# Patient Record
Sex: FEMALE | Race: WHITE | NOT HISPANIC OR LATINO | Employment: OTHER | URBAN - METROPOLITAN AREA
[De-identification: names, ages, dates, MRNs, and addresses within clinical notes are randomized per-mention and may not be internally consistent; named-entity substitution may affect disease eponyms.]

---

## 2017-01-06 ENCOUNTER — HOSPITAL ENCOUNTER (OUTPATIENT)
Dept: GENERAL RADIOLOGY | Facility: HOSPITAL | Age: 67
Discharge: HOME | End: 2017-01-06
Attending: INTERNAL MEDICINE | Admitting: INTERNAL MEDICINE

## 2017-01-11 ENCOUNTER — HOSPITAL ENCOUNTER (OUTPATIENT)
Dept: MRI IMAGING | Facility: HOSPITAL | Age: 67
Discharge: HOME | End: 2017-01-11
Attending: INTERNAL MEDICINE | Admitting: INTERNAL MEDICINE

## 2017-01-16 ENCOUNTER — HOSPITAL ENCOUNTER (OUTPATIENT)
Dept: PAIN MEDICINE | Facility: HOSPITAL | Age: 67
Discharge: HOME | End: 2017-01-16
Attending: PAIN MEDICINE | Admitting: PAIN MEDICINE

## 2017-03-07 ENCOUNTER — HOSPITAL ENCOUNTER (OUTPATIENT)
Dept: CARDIOLOGY | Facility: HOSPITAL | Age: 67
Discharge: HOME | End: 2017-03-07
Attending: INTERNAL MEDICINE | Admitting: INTERNAL MEDICINE

## 2018-01-18 ENCOUNTER — HOSPITAL ENCOUNTER (OUTPATIENT)
Dept: PAIN MEDICINE | Facility: HOSPITAL | Age: 68
Discharge: HOME | End: 2018-01-18
Attending: PHYSICAL MEDICINE & REHABILITATION | Admitting: PHYSICAL MEDICINE & REHABILITATION

## 2018-02-01 ENCOUNTER — HOSPITAL ENCOUNTER (OUTPATIENT)
Dept: PAIN MEDICINE | Facility: HOSPITAL | Age: 68
Discharge: HOME | End: 2018-02-01
Attending: PHYSICAL MEDICINE & REHABILITATION | Admitting: PHYSICAL MEDICINE & REHABILITATION

## 2018-02-22 ENCOUNTER — HOSPITAL ENCOUNTER (OUTPATIENT)
Dept: PAIN MEDICINE | Facility: HOSPITAL | Age: 68
Discharge: HOME | End: 2018-02-22
Attending: PHYSICAL MEDICINE & REHABILITATION | Admitting: PHYSICAL MEDICINE & REHABILITATION

## 2018-03-08 ENCOUNTER — HOSPITAL ENCOUNTER (OUTPATIENT)
Dept: PAIN MEDICINE | Facility: HOSPITAL | Age: 68
Discharge: HOME | End: 2018-03-08
Attending: PHYSICAL MEDICINE & REHABILITATION | Admitting: PHYSICAL MEDICINE & REHABILITATION

## 2018-05-08 ENCOUNTER — HOSPITAL ENCOUNTER (OUTPATIENT)
Dept: PAIN MEDICINE | Facility: HOSPITAL | Age: 68
Discharge: HOME | End: 2018-05-08
Attending: PHYSICAL MEDICINE & REHABILITATION | Admitting: PHYSICAL MEDICINE & REHABILITATION

## 2018-05-15 ENCOUNTER — HOSPITAL ENCOUNTER (OUTPATIENT)
Dept: ORTHOPEDIC SURGERY | Facility: CLINIC | Age: 68
Discharge: HOME | End: 2018-05-15
Attending: ORTHOPAEDIC SURGERY | Admitting: ORTHOPAEDIC SURGERY

## 2018-06-28 ENCOUNTER — HOSPITAL ENCOUNTER (OUTPATIENT)
Dept: ORTHOPEDIC SURGERY | Facility: CLINIC | Age: 68
Discharge: HOME | End: 2018-06-28
Attending: PHYSICIAN ASSISTANT | Admitting: PHYSICIAN ASSISTANT

## 2018-07-16 ENCOUNTER — HOSPITAL ENCOUNTER (OUTPATIENT)
Dept: PHYSICAL THERAPY | Facility: HOSPITAL | Age: 68
Setting detail: RECURRING SERIES
Discharge: HOME | End: 2018-09-26
Attending: PHYSICIAN ASSISTANT | Admitting: PHYSICIAN ASSISTANT

## 2018-08-13 ENCOUNTER — HOSPITAL ENCOUNTER (OUTPATIENT)
Dept: PAIN MEDICINE | Facility: HOSPITAL | Age: 68
Discharge: HOME | End: 2018-08-13
Attending: PHYSICAL MEDICINE & REHABILITATION | Admitting: PHYSICAL MEDICINE & REHABILITATION

## 2018-12-06 ENCOUNTER — HOSPITAL ENCOUNTER (OUTPATIENT)
Dept: PAIN MEDICINE | Facility: HOSPITAL | Age: 68
Discharge: HOME | End: 2018-12-06
Attending: PHYSICAL MEDICINE & REHABILITATION | Admitting: PHYSICAL MEDICINE & REHABILITATION

## 2018-12-21 ENCOUNTER — HOSPITAL ENCOUNTER (OUTPATIENT)
Dept: MRI IMAGING | Facility: HOSPITAL | Age: 68
Discharge: HOME | End: 2018-12-21
Attending: PHYSICAL MEDICINE & REHABILITATION | Admitting: PHYSICAL MEDICINE & REHABILITATION

## 2019-01-10 ENCOUNTER — HOSPITAL ENCOUNTER (OUTPATIENT)
Dept: PAIN MEDICINE | Facility: HOSPITAL | Age: 69
Discharge: HOME | End: 2019-01-10
Attending: PHYSICAL MEDICINE & REHABILITATION | Admitting: PHYSICAL MEDICINE & REHABILITATION

## 2019-02-01 ENCOUNTER — HOSPITAL ENCOUNTER (OUTPATIENT)
Dept: FAMILY MEDICINE CLINIC | Facility: CLINIC | Age: 69
Setting detail: SPECIMEN
Discharge: HOME | End: 2019-02-01
Attending: INTERNAL MEDICINE | Admitting: INTERNAL MEDICINE

## 2019-02-01 LAB
ALBUMIN SERPL-MCNC: 3.6 G/DL (ref 3.5–4.8)
ALBUMIN/GLOB SERPL: 1.2 {RATIO} (ref 1–1.7)
ALP SERPL-CCNC: 81 IU/L (ref 32–91)
ALT SERPL-CCNC: 17 IU/L (ref 14–54)
ANION GAP SERPL CALC-SCNC: 14.2 MMOL/L (ref 10–20)
AST SERPL-CCNC: 25 IU/L (ref 15–41)
BILIRUB SERPL-MCNC: 0.3 MG/DL (ref 0.3–1.2)
BUN SERPL-MCNC: 12 MG/DL (ref 8–20)
BUN/CREAT SERPL: 17.1 (ref 5.4–26.2)
CALCIUM SERPL-MCNC: 9.6 MG/DL (ref 8.9–10.3)
CHLORIDE SERPL-SCNC: 98 MMOL/L (ref 101–111)
CONV CO2: 24 MMOL/L (ref 22–32)
CONV TOTAL PROTEIN: 6.7 G/DL (ref 6.1–7.9)
CREAT UR-MCNC: 0.7 MG/DL (ref 0.4–1)
ERYTHROCYTE [SEDIMENTATION RATE] IN BLOOD BY WESTERGREN METHOD: 82 MM/HR (ref 0–30)
GLOBULIN UR ELPH-MCNC: 3.1 G/DL (ref 2.5–3.8)
GLUCOSE SERPL-MCNC: 85 MG/DL (ref 65–99)
POTASSIUM SERPL-SCNC: 4.2 MMOL/L (ref 3.6–5.1)
SODIUM SERPL-SCNC: 132 MMOL/L (ref 136–144)

## 2019-02-04 LAB
ANA SER QL IA: NORMAL

## 2019-02-13 ENCOUNTER — HOSPITAL ENCOUNTER (OUTPATIENT)
Dept: LAB | Facility: HOSPITAL | Age: 69
Discharge: HOME | End: 2019-02-13
Attending: NURSE PRACTITIONER | Admitting: NURSE PRACTITIONER

## 2019-02-13 LAB
ALBUMIN SERPL-MCNC: 3.5 G/DL (ref 3.5–4.8)
ALPHA1 GLOB FLD ELPH-MCNC: 0.3 GM/DL (ref 0.1–0.4)
ALPHA2 GLOB SERPL ELPH-MCNC: 1 GM/DL (ref 0.5–1)
B-GLOBULIN SERPL ELPH-MCNC: 1 GM/DL (ref 0.7–1.4)
BILIRUB UR QL STRIP: NEGATIVE MG/DL
CASTS URNS QL MICRO: ABNORMAL /[LPF]
CK SERPL-CCNC: 65 IU/L (ref 38–234)
COLOR UR: YELLOW
CONV BACTERIA IN URINE MICRO: NEGATIVE
CONV CLARITY OF URINE: CLEAR
CONV HYALINE CASTS IN URINE MICRO: 0 /[LPF] (ref 0–5)
CONV PROTEIN IN URINE BY AUTOMATED TEST STRIP: NEGATIVE MG/DL
CONV SMALL ROUND CELLS: ABNORMAL /[HPF]
CONV TOTAL PROTEIN: 7.1 G/DL (ref 6.1–7.9)
CONV UROBILINOGEN IN URINE BY AUTOMATED TEST STRIP: 0.2 MG/DL
CRP SERPL-MCNC: 0.41 MG/DL (ref 0–0.7)
CULTURE INDICATED?: ABNORMAL
ERYTHROCYTE [DISTWIDTH] IN BLOOD BY AUTOMATED COUNT: 14.3 % (ref 11.5–14.5)
GAMMA GLOB SERPL ELPH-MCNC: 1.2 GM/DL (ref 0.6–1.6)
GLUCOSE UR QL: NEGATIVE MG/DL
HCT VFR BLD AUTO: 36.7 % (ref 35–49)
HGB BLD-MCNC: 12 G/DL (ref 12–15)
HGB UR QL STRIP: NEGATIVE
INSULIN SERPL-ACNC: NORMAL U[IU]/ML
KETONES UR QL STRIP: NEGATIVE MG/DL
LEUKOCYTE ESTERASE UR QL STRIP: ABNORMAL
MAGNESIUM SERPL-MCNC: 1.9 MG/DL (ref 1.8–2.5)
MCH RBC QN AUTO: 34.5 PG (ref 26–32)
MCHC RBC AUTO-ENTMCNC: 32.7 G/DL (ref 32–36)
MCV RBC AUTO: 105.2 FL (ref 80–94)
NITRITE UR QL STRIP: NEGATIVE
PH UR STRIP.AUTO: 7 [PH] (ref 4.5–8)
PLATELET # BLD AUTO: 499 10*3/UL (ref 150–450)
PMV BLD AUTO: 7.4 FL (ref 7.4–10.4)
RBC # BLD AUTO: 3.49 10*6/UL (ref 4–5.4)
RBC #/AREA URNS HPF: 0 /[HPF] (ref 0–3)
SP GR UR: 1.01 (ref 1–1.03)
SPERM URNS QL MICRO: ABNORMAL /[HPF]
SQUAMOUS SPT QL MICRO: 3 /[HPF] (ref 0–5)
UNIDENT CRYS URNS QL MICRO: ABNORMAL /[HPF]
WBC # BLD AUTO: 11.5 10*3/UL (ref 4.5–11.5)
WBC #/AREA URNS HPF: 4 /[HPF] (ref 0–5)
YEAST SPEC QL WET PREP: ABNORMAL /[HPF]

## 2019-02-15 LAB
ANA SER QL IA: NORMAL
C3 SERPL-MCNC: 110 MG/DL (ref 79–152)
C4 SERPL-MCNC: 26.4 MG/DL (ref 18–55)

## 2019-04-15 ENCOUNTER — HOSPITAL ENCOUNTER (OUTPATIENT)
Dept: ORTHOPEDIC SURGERY | Facility: CLINIC | Age: 69
Discharge: HOME | End: 2019-04-15
Attending: NEUROLOGICAL SURGERY | Admitting: NEUROLOGICAL SURGERY

## 2019-06-03 ENCOUNTER — CONVERSION ENCOUNTER (OUTPATIENT)
Dept: ORTHOPEDIC SURGERY | Facility: CLINIC | Age: 69
End: 2019-06-03

## 2019-06-03 ENCOUNTER — HOSPITAL ENCOUNTER (OUTPATIENT)
Dept: ORTHOPEDIC SURGERY | Facility: CLINIC | Age: 69
Discharge: HOME | End: 2019-06-03
Attending: NEUROLOGICAL SURGERY | Admitting: NEUROLOGICAL SURGERY

## 2019-06-04 VITALS
HEIGHT: 65 IN | DIASTOLIC BLOOD PRESSURE: 85 MMHG | SYSTOLIC BLOOD PRESSURE: 125 MMHG | HEART RATE: 79 BPM | BODY MASS INDEX: 31.95 KG/M2

## 2019-06-06 NOTE — PROGRESS NOTES
Referring Provider:  Uyen Cleveland MD  Primary Provider:  Uyen Cleveland MD    CC:  back pain.    History of Present Illness:  Mrs. Schoen He is here today for her 2 week postoperative follow-up after undergoing a T10 through S2 posterior thoracic decompression and arthrodesis with correction of sagittal coronal deformity.  She states that when she left the rehab the MS Contin was   DC age.  The pain is now somewhat returns but mainly all in the right upper gluteal flank region.  Her preoperative back and leg pain have all resolved.  She is mildly tender palpation in the right superior gluteal region over the iliac crest.  her   incision is healing very well.  Her standing x-rays today demonstrate good hardware placement and spinal alignment.  At this time I feel that some of her pain is more mild fashion related.  Will start course of physical therapy would deep tissue and core   strengthening as well as prescribed her her MS Contin 50 mg p.o. twice a day.  I will see her back in 2 months with repeat standing x-rays.  She is to continue wearing her brace at this time.      Past Medical History:     Reviewed history from 08/09/2018 and no changes required:        stress test normal (2000)        HTN        hospitalizations: childbirth, surgery        thyroid problems        Inflammatory Arthritis         Rheumatology - Dr. Stein        Anxiety Disorder        Depression        Polymyalgia Rheumatica         left knee pain        No Drug Allergies?     Past Surgical History:     Reviewed history from 06/19/2018 and no changes required:        ruptured appendix        Left TKR 6/13/18 Dr. Vega        5/19/19 T10-S1 posterior arthrodesis correction of sagittal  coronal deformity with L1-S1 decompression - Dr Emmanuel Ley        Social History:     Passive Smoke: Y     Alcohol Use: N     Drug Use: N     HIV/High Risk: N     Regular Exercise: N     Hx Domestic Abuse: N     Yazdanism Affecting Care: N      Marital Status:      Children:      Occupation:      Smoking History:     Patient currently smokes every day.     Patient has been counseled to quit.        Risk Factors:     Smoked Tobacco Use:  Current every day smoker     Cigarettes:  Yes -- 1 pack(s) per day,      Years smoked:  30  Smokeless Tobacco Use:  Never     Counseled to quit/cut down:  yes  Passive smoke exposure:  yes  Drug use:  no  HIV high-risk behavior:  no  Alcohol use:  no  Exercise:  no  Seatbelt use:  100 %  Sun Exposure:  occasionally      Vital Signs:    Patient Profile:    69 Years Old Female  Height:     65 inches (165.10 cm)  Pulse rate: 79 / minute  BP Sittin / 85        Problems: Active problems were reviewed with the patient during this visit.  Medications: Medications were reviewed with the patient during this visit.  Allergies: Allergies were reviewed with the patient during this visit.  No Known Allergy.        Vitals Entered By: Erica MORA (Makayla  3, 2019 12:55 PM)    Active Medications (reviewed today):  NORVASC 2.5 MG ORAL TABLET (AMLODIPINE BESYLATE) take one tablet daily  MEDROL 4 MG ORAL TABLET THERAPY PACK (METHYLPREDNISOLONE) by mouth as directed on package  TIZANIDINE HCL 2 MG ORAL TABLET (TIZANIDINE HCL) 1 tab po Q8hrs as needed for muscle spasms.  PREDNISONE 20 MG ORAL TABLET (PREDNISONE) 2 po qd x 4 days then 1 po qd x 10 days, then 1/2 daily  LOSARTAN POTASSIUM 100 MG ORAL TABLET (LOSARTAN POTASSIUM) one tablet by mouth daily  GABAPENTIN 300 MG CAPSULE (GABAPENTIN) TAKE 3 in the morning, 1 in the afternoon nan 3 at night  LEVOTHYROXINE SODIUM 125 MCG ORAL TABLET (LEVOTHYROXINE SODIUM) one tablet by mouth daily    Current Allergies (reviewed today):  No known allergies    Current Medications (including medications started today):   HYDROCODONE-ACETAMINOPHEN 5-325 MG ORAL TABLET (HYDROCODONE-ACETAMINOPHEN) 1 po qid  NORVASC 2.5 MG ORAL TABLET (AMLODIPINE BESYLATE) take one tablet daily  MEDROL 4 MG ORAL  TABLET THERAPY PACK (METHYLPREDNISOLONE) by mouth as directed on package  TIZANIDINE HCL 2 MG ORAL TABLET (TIZANIDINE HCL) 1 tab po Q8hrs as needed for muscle spasms.  PREDNISONE 20 MG ORAL TABLET (PREDNISONE) 2 po qd x 4 days then 1 po qd x 10 days, then 1/2 daily  LOSARTAN POTASSIUM 100 MG ORAL TABLET (LOSARTAN POTASSIUM) one tablet by mouth daily  GABAPENTIN 300 MG CAPSULE (GABAPENTIN) TAKE 3 in the morning, 1 in the afternoon nan 3 at night  LEVOTHYROXINE SODIUM 125 MCG ORAL TABLET (LEVOTHYROXINE SODIUM) one tablet by mouth daily          Blood Pressure:  Today's BP: 125/85 mm Hg    Labwork:   Most Recent Lab Results:   LDL: na mg/dL 11/29/2012  HbA1c: : 5.5 % 06/04/2018          Medications Added to Medication List This Visit:  1)  Hydrocodone-acetaminophen 5-325 Mg Oral Tablet (Hydrocodone-acetaminophen) .... 1 po qid      ]      Electronically signed by Emmanuel Ley MD on 06/03/2019 at 4:43 PM  ________________________________________________________________________  Clinical Lists Changes    Orders:  Added new Referral order of Home Physical Therapy (HPT) - Signed                          Electronically signed by Kimberly Jones CMA on 06/05/2019 at 2:03 PM  ________________________________________________________________________       Disclaimer: Converted Note message may not contain all data elements that existed in the legacy source system. Please see [facility] [facility] for the original note details.

## 2019-06-27 ENCOUNTER — TELEPHONE (OUTPATIENT)
Dept: NEUROSURGERY | Facility: CLINIC | Age: 69
End: 2019-06-27

## 2019-06-28 DIAGNOSIS — M43.20 FUSION OF SPINE, UNSPECIFIED SPINAL REGION: Primary | ICD-10-CM

## 2019-07-22 RX ORDER — GABAPENTIN 300 MG/1
CAPSULE ORAL
Qty: 210 CAPSULE | Refills: 2 | Status: SHIPPED | OUTPATIENT
Start: 2019-07-22 | End: 2019-10-21 | Stop reason: SDUPTHER

## 2019-07-30 ENCOUNTER — OFFICE VISIT (OUTPATIENT)
Dept: PHYSICAL THERAPY | Facility: CLINIC | Age: 69
End: 2019-07-30

## 2019-07-30 DIAGNOSIS — M43.20 FUSION OF SPINE, UNSPECIFIED SPINAL REGION: Primary | ICD-10-CM

## 2019-07-30 PROCEDURE — 97162 PT EVAL MOD COMPLEX 30 MIN: CPT | Performed by: PHYSICAL THERAPIST

## 2019-07-30 PROCEDURE — 97110 THERAPEUTIC EXERCISES: CPT | Performed by: PHYSICAL THERAPIST

## 2019-07-30 NOTE — PROGRESS NOTES
Physical Therapy Initial Evaluation and Plan of Care    Patient: Connie A Schoen   : 1950  Diagnosis/ICD-10 Code:  Fusion of spine, unspecified spinal region [M43.20]  Referring practitioner: Iram Arambula PA-C  Date of Initial Visit: 2019  Today's Date: 2019  Patient seen for 1 sessions           Subjective Questionnaire: Oswestry: 32%      Subjective Evaluation    History of Present Illness  Mechanism of injury: Pt reports she had spinal fusion May 17, 2019 then she went to [facility] for a week  after being in the hospital for 1 week.  She had OT for 1 week at home and PT for 3 weeks then DC'd.  She ended up with a stomach bug in early to mid July and was not able to come in to PT til now.  She walked as much as she could at home and is still walking but feels like she is behind on her progress.  She is unsure how long she is to wear her brace or how long she cannot bend; she is wearing her bone stimulator 2 hours per day.  She sees MD again on .  She is not employed; she enjoys playing with [employer], doing yardwork, attending sporting events including sitting on bleachers.  Symptoms prior to surgery included pain in right leg to foot, pain in right side of back.  All of that is gone now.    Her current restrictions are no bending, twisting or lifting.      Quality of life: good    Pain  Current pain ratin  At best pain ratin  At worst pain ratin  Quality: dull ache  Relieving factors: rest and medications  Aggravating factors: standing    Social Support  Lives in: multiple-level home (one step entry)  Lives with: spouse    Treatments  Previous treatment: home therapy  Discharged from (in last 30 days): home health care  Patient Goals  Patient goals for therapy: decreased pain, increased motion, independence with ADLs/IADLs and return to sport/leisure activities             Objective       Active Range of Motion     Additional Active Range of Motion Details  Deferred due to  surgical precautions    SLR: right 55 deg            Left 65 deg    Strength/Myotome Testing     Left Hip   Planes of Motion   Flexion: 4  Extension: 4  Abduction: 4    Right Hip   Planes of Motion   Flexion: 4  Extension: 4  Abduction: 4    Left Knee   Flexion: 4+  Extension: 4+    Right Knee   Flexion: 4+  Extension: 4+    Left Ankle/Foot   Dorsiflexion: 4+  Plantar flexion: 4+  Inversion: 4+  Eversion: 4+    Right Ankle/Foot   Dorsiflexion: 4+  Plantar flexion: 4+  Inversion: 4+  Eversion: 4+         Assessment & Plan     Assessment  Impairments: abnormal or restricted ROM, activity intolerance, impaired physical strength, lacks appropriate home exercise program, pain with function and weight-bearing intolerance  Assessment details: Pt is a 70 y/o wf with a dx of s/p spinal fusion May 17, 2019.  She went to rehab then had home health PT.  She has little pain with the most being while standing.  She has been walking as much as possible but had been ill with at stomach bug for 11 days.  She has a goal of getting back to yardwork and going to sporting events and activities with her grandkids.  She exhibits the above impairments and would benefit from skilled PT to address those, dec pain and maximize function.  Prognosis: good  Functional Limitations: carrying objects, walking and standing  Goals  Plan Goals: STGs:  3 weeks  1.  Pt to tolerate initial HEP without inc pain.  2.  Pt to tolerate advancement of HEP without inc pain.  3.  Pt to report pain has decreased by at least 25%.  4.  Assess spine ROM when appropriate and allowed per protocol.  5.  Pt to sit with proper posture without cues required.    6.  Obtain information from MD office re: restrictions and protocol.      LTGs:  By DC  1.  Pt to be (I) with HEP.  2.  Pt to report pain has decreased by at least 75% to allow better tolerance to and  performance of daily tasks.  3.  Pt to improve AROM of lumbar spine  to WFL considering her surgical status to  allow normal performance of daily tasks.  4.  Pt to improve strength of right LE to 4+/5 or better to be adequate for           completion/performance of daily tasks.  5.  Pt to exhibit improved function as indicated by improved score on oswestry vs score at eval.    6.  Pt to demonstrate (I) and appropriate use of body mechanics for daily home and/or work tasks.    Plan  Therapy options: will be seen for skilled physical therapy services  Planned modality interventions: thermotherapy (hydrocollator packs) and TENS  Planned therapy interventions: abdominal trunk stabilization, body mechanics training, home exercise program, therapeutic activities, strengthening, stretching, postural training, neuromuscular re-education and manual therapy  Frequency: 2x week  Duration in visits: 20  Treatment plan discussed with: patient        Timed:         Manual Therapy:         mins  31801;     Therapeutic Exercise:   15      mins  13848;     Neuromuscular Brooklynn:        mins  09647;    Therapeutic Activity:          mins  43078;     Gait Training:           mins  38086;     Ultrasound:          mins  79151;    Ionto:                                   mins   27391  Self Care:                            mins   66853  Canalith Repos:                  mins   36711      Un-Timed:  Electrical Stimulation:         mins  14599 ( );  Traction          mins 11530  Low Eval          Mins  75990  Mod Eval   40       Mins  26598  High Eval                            Mins  46490  Re-Eval                               mins  70218        Timed Treatment:  15    mins   Total Treatment:    55    mins    PT SIGNATURE: Vicki Deshpande PT   DATE TREATMENT INITIATED: 7/30/2019    Initial Certification  Certification Period: 10/28/2019  I certify that the therapy services are furnished while this patient is under my care.  The services outlined above are required by this patient, and will be reviewed every 90 days.     PHYSICIAN: Iram Arambula,  PA-C      DATE:     Please sign and return via fax to [phone].. Thank you, [facility].

## 2019-08-08 ENCOUNTER — OFFICE VISIT (OUTPATIENT)
Dept: PHYSICAL THERAPY | Facility: CLINIC | Age: 69
End: 2019-08-08

## 2019-08-08 DIAGNOSIS — M43.20 FUSION OF SPINE, UNSPECIFIED SPINAL REGION: Primary | ICD-10-CM

## 2019-08-08 PROCEDURE — 97110 THERAPEUTIC EXERCISES: CPT | Performed by: PHYSICAL THERAPIST

## 2019-08-08 NOTE — PROGRESS NOTES
Physical Therapy Daily Progress Note    VISIT#: 2    Subjective Evaluation    History of Present Illness  Mechanism of injury: Pt reports she has been having issues with bowels where she has to get to the BR quickly; at times she has a normal BM at home then she ends up with diarrhea.  She was scheduled for a colonoscopy before surgery and it had to be cancelled due to the surgery.  She has a family history of colon CA.    Back pain seems to be doing OK; she feels it in her ribs at times.    Pain  Current pain ratin             Objective     See Exercise, Manual, and Modality Logs for complete treatment.   Reviewed HEP from last session;  Added to program  Waiting to hear from surgeon's office re:  Restrictions at this point in recovery    Patient Education:  Follow restrictions as she has been told per MD office until further notice    Assessment & Plan     Assessment  Assessment details: Pt tolerated there ex well today with some difficulty due to incoordination          Progress per Plan of Care            Timed:         Manual Therapy:         mins  11432;     Therapeutic Exercise:   30      mins  42435;     Neuromuscular Brooklynn:        mins  96466;    Therapeutic Activity:          mins  10656;     Gait Training:           mins  13687;     Ultrasound:          mins  77148;    Ionto                                   mins   95254  Self Care                            mins   49758  Canalith Repos                   mins  4209    Un-Timed:  Electrical Stimulation:         mins  48869 ( );  Dry Needling          mins self-pay  Traction          mins 49500  Low Eval          Mins  87431  Mod Eval          Mins  19042  High Eval                            Mins  48721  Re-Eval                               mins  52275    Timed Treatment: 30     mins   Total Treatment:        mins    Vicki Deshpande, PT  Physical Therapist

## 2019-08-13 ENCOUNTER — OFFICE VISIT (OUTPATIENT)
Dept: PHYSICAL THERAPY | Facility: CLINIC | Age: 69
End: 2019-08-13

## 2019-08-13 DIAGNOSIS — M43.20 FUSION OF SPINE, UNSPECIFIED SPINAL REGION: Primary | ICD-10-CM

## 2019-08-13 PROCEDURE — 97110 THERAPEUTIC EXERCISES: CPT | Performed by: PHYSICAL THERAPIST

## 2019-08-13 NOTE — PROGRESS NOTES
Physical Therapy Daily Progress Note    VISIT#: 3    Subjective Evaluation    History of Present Illness  Mechanism of injury: Reports she has been doing well with exercises.  Cannot do the arm thing on the left at all due to pain in the arm             Objective     See Exercise, Manual, and Modality Logs for complete treatment.   Cont with there ex and added to HEP today    Patient Education:    Assessment & Plan     Assessment  Assessment details: Tolerated well with additions of ex today for home          Progress per Plan of Care            Timed:         Manual Therapy:         mins  92416;     Therapeutic Exercise:   30      mins  72309;     Neuromuscular Brooklynn:        mins  30257;    Therapeutic Activity:          mins  68997;     Gait Training:           mins  26900;     Ultrasound:          mins  42213;    Ionto                                   mins   99142  Self Care                            mins   48087  Canalith Repos                   mins  4209    Un-Timed:  Electrical Stimulation:         mins  93253 ( );  Dry Needling          mins self-pay  Traction          mins 88146  Low Eval          Mins  71114  Mod Eval          Mins  51294  High Eval                            Mins  63057  Re-Eval                               mins  62427    Timed Treatment:  30    mins   Total Treatment:   30     mins    Vicki Deshpande PT  Physical Therapist

## 2019-08-15 ENCOUNTER — OFFICE VISIT (OUTPATIENT)
Dept: PHYSICAL THERAPY | Facility: CLINIC | Age: 69
End: 2019-08-15

## 2019-08-15 DIAGNOSIS — M43.20 FUSION OF SPINE, UNSPECIFIED SPINAL REGION: Primary | ICD-10-CM

## 2019-08-15 PROCEDURE — 97110 THERAPEUTIC EXERCISES: CPT | Performed by: PHYSICAL THERAPIST

## 2019-08-15 NOTE — PROGRESS NOTES
Physical Therapy Daily Progress Note    VISIT#: 4    Subjective Evaluation    History of Present Illness  Mechanism of injury: She is sore today in her thighs and she is feeling like her back was having issues during washing the dishes;  Fatigue in a way but definitely discomfort.      Pain  Current pain ratin             Objective     See Exercise, Manual, and Modality Logs for complete treatment.   Cont with there ex as before reviewing HEP and adding new ex to program    Pt had left knee pain with right hip ext and ad in standing; unlocking left knee relieved that pain    Patient Education/Ther Act:  instr in standing with foot on shelf of lower cabinet during kitchen or bathroom tasks to dec force on back; she returned demo and is (I) with this and voices understanding    Assessment & Plan     Assessment  Assessment details: Good tolerance to there ex today with some soreness reported in thighs from HEP          Progress per Plan of Care            Timed:         Manual Therapy:         mins  54317;     Therapeutic Exercise:  35       mins  64148;     Neuromuscular Brooklynn:        mins  10734;    Therapeutic Activity:          mins  60823;     Gait Training:           mins  97771;     Ultrasound:          mins  95037;    Ionto                                   mins   17595  Self Care                            mins   53450  Canalith Repos                   mins  4209    Un-Timed:  Electrical Stimulation:         mins  56939 ( );  Dry Needling          mins self-pay  Traction          mins 37835  Low Eval          Mins  82722  Mod Eval          Mins  85188  High Eval                            Mins  57067  Re-Eval                               mins  96293    Timed Treatment:  35    mins   Total Treatment:    35    mins    Vicki Deshpande PT  Physical Therapist

## 2019-08-22 ENCOUNTER — OFFICE VISIT (OUTPATIENT)
Dept: PHYSICAL THERAPY | Facility: CLINIC | Age: 69
End: 2019-08-22

## 2019-08-22 DIAGNOSIS — M43.20 FUSION OF SPINE, UNSPECIFIED SPINAL REGION: Primary | ICD-10-CM

## 2019-08-22 PROCEDURE — 97110 THERAPEUTIC EXERCISES: CPT | Performed by: PHYSICAL THERAPIST

## 2019-08-22 NOTE — PROGRESS NOTES
Physical Therapy Daily Progress Note    VISIT#: 5    Subjective Evaluation    History of Present Illness  Mechanism of injury: Pt reports she went out of town and did a lot of walking and her right leg started to hurt/burn and get numb to the knee while in the car sitting for the ride on the way home.  The next day the upper thigh swelled.  Cancelled Monday PT appt due to still burning and swollen.  Tues and Wed it started to get better but even walking around the house caused burning.  She has pretty much stopped the ex given to her from PT due to fear of swelling and pain.  She feels like she is pounding on her right leg when she walks.  She also spent the day in the hospital with her son.      Pain  Current pain ratin             Objective     See Exercise, Manual, and Modality Logs for complete treatment.   Cont with supine and SL there ex to assess effect;  No inc pain with these    Patient Education: try to resume lying and or sitting ex at home then add in the standing ex as able/tolerated    Assessment & Plan     Assessment  Assessment details: iva well today without inc pain with ex here in clinic          Progress per Plan of Care            Timed:         Manual Therapy:         mins  40327;     Therapeutic Exercise:   40      mins  68037;     Neuromuscular Brooklynn:        mins  76486;    Therapeutic Activity:          mins  38126;     Gait Training:           mins  83985;     Ultrasound:          mins  54029;    Ionto                                   mins   93874  Self Care                            mins   36320  Canalith Repos                   mins  4209    Un-Timed:  Electrical Stimulation:         mins  08360 ( );  Dry Needling          mins self-pay  Traction          mins 73720  Low Eval          Mins  83851  Mod Eval          Mins  21278  High Eval                            Mins  24054  Re-Eval                               mins  22690    Timed Treatment:  40    mins   Total Treatment:         francisco javier Deshpande, PT  Physical Therapist

## 2019-08-26 ENCOUNTER — OFFICE VISIT (OUTPATIENT)
Dept: PHYSICAL THERAPY | Facility: CLINIC | Age: 69
End: 2019-08-26

## 2019-08-26 DIAGNOSIS — M43.20 FUSION OF SPINE, UNSPECIFIED SPINAL REGION: Primary | ICD-10-CM

## 2019-08-26 PROCEDURE — 97530 THERAPEUTIC ACTIVITIES: CPT | Performed by: PHYSICAL THERAPIST

## 2019-08-26 PROCEDURE — 97110 THERAPEUTIC EXERCISES: CPT | Performed by: PHYSICAL THERAPIST

## 2019-08-26 NOTE — PROGRESS NOTES
Physical Therapy Daily Progress Note    VISIT#: 6    Subjective Evaluation    History of Present Illness  Mechanism of injury: Pt reports she had inc pain from standing to mick krishnamurthyon; walking/standing combo more than 15 mins it starts to hurt.  Last week she went shopping for a bit after PT (at least 45 mins shopping).  She has not added back in the lying exercises.  She forgets a lot of the time due to being busy.  During the other act that have caused pain she hurts in the rib area rated at 3-4/10.      Pain  Current pain ratin             Objective     See Exercise, Manual, and Modality Logs for complete treatment.   Cont with there ex as noted  THERE ACT:  instr pt in squatting and/or golfer's bend to get something from the floor    Patient Education:  Follow your body:  Listen to pain signals and what you are doing at the time the pain starts; modify positions as needed to dec pressure on the spine    Assessment & Plan     Assessment  Assessment details: Pt cont to over-work herself and she experiences pain in spine that at times lingers for hours and she does not get relief unless she lies down          Progress per Plan of Care            Timed:         Manual Therapy:         mins  85287;     Therapeutic Exercise:  30       mins  52130;     Neuromuscular Brooklynn:        mins  84442;    Therapeutic Activity:   10       mins  36906;     Gait Training:           mins  69224;     Ultrasound:          mins  75189;    Ionto                                   mins   20213  Self Care                            mins   29741  Canalith Repos                   mins  4209    Un-Timed:  Electrical Stimulation:         mins  92683 ( );  Dry Needling          mins self-pay  Traction          mins 76156  Low Eval          Mins  50555  Mod Eval          Mins  80406  High Eval                            Mins  49937  Re-Eval                               mins  26185    Timed Treatment:  40    mins   Total Treatment:    40     francisco javier Deshpande, PT  Physical Therapist

## 2019-09-03 ENCOUNTER — OFFICE VISIT (OUTPATIENT)
Dept: PHYSICAL THERAPY | Facility: CLINIC | Age: 69
End: 2019-09-03

## 2019-09-03 DIAGNOSIS — M43.20 FUSION OF SPINE, UNSPECIFIED SPINAL REGION: Primary | ICD-10-CM

## 2019-09-03 PROCEDURE — 97110 THERAPEUTIC EXERCISES: CPT | Performed by: PHYSICAL THERAPIST

## 2019-09-03 NOTE — PROGRESS NOTES
Physical Therapy Daily Progress Note    VISIT#: 7    Subjective Evaluation    History of Present Illness  Mechanism of injury: Pt came in today without the brace; she didn't wear it over the weekend and she felt better without pain in the ribs.  She had been doing the ex but this weekend was bad for doing them twice a day.  She does them in the am about 10:00 then again in the evening around 5 pm but the weekends are bad due to being busy, having company,etc.      Pain  Current pain ratin             Objective     See Exercise, Manual, and Modality Logs for complete treatment.   Cont with there ex        Assessment & Plan     Assessment  Assessment details: Performed well with new addition today for strengthening and balance          Progress per Plan of Care            Timed:         Manual Therapy:         mins  63111;     Therapeutic Exercise:   30      mins  49306;     Neuromuscular Brooklynn:        mins  20491;    Therapeutic Activity:          mins  59392;     Gait Training:           mins  70642;     Ultrasound:          mins  36286;    Ionto                                   mins   10156  Self Care                            mins   85022  Canalith Repos                   mins  4209    Un-Timed:  Electrical Stimulation:         mins  59721 ( );  Dry Needling          mins self-pay  Traction          mins 60465  Low Eval          Mins  46344  Mod Eval          Mins  26248  High Eval                            Mins  04959  Re-Eval                               mins  26758    Timed Treatment: 30     mins   Total Treatment:    30    mins    Vicki Deshpande PT  Physical Therapist

## 2019-09-05 ENCOUNTER — OFFICE VISIT (OUTPATIENT)
Dept: NEUROSURGERY | Facility: CLINIC | Age: 69
End: 2019-09-05

## 2019-09-05 VITALS
SYSTOLIC BLOOD PRESSURE: 121 MMHG | RESPIRATION RATE: 18 BRPM | HEIGHT: 65 IN | HEART RATE: 74 BPM | DIASTOLIC BLOOD PRESSURE: 83 MMHG | WEIGHT: 183 LBS | OXYGEN SATURATION: 95 % | BODY MASS INDEX: 30.49 KG/M2

## 2019-09-05 DIAGNOSIS — M25.561 CHRONIC PAIN OF RIGHT KNEE: ICD-10-CM

## 2019-09-05 DIAGNOSIS — M41.57 SCOLIOSIS OF LUMBOSACRAL REGION DUE TO DEGENERATIVE DISEASE OF SPINE IN ADULT: ICD-10-CM

## 2019-09-05 DIAGNOSIS — M43.20 FUSION OF SPINE, UNSPECIFIED SPINAL REGION: Primary | ICD-10-CM

## 2019-09-05 DIAGNOSIS — G89.29 CHRONIC PAIN OF RIGHT KNEE: ICD-10-CM

## 2019-09-05 PROCEDURE — 99213 OFFICE O/P EST LOW 20 MIN: CPT | Performed by: NEUROLOGICAL SURGERY

## 2019-09-05 RX ORDER — LOSARTAN POTASSIUM 100 MG/1
TABLET ORAL
COMMUNITY
Start: 2019-08-12 | End: 2019-10-04 | Stop reason: SDUPTHER

## 2019-09-05 RX ORDER — LEVOTHYROXINE SODIUM 0.12 MG/1
TABLET ORAL
COMMUNITY
Start: 2019-08-08 | End: 2019-10-08 | Stop reason: SDUPTHER

## 2019-09-05 RX ORDER — SERTRALINE HYDROCHLORIDE 100 MG/1
TABLET, FILM COATED ORAL
COMMUNITY
Start: 2019-07-11 | End: 2020-01-30

## 2019-09-05 NOTE — PROGRESS NOTES
"Subjective   Connie A Schoen is a 69 y.o. female.     Chief Complaint   Patient presents with   • Follow-up     T10-S1 poster arthrodesis correction     Visit Vitals  /83 (BP Location: Right arm, Patient Position: Sitting, Cuff Size: Large Adult)   Pulse 74   Resp 18   Ht 165.1 cm (65\")   Wt 83 kg (183 lb)   SpO2 95%   BMI 30.45 kg/m²       History of Present Illness: Mrs Schoen is here today for follow-up.  She is a month status post a T10-S2 posterior decompression and arthrodesis with correction of sagittal deformity.  She states that she only has minimal back pain in all of her leg pain is resolved.  She is actually describing worsening right knee pain as she is been ambulating more.  She did have her left knee replaced several years ago.  She is seen orthopedics in the past for her knee.    The following portions of the patient's history were reviewed and updated as appropriate: allergies, current medications, past family history, past medical history, past social history, past surgical history and problem list.    Review of Systems      Past Surgical History:   Procedure Laterality Date   • APPENDECTOMY     • BACK SURGERY      T10-S1 poster lumbar fusion correction   • TOTAL KNEE ARTHROPLASTY Left        Past Medical History:   Diagnosis Date   • Anxiety    • Arthritis    • Depression    • HTN (hypertension)    • RA (rheumatoid arthritis) (CMS/[facility])    • Thyroid disease        Objective   Physical Exam  Neurologic Exam  Ortho Exam    Tender to palpation of the right knee at the patellar fat pad  No obvious edema  No focal motor deficits    Standing thoracolumbar films demonstrate all the hardware to be in good position with good correction of the sagittal coronal balance    Assessment and Plan: At this time Jazmine is doing quite well postoperatively.  I do feel that her knee is problematic probably more from redistribution of her balance and mobilizing more.  I will refer her to orthopedics for evaluation " of her knee.  I will see her back in 3 months with repeat standing films.  She is to come out of the brace at this time.  She can continue her physical therapy and continue working on her hip flexibility and hamstring flexibility.      Problems Addressed this Visit     None

## 2019-09-23 ENCOUNTER — TREATMENT (OUTPATIENT)
Dept: PHYSICAL THERAPY | Facility: CLINIC | Age: 69
End: 2019-09-23

## 2019-09-23 DIAGNOSIS — M43.20 FUSION OF SPINE, UNSPECIFIED SPINAL REGION: Primary | ICD-10-CM

## 2019-09-23 PROCEDURE — 97110 THERAPEUTIC EXERCISES: CPT | Performed by: PHYSICAL THERAPIST

## 2019-09-23 NOTE — PROGRESS NOTES
Physical Therapy Daily Progress Note    VISIT#: 8    Subjective Evaluation    History of Present Illness  Mechanism of injury: Pt has missed a lot of visits of PT due to various reasons but she is having a lot of pain and into her ribs.  Her knee is bothering her so she is not able to do the one exercises for sit to stands.  She saw assistant at surgeon's office and she said to stop PT for 2 weeks to give herself a rest.  She asked the MD and he said to cont PT.  When she has an inc in pain it often finally goes away but it might take awhile.  She works around the house a lot and she tries to keep her back straight.  He told her she is looking at 6 months minimum and maybe up to a year before she will feel really back to normal.  She has not done her HEP as much as she should.      Pain  Current pain rating: 3  Location: LB and ribcage             Objective     See Exercise, Manual, and Modality Logs for complete treatment.   Cont with there ex with review of HEP to help ensure compliance    Patient Education:    Assessment & Plan     Assessment  Assessment details: Pt performed most of current HEP without inc pain in back          Progress per Plan of Care            Timed:         Manual Therapy:         mins  65753;     Therapeutic Exercise:   30      mins  08944;     Neuromuscular Brooklynn:        mins  76557;    Therapeutic Activity:          mins  38860;     Gait Training:           mins  46385;     Ultrasound:          mins  13979;    Ionto                                   mins   48441  Self Care                            mins   74465  Canalith Repos                   mins  4209    Un-Timed:  Electrical Stimulation:         mins  17140 ( );  Dry Needling          mins self-pay  Traction          mins 62387  Low Eval          Mins  59759  Mod Eval          Mins  68001  High Eval                            Mins  74434  Re-Eval                               mins  11728    Timed Treatment:  30    mins    Total Treatment:    30    mins    Vicki Deshpande, PT  Physical Therapist

## 2019-10-04 RX ORDER — LOSARTAN POTASSIUM 100 MG/1
100 TABLET ORAL DAILY
Qty: 90 TABLET | Refills: 0 | Status: SHIPPED | OUTPATIENT
Start: 2019-10-04 | End: 2019-10-10 | Stop reason: SDUPTHER

## 2019-10-08 RX ORDER — LEVOTHYROXINE SODIUM 0.12 MG/1
TABLET ORAL
Qty: 30 TABLET | Refills: 10 | Status: SHIPPED | OUTPATIENT
Start: 2019-10-08 | End: 2020-10-19

## 2019-10-10 RX ORDER — LOSARTAN POTASSIUM 100 MG/1
TABLET ORAL
Qty: 30 TABLET | Refills: 10 | Status: SHIPPED | OUTPATIENT
Start: 2019-10-10 | End: 2019-10-17 | Stop reason: SDUPTHER

## 2019-10-17 RX ORDER — LOSARTAN POTASSIUM 100 MG/1
TABLET ORAL
Qty: 30 TABLET | Refills: 10 | Status: SHIPPED | OUTPATIENT
Start: 2019-10-17 | End: 2020-08-25 | Stop reason: SDUPTHER

## 2019-10-21 RX ORDER — GABAPENTIN 300 MG/1
CAPSULE ORAL
Qty: 210 CAPSULE | Refills: 1 | Status: SHIPPED | OUTPATIENT
Start: 2019-10-21 | End: 2019-12-27

## 2019-11-21 ENCOUNTER — OFFICE VISIT (OUTPATIENT)
Dept: NEUROSURGERY | Facility: CLINIC | Age: 69
End: 2019-11-21

## 2019-11-21 VITALS
DIASTOLIC BLOOD PRESSURE: 80 MMHG | WEIGHT: 186.6 LBS | BODY MASS INDEX: 31.09 KG/M2 | HEART RATE: 74 BPM | HEIGHT: 65 IN | SYSTOLIC BLOOD PRESSURE: 119 MMHG

## 2019-11-21 DIAGNOSIS — M43.20 FUSION OF SPINE, UNSPECIFIED SPINAL REGION: Primary | ICD-10-CM

## 2019-11-21 PROCEDURE — 99213 OFFICE O/P EST LOW 20 MIN: CPT | Performed by: NEUROLOGICAL SURGERY

## 2019-11-21 RX ORDER — GINKGO BILOBA LEAF EXTRACT 60 MG
CAPSULE ORAL
COMMUNITY

## 2019-12-02 NOTE — PROGRESS NOTES
"Subjective Connie A Schoen is a 69 y.o. female.     Chief Complaint   Patient presents with   • Back Pain     increased pain in ribs bilat front     Visit Vitals  /80 (BP Location: Left arm, Patient Position: Sitting, Cuff Size: Large Adult)   Pulse 74   Ht 165.1 cm (65\")   Wt 84.6 kg (186 lb 9.6 oz)   BMI 31.05 kg/m²       History of Present Illness:  Mrs Connie Schoen is a 68 y/o female that presents today a 6 month post op follow up. In May 2019 she underwent a T10-S2 posterior decompression and arthrodesis with correction of sagittal and coronal deformity.  She continues to have resolution on her leg pain and her back continues to improve. However, she fell about three weeks ago and is concerned about the hardware in her back with an increase in her back pain after the fall.     The following portions of the patient's history were reviewed and updated as appropriate: allergies, current medications, past family history, past medical history, past social history, past surgical history and problem list.    Review of Systems   Constitutional: Positive for activity change. Negative for appetite change, chills, diaphoresis, fatigue and fever.   Gastrointestinal: Negative for abdominal pain, constipation, diarrhea, nausea and vomiting.   Genitourinary: Negative for urinary incontinence.   Musculoskeletal: Positive for back pain. Negative for gait problem, joint swelling, myalgias and neck pain.   Neurological: Negative for dizziness, weakness, numbness and headache.         Past Surgical History:   Procedure Laterality Date   • APPENDECTOMY     • BACK SURGERY      T10-S1 poster lumbar fusion correction   • TOTAL KNEE ARTHROPLASTY Left        Past Medical History:   Diagnosis Date   • Anxiety    • Arthritis    • Depression    • HTN (hypertension)    • RA (rheumatoid arthritis) (CMS/[facility])    • Thyroid disease        Objective   Physical Exam   Constitutional: She is oriented to person, place, and time. She appears " well-developed and well-nourished.   HENT:   Head: Normocephalic and atraumatic.   Eyes: Pupils are equal, round, and reactive to light.   Musculoskeletal: Normal range of motion.   Neurological: She is alert and oriented to person, place, and time.   No focal neurological deficits    No sensory deficits    No gait ataxia    No midline tenderness   Skin: Skin is warm.   Psychiatric: She has a normal mood and affect.   Vitals reviewed.    Neurologic Exam     Mental Status   Oriented to person, place, and time.     Cranial Nerves     CN III, IV, VI   Pupils are equal, round, and reactive to light.    Ortho Exam        Assessment and Plan:   I have reviewed the ap/lat XRs of her lumbar spine in the office today. The hardware is intact with no signs of lucency, it is stable in comparison to 9/5/2019. At this time, I would like to obtain a CT scan of her lumbar spine to assess for bony ingrowth, she is now 6 months out and recently fell. Further determinations will be based on the findings of her CT scan.       Problems Addressed this Visit        Musculoskeletal and Integument    Fusion of spine - Primary    Relevant Orders    XR Spine Lumbar AP & Lateral (Completed)    CT Lumbar Spine Without Contrast

## 2019-12-09 ENCOUNTER — OFFICE VISIT (OUTPATIENT)
Dept: NEUROSURGERY | Facility: CLINIC | Age: 69
End: 2019-12-09

## 2019-12-09 VITALS
DIASTOLIC BLOOD PRESSURE: 95 MMHG | SYSTOLIC BLOOD PRESSURE: 138 MMHG | RESPIRATION RATE: 18 BRPM | BODY MASS INDEX: 30.99 KG/M2 | WEIGHT: 186 LBS | HEART RATE: 82 BPM | HEIGHT: 65 IN

## 2019-12-09 DIAGNOSIS — M43.20 FUSION OF SPINE, UNSPECIFIED SPINAL REGION: ICD-10-CM

## 2019-12-09 DIAGNOSIS — M41.57 SCOLIOSIS OF LUMBOSACRAL REGION DUE TO DEGENERATIVE DISEASE OF SPINE IN ADULT: Primary | ICD-10-CM

## 2019-12-09 PROCEDURE — 99212 OFFICE O/P EST SF 10 MIN: CPT | Performed by: NEUROLOGICAL SURGERY

## 2019-12-09 NOTE — PROGRESS NOTES
"Subjective   Connie A Schoen is a 69 y.o. female.     Chief Complaint   Patient presents with   • Follow-up     s/p post. fusion T10-S2     Visit Vitals  /95 (BP Location: Right arm, Patient Position: Sitting, Cuff Size: Large Adult)   Pulse 82   Resp 18   Ht 165.1 cm (65\")   Wt 84.4 kg (186 lb)   BMI 30.95 kg/m²       History of Present Illness: Jazmine is here today for follow-up.  She is not obtaining the CT scan of her thoracolumbar spine yet.  She states she is having the pain in the upper thoracic area which is only aggravated with excessive motion and standing.  It is not that bad.  I discussed her at length that I need to see the CT scan but I feel like most likely she needs to work on flexibility in her hips.  I feel like she strained in the upper part of her spine but I do not feel that there is any evidence of hardware failure.  Her standing films demonstrate that there appears to be a solid arthrodesis.  At this time we will get the CT scan and bring her back after that and started a course of physical therapy for dedicated hip range of motion and flexibility therapy.    The following portions of the patient's history were reviewed and updated as appropriate: allergies, current medications, past family history, past medical history, past social history, past surgical history and problem list.    Review of Systems      Past Surgical History:   Procedure Laterality Date   • APPENDECTOMY     • BACK SURGERY      T10-S1 poster lumbar fusion correction   • TOTAL KNEE ARTHROPLASTY Left        Past Medical History:   Diagnosis Date   • Anxiety    • Arthritis    • Depression    • HTN (hypertension)    • RA (rheumatoid arthritis) (CMS/[facility])    • Thyroid disease        Objective   Physical Exam  Neurologic Exam  Ortho Exam        Assessment and Plan: Please see above      Problems Addressed this Visit     None                 "

## 2019-12-17 ENCOUNTER — HOSPITAL ENCOUNTER (OUTPATIENT)
Dept: CT IMAGING | Facility: HOSPITAL | Age: 69
Discharge: HOME | End: 2019-12-17
Admitting: NEUROLOGICAL SURGERY

## 2019-12-17 DIAGNOSIS — M43.20 FUSION OF SPINE, UNSPECIFIED SPINAL REGION: ICD-10-CM

## 2019-12-17 PROCEDURE — 72131 CT LUMBAR SPINE W/O DYE: CPT

## 2019-12-19 ENCOUNTER — DOCUMENTATION (OUTPATIENT)
Dept: PHYSICAL THERAPY | Facility: CLINIC | Age: 69
End: 2019-12-19

## 2019-12-19 NOTE — PROGRESS NOTES
Discharge Summary  Discharge Summary from Physical Therapy Report      Number of Visits: 8     Goals: Partially Met    Discharge Plan: Patient to return to referring/providing physician    Comments :  Pt has not been actively attending PT due to pain in her back and pain in her knee; she was going to see ortho about her knee but has not called back to schedule any appts.      Date of Discharge 12-19-19        Vicki Deshpande, PT  Physical Therapist

## 2019-12-27 RX ORDER — GABAPENTIN 300 MG/1
CAPSULE ORAL
Qty: 210 CAPSULE | Refills: 0 | Status: SHIPPED | OUTPATIENT
Start: 2019-12-27 | End: 2020-02-03

## 2020-01-06 ENCOUNTER — OFFICE VISIT (OUTPATIENT)
Dept: NEUROSURGERY | Facility: CLINIC | Age: 70
End: 2020-01-06

## 2020-01-06 VITALS
BODY MASS INDEX: 30.22 KG/M2 | SYSTOLIC BLOOD PRESSURE: 146 MMHG | HEART RATE: 77 BPM | DIASTOLIC BLOOD PRESSURE: 86 MMHG | HEIGHT: 66 IN | WEIGHT: 188 LBS

## 2020-01-06 DIAGNOSIS — M43.20 FUSION OF SPINE, UNSPECIFIED SPINAL REGION: ICD-10-CM

## 2020-01-06 DIAGNOSIS — M41.57 SCOLIOSIS OF LUMBOSACRAL REGION DUE TO DEGENERATIVE DISEASE OF SPINE IN ADULT: ICD-10-CM

## 2020-01-06 DIAGNOSIS — M47.27 OSTEOARTHRITIS OF LUMBOSACRAL SPINE WITH RADICULOPATHY: Primary | ICD-10-CM

## 2020-01-06 PROBLEM — Z96.652 PRESENCE OF LEFT ARTIFICIAL KNEE JOINT: Status: ACTIVE | Noted: 2018-06-19

## 2020-01-06 PROBLEM — N39.0 URINARY TRACT INFECTION: Status: ACTIVE | Noted: 2017-01-06

## 2020-01-06 PROBLEM — M51.36 DEGENERATION OF INTERVERTEBRAL DISC OF LUMBAR REGION: Status: ACTIVE | Noted: 2018-01-18

## 2020-01-06 PROBLEM — M17.9 OSTEOARTHRITIS OF KNEE: Status: ACTIVE | Noted: 2018-04-09

## 2020-01-06 PROBLEM — M21.70 LEG LENGTH DISCREPANCY: Status: ACTIVE | Noted: 2019-02-01

## 2020-01-06 PROBLEM — F41.9 ANXIETY DISORDER: Status: ACTIVE | Noted: 2018-01-17

## 2020-01-06 PROBLEM — M47.817 OSTEOARTHRITIS OF LUMBOSACRAL SPINE WITHOUT MYELOPATHY: Status: ACTIVE | Noted: 2018-05-08

## 2020-01-06 PROBLEM — M51.369 DEGENERATION OF INTERVERTEBRAL DISC OF LUMBAR REGION: Status: ACTIVE | Noted: 2018-01-18

## 2020-01-06 PROBLEM — M79.604 LEG PAIN, BILATERAL: Status: ACTIVE | Noted: 2017-03-07

## 2020-01-06 PROBLEM — M17.12 DEGENERATIVE JOINT DISEASE OF LEFT KNEE: Status: ACTIVE | Noted: 2018-05-15

## 2020-01-06 PROBLEM — E55.9 VITAMIN D DEFICIENCY: Status: ACTIVE | Noted: 2018-04-09

## 2020-01-06 PROBLEM — M54.16 LUMBAR RADICULOPATHY: Status: ACTIVE | Noted: 2018-01-18

## 2020-01-06 PROBLEM — M48.061 SPINAL STENOSIS, LUMBAR: Status: ACTIVE | Noted: 2017-03-30

## 2020-01-06 PROBLEM — Z01.818 PREOPERATIVE CLEARANCE: Status: ACTIVE | Noted: 2018-06-05

## 2020-01-06 PROBLEM — M25.562 KNEE PAIN, LEFT: Status: ACTIVE | Noted: 2018-05-15

## 2020-01-06 PROBLEM — L73.9 FOLLICULITIS: Status: ACTIVE | Noted: 2017-02-21

## 2020-01-06 PROBLEM — M79.605 LEG PAIN, BILATERAL: Status: ACTIVE | Noted: 2017-03-07

## 2020-01-06 PROBLEM — M79.7 FIBROMYALGIA: Status: ACTIVE | Noted: 2018-02-14

## 2020-01-06 PROBLEM — R15.9 FULL INCONTINENCE OF FECES: Status: ACTIVE | Noted: 2018-11-28

## 2020-01-06 PROCEDURE — 99212 OFFICE O/P EST SF 10 MIN: CPT | Performed by: NEUROLOGICAL SURGERY

## 2020-01-06 RX ORDER — MELOXICAM 15 MG/1
15 TABLET ORAL DAILY
Qty: 60 TABLET | Refills: 2 | Status: SHIPPED | OUTPATIENT
Start: 2020-01-06 | End: 2020-09-28 | Stop reason: ALTCHOICE

## 2020-01-06 RX ORDER — LORATADINE 10 MG/1
10 TABLET ORAL DAILY
COMMUNITY
End: 2020-08-31

## 2020-01-06 RX ORDER — OMEGA-3S/DHA/EPA/FISH OIL/D3 300MG-1000
400 CAPSULE ORAL DAILY
COMMUNITY
End: 2021-09-01 | Stop reason: SDUPTHER

## 2020-01-06 NOTE — PROGRESS NOTES
"Subjective   Connie A Schoen is a 69 y.o. female.     Chief Complaint   Patient presents with   • Back Pain     CT Follow up     Visit Vitals  /86   Pulse 77   Ht 167.6 cm (66\")   Wt 85.3 kg (188 lb)   BMI 30.34 kg/m²       History of Present Illness: Mrs. Schoen is here today for follow-up review of her CT scan of the thoracolumbar spine.  She states that all the back and leg pain she had prior to surgery is completely gone.  This is only occurring in the upper lumbar thoracic region but she states is only hurts sometimes.  She works really to bend over that is the biggest pain.  She denies any weakness or gait dysfunction.  She has taken ibuprofen intermittently which she states does help.  Is not set up her appointment with physical therapy yet.    The following portions of the patient's history were reviewed and updated as appropriate: allergies, current medications, past family history, past medical history, past social history, past surgical history and problem list.    Review of Systems      Past Surgical History:   Procedure Laterality Date   • APPENDECTOMY     • BACK SURGERY      T10-S1 poster lumbar fusion correction   • TOTAL KNEE ARTHROPLASTY Left        Past Medical History:   Diagnosis Date   • Anxiety    • Arthritis    • Depression    • HTN (hypertension)    • RA (rheumatoid arthritis) (CMS/[facility])    • Thyroid disease        Objective   Physical Exam  Neurologic Exam  Ortho Exam    The CT scan of the lumbar and thoracic spine demonstrate stable hardware placement and spinal alignment.  Her overall spinal curvature matches her pelvic incident.  There appears to be a solid arthrodesis from the T12-S1 region and interbody arthrodesis I can see from the T9-T12 level but no real facet arthrodesis yet.  There is no evidence of hardware failure or lucency.    Assessment and Plan: At this time I feel Jazmine has a solid arthrodesis.  I do feel a lot of her pain could be coming from the adjacent segment " thoracic spine but most likely is arthritic and inflammatory nature.  There is no evidence of adjacent segment degenerative disease.  At this time we will start her on meloxicam and continue to work with physical therapy for increasing her pelvic range of motion which I think will significantly help her.  I will see her back in 3 months on a as needed basis.      Problems Addressed this Visit     None

## 2020-01-16 ENCOUNTER — TREATMENT (OUTPATIENT)
Dept: PHYSICAL THERAPY | Facility: CLINIC | Age: 70
End: 2020-01-16

## 2020-01-16 DIAGNOSIS — M41.57 SCOLIOSIS OF LUMBOSACRAL REGION DUE TO DEGENERATIVE DISEASE OF SPINE IN ADULT: Primary | ICD-10-CM

## 2020-01-16 DIAGNOSIS — M43.20 FUSION OF SPINE, UNSPECIFIED SPINAL REGION: ICD-10-CM

## 2020-01-16 PROCEDURE — 97110 THERAPEUTIC EXERCISES: CPT | Performed by: PHYSICAL THERAPIST

## 2020-01-16 PROCEDURE — 97161 PT EVAL LOW COMPLEX 20 MIN: CPT | Performed by: PHYSICAL THERAPIST

## 2020-01-16 NOTE — PROGRESS NOTES
Physical Therapy Initial Evaluation and Plan of Care    Patient: Connie A Schoen   : 1950  Diagnosis/ICD-10 Code:  Scoliosis of lumbosacral region due to degenerative disease of spine in adult [M41.57]  Referring practitioner: Emmanuel Ley MD  Date of Initial Visit: 2020  Today's Date: 2020  Patient seen for 1 sessions           Subjective Questionnaire: Oswestry: 38%      Subjective Evaluation    History of Present Illness  Mechanism of injury: Pt has been in PT prior for the same back problem s/p surgery.  She just saw that MD last week.  She feels like she has a lot of restrictions vs pain at this point.  He wanted her to go back to PT again.  She is to go back to see him in 3 months. She had a CT and it was fine.  She no longer has any restrictions from the surgeon.   She has had injections in her right knee x 3 and they did help some (it is about 75% better now).  If she sits too long it gets achy but otherwise she feels pretty good.      Pain  Current pain ratin  At best pain ratin  At worst pain ratin  Quality: dull ache, sharp, throbbing and tight  Relieving factors: rest, support and change in position  Aggravating factors: lifting, movement and squatting  Progression: improved    Social Support  Lives in: one-story house  Lives with: spouse    Treatments  Previous treatment: physical therapy  Patient Goals  Patient goals for therapy: decreased pain, improved balance, increased motion, increased strength and independence with ADLs/IADLs  Patient goal: pick things up from the floor       Objective       Postural Observations  Seated posture: fair  Standing posture: fair  Correction of posture: has no consistent effect        Palpation     Additional Palpation Details  TTP:  bilat thor paraspinals and quadratus lumborum    Active Range of Motion     Lumbar   Flexion: Active lumbar flexion: mod limit.   Extension: Active lumbar extension: severe limit.   Left lateral  flexion: Active left lumbar lateral flexion: mod limit. with pain  Right lateral flexion: Active right lumbar lateral flexion: mod limit. with pain  Left rotation: WFL  Right rotation: WFL    Strength/Myotome Testing     Additional Strength Details  Sitting all are 4 to 4+ bilat    SL hip abd:  4 to 4+ bilat  Prone hip ext:  3+/4- bilat    Tests     Additional Tests Details  SLR:  bilat 65 deg with no back pain  Hip scour:  Neg bilat  Hip AROM:  All WFL except ext is limited to 5 deg bilat         Assessment & Plan     Assessment  Impairments: abnormal or restricted ROM, activity intolerance, impaired balance, impaired physical strength and lacks appropriate home exercise program  Assessment details: Pt is a 68 y/o female with a dx of lumbar spinal fusion T10-S2 in May of 2019.  She was in PT previously and is now returning for work on mobility and using her hips vs her back.  Pt exhibits the above impairments and functional limitations and would benefit from skilled PT to address those areas and maximize function.  Functional Limitations: carrying objects, lifting, pulling, pushing, stooping, reaching behind back and reaching overhead  Goals  Plan Goals: STGs:  6 weeks  1.  Pt to tolerate initial HEP without inc pain.  2.  Pt to tolerate advancement of HEP without inc pain.  3.  Pt to report pain has decreased by at least 25%.  4.  Pt to improve  AROM of bilat hip flexion (vs spine) to at least minimal limitation.  5.  Pt to sit with proper posture without cues required.    6.  Pt to demo proper body mechanics with daily household chores.      LTGs:  By DC  1.  Pt to be (I) with HEP.  2.  Pt to report pain has decreased by at least 80% to allow better tolerance to and performance of daily tasks.  3.  Pt to improve AROM of bilat hip flexion  to WFL/WNL to allow (I) and normal function with daily tasks.  4.  Pt to improve strength of bilat hips  to 4+/5 or better to be adequate for completion/performance of normal  daily tasks.  5.  Pt to exhibit improved function as indicated by improved score on Oswestry vs score at eval.    6.  Pt to demonstrate (I) and appropriate use of body mechanics for daily home and/or work tasks including picking up objects from the floor.    Plan  Therapy options: will be seen for skilled physical therapy services  Planned therapy interventions: abdominal trunk stabilization, body mechanics training, home exercise program, joint mobilization, therapeutic activities, stretching, strengthening, manual therapy, neuromuscular re-education and postural training  Frequency: 2x week  Plan details: 30 visits        See flowsheet for treatment details.    History # of Personal Factors and/or Comorbidities: MODERATE (1-2)  Examination of Body System(s): # of elements: MODERATE (3)  Clinical Presentation: STABLE   Clinical Decision Making: LOW       Timed:         Manual Therapy:         mins  01189;     Therapeutic Exercise:  15       mins  03275;     Neuromuscular Brooklynn:        mins  13631;    Therapeutic Activity:          mins  88202;     Gait Training:           mins  60142;     Ultrasound:          mins  59789;    Ionto:                                   mins   01279  Self Care:                            mins   79432  Canalith Repos:                  mins   88040      Un-Timed:  Electrical Stimulation:         mins  66843 ( );  Traction          mins 86623  Low Eval   45       Mins  95403  Mod Eval          Mins  90852  High Eval                            Mins  64271  Re-Eval                               mins  46093        Timed Treatment: 15     mins   Total Treatment:    45    mins    PT SIGNATURE: Vicki Deshpande PT   DATE TREATMENT INITIATED: 1/16/2020    Initial Certification  Certification Period: 4/15/2020  I certify that the therapy services are furnished while this patient is under my care.  The services outlined above are required by this patient, and will be reviewed every 90  days.     PHYSICIAN: Emmanuel Ley MD      DATE:     Please sign and return via fax to [phone].. Thank you, [facility].

## 2020-01-30 ENCOUNTER — TELEPHONE (OUTPATIENT)
Dept: FAMILY MEDICINE CLINIC | Facility: CLINIC | Age: 70
End: 2020-01-30

## 2020-01-30 RX ORDER — SERTRALINE HYDROCHLORIDE 100 MG/1
TABLET, FILM COATED ORAL
Qty: 90 TABLET | Refills: 4 | Status: SHIPPED | OUTPATIENT
Start: 2020-01-30 | End: 2020-01-30 | Stop reason: SDUPTHER

## 2020-01-30 RX ORDER — SERTRALINE HYDROCHLORIDE 100 MG/1
100 TABLET, FILM COATED ORAL DAILY
Qty: 15 TABLET | Refills: 0 | Status: SHIPPED | OUTPATIENT
Start: 2020-01-30 | End: 2021-04-05

## 2020-02-03 RX ORDER — GABAPENTIN 300 MG/1
CAPSULE ORAL
Qty: 210 CAPSULE | Refills: 0 | Status: SHIPPED | OUTPATIENT
Start: 2020-02-03 | End: 2020-03-04

## 2020-02-03 NOTE — TELEPHONE ENCOUNTER
Found op report in centricity, spoke with patient. It was Dr. Richy Rendon in 2011. Info given to front office to have info scanned into her chart.

## 2020-02-24 ENCOUNTER — TREATMENT (OUTPATIENT)
Dept: PHYSICAL THERAPY | Facility: CLINIC | Age: 70
End: 2020-02-24

## 2020-02-24 DIAGNOSIS — M41.57 SCOLIOSIS OF LUMBOSACRAL REGION DUE TO DEGENERATIVE DISEASE OF SPINE IN ADULT: Primary | ICD-10-CM

## 2020-02-24 DIAGNOSIS — M43.20 FUSION OF SPINE, UNSPECIFIED SPINAL REGION: ICD-10-CM

## 2020-02-24 PROCEDURE — 97530 THERAPEUTIC ACTIVITIES: CPT | Performed by: PHYSICAL THERAPIST

## 2020-02-24 PROCEDURE — 97110 THERAPEUTIC EXERCISES: CPT | Performed by: PHYSICAL THERAPIST

## 2020-02-24 NOTE — PROGRESS NOTES
Physical Therapy Daily Progress Note    VISIT#: 2    Subjective Evaluation    History of Present Illness  Mechanism of injury: Pt has been taking Meloxicam every day and it has been helping.  She has right knee pain if she lies on her right side.  She is doing better bending over (while sitting) to put on her shoes.  She still is not sure how to move the right way to do things at home.    Pain  Current pain ratin  Location: sore on the right side         Objective     See Exercise, Manual, and Modality Logs for complete treatment.   Cont with there ex as noted; initiated training on body mechanics as noted    Pt missed 1 visit due to PT being out and 1 visit due to pt hurting her ankle and could not walk very well      Assessment & Plan     Assessment  Assessment details: Pt performed fair today with apparent deconditioning overall; would benefit from cont strengthening and body mechanics training          Progress per Plan of Care            Timed:         Manual Therapy:         mins  72360;     Therapeutic Exercise:  10       mins  85489;     Neuromuscular Brooklynn:        mins  83118;    Therapeutic Activity:  20        mins  32439;     Gait Training:           mins  13668;     Ultrasound:          mins  85534;    Ionto                                   mins   97084  Self Care                            mins   99120  Canalith Repos                   mins  4209    Un-Timed:  Electrical Stimulation:         mins  16995 ( );  Dry Needling          mins self-pay  Traction          mins 66033  Low Eval          Mins  65054  Mod Eval          Mins  24288  High Eval                            Mins  69420  Re-Eval                               mins  74070    Timed Treatment: 30     mins   Total Treatment:   30     mins    Vicki Deshpande PT  Physical Therapist

## 2020-02-26 ENCOUNTER — TREATMENT (OUTPATIENT)
Dept: PHYSICAL THERAPY | Facility: CLINIC | Age: 70
End: 2020-02-26

## 2020-02-26 DIAGNOSIS — M41.57 SCOLIOSIS OF LUMBOSACRAL REGION DUE TO DEGENERATIVE DISEASE OF SPINE IN ADULT: Primary | ICD-10-CM

## 2020-02-26 DIAGNOSIS — M43.20 FUSION OF SPINE, UNSPECIFIED SPINAL REGION: ICD-10-CM

## 2020-02-26 PROCEDURE — 97110 THERAPEUTIC EXERCISES: CPT | Performed by: PHYSICAL THERAPIST

## 2020-02-26 PROCEDURE — 97530 THERAPEUTIC ACTIVITIES: CPT | Performed by: PHYSICAL THERAPIST

## 2020-02-26 NOTE — PROGRESS NOTES
Physical Therapy Daily Progress Note    VISIT#: 3    Subjective Evaluation    History of Present Illness  Mechanism of injury: Pt reports she went to [facility] after PT yesterday and she did a lot of walking.  She     Pain  Current pain ratin         Objective     See Exercise, Manual, and Modality Logs for complete treatment.   Cont with there ex and there act as noted; added ham stretch to HEP     Patient Education:  Cont with ex at home and be mindful of back position with daily taskd    Assessment & Plan     Assessment  Assessment details: iva well today with some concern about getting sore from exercises          Progress per Plan of Care            Timed:         Manual Therapy:         mins  57019;     Therapeutic Exercise: 20        mins  48664;     Neuromuscular Brooklynn:        mins  92728;    Therapeutic Activity:  10        mins  76533;     Gait Training:           mins  19778;     Ultrasound:          mins  69386;    Ionto                                   mins   23572  Self Care                            mins   65071  Canalith Repos                   mins  4209    Un-Timed:  Electrical Stimulation:         mins  46343 ( );  Dry Needling          mins self-pay  Traction          mins 27264  Low Eval          Mins  96511  Mod Eval          Mins  52679  High Eval                            Mins  03393  Re-Eval                               mins  47002    Timed Treatment:30      mins   Total Treatment:  30      mins    Vicki Deshpande PT  Physical Therapist

## 2020-03-04 RX ORDER — GABAPENTIN 300 MG/1
CAPSULE ORAL
Qty: 210 CAPSULE | Refills: 0 | Status: SHIPPED | OUTPATIENT
Start: 2020-03-04 | End: 2020-04-06

## 2020-03-31 ENCOUNTER — DOCUMENTATION (OUTPATIENT)
Dept: PHYSICAL THERAPY | Facility: CLINIC | Age: 70
End: 2020-03-31

## 2020-03-31 NOTE — PROGRESS NOTES
Discharge Summary  Discharge Summary from Physical/Occupational Therapy Report    Patient: Connie A Schoen   : 1950  Today's Date: 3/31/2020    Patient seen for 11 visits.    Goals: Partially Met    Discharge Plan: Continue with current home exercise program as instructed    Comments :  Pt has been out of PT due to knee pain;  She plans to call us when she can return  For treatment      SIGNATURE: Vicki Deshpande, PT

## 2020-04-06 RX ORDER — GABAPENTIN 300 MG/1
CAPSULE ORAL
Qty: 210 CAPSULE | Refills: 0 | Status: SHIPPED | OUTPATIENT
Start: 2020-04-06 | End: 2020-05-11

## 2020-05-11 RX ORDER — GABAPENTIN 300 MG/1
CAPSULE ORAL
Qty: 210 CAPSULE | Refills: 0 | Status: SHIPPED | OUTPATIENT
Start: 2020-05-11 | End: 2020-06-15

## 2020-06-09 ENCOUNTER — TELEPHONE (OUTPATIENT)
Dept: FAMILY MEDICINE CLINIC | Facility: CLINIC | Age: 70
End: 2020-06-09

## 2020-06-09 DIAGNOSIS — R13.19 ESOPHAGEAL DYSPHAGIA: Primary | ICD-10-CM

## 2020-06-09 DIAGNOSIS — Z12.11 SCREENING FOR COLON CANCER: ICD-10-CM

## 2020-06-09 NOTE — TELEPHONE ENCOUNTER
Pt called and stated she is needing a gastro referral. Pt would like the referral to go to:   Please advise: [phone]        [facility]  [address] [place] [phone]

## 2020-06-09 NOTE — TELEPHONE ENCOUNTER
Scheduled Medicare wellness exam 6/19/20 2:30 pm CMM (30 minutes).    Pt is asking for referral to GI for trouble swallowing and she also needs to get a colonoscopy. Thank you.

## 2020-06-15 RX ORDER — GABAPENTIN 300 MG/1
CAPSULE ORAL
Qty: 210 CAPSULE | Refills: 0 | Status: SHIPPED | OUTPATIENT
Start: 2020-06-15 | End: 2020-07-13

## 2020-07-13 RX ORDER — GABAPENTIN 300 MG/1
CAPSULE ORAL
Qty: 210 CAPSULE | Refills: 0 | Status: SHIPPED | OUTPATIENT
Start: 2020-07-13 | End: 2020-09-09 | Stop reason: SDUPTHER

## 2020-08-03 ENCOUNTER — TELEPHONE (OUTPATIENT)
Dept: NEUROSURGERY | Facility: CLINIC | Age: 70
End: 2020-08-03

## 2020-08-03 DIAGNOSIS — M41.57 SCOLIOSIS OF LUMBOSACRAL REGION DUE TO DEGENERATIVE DISEASE OF SPINE IN ADULT: ICD-10-CM

## 2020-08-03 DIAGNOSIS — M43.20 FUSION OF SPINE, UNSPECIFIED SPINAL REGION: ICD-10-CM

## 2020-08-03 DIAGNOSIS — M47.27 OSTEOARTHRITIS OF LUMBOSACRAL SPINE WITH RADICULOPATHY: Primary | ICD-10-CM

## 2020-08-03 NOTE — TELEPHONE ENCOUNTER
Spoke with patient.  xrays ordered per Nathalie DAVILA.  Patient will obtain xrays and they will be reviewed to see if patient needs follow up

## 2020-08-03 NOTE — TELEPHONE ENCOUNTER
PT CALLED AND STATES SHE IS HAVING  BACK PAIN AND NEEDS TO BE SEEN OR HAVE AN XRAY ORDER/PT STATES THAT SHE FELL IN EARLY MAY BUT SHE FEELS LIKE THE FALL AFFECTED HER HIP MORE THAN HER BACK AT THE TIME/PT STATES BOTH SIDES OF HER BACK ARE TIGHT WITH A DULL PAIN/PT STATES SHE IS UNABLE TO SIT IN A CHAIR WITHOUT DISCOMFORT/STATES HER BALANCE AND GAIT ARE OFF/PT STATES SHE IS TAKING TYLENOL AND MELOXICAM BUT DOESN'T SEEM TO GETTING ANY RELIEF.     PLEASE CONTACT PT AT: [phone] or [phone].    THANK YOU!

## 2020-08-07 NOTE — TELEPHONE ENCOUNTER
PATIENT CALLED TO CHECK THE STATUS OF XR ORDERS TO [facility]. CONFIRMED ORDER WERE CREATED BUT UNABLE TO CONFIRM IF ORDERS WERE SENT DUE TO NO ADDITIONAL NOTES. PATIENT WILL CONTACT [facility] TO CHECK STATUS.    [phone]

## 2020-08-10 ENCOUNTER — TELEPHONE (OUTPATIENT)
Dept: FAMILY MEDICINE CLINIC | Facility: CLINIC | Age: 70
End: 2020-08-10

## 2020-08-10 NOTE — TELEPHONE ENCOUNTER
PATIENT CALLED STATING THAT SHE HAS BEEN HAVING PAIN IN THE MIDDLE OF HER BACK, ON BOTH SIDES, FOR AT LEAST 3 WEEKS NOW, AND IT IS GETTING PROGRESSIVELY WORSE. THE PATIENT STATED THAT SHE HAS ALSO BEEN EXPERIENCING A PERSISTENT URGE TO URINATE, BUT WITH LITTLE URINE OUTPUT.    THE PATIENT STATED THAT SHE IS GOING TO HAVE AN X-RAY PERFORMED ON HER BACK TODAY (8/10/20) TO CHECK FOR ANYTHING THAT MAY BE WRONG WITH IT, BUT SHE IS CONCERNED THAT SHE MAY BE HAVING A PROBLEM WITH HER KIDNEYS.    THE PATIENT STATED THAT SHE IS SCHEDULED TO HAVE A MEDICARE WELLNESS EXAM WITH DR. HOLLEY ON 8/25/20, BUT SHE DOES NOT WANT TO WAIT THAT LONG TO HAVE THIS CONCERN ADDRESSED, ESPECIALLY IF THERE IS SOMETHING WRONG WITH HER KIDNEYS.    THE PATIENT STATED THAT SHE WOULD LIKE TO BRING A URINE SAMPLE TO DR. HOLLEY'S OFFICE AS SOON AS POSSIBLE TO HAVE LABS PERFORMED ON IT TO CHECK HER KIDNEY FUNCTION.    PLEASE CALL THE PATIENT ON HER MOBILE PHONE [phone] WHEN THIS MESSAGE HAS BEEN RECEIVED AND ADVISE HER REGARDING THE STATUS OF THIS REQUEST. THE PATIENT STATED THAT A DETAILED MESSAGE CAN BE LEFT ON HER VOICEMAIL IF SHE IS UNABLE TO ANSWER WHEN CALLED.

## 2020-08-10 NOTE — TELEPHONE ENCOUNTER
Gave message to patient at 2:16pm.  I offered an appt with [facility] for tomorrow, but patient said she is going to wait until she gets her back xray results back and see what they say first.

## 2020-08-13 ENCOUNTER — TELEPHONE (OUTPATIENT)
Dept: NEUROSURGERY | Facility: CLINIC | Age: 70
End: 2020-08-13

## 2020-08-13 NOTE — TELEPHONE ENCOUNTER
PATIENT CALLED TO GET THE RESULTS FROM HER MRI TAKEN AT [facility]    [phone] [place]  [phone] -CELL

## 2020-08-17 ENCOUNTER — TELEPHONE (OUTPATIENT)
Dept: NEUROSURGERY | Facility: CLINIC | Age: 70
End: 2020-08-17

## 2020-08-17 NOTE — TELEPHONE ENCOUNTER
PT CALLED REQUESTING XRAY RESULTS FROM [facility] THAT WAS COMPLETED ON 8/10/20.    CALL PT [phone] WITH THESE RESULTS.

## 2020-08-20 ENCOUNTER — TELEPHONE (OUTPATIENT)
Dept: NEUROSURGERY | Facility: CLINIC | Age: 70
End: 2020-08-20

## 2020-08-25 ENCOUNTER — LAB (OUTPATIENT)
Dept: FAMILY MEDICINE CLINIC | Facility: CLINIC | Age: 70
End: 2020-08-25

## 2020-08-25 ENCOUNTER — OFFICE VISIT (OUTPATIENT)
Dept: FAMILY MEDICINE CLINIC | Facility: CLINIC | Age: 70
End: 2020-08-25

## 2020-08-25 VITALS
BODY MASS INDEX: 28.93 KG/M2 | HEART RATE: 98 BPM | DIASTOLIC BLOOD PRESSURE: 74 MMHG | WEIGHT: 180 LBS | RESPIRATION RATE: 16 BRPM | OXYGEN SATURATION: 93 % | TEMPERATURE: 97.3 F | SYSTOLIC BLOOD PRESSURE: 110 MMHG | HEIGHT: 66 IN

## 2020-08-25 DIAGNOSIS — M15.9 OSTEOARTHRITIS OF MULTIPLE JOINTS, UNSPECIFIED OSTEOARTHRITIS TYPE: ICD-10-CM

## 2020-08-25 DIAGNOSIS — I10 ESSENTIAL HYPERTENSION: ICD-10-CM

## 2020-08-25 DIAGNOSIS — M35.3 POLYMYALGIA RHEUMATICA (HCC): ICD-10-CM

## 2020-08-25 DIAGNOSIS — F10.20 ALCOHOLISM (HCC): ICD-10-CM

## 2020-08-25 DIAGNOSIS — E03.9 ACQUIRED HYPOTHYROIDISM: ICD-10-CM

## 2020-08-25 DIAGNOSIS — Z00.00 MEDICARE ANNUAL WELLNESS VISIT, SUBSEQUENT: Primary | ICD-10-CM

## 2020-08-25 DIAGNOSIS — E55.9 VITAMIN D DEFICIENCY: ICD-10-CM

## 2020-08-25 DIAGNOSIS — M51.36 DEGENERATION OF INTERVERTEBRAL DISC OF LUMBAR REGION: ICD-10-CM

## 2020-08-25 DIAGNOSIS — Z12.11 SCREENING FOR COLON CANCER: ICD-10-CM

## 2020-08-25 DIAGNOSIS — Z12.31 ENCOUNTER FOR SCREENING MAMMOGRAM FOR MALIGNANT NEOPLASM OF BREAST: ICD-10-CM

## 2020-08-25 DIAGNOSIS — Z23 ENCOUNTER FOR IMMUNIZATION: ICD-10-CM

## 2020-08-25 DIAGNOSIS — Z12.39 SCREENING FOR BREAST CANCER: ICD-10-CM

## 2020-08-25 PROCEDURE — 85025 COMPLETE CBC W/AUTO DIFF WBC: CPT | Performed by: INTERNAL MEDICINE

## 2020-08-25 PROCEDURE — 99213 OFFICE O/P EST LOW 20 MIN: CPT | Performed by: INTERNAL MEDICINE

## 2020-08-25 PROCEDURE — 96160 PT-FOCUSED HLTH RISK ASSMT: CPT | Performed by: INTERNAL MEDICINE

## 2020-08-25 PROCEDURE — 82306 VITAMIN D 25 HYDROXY: CPT | Performed by: INTERNAL MEDICINE

## 2020-08-25 PROCEDURE — G0009 ADMIN PNEUMOCOCCAL VACCINE: HCPCS | Performed by: INTERNAL MEDICINE

## 2020-08-25 PROCEDURE — 84443 ASSAY THYROID STIM HORMONE: CPT | Performed by: INTERNAL MEDICINE

## 2020-08-25 PROCEDURE — 80061 LIPID PANEL: CPT | Performed by: INTERNAL MEDICINE

## 2020-08-25 PROCEDURE — G0439 PPPS, SUBSEQ VISIT: HCPCS | Performed by: INTERNAL MEDICINE

## 2020-08-25 PROCEDURE — 80053 COMPREHEN METABOLIC PANEL: CPT | Performed by: INTERNAL MEDICINE

## 2020-08-25 PROCEDURE — 84439 ASSAY OF FREE THYROXINE: CPT | Performed by: INTERNAL MEDICINE

## 2020-08-25 PROCEDURE — 90732 PPSV23 VACC 2 YRS+ SUBQ/IM: CPT | Performed by: INTERNAL MEDICINE

## 2020-08-25 RX ORDER — LOSARTAN POTASSIUM 100 MG/1
100 TABLET ORAL DAILY
Qty: 90 TABLET | Refills: 3 | Status: SHIPPED | OUTPATIENT
Start: 2020-08-25 | End: 2021-11-10

## 2020-08-25 RX ORDER — DICLOFENAC SODIUM 75 MG/1
75 TABLET, DELAYED RELEASE ORAL 2 TIMES DAILY PRN
Qty: 180 TABLET | Refills: 0 | Status: SHIPPED | OUTPATIENT
Start: 2020-08-25 | End: 2020-11-23

## 2020-08-25 RX ORDER — TIZANIDINE 4 MG/1
4 TABLET ORAL NIGHTLY PRN
Qty: 30 TABLET | Refills: 1 | Status: SHIPPED | OUTPATIENT
Start: 2020-08-25 | End: 2020-10-28

## 2020-08-25 NOTE — PROGRESS NOTES
The ABCs of the Annual Wellness Visit  Subsequent Medicare Wellness Visit    Chief Complaint   Patient presents with   • Medicare Wellness-subsequent       Subjective   History of Present Illness:  Connie A Schoen is a 70 y.o. female who presents for a Subsequent Medicare Wellness Visit and other concerns. Had increased back pains-  Never heard from the neurosurgery- was on mobic then off increased pain in thoracic tight muscle pain.  No muscle relaxer.  Not doing stretches anymore no radicular pain gait off- needs sharping cart- right knee hurts all the time- [facility]- and had injections  x4 weeks (synvisc)  And helped but now bad again-   Sleeping poor because of pain- takes 2 tylenol pm to sleep   was seeing perla and diagnosed with polyarthralgia but then later told fibromyalgia from NP and unsure what.   Had left knee replaced        HEALTH RISK ASSESSMENT    Recent Hospitalizations:  No hospitalization(s) within the last year.    Current Medical Providers:  Patient Care Team:  Uyen Cleveland MD as PCP - General (Internal Medicine)  Uyen Cleveland MD as PCP - Family Medicine    Smoking Status:  Social History     Tobacco Use   Smoking Status Current Every Day Smoker   • Packs/day: 1.00   • Types: Cigarettes   Smokeless Tobacco Never Used       Alcohol Consumption:  Social History     Substance and Sexual Activity   Alcohol Use No   • Frequency: Never       Depression Screen:   PHQ-2/PHQ-9 Depression Screening 8/25/2020   Little interest or pleasure in doing things 0   Feeling down, depressed, or hopeless 0   Total Score 0       Fall Risk Screen:  STEADI Fall Risk Assessment was completed, and patient is at MODERATE risk for falls. Assessment completed on:8/25/2020    Health Habits and Functional and Cognitive Screening:  Functional & Cognitive Status 8/25/2020   Do you have difficulty preparing food and eating? No   Do you have difficulty bathing yourself, getting  dressed or grooming yourself? Yes   Do you have difficulty using the toilet? Yes   Do you have difficulty moving around from place to place? Yes   Do you have trouble with steps or getting out of a bed or a chair? No   Current Diet Well Balanced Diet   Dental Exam Not up to date   Eye Exam Not up to date   Current Exercise Activities Include None   Do you need help using the phone?  No   Do you need help with transportation? No   Do you need help shopping? No   Do you need help preparing meals?  No   Do you need help with housework?  Yes   Do you need help with laundry? No   Do you need help taking your medications? No   Do you need help managing money? No   Do you ever drive or ride in a car without wearing a seat belt? No   Have you felt unusual stress, anger or loneliness in the last month? No   Who do you live with? Spouse   If you need help, do you have trouble finding someone available to you? No   Do you have difficulty concentrating, remembering or making decisions? No         Does the patient have evidence of cognitive impairment? No    Asprin use counseling:Taking ASA appropriately as indicated    Age-appropriate Screening Schedule:  Refer to the list below for future screening recommendations based on patient's age, sex and/or medical conditions. Orders for these recommended tests are listed in the plan section. The patient has been provided with a written plan.    Health Maintenance   Topic Date Due   • MAMMOGRAM  1950   • ZOSTER VACCINE (1 of 2) 03/31/2002   • TDAP/TD VACCINES (2 - Td) 01/08/2019   • INFLUENZA VACCINE  08/01/2020   • COLONOSCOPY  02/04/2025          The following portions of the patient's history were reviewed and updated as appropriate: allergies, current medications, past family history, past medical history, past social history, past surgical history and problem list.    Outpatient Medications Prior to Visit   Medication Sig Dispense Refill   • cholecalciferol (VITAMIN D3) 10  MCG (400 UNIT) tablet Take 400 Units by mouth Daily.     • gabapentin (NEURONTIN) 300 MG capsule TAKE THREE CAPSULES BY MOUTH EVERY MORNING AND TAKE 1 CAPSULE IN THE AFTERNOON AND TAKE THREE CAPSULES BY MOUTH EVERY EVENING 210 capsule 0   • Green Tea 150 MG capsule Take  by mouth.     • levothyroxine (SYNTHROID, LEVOTHROID) 125 MCG tablet TAKE ONE TABLET BY MOUTH DAILY 30 tablet 10   • sertraline (ZOLOFT) 100 MG tablet Take 1 tablet by mouth Daily. 15 tablet 0   • losartan (COZAAR) 100 MG tablet TAKE ONE TABLET BY MOUTH DAILY 30 tablet 10   • loratadine (CLARITIN) 10 MG tablet Take 10 mg by mouth Daily.     • meloxicam (MOBIC) 15 MG tablet Take 1 tablet by mouth Daily. 60 tablet 2     No facility-administered medications prior to visit.        Patient Active Problem List   Diagnosis   • Fusion of spine   • Scoliosis of lumbosacral region due to degenerative disease of spine in adult   • Abdominal pain   • Low back pain   • Alcoholism (CMS/HCC)   • Anemia   • Ankle sprain   • Anxiety disorder   • Bursitis of hip   • Chest discomfort   • Cough   • Degeneration of intervertebral disc of lumbar region   • Depression   • Ecchymosis   • Elevated LFTs   • Folliculitis   • Full incontinence of feces   • Hypertension   • Hypothyroidism   • Knee pain, left   • Leg pain, bilateral   • Leg edema   • Swelling of lower extremity   • Leg length discrepancy   • Muscle pain   • Degenerative joint disease of left knee   • Osteoarthritis of shoulder   • Osteoarthritis of knee   • Osteoarthritis of glenohumeral joint   • Osteoarthritis   • Polyarthritis   • Other ovarian dysfunction   • Other specified dorsopathies, site unspecified   • Pain in joint of left shoulder   • Arm pain   • Fibromyalgia   • Pain in shoulder   • Polymyalgia rheumatica (CMS/HCC)   • Personal history of other drug therapy   • Preoperative clearance   • Presence of left artificial knee joint   • Encounter for general adult medical examination without abnormal  "findings   • Screening for breast cancer   • Tear of rotator cuff   • Urinary tract infection   • Vitamin D deficiency   • Weight loss   • Lumbar radiculopathy   • Radiculopathy   • Spinal stenosis, lumbar   • Osteoarthritis of lumbosacral spine with radiculopathy   • Osteoarthritis of lumbosacral spine without myelopathy   • Screening for colon cancer   • Encounter for immunization    • Medicare annual wellness visit, subsequent       Advanced Care Planning:  ACP discussion was held with the patient during this visit. Patient has an advance directive in EMR which is still valid.     Review of Systems   Constitutional: Positive for fatigue. Negative for chills.   HENT: Negative for congestion.    Respiratory: Negative for apnea, cough and wheezing.    Cardiovascular: Negative for chest pain.   Gastrointestinal: Negative for abdominal distention.   Genitourinary: Positive for decreased urine volume.   Musculoskeletal: Positive for arthralgias, back pain and gait problem.   Neurological: Negative for seizures.   Psychiatric/Behavioral: Positive for sleep disturbance.       Compared to one year ago, the patient feels her physical health is better.  Compared to one year ago, the patient feels her mental health is the same.    Reviewed chart for potential of high risk medication in the elderly: yes  Reviewed chart for potential of harmful drug interactions in the elderly:yes    Objective         Vitals:    08/25/20 1507   BP: 110/74   BP Location: Left arm   Cuff Size: Large Adult   Pulse: 98   Resp: 16   Temp: 97.3 °F (36.3 °C)   SpO2: 93%   Weight: 81.6 kg (180 lb)   Height: 167.6 cm (66\")       Body mass index is 29.05 kg/m².  Discussed the patient's BMI with her. The BMI is in the acceptable range.    Physical Exam   Constitutional: She is oriented to person, place, and time. She appears well-developed and well-nourished.   HENT:   Head: Normocephalic and atraumatic.   Right Ear: Tympanic membrane normal.   Left Ear: " Tympanic membrane normal.   Eyes: Pupils are equal, round, and reactive to light.   Neck: Normal range of motion. Neck supple.   Cardiovascular: Normal rate and regular rhythm.   No murmur heard.  Pulmonary/Chest: Effort normal and breath sounds normal. Right breast exhibits no inverted nipple and no mass. Left breast exhibits no inverted nipple and no mass.   Abdominal: Soft. Bowel sounds are normal. She exhibits no distension.   Musculoskeletal: She exhibits tenderness.   Neurological: She is oriented to person, place, and time.   Skin: Capillary refill takes less than 2 seconds.   Nursing note and vitals reviewed.      Lab Results   Component Value Date    TRIG 156 (H) 08/25/2020    HDL 57 08/25/2020    LDL 86 08/25/2020    VLDL 31.2 08/25/2020        Assessment/Plan   Medicare Risks and Personalized Health Plan  CMS Preventative Services Quick Reference  Dementia/Memory     The above risks/problems have been discussed with the patient.  Pertinent information has been shared with the patient in the After Visit Summary.  Follow up plans and orders are seen below in the Assessment/Plan Section.    Diagnoses and all orders for this visit:    1. Medicare annual wellness visit, subsequent (Primary)  Comments:  dsicussed all recommendations   labs order, mammogram ordered, refilled meds  Orders:  -     Lipid Panel  -     Pneumococcal Polysaccharide Vaccine 23-Valent Greater Than or Equal To 1yo Subcutaneous / IM    2. Acquired hypothyroidism  -     TSH  -     T4, Free    3. Essential hypertension  -     Comprehensive Metabolic Panel  -     CBC Auto Differential    4. Osteoarthritis of multiple joints, unspecified osteoarthritis type    5. Degeneration of intervertebral disc of lumbar region  Comments:  pain management and possibly surgery in near future    6. Screening for breast cancer  -     Mammo Screening Digital Tomosynthesis Bilateral With CAD; Future    7. Vitamin D deficiency  -     Vitamin D 25 Hydroxy    8.  Encounter for screening mammogram for malignant neoplasm of breast   -     Mammo Screening Digital Tomosynthesis Bilateral With CAD; Future    9. Encounter for immunization   -     Pneumococcal Polysaccharide Vaccine 23-Valent Greater Than or Equal To 3yo Subcutaneous / IM    10. Screening for colon cancer  -     Ambulatory Referral For Screening Colonoscopy    11. Polymyalgia rheumatica (CMS/[facility])  Comments:  seen rheum- pains continue    12. Alcoholism (CMS/[facility])  Comments:  resolved- doign great alcohol free    Other orders  -     tiZANidine (Zanaflex) 4 MG tablet; Take 1 tablet by mouth At Night As Needed for Muscle Spasms.  Dispense: 30 tablet; Refill: 1  -     diclofenac (VOLTAREN) 75 MG EC tablet; Take 1 tablet by mouth 2 (Two) Times a Day As Needed (knee pain) for up to 90 days. Take with food  Dispense: 180 tablet; Refill: 0  -     losartan (COZAAR) 100 MG tablet; Take 1 tablet by mouth Daily.  Dispense: 90 tablet; Refill: 3      Follow Up:  Return in about 6 months (around 2/25/2021), or if symptoms worsen or fail to improve.     An After Visit Summary and PPPS were given to the patient.         .During this visit for their annual exam, we reviewed their personal history, social history and family history.  We went over their medications and all the recommended health maintenence items for their age group. They were given the opportunity to ask questions and discuss other concerns.

## 2020-08-26 LAB
25(OH)D3 SERPL-MCNC: 64.1 NG/ML (ref 30–100)
ALBUMIN SERPL-MCNC: 4.2 G/DL (ref 3.5–5.2)
ALBUMIN/GLOB SERPL: 1.3 G/DL
ALP SERPL-CCNC: 88 U/L (ref 39–117)
ALT SERPL W P-5'-P-CCNC: 17 U/L (ref 1–33)
ANION GAP SERPL CALCULATED.3IONS-SCNC: 9.7 MMOL/L (ref 5–15)
AST SERPL-CCNC: 18 U/L (ref 1–32)
BASOPHILS # BLD AUTO: 0.02 10*3/MM3 (ref 0–0.2)
BASOPHILS NFR BLD AUTO: 0.2 % (ref 0–1.5)
BILIRUB SERPL-MCNC: 0.2 MG/DL (ref 0–1.2)
BUN SERPL-MCNC: 26 MG/DL (ref 8–23)
BUN/CREAT SERPL: 27.1 (ref 7–25)
CALCIUM SPEC-SCNC: 10.8 MG/DL (ref 8.6–10.5)
CHLORIDE SERPL-SCNC: 102 MMOL/L (ref 98–107)
CHOLEST SERPL-MCNC: 174 MG/DL (ref 0–200)
CO2 SERPL-SCNC: 22.3 MMOL/L (ref 22–29)
CREAT SERPL-MCNC: 0.96 MG/DL (ref 0.57–1)
DEPRECATED RDW RBC AUTO: 47 FL (ref 37–54)
EOSINOPHIL # BLD AUTO: 0.33 10*3/MM3 (ref 0–0.4)
EOSINOPHIL NFR BLD AUTO: 2.7 % (ref 0.3–6.2)
ERYTHROCYTE [DISTWIDTH] IN BLOOD BY AUTOMATED COUNT: 12.3 % (ref 12.3–15.4)
GFR SERPL CREATININE-BSD FRML MDRD: 58 ML/MIN/1.73
GLOBULIN UR ELPH-MCNC: 3.3 GM/DL
GLUCOSE SERPL-MCNC: 95 MG/DL (ref 65–99)
HCT VFR BLD AUTO: 36.9 % (ref 34–46.6)
HDLC SERPL-MCNC: 57 MG/DL (ref 40–60)
HGB BLD-MCNC: 12.3 G/DL (ref 12–15.9)
IMM GRANULOCYTES # BLD AUTO: 0.05 10*3/MM3 (ref 0–0.05)
IMM GRANULOCYTES NFR BLD AUTO: 0.4 % (ref 0–0.5)
LDLC SERPL CALC-MCNC: 86 MG/DL (ref 0–100)
LDLC/HDLC SERPL: 1.51 {RATIO}
LYMPHOCYTES # BLD AUTO: 1.74 10*3/MM3 (ref 0.7–3.1)
LYMPHOCYTES NFR BLD AUTO: 14.5 % (ref 19.6–45.3)
MCH RBC QN AUTO: 34.4 PG (ref 26.6–33)
MCHC RBC AUTO-ENTMCNC: 33.3 G/DL (ref 31.5–35.7)
MCV RBC AUTO: 103.1 FL (ref 79–97)
MONOCYTES # BLD AUTO: 0.93 10*3/MM3 (ref 0.1–0.9)
MONOCYTES NFR BLD AUTO: 7.7 % (ref 5–12)
NEUTROPHILS NFR BLD AUTO: 74.5 % (ref 42.7–76)
NEUTROPHILS NFR BLD AUTO: 8.96 10*3/MM3 (ref 1.7–7)
NRBC BLD AUTO-RTO: 0 /100 WBC (ref 0–0.2)
PLATELET # BLD AUTO: 515 10*3/MM3 (ref 140–450)
PMV BLD AUTO: 8.9 FL (ref 6–12)
POTASSIUM SERPL-SCNC: 5.7 MMOL/L (ref 3.5–5.2)
PROT SERPL-MCNC: 7.5 G/DL (ref 6–8.5)
RBC # BLD AUTO: 3.58 10*6/MM3 (ref 3.77–5.28)
SODIUM SERPL-SCNC: 134 MMOL/L (ref 136–145)
T4 FREE SERPL-MCNC: 1.61 NG/DL (ref 0.93–1.7)
TRIGL SERPL-MCNC: 156 MG/DL (ref 0–150)
TSH SERPL DL<=0.05 MIU/L-ACNC: 0.8 UIU/ML (ref 0.27–4.2)
VLDLC SERPL-MCNC: 31.2 MG/DL (ref 5–40)
WBC # BLD AUTO: 12.03 10*3/MM3 (ref 3.4–10.8)

## 2020-08-31 ENCOUNTER — TELEPHONE (OUTPATIENT)
Dept: FAMILY MEDICINE CLINIC | Facility: CLINIC | Age: 70
End: 2020-08-31

## 2020-08-31 DIAGNOSIS — E87.5 HIGH SERUM POTASSIUM LEVEL: ICD-10-CM

## 2020-08-31 DIAGNOSIS — E83.52 HIGH CALCIUM LEVELS: Primary | ICD-10-CM

## 2020-08-31 PROBLEM — Z00.00 MEDICARE ANNUAL WELLNESS VISIT, SUBSEQUENT: Status: ACTIVE | Noted: 2020-08-31

## 2020-08-31 PROBLEM — Z12.11 SCREENING FOR COLON CANCER: Status: ACTIVE | Noted: 2020-08-31

## 2020-08-31 PROBLEM — Z23 ENCOUNTER FOR IMMUNIZATION: Status: ACTIVE | Noted: 2020-08-31

## 2020-08-31 NOTE — TELEPHONE ENCOUNTER
Patient is coming in on 9/8 under [facility] for this lab work.  Please put orders in.

## 2020-08-31 NOTE — PROGRESS NOTES
Please call the patient regarding her abnormal result. Please tell pt that potassium and calcium are high- stop multi vitamin and any potassium supplements may be taking- recheck in 1 week

## 2020-08-31 NOTE — TELEPHONE ENCOUNTER
----- Message from Uyen Cleveland MD sent at 8/30/2020 10:28 PM EDT -----  Please call the patient regarding her abnormal result. Please tell pt that potassium and calcium are high- stop multi vitamin and any potassium supplements may be taking- recheck in 1 week

## 2020-08-31 NOTE — TELEPHONE ENCOUNTER
Spoke with patient & she is coming back in a week to get her labs rechecked but she isn't taking any multivitamin or potassium supplement.

## 2020-09-08 ENCOUNTER — LAB (OUTPATIENT)
Dept: FAMILY MEDICINE CLINIC | Facility: CLINIC | Age: 70
End: 2020-09-08

## 2020-09-08 DIAGNOSIS — E87.5 HIGH SERUM POTASSIUM LEVEL: ICD-10-CM

## 2020-09-08 DIAGNOSIS — E83.52 HIGH CALCIUM LEVELS: ICD-10-CM

## 2020-09-08 LAB
ALBUMIN SERPL-MCNC: 4.1 G/DL (ref 3.5–5.2)
ALBUMIN/GLOB SERPL: 1.2 G/DL
ALP SERPL-CCNC: 139 U/L (ref 39–117)
ALT SERPL W P-5'-P-CCNC: 33 U/L (ref 1–33)
ANION GAP SERPL CALCULATED.3IONS-SCNC: 9.8 MMOL/L (ref 5–15)
AST SERPL-CCNC: 22 U/L (ref 1–32)
BILIRUB SERPL-MCNC: 0.2 MG/DL (ref 0–1.2)
BUN SERPL-MCNC: 22 MG/DL (ref 8–23)
BUN/CREAT SERPL: 22 (ref 7–25)
CALCIUM SPEC-SCNC: 10.6 MG/DL (ref 8.6–10.5)
CHLORIDE SERPL-SCNC: 100 MMOL/L (ref 98–107)
CO2 SERPL-SCNC: 22.2 MMOL/L (ref 22–29)
CREAT SERPL-MCNC: 1 MG/DL (ref 0.57–1)
GFR SERPL CREATININE-BSD FRML MDRD: 55 ML/MIN/1.73
GLOBULIN UR ELPH-MCNC: 3.4 GM/DL
GLUCOSE SERPL-MCNC: 96 MG/DL (ref 65–99)
POTASSIUM SERPL-SCNC: 5.2 MMOL/L (ref 3.5–5.2)
PROT SERPL-MCNC: 7.5 G/DL (ref 6–8.5)
SODIUM SERPL-SCNC: 132 MMOL/L (ref 136–145)

## 2020-09-08 PROCEDURE — 80053 COMPREHEN METABOLIC PANEL: CPT | Performed by: INTERNAL MEDICINE

## 2020-09-09 RX ORDER — GABAPENTIN 300 MG/1
CAPSULE ORAL
Qty: 210 CAPSULE | Refills: 1 | Status: SHIPPED | OUTPATIENT
Start: 2020-09-09 | End: 2020-11-06

## 2020-09-09 NOTE — TELEPHONE ENCOUNTER
Caller: Schoen, Connie A    Relationship: Self    Best call back number:[phone]   Medication needed:   Requested Prescriptions     Pending Prescriptions Disp Refills   • gabapentin (NEURONTIN) 300 MG capsule 210 capsule 0       When do you need the refill by: TODAY   What details did the patient provide when requesting the medication:OUT OF MEDICATION   Does the patient have less than a 3 day supply:  [x] Yes  [] No    What is the patient's preferred pharmacy: [facility] [address] [place] [address], [place]  [address] [facility] [phone] [facility] [phone] FX

## 2020-09-17 ENCOUNTER — LAB (OUTPATIENT)
Dept: FAMILY MEDICINE CLINIC | Facility: CLINIC | Age: 70
End: 2020-09-17

## 2020-09-17 ENCOUNTER — OFFICE VISIT (OUTPATIENT)
Dept: FAMILY MEDICINE CLINIC | Facility: CLINIC | Age: 70
End: 2020-09-17

## 2020-09-17 VITALS
HEART RATE: 102 BPM | SYSTOLIC BLOOD PRESSURE: 89 MMHG | RESPIRATION RATE: 10 BRPM | DIASTOLIC BLOOD PRESSURE: 60 MMHG | BODY MASS INDEX: 29.05 KG/M2 | WEIGHT: 180 LBS | TEMPERATURE: 97.1 F

## 2020-09-17 DIAGNOSIS — E83.52 HYPERCALCEMIA: ICD-10-CM

## 2020-09-17 DIAGNOSIS — R10.84 GENERALIZED ABDOMINAL PAIN: ICD-10-CM

## 2020-09-17 DIAGNOSIS — R74.8 ELEVATED SERUM ALKALINE PHOSPHATASE LEVEL: ICD-10-CM

## 2020-09-17 DIAGNOSIS — Z23 NEED FOR IMMUNIZATION AGAINST INFLUENZA: Primary | ICD-10-CM

## 2020-09-17 LAB
CA-I BLD-MCNC: 5.6 MG/DL (ref 4.6–5.4)
CA-I SERPL ISE-MCNC: 1.41 MMOL/L (ref 1.15–1.35)
PROLACTIN SERPL-MCNC: 20 NG/ML (ref 4.79–23.3)
PTH-INTACT SERPL-MCNC: 19.3 PG/ML (ref 15–65)

## 2020-09-17 PROCEDURE — 83625 ASSAY OF LDH ENZYMES: CPT | Performed by: INTERNAL MEDICINE

## 2020-09-17 PROCEDURE — 83615 LACTATE (LD) (LDH) ENZYME: CPT | Performed by: INTERNAL MEDICINE

## 2020-09-17 PROCEDURE — 83970 ASSAY OF PARATHORMONE: CPT | Performed by: INTERNAL MEDICINE

## 2020-09-17 PROCEDURE — 82330 ASSAY OF CALCIUM: CPT | Performed by: INTERNAL MEDICINE

## 2020-09-17 PROCEDURE — 99213 OFFICE O/P EST LOW 20 MIN: CPT | Performed by: INTERNAL MEDICINE

## 2020-09-17 PROCEDURE — 84146 ASSAY OF PROLACTIN: CPT | Performed by: INTERNAL MEDICINE

## 2020-09-17 PROCEDURE — 36415 COLL VENOUS BLD VENIPUNCTURE: CPT

## 2020-09-17 NOTE — PROGRESS NOTES
Rooming Tab(CC,VS,Pt Hx,Fall Screen)  Chief Complaint   Patient presents with   • Hypotension       Subjective   Pt here for dizziness and low BP- started the losartan a few days ago- and some better but BP still low.  Had decreaesd to 50mg first then now totally off.  Dizziness is there but improved   had abnormal labs and back for further investigation  Generalized abd pain worse on left.   I have reviewed and updated her medications, medical history and problem list during today's office visit.     Patient Care Team:  Uyen Cleveland MD as PCP - General (Internal Medicine)  Uyen Cleveland MD as PCP - Family Medicine    Problem List Tab  Medications Tab  Synopsis Tab  Chart Review Tab  Care Everywhere Tab  Immunizations Tab  Patient History Tab    Social History     Tobacco Use   • Smoking status: Current Every Day Smoker     Packs/day: 1.00     Types: Cigarettes   • Smokeless tobacco: Never Used   Substance Use Topics   • Alcohol use: No     Frequency: Never       Review of Systems   Constitutional: Negative for fatigue and fever.   HENT: Negative for congestion.    Respiratory: Negative for apnea, cough and wheezing.    Cardiovascular: Negative for chest pain.   Gastrointestinal: Positive for abdominal pain and constipation. Negative for abdominal distention and GERD.   Neurological: Negative for syncope.   Psychiatric/Behavioral: Negative for behavioral problems.       Objective     Rooming Tab(CC,VS,Pt Hx,Fall Screen)  BP (!) 89/60   Pulse 102   Temp 97.1 °F (36.2 °C)   Resp 10   Wt 81.6 kg (180 lb)   BMI 29.05 kg/m²     Body mass index is 29.05 kg/m².    Physical Exam  Vitals signs and nursing note reviewed.   Constitutional:       Appearance: She is well-developed.   HENT:      Head: Normocephalic and atraumatic.      Right Ear: Tympanic membrane normal.      Left Ear: Tympanic membrane normal.   Eyes:      Pupils: Pupils are equal, round, and reactive to light.   Neck:       Musculoskeletal: Normal range of motion and neck supple.   Cardiovascular:      Rate and Rhythm: Normal rate and regular rhythm.      Heart sounds: No murmur.   Pulmonary:      Effort: Pulmonary effort is normal.      Breath sounds: Normal breath sounds.   Abdominal:      General: Bowel sounds are normal. There is no distension.      Palpations: Abdomen is soft.   Skin:     Capillary Refill: Capillary refill takes less than 2 seconds.      Coloration: Skin is not jaundiced.   Neurological:      Mental Status: She is oriented to person, place, and time.          Statin Choice Calculator  Data Reviewed:               Lab Results   Component Value Date    BUN 22 09/08/2020    CREATININE 1.00 09/08/2020    EGFRIFNONA 55 (L) 09/08/2020     (L) 09/08/2020    K 5.2 09/08/2020     09/08/2020    CALCIUM 10.6 (H) 09/08/2020    ALBUMIN 4.10 09/08/2020    BILITOT 0.2 09/08/2020    ALKPHOS 139 (H) 09/08/2020    AST 22 09/08/2020    ALT 33 09/08/2020    TRIG 156 (H) 08/25/2020    HDL 57 08/25/2020    VLDL 31.2 08/25/2020    LDL 86 08/25/2020    LDLHDL 1.51 08/25/2020    WBC 12.03 (H) 08/25/2020    RBC 3.58 (L) 08/25/2020    HCT 36.9 08/25/2020    .1 (H) 08/25/2020    MCH 34.4 (H) 08/25/2020    TSH 0.795 08/25/2020    FREET4 1.61 08/25/2020    HHEZ14GO 64.1 08/25/2020      Assessment/Plan   Order Review Tab  Health Maintenance Tab  Patient Plan/Order Tab  Diagnoses and all orders for this visit:    1. Need for immunization against influenza (Primary)  -     Cancel: Fluad Quad 65+ yrs ([id number])    2. Hypercalcemia  Comments:  check PTH, ionized calcium  Orders:  -     PTH, Intact; Future  -     Calcium, Ionized  -     Prolactin    3. Elevated serum alkaline phosphatase level  Comments:  recheck today- if still eelvated will get imaging  Orders:  -     Lactate Dehydrogenase, Isoenzymes; Future    4. Generalized abdominal pain  Comments:   no diclofenac- increa miralax and increaes water-         Wrapup  Tab  Return if symptoms worsen or fail to improve.       They were informed of the diagnosis and treatment plan and directed to f/u for any further problems or concerns.

## 2020-09-21 ENCOUNTER — OFFICE VISIT (OUTPATIENT)
Dept: NEUROSURGERY | Facility: CLINIC | Age: 70
End: 2020-09-21

## 2020-09-21 DIAGNOSIS — M51.36 DEGENERATION OF INTERVERTEBRAL DISC OF LUMBAR REGION: Primary | ICD-10-CM

## 2020-09-21 DIAGNOSIS — R26.89 ABNORMALITY OF GAIT DUE TO IMPAIRMENT OF BALANCE: ICD-10-CM

## 2020-09-21 DIAGNOSIS — M41.57 SCOLIOSIS OF LUMBOSACRAL REGION DUE TO DEGENERATIVE DISEASE OF SPINE IN ADULT: ICD-10-CM

## 2020-09-21 PROCEDURE — 99442 PR PHYS/QHP TELEPHONE EVALUATION 11-20 MIN: CPT | Performed by: NEUROLOGICAL SURGERY

## 2020-09-21 RX ORDER — FUROSEMIDE 40 MG/1
TABLET ORAL
COMMUNITY
Start: 2020-08-24 | End: 2021-01-13 | Stop reason: SDUPTHER

## 2020-09-21 NOTE — PROGRESS NOTES
Mrs. Schoen is here today for televisit follow up.  She states that her back pain still feels about 80% better than it did before surgery last year in May 2019.  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  She states she is feeling spasms in her back and like she feels off balance at times.  She states she is using a shopping cart every time she goes to the store just to help with her balance.  She denies any weakness in the hands or urinary urgency or incontinence or really weakness in the leg.  She does not feel like she gets fatigued when she walks for any period of time.  She really denies any neck pain or radicular pain.  She underwent repeat standing lumbar films demonstrating stable spinal alignment hardware placement with no evidence of failure or adjacent segment disease.  At this time with her constellation of symptoms the amount of spondylitic disease I be slightly more concerned for possible early signs of cervical myelopathy.  Though I cannot rule out adjacent segment disease of the spine at the thoracic level I see no evidence of this on her x-rays.  I still feel it warranted to get an MRI of the cervical and thoracic spine.  I would like to bring her back in for evaluation to rule out any subtle features of myelopathy.  We will also change her over to meloxicam she states that works very well for her pain and she had to come off the diclofenac secondary to side effects.        You have chosen to receive care through a telephone visit. Do you consent to use a telephone visit for your medical care today? Yes    I spent 15 minutes with the patient in direct communication obtaining history, reviewing the films, discussing the pathology and treatment plans.

## 2020-09-23 LAB
LDH SERPL-CCNC: 159 IU/L (ref 119–226)
LDH1 CFR SERPL ELPH: 22 % (ref 17–32)
LDH2 CFR SERPL ELPH: 32 % (ref 25–40)
LDH3 CFR SERPL ELPH: 20 % (ref 17–27)
LDH4 CFR SERPL ELPH: 11 % (ref 5–13)
LDH5 CFR SERPL ELPH: 15 % (ref 4–20)

## 2020-09-28 ENCOUNTER — TELEPHONE (OUTPATIENT)
Dept: NEUROSURGERY | Facility: CLINIC | Age: 70
End: 2020-09-28

## 2020-09-28 RX ORDER — MELOXICAM 15 MG/1
15 TABLET ORAL NIGHTLY
Qty: 60 TABLET | Refills: 0 | Status: SHIPPED | OUTPATIENT
Start: 2020-09-28 | End: 2021-09-01

## 2020-09-28 NOTE — TELEPHONE ENCOUNTER
Caller: PT    Relationship: SELF    Best call back number: [phone]    What orders are you requesting (i.e. lab or imaging): MRI  In what timeframe would the patient need to come in: ASAP      Additional notes: PT IS CALLING ABOUT THE MRI AS SHE STATES SHE HAS RODS AND SCREWS IN HER BACK AND DID NOT THINK THAT SHE WAS ABLE TO HAVE AN MRI.    PLEASE ADVISE    THANK YOU

## 2020-10-19 RX ORDER — LEVOTHYROXINE SODIUM 0.12 MG/1
TABLET ORAL
Qty: 30 TABLET | Refills: 9 | Status: SHIPPED | OUTPATIENT
Start: 2020-10-19 | End: 2020-10-21

## 2020-10-21 RX ORDER — LEVOTHYROXINE SODIUM 0.12 MG/1
TABLET ORAL
Qty: 30 TABLET | Refills: 9 | Status: SHIPPED | OUTPATIENT
Start: 2020-10-21 | End: 2021-11-15

## 2020-10-28 RX ORDER — TIZANIDINE 4 MG/1
TABLET ORAL
Qty: 30 TABLET | Refills: 4 | Status: SHIPPED | OUTPATIENT
Start: 2020-10-28 | End: 2021-10-12

## 2020-11-06 ENCOUNTER — HOSPITAL ENCOUNTER (OUTPATIENT)
Dept: MRI IMAGING | Facility: HOSPITAL | Age: 70
Discharge: HOME | End: 2020-11-06

## 2020-11-06 DIAGNOSIS — M41.57 SCOLIOSIS OF LUMBOSACRAL REGION DUE TO DEGENERATIVE DISEASE OF SPINE IN ADULT: ICD-10-CM

## 2020-11-06 DIAGNOSIS — R26.89 ABNORMALITY OF GAIT DUE TO IMPAIRMENT OF BALANCE: ICD-10-CM

## 2020-11-06 DIAGNOSIS — M51.36 DEGENERATION OF INTERVERTEBRAL DISC OF LUMBAR REGION: ICD-10-CM

## 2020-11-06 PROCEDURE — 72141 MRI NECK SPINE W/O DYE: CPT

## 2020-11-06 PROCEDURE — 72146 MRI CHEST SPINE W/O DYE: CPT

## 2020-11-06 RX ORDER — GABAPENTIN 300 MG/1
CAPSULE ORAL
Qty: 210 CAPSULE | Refills: 0 | Status: SHIPPED | OUTPATIENT
Start: 2020-11-06 | End: 2020-12-08

## 2020-11-16 ENCOUNTER — TELEPHONE (OUTPATIENT)
Dept: NEUROSURGERY | Facility: CLINIC | Age: 70
End: 2020-11-16

## 2020-11-16 DIAGNOSIS — G95.20 CERVICAL CORD COMPRESSION WITH MYELOPATHY (HCC): Primary | ICD-10-CM

## 2020-11-16 NOTE — TELEPHONE ENCOUNTER
PT CALLED TO REQUEST A CALL BACK WITH HER MRI RESULTS FROM 11.6.20.     PLEASE CALL PT AT HOME PHONE FIRST [phone], IF NO ANSWER CALL ON CELL PHONE [phone]     THANK YOU.

## 2020-11-17 DIAGNOSIS — M54.12 CERVICAL RADICULOPATHY: ICD-10-CM

## 2020-11-17 DIAGNOSIS — M54.2 CERVICAL SPINE PAIN: Primary | ICD-10-CM

## 2020-11-17 NOTE — TELEPHONE ENCOUNTER
I spoke with the patient today and reviewed her films with her.  I feel there is a potential issue that she might be suffering spinal cord compression dynamically.  Let us get some cervical flexion-extension and just have Dr. Shin or [address] to see her now. If they want one of the locums to see her then that is fine as well.

## 2020-12-01 ENCOUNTER — TELEPHONE (OUTPATIENT)
Dept: NEUROSURGERY | Facility: CLINIC | Age: 70
End: 2020-12-01

## 2020-12-01 NOTE — TELEPHONE ENCOUNTER
THE PATIENT HAS BEEN EXPOSED TO COVID AND NEEDS TO CANCEL HER APPOINTMENT TOMORROW WITH DR. LOPEZ.     THE PATIENT HAS FILMS FROM HER XRAY THAT SHE WAS GOING TO BRING TO THE APPOINTMENT, SHE WANTS TO KNOW IF SHE SHOULD MAIL THEM IN FOR DR. LOPEZ TO REVIEW OR IF SHE SHOULD WAIT UNTIL SHE CAN RESCHEDULE TO BRING THEM IN.    PLEASE ADVISE. [phone]    THANK YOU!

## 2020-12-08 RX ORDER — GABAPENTIN 300 MG/1
CAPSULE ORAL
Qty: 210 CAPSULE | Refills: 1 | Status: SHIPPED | OUTPATIENT
Start: 2020-12-08 | End: 2021-02-04

## 2021-01-13 RX ORDER — FUROSEMIDE 40 MG/1
40 TABLET ORAL DAILY
Qty: 90 TABLET | Refills: 1 | Status: SHIPPED | OUTPATIENT
Start: 2021-01-13 | End: 2021-08-26

## 2021-02-04 RX ORDER — GABAPENTIN 300 MG/1
CAPSULE ORAL
Qty: 210 CAPSULE | Refills: 1 | Status: SHIPPED | OUTPATIENT
Start: 2021-02-04 | End: 2021-05-04

## 2021-04-05 RX ORDER — SERTRALINE HYDROCHLORIDE 100 MG/1
TABLET, FILM COATED ORAL
Qty: 90 TABLET | Refills: 3 | Status: SHIPPED | OUTPATIENT
Start: 2021-04-05 | End: 2022-03-31

## 2021-05-04 ENCOUNTER — TELEPHONE (OUTPATIENT)
Dept: FAMILY MEDICINE CLINIC | Facility: CLINIC | Age: 71
End: 2021-05-04

## 2021-05-04 RX ORDER — GABAPENTIN 600 MG/1
600 TABLET ORAL 3 TIMES DAILY
Qty: 90 TABLET | Refills: 3 | Status: SHIPPED | OUTPATIENT
Start: 2021-05-04 | End: 2021-08-31

## 2021-05-04 RX ORDER — GABAPENTIN 300 MG/1
CAPSULE ORAL
Qty: 210 CAPSULE | Refills: 1 | Status: CANCELLED | OUTPATIENT
Start: 2021-05-04

## 2021-05-04 NOTE — TELEPHONE ENCOUNTER
Caller: Schoen, Connie A    Relationship: Self    Best call back number: [phone]    Medication needed:   Requested Prescriptions     Pending Prescriptions Disp Refills   • gabapentin (NEURONTIN) 300 MG capsule 210 capsule 1       When do you need the refill by: 05/04/21    What additional details did the patient provide when requesting the medication: PATIENT IS COMPLETELY OUT OF MEDICATION     Does the patient have less than a 3 day supply:  [x] Yes  [] No    What is the patient's preferred pharmacy: [facility] [address] [place] [address], [place] [address] [phone] [facility] [phone] FX

## 2021-05-21 ENCOUNTER — TREATMENT (OUTPATIENT)
Dept: PHYSICAL THERAPY | Facility: CLINIC | Age: 71
End: 2021-05-21

## 2021-05-21 DIAGNOSIS — R26.89 BALANCE PROBLEMS: ICD-10-CM

## 2021-05-21 DIAGNOSIS — M54.6 CHRONIC BILATERAL THORACIC BACK PAIN: ICD-10-CM

## 2021-05-21 DIAGNOSIS — G89.29 CHRONIC BILATERAL THORACIC BACK PAIN: ICD-10-CM

## 2021-05-21 DIAGNOSIS — Z98.1 ARTHRODESIS STATUS: Primary | ICD-10-CM

## 2021-05-21 PROCEDURE — 97162 PT EVAL MOD COMPLEX 30 MIN: CPT | Performed by: PHYSICAL THERAPIST

## 2021-05-21 NOTE — PROGRESS NOTES
Physical Therapy Initial Evaluation and Plan of Care    Patient: Connie A Schoen   : 1950  Diagnosis/ICD-10 Code:  Arthrodesis status [Z98.1]  Referring practitioner: BARBRA Beard   Outcome Measures: LEFS: , Modified Oswestry: 60% disability    Subjective Evaluation    History of Present Illness  Mechanism of injury: Pt reports to therapy with middle and low back pain along with balance deficits that has been occurring for several years with more recently pt reporting her balance and decreased activity tolerance resulting in her not performing her normal activities. Pt has a hx of falls with the most recent roughly a month ago when her foot got caught with no major injury noted. Once she had fallen she had difficulty returning to standing. Pt has a hx of thoracic fusion, and has continued to have pain in her LINDSEY ribs and flank and more recently noted pain going down her L leg. Jazmine reports her biggest issue currently is her balance because her falls are becoming more frequent and she is unable to recover by herself. Pt is going to see a spine specialist at the end of this month. Pt is going to [place] the first week in July.       Aggravating factors: prolonged ambulation,prolonged standing, navigating stairs    Alleviating factors: pain medication      PMHx:arhritis, back injury, SOA.       Patient Occupation: retired Quality of life: good    Pain  Current pain ratin  At best pain ratin  At worst pain ratin  Quality: dull ache, discomfort, pressure, throbbing and tight  Progression: worsening    Patient Goals  Patient goals for therapy: increased motion, decreased pain, increased strength, independence with ADLs/IADLs, return to sport/leisure activities and improved balance             Objective        Special Questions  Patient is experiencing night pain and disturbed sleep.       Postural Observations  Seated posture: fair  Standing posture: fair        Palpation   Left   Hypertonic  in the erector spinae and lumbar paraspinals.   Tenderness of the erector spinae and lumbar paraspinals.     Right   Hypertonic in the erector spinae and lumbar paraspinals. Tenderness of the erector spinae and lumbar paraspinals.     Neurological Testing     Sensation     Lumbar   Left   Intact: light touch    Right   Intact: light touch    Active Range of Motion     Additional Active Range of Motion Details  Flex: WFL + pain  Ext: Mod impaired + pain  R SB: severely impaired + pain  L SB: severely impaired + pain  R ROT:severely impaired + pain  L ROT:severely impaired + pain    Passive Range of Motion     Additional Passive Range of Motion Details  Tenderness T3-T7 PA    Strength/Myotome Testing     Left Hip   Planes of Motion   Flexion: 4+  External rotation: 4  Internal rotation: 4    Right Hip   Planes of Motion   Flexion: 4+  External rotation: 4+  Internal rotation: 4+    Left Knee   Flexion: 4  Extension: 4    Right Knee   Flexion: 4+  Extension: 4+    Left Ankle/Foot   Dorsiflexion: 4  Plantar flexion: 4    Right Ankle/Foot   Dorsiflexion: 4+  Plantar flexion: 4+    Tests       Thoracic   Negative slump.     Lumbar     Left   Positive quadrant.   Negative passive SLR.     Right   Positive quadrant.   Negative passive SLR.     Ambulation     Comments   Pt ambulates with decreased gait speed, decreased foot clearance BLE    Functional Assessment     Comments  SLS: <3 sec LINDSEY  Tandem balance: <5 sec LINDSEY  FTSTS:21.25 sec with minimal use of hands  TU.9 sec            Assessment & Plan     Assessment  Impairments: abnormal coordination, abnormal gait, abnormal muscle firing, abnormal muscle tone, abnormal or restricted ROM, activity intolerance, impaired balance, impaired physical strength, lacks appropriate home exercise program, pain with function and safety issue  Assessment details: Patient is a 71 y.o. year old female who presents to therapy with mid back pain and balance issues. .  she presents with  significant impairments including decreased lumbar ROM, decreased BLE strength, impaired balance, hx of falls, decreased standing tolerance, decreased walking tolerance, decreased activity tolerance, impaired TUG score, impaired LEFS and Modified Oswestry score, and pain. Impairments affect ADL/IADL's, functional activities, recreational activities, and community activities. Pt will benefit from skilled physical therapy to address impairments, decrease pain and restore function.  Prognosis: good  Functional Limitations: carrying objects, lifting, sleeping, walking, pulling, pushing, uncomfortable because of pain, moving in bed, sitting, standing, stooping, reaching behind back, reaching overhead and unable to perform repetitive tasks  Goals  Plan Goals: SHORT TERM GOALS: 3-4 weeks   1. Pt will improve standing balance with eyes closed to require only supervision  > 15 sec .     2. Pt. will improve (B) LE strength to >4+/5 (in sitting) as needed to perform sit to stand txf x 2 without UE.   3. Pt. Will demonstrate the ability to ambulate with SBA with proper adjustments and forward gaze for 4 min.   4. Pt. Will improve SLS to >5sec LINDSEY    LONG TERM GOALS: 6-8 weeks   1. Pt will improve standing balance with narrow BRAXTON, eyes closed to require only supervision > 1 min.   2. Pt. will improve gluteal and gastrocs strength to5/5 (in sitting) as needed to improve 30 sec sit to stand to 10x with UE assist on thighs.    3. Pt. Will demonstrate the ability to ambulate with SBA with proper adjustments and forward gaze for >7 min.   4. Pt. Will decrease TUG score to < 15  Sec        Plan  Therapy options: will be seen for skilled physical therapy services  Planned modality interventions: TENS, thermotherapy (hydrocollator packs), traction, electrical stimulation/Russian stimulation, high voltage pulsed current (pain management) and cryotherapy  Planned therapy interventions: abdominal trunk stabilization, ADL retraining,  balance/weight-bearing training, body mechanics training, fine motor coordination training, flexibility, functional ROM exercises, gait training, home exercise program, IADL retraining, joint mobilization, transfer training, therapeutic activities, stretching, strengthening, spinal/joint mobilization, soft tissue mobilization, postural training, orthotic fitting/training, neuromuscular re-education, motor coordination training and manual therapy  Duration in visits: 20  Treatment plan discussed with: patient        History # of Personal Factors and/or Comorbidities: MODERATE (1-2)  Examination of Body System(s): # of elements: MODERATE (3)  Clinical Presentation: UNSTABLE   Clinical Decision Making: MODERATE    Timed:         Manual Therapy:         mins  66204;     Therapeutic Exercise:         mins  05593;     Neuromuscular Brooklynn:        mins  31295;    Therapeutic Activity:          mins  21548;     Gait Training:           mins  81323;     Ultrasound:          mins  35468;    Ionto                                   mins   42454  Self Care                            mins   32001        Un-Timed:  Electrical Stimulation:         mins  03130 ( );  Dry Needling         mins self-pay  Traction          mins 15088  Low Eval          Mins  21529  Mod Eval     45     Mins  09878  High Eval                            Mins  34911  Re-Eval                               mins  05187        Timed Treatment:   0   mins   Total Treatment:     45   mins  PT SIGNATURE: Sherrie Schwartz PT, DPT    DATE TREATMENT INITIATED: 5/21/2021    Initial Certification  Certification Period: 8/19/2021  I certify that the therapy services are furnished while this patient is under my care.  The services outlined above are required by this patient, and will be reviewed every 90 days.     PHYSICIAN: Christophe Chang PA      DATE:     Please sign and return via fax to [phone].. Thank you, [facility].

## 2021-06-01 ENCOUNTER — TREATMENT (OUTPATIENT)
Dept: PHYSICAL THERAPY | Facility: CLINIC | Age: 71
End: 2021-06-01

## 2021-06-01 DIAGNOSIS — M41.57 SCOLIOSIS OF LUMBOSACRAL REGION DUE TO DEGENERATIVE DISEASE OF SPINE IN ADULT: ICD-10-CM

## 2021-06-01 DIAGNOSIS — M43.20 FUSION OF SPINE, UNSPECIFIED SPINAL REGION: ICD-10-CM

## 2021-06-01 DIAGNOSIS — M54.6 CHRONIC BILATERAL THORACIC BACK PAIN: ICD-10-CM

## 2021-06-01 DIAGNOSIS — R26.89 BALANCE PROBLEMS: ICD-10-CM

## 2021-06-01 DIAGNOSIS — G89.29 CHRONIC BILATERAL THORACIC BACK PAIN: ICD-10-CM

## 2021-06-01 DIAGNOSIS — Z98.1 ARTHRODESIS STATUS: Primary | ICD-10-CM

## 2021-06-01 PROCEDURE — 97110 THERAPEUTIC EXERCISES: CPT | Performed by: PHYSICAL THERAPIST

## 2021-06-01 NOTE — PROGRESS NOTES
Physical Therapy Daily Progress Note    VISIT#: 2    Subjective   Connie Schoen reports that she sees a spine doctor tomorrow and that she has started to occasionally have shooting pain in the lateral aspect of her R thigh with insidious onset. Pt reports no recent LOB  Pain Rating (0/10): 4    Objective     See Exercise, Manual, and Modality Logs for complete treatment.     Assessment/Plan   Pt initiated core strengthening activities with ROM limited to prevent increased pain. Figure 4 stretch attempted today with high levels of pain noted in LB and BLE with exercise terminated. Pt instructed that we will determine therapy plans following her visit with spine specialist tomorrow.     Plan  Progress per Plan of Care and Progress strengthening /stabilization /functional activity            Timed:         Manual Therapy:         mins  95536;     Therapeutic Exercise:    29     mins  12461;     Neuromuscular Brooklynn:        mins  15285;    Therapeutic Activity:          mins  18765;     Gait Training:           mins  61125;     Ultrasound:          mins  74016;    Ionto                                  mins   75236  Self Care                            mins   62143    Un-Timed:  Electrical Stimulation:         mins  01103 ( );  Dry Needling          mins self-pay  Traction         mins 90486  Low Eval          Mins  34976  Mod Eval          Mins  95053  High Eval                            Mins  83049  Re-Eval                               mins  52044    Timed Treatment:   29   mins   Total Treatment:     29   mins    Sherrie Schwartz PT, DPT  Physical Therapist

## 2021-06-04 ENCOUNTER — TREATMENT (OUTPATIENT)
Dept: PHYSICAL THERAPY | Facility: CLINIC | Age: 71
End: 2021-06-04

## 2021-06-04 DIAGNOSIS — G89.29 CHRONIC BILATERAL THORACIC BACK PAIN: ICD-10-CM

## 2021-06-04 DIAGNOSIS — Z98.1 ARTHRODESIS STATUS: Primary | ICD-10-CM

## 2021-06-04 DIAGNOSIS — R26.89 BALANCE PROBLEMS: ICD-10-CM

## 2021-06-04 DIAGNOSIS — R26.89 ABNORMALITY OF GAIT DUE TO IMPAIRMENT OF BALANCE: ICD-10-CM

## 2021-06-04 DIAGNOSIS — M54.6 CHRONIC BILATERAL THORACIC BACK PAIN: ICD-10-CM

## 2021-06-04 PROCEDURE — 97110 THERAPEUTIC EXERCISES: CPT | Performed by: PHYSICAL THERAPIST

## 2021-06-04 PROCEDURE — 97112 NEUROMUSCULAR REEDUCATION: CPT | Performed by: PHYSICAL THERAPIST

## 2021-06-04 NOTE — PROGRESS NOTES
Physical Therapy Daily Progress Note    VISIT#: 3    Subjective   Connie Schoen reports that she is to have a myelograph performed but was instructed by her physician to continue with therapy at this time.   Pain Rating (0/10): 2    Objective     See Exercise, Manual, and Modality Logs for complete treatment.         Assessment/Plan   Pt  Performed greater seated strengthening exercises today to minimize transfers due to increased irritation while performing transfers. Pt also progressed to greater balance activities. Pt required frequent rest breaks throughout the session due to fatigue.     Plan  Progress per Plan of Care and Progress strengthening /stabilization /functional activity            Timed:         Manual Therapy:         mins  79043;     Therapeutic Exercise:    20     mins  34877;     Neuromuscular Brooklynn:    10    mins  69430;    Therapeutic Activity:          mins  08168;     Gait Training:           mins  45847;     Ultrasound:          mins  37948;    Ionto                                   mins   70141  Self Care                            mins   30530    Un-Timed:  Electrical Stimulation:         mins  91305 ( );  Dry Needling          mins self-pay  Traction          mins 26005  Low Eval          Mins  68269  Mod Eval          Mins  85863  High Eval                            Mins  50976  Re-Eval                               mins  47853    Timed Treatment:   30   mins   Total Treatment:     30   mins    Sherrie Schwartz PT, DPT  Physical Therapist

## 2021-06-10 ENCOUNTER — TREATMENT (OUTPATIENT)
Dept: PHYSICAL THERAPY | Facility: CLINIC | Age: 71
End: 2021-06-10

## 2021-06-10 DIAGNOSIS — Z98.1 ARTHRODESIS STATUS: ICD-10-CM

## 2021-06-10 DIAGNOSIS — R26.89 BALANCE PROBLEMS: Primary | ICD-10-CM

## 2021-06-10 DIAGNOSIS — M41.57 SCOLIOSIS OF LUMBOSACRAL REGION DUE TO DEGENERATIVE DISEASE OF SPINE IN ADULT: ICD-10-CM

## 2021-06-10 PROCEDURE — 97110 THERAPEUTIC EXERCISES: CPT | Performed by: PHYSICAL THERAPIST

## 2021-06-10 NOTE — PROGRESS NOTES
Physical Therapy Daily Progress Note    Patient: Connie A Schoen   : 1950  Referring practitioner: BARBRA Beard  Today's Date: 6/10/2021    VISIT#: 4    Subjective Evaluation    History of Present Illness  Mechanism of injury: Pt reports she has a myelogram on  then she will go back to see the MD for test results and a plan.  She gets pain if she turns the right way but mostly does not have pain.  She is worried about her balance more.  She fell at her daughter's house when she was there by herself.  She was down for 2 hours then she finally scooted and was able to get up.      Pain  Current pain ratin         Objective     See Exercise, Manual, and Modality Logs for complete treatment.     Cont with ex today focusing on strength vs balance today    Patient Education:    Assessment & Plan     Assessment  Assessment details: Pt not with as much pain as in past sessions.  Focused on strengthening ex today.  May be able to work on supine ex and balance ex at next visit.      Progress per Plan of Care        Timed:         Manual Therapy:         mins  10469;     Therapeutic Exercise:  28       mins  55503;     Neuromuscular Brooklynn:        mins  26382;    Therapeutic Activity:          mins  10390;     Gait Training:           mins  92252;     Ultrasound:          mins  94230;    Ionto                                   mins   34739  Self Care                            mins   34209  Canalith Repos                   mins  4209    Un-Timed:  Electrical Stimulation:         mins  39709 ( );  Traction          mins 72602  Low Eval          Mins  87522  Mod Eval          Mins  59561  High Eval                            Mins  63677  Re-Eval                               mins  54365    Timed Treatment:28      mins   Total Treatment:   28     mins    Vicki Deshpande PT  Physical Therapist

## 2021-06-15 ENCOUNTER — DOCUMENTATION (OUTPATIENT)
Dept: PHYSICAL THERAPY | Facility: CLINIC | Age: 71
End: 2021-06-15

## 2021-06-15 NOTE — PROGRESS NOTES
Patient did not show for appointment.  PTA called patient who got appointment days mixed up.  Confirmed next PT session on 6/17 @ 11:30.

## 2021-06-17 ENCOUNTER — TREATMENT (OUTPATIENT)
Dept: PHYSICAL THERAPY | Facility: CLINIC | Age: 71
End: 2021-06-17

## 2021-06-17 PROCEDURE — 97110 THERAPEUTIC EXERCISES: CPT | Performed by: PHYSICAL THERAPIST

## 2021-06-17 PROCEDURE — 97112 NEUROMUSCULAR REEDUCATION: CPT | Performed by: PHYSICAL THERAPIST

## 2021-06-17 NOTE — PROGRESS NOTES
Physical Therapy Daily Progress Note    VISIT#: 5    Subjective   Connie Schoen reports she cont to have balance deficits.  Myelogram tomorrow morning.  Was having LLE pain, but since starting PT and taking steroid pack, this pain has diminished.  She feels stiff today.    Objective   See Exercise, Manual, and Modality Logs for complete treatment.     Assessment/Plan  SHORT TERM GOALS: 3-4 weeks   1. Pt will improve standing balance with eyes closed to require only supervision  > 15 sec.  --PARTIALLY MET (required CGA with lateral sway to L.)      2. Pt. will improve (B) LE strength to >4+/5 (in sitting) as needed to perform sit to stand txf x 2 without UE.  --NOT MET (4/5 R, 4-/5 L for hip flex MMT in sitting.)     3. Pt. Will demonstrate the ability to ambulate with SBA with proper adjustments and forward gaze for 4 min.  --PARTIALLY MET.    4. Pt. Will improve SLS to >5sec LINDSEY.  --NOT MET (2 sec SLS each LE with CGA.)     Cues for proper breathing mechanics throughout there-ex to avoid breath-holding.  Pt tolerated progression of exercises without problems.  Cues as noted for there-ex program.  No complications today.  Continue current POC and progress as tolerated per PT evaluation.  PT POC is still appropriate at this time; move towards more balance challenges as appropriate to address remaining balance deficits.  Pt cancelled her upcoming PT appts as she is stressed and busy getting ready for her upcoming trip to [place]; she will resume PT once she returns from her vacation.        Timed:  Therapeutic Exercise:    20     mins  00098;     Neuromuscular Brooklynn:    9    mins  33675;        Timed Treatment:   29   mins   Total Treatment:     29   mins    Lyndon Duron, PT  [place] License # [id number]  Physical Therapist

## 2021-08-09 ENCOUNTER — APPOINTMENT (OUTPATIENT)
Dept: CT IMAGING | Facility: HOSPITAL | Age: 71
End: 2021-08-09

## 2021-08-09 ENCOUNTER — APPOINTMENT (OUTPATIENT)
Dept: GENERAL RADIOLOGY | Facility: HOSPITAL | Age: 71
End: 2021-08-09

## 2021-08-09 ENCOUNTER — HOSPITAL ENCOUNTER (INPATIENT)
Facility: HOSPITAL | Age: 71
LOS: 2 days | Discharge: HOME | End: 2021-08-12
Attending: EMERGENCY MEDICINE | Admitting: HOSPITALIST

## 2021-08-09 DIAGNOSIS — S22.22XA CLOSED FRACTURE OF BODY OF STERNUM, INITIAL ENCOUNTER: ICD-10-CM

## 2021-08-09 DIAGNOSIS — I46.9 CARDIOPULMONARY ARREST WITH SUCCESSFUL RESUSCITATION (HCC): Primary | ICD-10-CM

## 2021-08-09 DIAGNOSIS — S22.42XA CLOSED FRACTURE OF MULTIPLE RIBS OF LEFT SIDE, INITIAL ENCOUNTER: ICD-10-CM

## 2021-08-09 LAB
AMPHET+METHAMPHET UR QL: NEGATIVE
ANION GAP SERPL CALCULATED.3IONS-SCNC: 12 MMOL/L (ref 5–15)
BARBITURATES UR QL SCN: NEGATIVE
BASOPHILS # BLD AUTO: 0 10*3/MM3 (ref 0–0.2)
BASOPHILS NFR BLD AUTO: 0.6 % (ref 0–1.5)
BENZODIAZ UR QL SCN: NEGATIVE
BUN SERPL-MCNC: 16 MG/DL (ref 8–23)
BUN/CREAT SERPL: 16.5 (ref 7–25)
CALCIUM SPEC-SCNC: 8.7 MG/DL (ref 8.6–10.5)
CANNABINOIDS SERPL QL: NEGATIVE
CHLORIDE SERPL-SCNC: 106 MMOL/L (ref 98–107)
CO2 SERPL-SCNC: 19 MMOL/L (ref 22–29)
COCAINE UR QL: NEGATIVE
CREAT SERPL-MCNC: 0.97 MG/DL (ref 0.57–1)
D-LACTATE SERPL-SCNC: 1.9 MMOL/L (ref 0.5–2)
DEPRECATED RDW RBC AUTO: 53.8 FL (ref 37–54)
EOSINOPHIL # BLD AUTO: 0.1 10*3/MM3 (ref 0–0.4)
EOSINOPHIL NFR BLD AUTO: 1.3 % (ref 0.3–6.2)
ERYTHROCYTE [DISTWIDTH] IN BLOOD BY AUTOMATED COUNT: 14.6 % (ref 12.3–15.4)
ETHANOL UR QL: 0.06 %
GFR SERPL CREATININE-BSD FRML MDRD: 57 ML/MIN/1.73
GFR SERPL CREATININE-BSD FRML MDRD: 69 ML/MIN/1.73
GLUCOSE BLDC GLUCOMTR-MCNC: 127 MG/DL (ref 70–105)
GLUCOSE SERPL-MCNC: 91 MG/DL (ref 65–99)
HCT VFR BLD AUTO: 31.8 % (ref 34–46.6)
HGB BLD-MCNC: 10.8 G/DL (ref 12–15.9)
LYMPHOCYTES # BLD AUTO: 1.3 10*3/MM3 (ref 0.7–3.1)
LYMPHOCYTES NFR BLD AUTO: 17.9 % (ref 19.6–45.3)
MAGNESIUM SERPL-MCNC: 1.9 MG/DL (ref 1.6–2.4)
MCH RBC QN AUTO: 35.7 PG (ref 26.6–33)
MCHC RBC AUTO-ENTMCNC: 34 G/DL (ref 31.5–35.7)
MCV RBC AUTO: 104.8 FL (ref 79–97)
METHADONE UR QL SCN: NEGATIVE
MONOCYTES # BLD AUTO: 0.6 10*3/MM3 (ref 0.1–0.9)
MONOCYTES NFR BLD AUTO: 8.4 % (ref 5–12)
NEUTROPHILS NFR BLD AUTO: 5.3 10*3/MM3 (ref 1.7–7)
NEUTROPHILS NFR BLD AUTO: 71.8 % (ref 42.7–76)
NRBC BLD AUTO-RTO: 0.2 /100 WBC (ref 0–0.2)
NT-PROBNP SERPL-MCNC: 164.9 PG/ML (ref 0–900)
OPIATES UR QL: NEGATIVE
OXYCODONE UR QL SCN: NEGATIVE
PLATELET # BLD AUTO: 379 10*3/MM3 (ref 140–450)
PMV BLD AUTO: 6.1 FL (ref 6–12)
POTASSIUM SERPL-SCNC: 4.2 MMOL/L (ref 3.5–5.2)
RBC # BLD AUTO: 3.03 10*6/MM3 (ref 3.77–5.28)
SARS-COV-2 RNA PNL SPEC NAA+PROBE: NOT DETECTED
SODIUM SERPL-SCNC: 137 MMOL/L (ref 136–145)
TROPONIN T SERPL-MCNC: <0.01 NG/ML (ref 0–0.03)
TROPONIN T SERPL-MCNC: <0.01 NG/ML (ref 0–0.03)
WBC # BLD AUTO: 7.4 10*3/MM3 (ref 3.4–10.8)

## 2021-08-09 PROCEDURE — 70450 CT HEAD/BRAIN W/O DYE: CPT

## 2021-08-09 PROCEDURE — 82962 GLUCOSE BLOOD TEST: CPT

## 2021-08-09 PROCEDURE — 82077 ASSAY SPEC XCP UR&BREATH IA: CPT | Performed by: EMERGENCY MEDICINE

## 2021-08-09 PROCEDURE — G0378 HOSPITAL OBSERVATION PER HR: HCPCS

## 2021-08-09 PROCEDURE — 80307 DRUG TEST PRSMV CHEM ANLYZR: CPT | Performed by: HOSPITALIST

## 2021-08-09 PROCEDURE — 5A12012 PERFORMANCE OF CARDIAC OUTPUT, SINGLE, MANUAL: ICD-10-PCS | Performed by: EMERGENCY MEDICINE

## 2021-08-09 PROCEDURE — 25010000002 HYDROMORPHONE PER 4 MG: Performed by: EMERGENCY MEDICINE

## 2021-08-09 PROCEDURE — 71045 X-RAY EXAM CHEST 1 VIEW: CPT

## 2021-08-09 PROCEDURE — 71250 CT THORAX DX C-: CPT

## 2021-08-09 PROCEDURE — 36415 COLL VENOUS BLD VENIPUNCTURE: CPT

## 2021-08-09 PROCEDURE — 83605 ASSAY OF LACTIC ACID: CPT

## 2021-08-09 PROCEDURE — 83735 ASSAY OF MAGNESIUM: CPT | Performed by: EMERGENCY MEDICINE

## 2021-08-09 PROCEDURE — U0003 INFECTIOUS AGENT DETECTION BY NUCLEIC ACID (DNA OR RNA); SEVERE ACUTE RESPIRATORY SYNDROME CORONAVIRUS 2 (SARS-COV-2) (CORONAVIRUS DISEASE [COVID-19]), AMPLIFIED PROBE TECHNIQUE, MAKING USE OF HIGH THROUGHPUT TECHNOLOGIES AS DESCRIBED BY CMS-2020-01-R: HCPCS | Performed by: EMERGENCY MEDICINE

## 2021-08-09 PROCEDURE — 80048 BASIC METABOLIC PNL TOTAL CA: CPT | Performed by: EMERGENCY MEDICINE

## 2021-08-09 PROCEDURE — 25010000002 HEPARIN (PORCINE) PER 1000 UNITS: Performed by: HOSPITALIST

## 2021-08-09 PROCEDURE — 83880 ASSAY OF NATRIURETIC PEPTIDE: CPT | Performed by: EMERGENCY MEDICINE

## 2021-08-09 PROCEDURE — 25010000002 ONDANSETRON PER 1 MG: Performed by: HOSPITALIST

## 2021-08-09 PROCEDURE — 84484 ASSAY OF TROPONIN QUANT: CPT | Performed by: EMERGENCY MEDICINE

## 2021-08-09 PROCEDURE — 25010000002 MORPHINE PER 10 MG: Performed by: HOSPITALIST

## 2021-08-09 PROCEDURE — 85025 COMPLETE CBC W/AUTO DIFF WBC: CPT | Performed by: EMERGENCY MEDICINE

## 2021-08-09 PROCEDURE — 84484 ASSAY OF TROPONIN QUANT: CPT | Performed by: HOSPITALIST

## 2021-08-09 PROCEDURE — 99285 EMERGENCY DEPT VISIT HI MDM: CPT

## 2021-08-09 PROCEDURE — 25010000002 ONDANSETRON PER 1 MG: Performed by: EMERGENCY MEDICINE

## 2021-08-09 PROCEDURE — 99222 1ST HOSP IP/OBS MODERATE 55: CPT | Performed by: HOSPITALIST

## 2021-08-09 RX ORDER — GABAPENTIN 600 MG/1
600 TABLET ORAL 3 TIMES DAILY
Status: DISCONTINUED | OUTPATIENT
Start: 2021-08-09 | End: 2021-08-12 | Stop reason: HOSPADM

## 2021-08-09 RX ORDER — TIZANIDINE 4 MG/1
4 TABLET ORAL EVERY 8 HOURS PRN
COMMUNITY
End: 2021-09-01 | Stop reason: SDUPTHER

## 2021-08-09 RX ORDER — LOSARTAN POTASSIUM 50 MG/1
100 TABLET ORAL DAILY
Status: DISCONTINUED | OUTPATIENT
Start: 2021-08-10 | End: 2021-08-12 | Stop reason: HOSPADM

## 2021-08-09 RX ORDER — GABAPENTIN 600 MG/1
600 TABLET ORAL 3 TIMES DAILY
COMMUNITY
End: 2021-09-01 | Stop reason: SDUPTHER

## 2021-08-09 RX ORDER — LEVOTHYROXINE SODIUM 0.12 MG/1
125 TABLET ORAL DAILY
COMMUNITY
End: 2021-09-01 | Stop reason: SDUPTHER

## 2021-08-09 RX ORDER — SODIUM CHLORIDE 0.9 % (FLUSH) 0.9 %
3-10 SYRINGE (ML) INJECTION AS NEEDED
Status: DISCONTINUED | OUTPATIENT
Start: 2021-08-09 | End: 2021-08-12 | Stop reason: HOSPADM

## 2021-08-09 RX ORDER — SERTRALINE HYDROCHLORIDE 100 MG/1
100 TABLET, FILM COATED ORAL DAILY
COMMUNITY
End: 2021-09-01 | Stop reason: SDUPTHER

## 2021-08-09 RX ORDER — ONDANSETRON 2 MG/ML
4 INJECTION INTRAMUSCULAR; INTRAVENOUS EVERY 6 HOURS PRN
Status: DISCONTINUED | OUTPATIENT
Start: 2021-08-09 | End: 2021-08-12 | Stop reason: HOSPADM

## 2021-08-09 RX ORDER — LOSARTAN POTASSIUM 50 MG/1
100 TABLET ORAL DAILY
COMMUNITY
End: 2021-09-01 | Stop reason: SDUPTHER

## 2021-08-09 RX ORDER — LEVOTHYROXINE SODIUM 0.12 MG/1
125 TABLET ORAL
Status: DISCONTINUED | OUTPATIENT
Start: 2021-08-10 | End: 2021-08-12 | Stop reason: HOSPADM

## 2021-08-09 RX ORDER — SERTRALINE HYDROCHLORIDE 100 MG/1
100 TABLET, FILM COATED ORAL DAILY
Status: DISCONTINUED | OUTPATIENT
Start: 2021-08-10 | End: 2021-08-12 | Stop reason: HOSPADM

## 2021-08-09 RX ORDER — MELATONIN
1000 DAILY
COMMUNITY

## 2021-08-09 RX ORDER — MORPHINE SULFATE 4 MG/ML
2 INJECTION, SOLUTION INTRAMUSCULAR; INTRAVENOUS
Status: DISCONTINUED | OUTPATIENT
Start: 2021-08-09 | End: 2021-08-12 | Stop reason: HOSPADM

## 2021-08-09 RX ORDER — HEPARIN SODIUM 5000 [USP'U]/ML
5000 INJECTION, SOLUTION INTRAVENOUS; SUBCUTANEOUS EVERY 8 HOURS SCHEDULED
Status: DISCONTINUED | OUTPATIENT
Start: 2021-08-09 | End: 2021-08-10

## 2021-08-09 RX ORDER — HYDROMORPHONE HCL 110MG/55ML
1 PATIENT CONTROLLED ANALGESIA SYRINGE INTRAVENOUS ONCE
Status: COMPLETED | OUTPATIENT
Start: 2021-08-09 | End: 2021-08-09

## 2021-08-09 RX ORDER — ONDANSETRON 4 MG/1
4 TABLET, FILM COATED ORAL EVERY 6 HOURS PRN
Status: DISCONTINUED | OUTPATIENT
Start: 2021-08-09 | End: 2021-08-12 | Stop reason: HOSPADM

## 2021-08-09 RX ORDER — ONDANSETRON 2 MG/ML
4 INJECTION INTRAMUSCULAR; INTRAVENOUS ONCE
Status: COMPLETED | OUTPATIENT
Start: 2021-08-09 | End: 2021-08-09

## 2021-08-09 RX ORDER — MELATONIN
1000 DAILY
Status: DISCONTINUED | OUTPATIENT
Start: 2021-08-09 | End: 2021-08-12 | Stop reason: HOSPADM

## 2021-08-09 RX ORDER — SODIUM CHLORIDE 0.9 % (FLUSH) 0.9 %
3 SYRINGE (ML) INJECTION EVERY 12 HOURS SCHEDULED
Status: DISCONTINUED | OUTPATIENT
Start: 2021-08-09 | End: 2021-08-12 | Stop reason: HOSPADM

## 2021-08-09 RX ORDER — TIZANIDINE 4 MG/1
4 TABLET ORAL EVERY 8 HOURS PRN
Status: DISCONTINUED | OUTPATIENT
Start: 2021-08-09 | End: 2021-08-12 | Stop reason: HOSPADM

## 2021-08-09 RX ORDER — CALCIUM CARBONATE 200(500)MG
1 TABLET,CHEWABLE ORAL 2 TIMES DAILY PRN
Status: DISCONTINUED | OUTPATIENT
Start: 2021-08-09 | End: 2021-08-12 | Stop reason: HOSPADM

## 2021-08-09 RX ORDER — SODIUM CHLORIDE 0.9 % (FLUSH) 0.9 %
10 SYRINGE (ML) INJECTION AS NEEDED
Status: DISCONTINUED | OUTPATIENT
Start: 2021-08-09 | End: 2021-08-12 | Stop reason: HOSPADM

## 2021-08-09 RX ADMIN — GABAPENTIN 600 MG: 600 TABLET, FILM COATED ORAL at 21:40

## 2021-08-09 RX ADMIN — MORPHINE SULFATE 2 MG: 4 INJECTION INTRAVENOUS at 18:33

## 2021-08-09 RX ADMIN — Medication 10 ML: at 14:17

## 2021-08-09 RX ADMIN — MORPHINE SULFATE 2 MG: 4 INJECTION INTRAVENOUS at 21:45

## 2021-08-09 RX ADMIN — HYDROMORPHONE HYDROCHLORIDE 1 MG: 2 INJECTION, SOLUTION INTRAMUSCULAR; INTRAVENOUS; SUBCUTANEOUS at 14:17

## 2021-08-09 RX ADMIN — SODIUM CHLORIDE 500 ML: 0.9 INJECTION, SOLUTION INTRAVENOUS at 11:35

## 2021-08-09 RX ADMIN — HEPARIN SODIUM 5000 UNITS: 5000 INJECTION INTRAVENOUS; SUBCUTANEOUS at 21:40

## 2021-08-09 RX ADMIN — ONDANSETRON 4 MG: 2 INJECTION INTRAMUSCULAR; INTRAVENOUS at 14:17

## 2021-08-09 RX ADMIN — ONDANSETRON 4 MG: 2 INJECTION INTRAMUSCULAR; INTRAVENOUS at 18:33
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
## 2021-08-10 ENCOUNTER — APPOINTMENT (OUTPATIENT)
Dept: CARDIOLOGY | Facility: HOSPITAL | Age: 71
End: 2021-08-10

## 2021-08-10 LAB
ALBUMIN SERPL-MCNC: 3.7 G/DL (ref 3.5–5.2)
ALBUMIN/GLOB SERPL: 1.3 G/DL
ALP SERPL-CCNC: 97 U/L (ref 39–117)
ALT SERPL W P-5'-P-CCNC: 19 U/L (ref 1–33)
ANION GAP SERPL CALCULATED.3IONS-SCNC: 11 MMOL/L (ref 5–15)
AST SERPL-CCNC: 25 U/L (ref 1–32)
BASOPHILS # BLD AUTO: 0 10*3/MM3 (ref 0–0.2)
BASOPHILS NFR BLD AUTO: 0.4 % (ref 0–1.5)
BH CV ECHO MEAS - ACS: 2 CM
BH CV ECHO MEAS - AO MAX PG (FULL): -0.34 MMHG
BH CV ECHO MEAS - AO MAX PG: 6.2 MMHG
BH CV ECHO MEAS - AO MEAN PG (FULL): 0.28 MMHG
BH CV ECHO MEAS - AO MEAN PG: 3.3 MMHG
BH CV ECHO MEAS - AO ROOT AREA (BSA CORRECTED): 1.4
BH CV ECHO MEAS - AO ROOT AREA: 5.7 CM^2
BH CV ECHO MEAS - AO ROOT DIAM: 2.7 CM
BH CV ECHO MEAS - AO V2 MAX: 124.3 CM/SEC
BH CV ECHO MEAS - AO V2 MEAN: 84.5 CM/SEC
BH CV ECHO MEAS - AO V2 VTI: 25.3 CM
BH CV ECHO MEAS - ASC AORTA: 3.1 CM
BH CV ECHO MEAS - AVA(I,A): 4 CM^2
BH CV ECHO MEAS - AVA(I,D): 4 CM^2
BH CV ECHO MEAS - AVA(V,A): 4.1 CM^2
BH CV ECHO MEAS - AVA(V,D): 4.1 CM^2
BH CV ECHO MEAS - BSA(HAYCOCK): 1.9 M^2
BH CV ECHO MEAS - BSA: 1.9 M^2
BH CV ECHO MEAS - BZI_BMI: 30.9 KILOGRAMS/M^2
BH CV ECHO MEAS - BZI_METRIC_HEIGHT: 162.6 CM
BH CV ECHO MEAS - BZI_METRIC_WEIGHT: 81.6 KG
BH CV ECHO MEAS - EDV(CUBED): 106.8 ML
BH CV ECHO MEAS - EDV(MOD-SP2): 40.3 ML
BH CV ECHO MEAS - EDV(MOD-SP4): 83.6 ML
BH CV ECHO MEAS - EDV(TEICH): 104.7 ML
BH CV ECHO MEAS - EF(CUBED): 65.7 %
BH CV ECHO MEAS - EF(MOD-BP): 70 %
BH CV ECHO MEAS - EF(MOD-SP2): 50.8 %
BH CV ECHO MEAS - EF(MOD-SP4): 70.1 %
BH CV ECHO MEAS - EF(TEICH): 57.2 %
BH CV ECHO MEAS - ESV(CUBED): 36.6 ML
BH CV ECHO MEAS - ESV(MOD-SP2): 19.8 ML
BH CV ECHO MEAS - ESV(MOD-SP4): 25 ML
BH CV ECHO MEAS - ESV(TEICH): 44.8 ML
BH CV ECHO MEAS - FS: 30 %
BH CV ECHO MEAS - IVS/LVPW: 0.93
BH CV ECHO MEAS - IVSD: 1.1 CM
BH CV ECHO MEAS - LA DIMENSION(2D): 3.4 CM
BH CV ECHO MEAS - LV DIASTOLIC VOL/BSA (35-75): 44.7 ML/M^2
BH CV ECHO MEAS - LV MASS(C)D: 193.6 GRAMS
BH CV ECHO MEAS - LV MASS(C)DI: 103.5 GRAMS/M^2
BH CV ECHO MEAS - LV MAX PG: 6.5 MMHG
BH CV ECHO MEAS - LV MEAN PG: 3 MMHG
BH CV ECHO MEAS - LV SYSTOLIC VOL/BSA (12-30): 13.4 ML/M^2
BH CV ECHO MEAS - LV V1 MAX: 127.7 CM/SEC
BH CV ECHO MEAS - LV V1 MEAN: 80 CM/SEC
BH CV ECHO MEAS - LV V1 VTI: 25.1 CM
BH CV ECHO MEAS - LVIDD: 4.7 CM
BH CV ECHO MEAS - LVIDS: 3.3 CM
BH CV ECHO MEAS - LVOT AREA: 4 CM^2
BH CV ECHO MEAS - LVOT DIAM: 2.3 CM
BH CV ECHO MEAS - LVPWD: 1.2 CM
BH CV ECHO MEAS - MV A MAX VEL: 109.5 CM/SEC
BH CV ECHO MEAS - MV DEC SLOPE: 337.6 CM/SEC^2
BH CV ECHO MEAS - MV DEC TIME: 0.25 SEC
BH CV ECHO MEAS - MV E MAX VEL: 83.7 CM/SEC
BH CV ECHO MEAS - MV E/A: 0.76
BH CV ECHO MEAS - MV MAX PG: 4.6 MMHG
BH CV ECHO MEAS - MV MEAN PG: 1.8 MMHG
BH CV ECHO MEAS - MV V2 MAX: 106.8 CM/SEC
BH CV ECHO MEAS - MV V2 MEAN: 62.6 CM/SEC
BH CV ECHO MEAS - MV V2 VTI: 29.8 CM
BH CV ECHO MEAS - MVA(VTI): 3.4 CM^2
BH CV ECHO MEAS - PA ACC TIME: 0.12 SEC
BH CV ECHO MEAS - PA MAX PG (FULL): 2.4 MMHG
BH CV ECHO MEAS - PA MAX PG: 5.9 MMHG
BH CV ECHO MEAS - PA MEAN PG (FULL): 1.4 MMHG
BH CV ECHO MEAS - PA MEAN PG: 3 MMHG
BH CV ECHO MEAS - PA PR(ACCEL): 25 MMHG
BH CV ECHO MEAS - PA V2 MAX: 121.7 CM/SEC
BH CV ECHO MEAS - PA V2 MEAN: 80 CM/SEC
BH CV ECHO MEAS - PA V2 VTI: 26.6 CM
BH CV ECHO MEAS - PULM A REVS DUR: 0.12 SEC
BH CV ECHO MEAS - PULM A REVS VEL: 45.9 CM/SEC
BH CV ECHO MEAS - PULM DIAS VEL: 49.4 CM/SEC
BH CV ECHO MEAS - PULM S/D: 1.6
BH CV ECHO MEAS - PULM SYS VEL: 77.4 CM/SEC
BH CV ECHO MEAS - PVA(I,A): 4.3 CM^2
BH CV ECHO MEAS - PVA(I,D): 4.3 CM^2
BH CV ECHO MEAS - PVA(V,A): 4.5 CM^2
BH CV ECHO MEAS - PVA(V,D): 4.5 CM^2
BH CV ECHO MEAS - QP/QS: 1.1
BH CV ECHO MEAS - RAP SYSTOLE: 3 MMHG
BH CV ECHO MEAS - RV MAX PG: 3.6 MMHG
BH CV ECHO MEAS - RV MEAN PG: 1.6 MMHG
BH CV ECHO MEAS - RV V1 MAX: 94.2 CM/SEC
BH CV ECHO MEAS - RV V1 MEAN: 58 CM/SEC
BH CV ECHO MEAS - RV V1 VTI: 19.8 CM
BH CV ECHO MEAS - RVDD: 2.2 CM
BH CV ECHO MEAS - RVOT AREA: 5.8 CM^2
BH CV ECHO MEAS - RVOT DIAM: 2.7 CM
BH CV ECHO MEAS - RVSP: 29.1 MMHG
BH CV ECHO MEAS - SI(AO): 77.4 ML/M^2
BH CV ECHO MEAS - SI(CUBED): 37.6 ML/M^2
BH CV ECHO MEAS - SI(LVOT): 53.8 ML/M^2
BH CV ECHO MEAS - SI(MOD-SP2): 10.9 ML/M^2
BH CV ECHO MEAS - SI(MOD-SP4): 31.3 ML/M^2
BH CV ECHO MEAS - SI(TEICH): 32 ML/M^2
BH CV ECHO MEAS - SV(AO): 144.9 ML
BH CV ECHO MEAS - SV(CUBED): 70.2 ML
BH CV ECHO MEAS - SV(LVOT): 100.5 ML
BH CV ECHO MEAS - SV(MOD-SP2): 20.5 ML
BH CV ECHO MEAS - SV(MOD-SP4): 58.5 ML
BH CV ECHO MEAS - SV(RVOT): 115.5 ML
BH CV ECHO MEAS - SV(TEICH): 59.9 ML
BH CV ECHO MEAS - TR MAX VEL: 255.3 CM/SEC
BILIRUB SERPL-MCNC: 0.3 MG/DL (ref 0–1.2)
BUN SERPL-MCNC: 15 MG/DL (ref 8–23)
BUN/CREAT SERPL: 14.2 (ref 7–25)
CALCIUM SPEC-SCNC: 9.1 MG/DL (ref 8.6–10.5)
CHLORIDE SERPL-SCNC: 101 MMOL/L (ref 98–107)
CO2 SERPL-SCNC: 23 MMOL/L (ref 22–29)
CREAT SERPL-MCNC: 1.06 MG/DL (ref 0.57–1)
DEPRECATED RDW RBC AUTO: 54.7 FL (ref 37–54)
EOSINOPHIL # BLD AUTO: 0.1 10*3/MM3 (ref 0–0.4)
EOSINOPHIL NFR BLD AUTO: 1.2 % (ref 0.3–6.2)
ERYTHROCYTE [DISTWIDTH] IN BLOOD BY AUTOMATED COUNT: 14.9 % (ref 12.3–15.4)
GFR SERPL CREATININE-BSD FRML MDRD: 51 ML/MIN/1.73
GLOBULIN UR ELPH-MCNC: 2.9 GM/DL
GLUCOSE SERPL-MCNC: 120 MG/DL (ref 65–99)
HBA1C MFR BLD: 5.7 % (ref 3.5–5.6)
HCT VFR BLD AUTO: 33.2 % (ref 34–46.6)
HGB BLD-MCNC: 11.5 G/DL (ref 12–15.9)
LYMPHOCYTES # BLD AUTO: 1.2 10*3/MM3 (ref 0.7–3.1)
LYMPHOCYTES NFR BLD AUTO: 13 % (ref 19.6–45.3)
MCH RBC QN AUTO: 36.8 PG (ref 26.6–33)
MCHC RBC AUTO-ENTMCNC: 34.8 G/DL (ref 31.5–35.7)
MCV RBC AUTO: 105.6 FL (ref 79–97)
MONOCYTES # BLD AUTO: 0.8 10*3/MM3 (ref 0.1–0.9)
MONOCYTES NFR BLD AUTO: 9.1 % (ref 5–12)
NEUTROPHILS NFR BLD AUTO: 6.9 10*3/MM3 (ref 1.7–7)
NEUTROPHILS NFR BLD AUTO: 76.3 % (ref 42.7–76)
NRBC BLD AUTO-RTO: 0.1 /100 WBC (ref 0–0.2)
PLATELET # BLD AUTO: 381 10*3/MM3 (ref 140–450)
PMV BLD AUTO: 6.3 FL (ref 6–12)
POTASSIUM SERPL-SCNC: 4.3 MMOL/L (ref 3.5–5.2)
PROT SERPL-MCNC: 6.6 G/DL (ref 6–8.5)
QT INTERVAL: 413 MS
RBC # BLD AUTO: 3.14 10*6/MM3 (ref 3.77–5.28)
SODIUM SERPL-SCNC: 135 MMOL/L (ref 136–145)
TSH SERPL DL<=0.05 MIU/L-ACNC: 8.03 UIU/ML (ref 0.27–4.2)
WBC # BLD AUTO: 9 10*3/MM3 (ref 3.4–10.8)

## 2021-08-10 PROCEDURE — 99222 1ST HOSP IP/OBS MODERATE 55: CPT | Performed by: PSYCHIATRY & NEUROLOGY

## 2021-08-10 PROCEDURE — 99223 1ST HOSP IP/OBS HIGH 75: CPT | Performed by: INTERNAL MEDICINE

## 2021-08-10 PROCEDURE — 25010000002 HEPARIN (PORCINE) PER 1000 UNITS: Performed by: INTERNAL MEDICINE

## 2021-08-10 PROCEDURE — C1769 GUIDE WIRE: HCPCS | Performed by: INTERNAL MEDICINE

## 2021-08-10 PROCEDURE — 25010000002 MORPHINE PER 10 MG: Performed by: HOSPITALIST

## 2021-08-10 PROCEDURE — 93306 TTE W/DOPPLER COMPLETE: CPT | Performed by: INTERNAL MEDICINE

## 2021-08-10 PROCEDURE — 93458 L HRT ARTERY/VENTRICLE ANGIO: CPT | Performed by: INTERNAL MEDICINE

## 2021-08-10 PROCEDURE — 36415 COLL VENOUS BLD VENIPUNCTURE: CPT | Performed by: HOSPITALIST

## 2021-08-10 PROCEDURE — 25010000002 FENTANYL CITRATE (PF) 100 MCG/2ML SOLUTION: Performed by: INTERNAL MEDICINE

## 2021-08-10 PROCEDURE — 99152 MOD SED SAME PHYS/QHP 5/>YRS: CPT | Performed by: INTERNAL MEDICINE

## 2021-08-10 PROCEDURE — 93005 ELECTROCARDIOGRAM TRACING: CPT | Performed by: INTERNAL MEDICINE

## 2021-08-10 PROCEDURE — 99239 HOSP IP/OBS DSCHRG MGMT >30: CPT | Performed by: HOSPITALIST

## 2021-08-10 PROCEDURE — 25010000002 HEPARIN (PORCINE) PER 1000 UNITS: Performed by: HOSPITALIST

## 2021-08-10 PROCEDURE — 85025 COMPLETE CBC W/AUTO DIFF WBC: CPT | Performed by: HOSPITALIST

## 2021-08-10 PROCEDURE — B2111ZZ FLUOROSCOPY OF MULTIPLE CORONARY ARTERIES USING LOW OSMOLAR CONTRAST: ICD-10-PCS | Performed by: INTERNAL MEDICINE

## 2021-08-10 PROCEDURE — 4A023N7 MEASUREMENT OF CARDIAC SAMPLING AND PRESSURE, LEFT HEART, PERCUTANEOUS APPROACH: ICD-10-PCS | Performed by: INTERNAL MEDICINE

## 2021-08-10 PROCEDURE — 80053 COMPREHEN METABOLIC PANEL: CPT | Performed by: HOSPITALIST

## 2021-08-10 PROCEDURE — 97162 PT EVAL MOD COMPLEX 30 MIN: CPT

## 2021-08-10 PROCEDURE — 0 IOPAMIDOL PER 1 ML: Performed by: INTERNAL MEDICINE

## 2021-08-10 PROCEDURE — 83036 HEMOGLOBIN GLYCOSYLATED A1C: CPT | Performed by: HOSPITALIST

## 2021-08-10 PROCEDURE — 99153 MOD SED SAME PHYS/QHP EA: CPT | Performed by: INTERNAL MEDICINE

## 2021-08-10 PROCEDURE — B2151ZZ FLUOROSCOPY OF LEFT HEART USING LOW OSMOLAR CONTRAST: ICD-10-PCS | Performed by: INTERNAL MEDICINE

## 2021-08-10 PROCEDURE — 25010000002 MIDAZOLAM PER 1 MG: Performed by: INTERNAL MEDICINE

## 2021-08-10 PROCEDURE — 84443 ASSAY THYROID STIM HORMONE: CPT | Performed by: HOSPITALIST

## 2021-08-10 PROCEDURE — C1894 INTRO/SHEATH, NON-LASER: HCPCS | Performed by: INTERNAL MEDICINE

## 2021-08-10 PROCEDURE — 93306 TTE W/DOPPLER COMPLETE: CPT

## 2021-08-10 PROCEDURE — 25010000002 ONDANSETRON PER 1 MG: Performed by: HOSPITALIST

## 2021-08-10 RX ORDER — FENTANYL CITRATE 50 UG/ML
INJECTION, SOLUTION INTRAMUSCULAR; INTRAVENOUS AS NEEDED
Status: DISCONTINUED | OUTPATIENT
Start: 2021-08-10 | End: 2021-08-10 | Stop reason: HOSPADM

## 2021-08-10 RX ORDER — HEPARIN SODIUM 1000 [USP'U]/ML
INJECTION, SOLUTION INTRAVENOUS; SUBCUTANEOUS AS NEEDED
Status: DISCONTINUED | OUTPATIENT
Start: 2021-08-10 | End: 2021-08-10 | Stop reason: HOSPADM

## 2021-08-10 RX ORDER — NITROGLYCERIN 5 MG/ML
INJECTION, SOLUTION INTRAVENOUS AS NEEDED
Status: DISCONTINUED | OUTPATIENT
Start: 2021-08-10 | End: 2021-08-10 | Stop reason: HOSPADM

## 2021-08-10 RX ORDER — SODIUM CHLORIDE 9 MG/ML
3 INJECTION, SOLUTION INTRAVENOUS CONTINUOUS
Status: DISCONTINUED | OUTPATIENT
Start: 2021-08-10 | End: 2021-08-10

## 2021-08-10 RX ORDER — SODIUM CHLORIDE 9 MG/ML
100 INJECTION, SOLUTION INTRAVENOUS CONTINUOUS
Status: DISCONTINUED | OUTPATIENT
Start: 2021-08-10 | End: 2021-08-12 | Stop reason: HOSPADM

## 2021-08-10 RX ORDER — SODIUM CHLORIDE 9 MG/ML
INJECTION, SOLUTION INTRAVENOUS CONTINUOUS PRN
Status: COMPLETED | OUTPATIENT
Start: 2021-08-10 | End: 2021-08-10

## 2021-08-10 RX ORDER — MIDAZOLAM HYDROCHLORIDE 1 MG/ML
INJECTION INTRAMUSCULAR; INTRAVENOUS AS NEEDED
Status: DISCONTINUED | OUTPATIENT
Start: 2021-08-10 | End: 2021-08-10 | Stop reason: HOSPADM

## 2021-08-10 RX ORDER — LIDOCAINE HYDROCHLORIDE 20 MG/ML
INJECTION, SOLUTION INFILTRATION; PERINEURAL AS NEEDED
Status: DISCONTINUED | OUTPATIENT
Start: 2021-08-10 | End: 2021-08-10 | Stop reason: HOSPADM

## 2021-08-10 RX ORDER — NICARDIPINE HYDROCHLORIDE 2.5 MG/ML
INJECTION INTRAVENOUS AS NEEDED
Status: DISCONTINUED | OUTPATIENT
Start: 2021-08-10 | End: 2021-08-10 | Stop reason: HOSPADM

## 2021-08-10 RX ORDER — ACETAMINOPHEN 325 MG/1
650 TABLET ORAL EVERY 4 HOURS PRN
Status: DISCONTINUED | OUTPATIENT
Start: 2021-08-10 | End: 2021-08-12 | Stop reason: HOSPADM

## 2021-08-10 RX ORDER — HYDROCODONE BITARTRATE AND ACETAMINOPHEN 5; 325 MG/1; MG/1
1 TABLET ORAL EVERY 6 HOURS PRN
Qty: 10 TABLET | Refills: 0 | Status: SHIPPED | OUTPATIENT
Start: 2021-08-10 | End: 2021-08-23 | Stop reason: SDUPTHER

## 2021-08-10 RX ADMIN — SERTRALINE HYDROCHLORIDE 100 MG: 100 TABLET ORAL at 08:33

## 2021-08-10 RX ADMIN — MORPHINE SULFATE 2 MG: 4 INJECTION INTRAVENOUS at 20:22

## 2021-08-10 RX ADMIN — LOSARTAN POTASSIUM 100 MG: 25 TABLET, FILM COATED ORAL at 08:33

## 2021-08-10 RX ADMIN — ONDANSETRON 4 MG: 2 INJECTION INTRAMUSCULAR; INTRAVENOUS at 06:36

## 2021-08-10 RX ADMIN — MORPHINE SULFATE 2 MG: 4 INJECTION INTRAVENOUS at 06:36

## 2021-08-10 RX ADMIN — ONDANSETRON 4 MG: 2 INJECTION INTRAMUSCULAR; INTRAVENOUS at 10:51

## 2021-08-10 RX ADMIN — GABAPENTIN 600 MG: 600 TABLET, FILM COATED ORAL at 08:33

## 2021-08-10 RX ADMIN — MORPHINE SULFATE 2 MG: 4 INJECTION INTRAVENOUS at 10:52

## 2021-08-10 RX ADMIN — GABAPENTIN 600 MG: 600 TABLET, FILM COATED ORAL at 16:07

## 2021-08-10 RX ADMIN — ONDANSETRON 4 MG: 2 INJECTION INTRAMUSCULAR; INTRAVENOUS at 17:43

## 2021-08-10 RX ADMIN — LEVOTHYROXINE SODIUM 125 MCG: 0.12 TABLET ORAL at 08:33

## 2021-08-10 RX ADMIN — Medication 1000 UNITS: at 08:33

## 2021-08-10 RX ADMIN — HEPARIN SODIUM 5000 UNITS: 5000 INJECTION INTRAVENOUS; SUBCUTANEOUS at 08:32

## 2021-08-10 RX ADMIN — MORPHINE SULFATE 2 MG: 4 INJECTION INTRAVENOUS at 17:44

## 2021-08-10 RX ADMIN — SODIUM CHLORIDE 500 ML: 9 INJECTION, SOLUTION INTRAVENOUS at 08:33

## 2021-08-10 NOTE — ED NOTES
Pt tolerated medication well and resting with family at bedside. She was given a cup of ice at her request and her RN requested a hospital bed for her since she is being admitted. Pt will be updated when more information becomes available.      Milli Weiss, RN  08/09/21 7044
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
## 2021-08-10 NOTE — PLAN OF CARE
Pt is 74 yo female admitted for cardiac arrest s/p CPR.  Pt now with sternal and multiple ribs fractures on left side post CPR.  CT head (-) acute changes.  Further work up pending.  Pt presents with pain and educated on use of pillow for splinting with coughing and bed mobility.  Pt requiring up to modA for bed mobility, [place] for transfers.  Gait assessed with and without use of RW.  Pt exhibiting improved balance and endurance with use of RW.  Pt reports she has RW at home and has strong support of .  Plan home with 24/7 assist of , use of RW.  PT will follow 3x/week while at [facility].

## 2021-08-10 NOTE — ED NOTES
Pt moved to a hospital bed with assistance. She was able to stand and walk but was in much pain in sternal and rib areas. She was positioned for comfort on the new bed and given ice chips. Family at bedside was offered and provided with a recliner. Call light within reach and patient in no apparent distress. Will update patient as new orders are placed.      Milli Weiss RN  08/09/21 3504

## 2021-08-10 NOTE — ED NOTES
Dr Kevin came to bedside states he is going to take pt to [facility] after his last case which is at 8pm pt is aware along with family daughter has walker and walking pt around the unit and to the restroom at this time.      Libby Rosales, RN  08/10/21 [id number]

## 2021-08-10 NOTE — CASE MANAGEMENT/SOCIAL WORK
Discharge Planning Assessment  [facility]     Patient Name: Jazmine Malik  MRN: [id number]  Today's Date: 8/10/2021    Admit Date: 8/9/2021    Discharge Needs Assessment     Row Name 08/10/21 1005       Living Environment    Lives With  child(kaya), adult;spouse    Current Living Arrangements  home/apartment/condo    Primary Care Provided by  self    Provides Primary Care For  no one    Family Caregiver if Needed  child(kaya), adult;spouse    Family Caregiver Names  Spouse Beny and sons Cullen and Shamar. Pt states sons are living with her temporarily    Quality of Family Relationships  supportive    Able to Return to Prior Arrangements  yes       Resource/Environmental Concerns    Resource/Environmental Concerns  none       Transition Planning    Patient/Family Anticipates Transition to  home with family    Patient/Family Anticipated Services at Transition  none    Transportation Anticipated  family or friend will provide spouse or sons       Discharge Needs Assessment    Readmission Within the Last 30 Days  no previous admission in last 30 days    Equipment Currently Used at Home  -- Pt states she doesn't use any currently, but has a 4prong cane and walker at home from previous surgeries    Concerns to be Addressed  denies needs/concerns at this time    Anticipated Changes Related to Illness  none    Equipment Needed After Discharge  none    Discharge Facility/Level of Care Needs  -- Pt denies dc needs.Reports she was going to [facility] prior to recent vacation,but hasn't gone back since vacation.Declines 's offer to make appointment for her    Provided Post Acute Provider List?  Refused    Provided Post Acute Provider Quality & Resource List?  Refused        Discharge Plan     Row Name 08/10/21 1009       Plan    Plan  home    Provided Post Acute Provider List?  Refused    Refused Provider List Comment  denies dc needs    Provided Post Acute Provider Quality & Resource List?  Refused     Refused Quality and Resource List Comment  denies dc needs    Patient/Family in Agreement with Plan  yes    Plan Comments  Pt denies dc needs.Declines offer to assist her in making appointments at outpatient PT.Pt reports she previously went to PT before recent vacation,but hasn't been back since returning.States she will make appointment when she wants to. Confirms her PCP is Uyen Cleveland (Kimberly in registration notified). Currently enrolled in meds to bed        Continued Care and Services - Admitted Since 8/9/2021    Coordination has not been started for this encounter.         Demographic Summary     Row Name 08/10/21 1003       General Information    Admission Type  observation    Arrived From  home    Required Notices Provided  Observation Status Notice    Referral Source  admission list;high risk screening;physician    Reason for Consult  discharge planning    Preferred Language  English     Used During This Interaction  no    General Information Comments  Met with patient in room wearing PPE: mask and goggles. Maintained distance greater than six feet and spent less than 15 minutes in the room       Contact Information    Permission Granted to Share Info With      Contact Information Obtained for          Functional Status     Row Name 08/10/21 1005       Functional Status    Usual Activity Tolerance  moderate    Current Activity Tolerance  moderate       Functional Status, IADL    Medications  independent    Meal Preparation  independent    Housekeeping  independent    Laundry  independent    Shopping  independent        Vicki Gomez RN, [facility]  Office: [phone]  Fax: [phone]  [email or web address]        Vicki Gomez RN

## 2021-08-10 NOTE — ED NOTES
Daughter is at bedside worried about pt oxygen dropping educated pt and daughter on care of rib fractures and preventing pneumonia, I have given pt IS and educated her to use frequently. Pt was taken off oxygen and maintaining oxygen greater than 92%      Libby Rosales RN  08/10/21 [id number]

## 2021-08-10 NOTE — DISCHARGE SUMMARY
[facility] Medicine Services  DISCHARGE SUMMARY    Patient Name: Jazmine Malik  : 1950  MRN: [id number]    Date of Admission: 2021  Date of Discharge: 8/10/2021  Primary Care Physician: Uyen Cleveland MD      Presenting Problem:   Cardiopulmonary arrest with successful resuscitation (CMS/HCC) [I46.9]    Active and Resolved Hospital Problems:  Active Hospital Problems    Diagnosis POA   • Cardiopulmonary arrest with successful resuscitation (CMS/HCC) [I46.9] Yes      Resolved Hospital Problems   No resolved problems to display.         Hospital Course     Hospital Course:    Chief Complaint: Cardiac arrest     History of Present Illness: Patient is a 71-year-old female with past medical history as outlined below.  She presented to the ER brought in by her  after a syncopal episode minutes prior to her arrival in the ER.  They were driving on a vacation to [place] when all of a sudden patient who was in the passenger side of the car passed out.  Her  who witnessed it says she just started staring down on the floor became clammy and whispered she does not feel good and shortly after became unresponsive.  On arrival in the ER patient was found to be on responsive and in asystole.  CPR has been begun in about 1 to 2 minutes resuscitation patient regained consciousness and irregular cardiac rhythm and stable vital signs.  She regained consciousness and is alert oriented x3 and other than having chest pain she has no other complaints.  Prior to this episode she has been in her usual state of health she has some chronic low back pain issues however nothing out of the ordinary outside of that.  She has had no headaches no focal weakness no focal numbness no vision changes no dizziness no palpitations no chest pains no shortness of breath no cough no diarrhea no abdominal pain no blood per any orifice no dysuria no polyuria.  She has been on a vacation to  [place] couple weeks ago.  She reports no unusual rashes no insect bites or other incidents.  On further questioning it appears that patient does take a new supplement to increase her energy and improve weight loss.  She started taking it a couple weeks ago product is called try energize.  She got her over-the-counter at the local pharmacy.  She was also on phentermine in the past for similar reasons however has stopped taking that about a month ago.  Otherwise there is been no new medications.  She did note that she had a couple beers earlier that day which is not necessarily unusual though she is not a regular drinker.     ER as above and also underwent CT of the chest without contrast which showed nondisplaced fracture of the mid upper sternal body minimally to nondisplaced fracture of the left second through fifth ribs 3 cm borderline aneurysm of the mid descending thoracic aorta, mild emphysema of the lungs head CT showed no acute intracranial findings, WBC 7.4 hemoglobin 10.8 platelet count 379 potassium 4.2 sodium 137 creatinine 0.97 COVID-19 negative magnesium 1.9 troponin less than 0.01 proBNP 164  EKG normal sinus rhythm postcode    Hospital course     Plan:   Cardiac arrest  Possibly due to stimulant/weight loss supplements causing an arrhythmia?  Rule out ACS  Remains in sinus rhythm on telemetry  Seizures seem less likely however consult with neurology is pending at this time  Urine drug screen negative  2D echo pending  Electrolytes within normal limits  Cardiology consultation pending  Recommended avoiding any stimulant medication or stimulant supplements     Rib/sternal fractures  Post chest compressions  Pain control with meds as needed    TITI  Mild creatinine bump  Likely due to cardiovascular collapse.  Volume resuscitated with IV fluids    Borderline diabetes  Hemoglobin A1c 5.7  Follow-up with PCP     Hypertension  Continue Cozaar     Hypothyroidism  Continue Synthroid  TSH  pending     Depression  Continue Zoloft     Chronic back pain  We will hold Zanaflex and gabapentin for now    Descending aortic aneurysm   Asymptomatic 3cm   F/u with vascular surgery      DVT PUD prophylaxis     Plan discharge home stable condition pending cardiology neurology evaluation.  As well as clearance from both consultants.    Addendum 8/12/2021 patient's discharge has been delayed due to shortness of breath wheezes.    Updated discharge and problem list    Plan:   Cardiac arrest  Possibly due to stimulant/weight loss supplements causing an arrhythmia?  Ruled out ACS  Cardiac catheterization no culprits for presenting issues found.  No CAD  Remains in sinus rhythm on telemetry  Neurology cleared  Urine drug screen negative  2D echo nondiagnostic  Electrolytes within normal limits  Recommended avoiding any stimulant medication or stimulant supplements     Shortness of breath  Resolved  CT ruled out PE, does have bilateral atelectasis and slight effusions  Was wheezing which now resolved, start on steroids, given some bronchodilators wheezing resolved  Does have evidence of some emphysema     Rib/sternal fractures  Post chest compressions  Pain control with meds as needed     TITI  Resolved     Humeral lesion on CT scan  Follow-up with orthopedic surgery as an outpatient     Liver disease?  Patient does not drink alcohol  We will follow-up with PCP as an outpatient for further imaging and management of this     Borderline diabetes  Hemoglobin A1c 5.7  Follow-up with PCP     Hypertension  Continue Cozaar     Hypothyroidism  Continue Synthroid  TSH 8 however patient quite is asymptomatic this value is in the context of acute cardiac arrest and hospitalization.  At this time would recommend repeating TSH as an outpatient.     Depression  Continue Zoloft     Chronic back pain  Restart on discharge     Descending aortic aneurysm   Asymptomatic 3cm   F/u with vascular surgery      DVT PUD prophylaxis     DVT  prophylaxis:      Reasons For Change In Medications and Indications for New Medications:      Day of Discharge     Vital Signs:  Temp:  [98 °F (36.7 °C)-98.7 °F (37.1 °C)] 98.7 °F (37.1 °C)  Heart Rate:  [64-93] 73  Resp:  [16-20] 16  BP: ()/() 146/82  Flow (L/min):  [2] 2    Physical Exam:  Physical Exam   Constitutional:  oriented to person, place, and time. No distress.   HENT:   Head: Normocephalic and atraumatic.   Eyes: Conjunctivae and EOM are normal. Pupils are equal, round, and reactive to light.   Neck: No JVD present. No thyromegaly present.   Cardiovascular: Normal rate, regular rhythm, normal heart sounds and intact distal pulses. Exam reveals no gallop and no friction rub.   No murmur heard.  Pulmonary/Chest: Effort normal and breath sounds normal. No stridor. No respiratory distress.  has no wheezes.  has no rales.  exhibits no tenderness.   Abdominal: Soft. Bowel sounds are normal.  no distension and no mass. There is no tenderness. There is no rebound and no guarding. No hernia.   Musculoskeletal: Normal range of motion.   Lymphadenopathy:     no cervical adenopathy.   Neurological:  alert and oriented to person, place, and time. No cranial nerve deficit or sensory deficit. exhibits normal muscle tone.   Skin: No rash noted.  not diaphoretic.   Psychiatric:  normal mood and affect.   Vitals reviewed.        Pertinent  and/or Most Recent Results     LAB RESULTS:      Lab 08/10/21  0434 08/09/21  1158 08/09/21  1156   WBC 9.00  --  7.40   HEMOGLOBIN 11.5*  --  10.8*   HEMATOCRIT 33.2*  --  31.8*   PLATELETS 381  --  379   NEUTROS ABS 6.90  --  5.30   LYMPHS ABS 1.20  --  1.30   MONOS ABS 0.80  --  0.60   EOS ABS 0.10  --  0.10   .6*  --  104.8*   LACTATE  --  1.9  --          Lab 08/10/21  0434 08/09/21  1156   SODIUM 135* 137   POTASSIUM 4.3 4.2   CHLORIDE 101 106   CO2 23.0 19.0*   ANION GAP 11.0 12.0   BUN 15 16   CREATININE 1.06* 0.97   GLUCOSE 120* 91   CALCIUM 9.1 8.7    MAGNESIUM  --  1.9   HEMOGLOBIN A1C 5.7*  --          Lab 08/10/21  0434   TOTAL PROTEIN 6.6   ALBUMIN 3.70   GLOBULIN 2.9   ALT (SGPT) 19   AST (SGOT) 25   BILIRUBIN 0.3   ALK PHOS 97         Lab 08/09/21  1849 08/09/21  1156   PROBNP  --  164.9   TROPONIN T <0.010 <0.010                 Brief Urine Lab Results     None        Microbiology Results (last 10 days)     Procedure Component Value - Date/Time    COVID-19,CEPHEID/ALLEGRA/BDMAX,COR/KARTHIK/PAD/MIKAEL IN-HOUSE(OR EMERGENT/ADD-ON),NP SWAB IN TRANSPORT MEDIA 3-4 HR TAT, RT-PCR - Swab, Nasopharynx [[id number]]  (Normal) Collected: 08/09/21 1534    Lab Status: Final result Specimen: Swab from Nasopharynx Updated: 08/09/21 1642     COVID19 Not Detected    Narrative:      Fact sheet for providers: https://www.fda.gov/media/988056/download     Fact sheet for patients: https://www.fda.gov/media/500499/download  Fact sheet for providers: https://www.fda.gov/media/292962/download     Fact sheet for patients: https://www.fda.gov/media/916554/download          CT Head Without Contrast    Result Date: 8/9/2021  Impression: No acute intracranial findings.  Electronically Signed By-Vee Wills MD On:8/9/2021 1:06 PM This report was finalized on [id number] by  Vee Wills MD.    CT Chest Without Contrast Diagnostic    Result Date: 8/9/2021  Impression:  1. Nondisplaced fracture of the mid upper sternal body. No retrosternal hematoma. 2. Minimally to nondisplaced fractures of the left second through fifth ribs anteriorly. No pneumothorax. 3. 3.0 cm borderline aneurysm of the mid descending thoracic aorta. 4. Dense coronary artery calcifications. Correlate with cardiac history. 5. Nodular liver contour. Correlate for cirrhosis. 6. Mild emphysema. 7. Colonic diverticulosis. 8. Advanced degenerative changes in the spine with thoracolumbar spinal posterior fusion.    Electronically Signed By-Vee Wills MD On:8/9/2021 3:35 PM This report was finalized on [id number] by   Vee Wills MD.    XR Chest 1 View    Result Date: 8/9/2021  Impression:  1. No acute cardiopulmonary disease.   Electronically Signed By-Omar Bragg MD On:8/9/2021 12:40 PM This report was finalized on [id number] by  Omar Bragg MD.                  Labs Pending at Discharge:      Procedures Performed           Consults:   Consults     Date and Time Order Name Status Description    8/9/2021  5:55 PM Inpatient Neurology Consult General Completed     8/9/2021  5:55 PM Inpatient Cardiology Consult Completed     8/9/2021  3:47 PM Hospitalist (on-call MD unless specified) Completed             Discharge Details        Discharge Medications      Continue These Medications      Instructions Start Date   cholecalciferol 25 MCG (1000 UT) tablet  Commonly known as: VITAMIN D3   1,000 Units, Oral, Daily      gabapentin 600 MG tablet  Commonly known as: NEURONTIN   600 mg, Oral, 3 Times Daily      levothyroxine 125 MCG tablet  Commonly known as: SYNTHROID, LEVOTHROID   125 mcg, Oral, Daily      losartan 50 MG tablet  Commonly known as: COZAAR   100 mg, Oral, Daily      sertraline 100 MG tablet  Commonly known as: ZOLOFT   100 mg, Oral, Daily      tiZANidine 4 MG tablet  Commonly known as: ZANAFLEX   4 mg, Oral, Every 8 Hours PRN             No Known Allergies      Discharge Disposition:  Home or Self Care    Diet:  Hospital:  Diet Order   Procedures   • Diet Regular         Discharge Activity:   Activity Instructions     Activity as Tolerated              CODE STATUS:  Code Status and Medical Interventions:   Ordered at: 08/09/21 1601     Code Status:    CPR     Medical Interventions (Level of Support Prior to Arrest):    Full         No future appointments.    Additional Instructions for the Follow-ups that You Need to Schedule     Discharge Follow-up with PCP   As directed       Currently Documented PCP:    Uyen Cleveland MD    PCP Phone Number:    [phone]     Follow Up Details: 1 week          Discharge Follow-up with Specialty: Cardiology 1 week   As directed      Specialty: Cardiology 1 week               Time spent on Discharge including face to face service: 38 minutes      Signature:   Electronically signed by Eleonora Marcial MD, 08/10/21, 12:25 PM EDT.

## 2021-08-10 NOTE — CONSULTS
Cardiology consult note  Richy Kevin MD, PhD      Patient Care Team:  Uyen Cleveland MD as PCP - General (Internal Medicine)    CHIEF COMPLAINT: Syncope, cardiac arrest    HISTORY OF PRESENT ILLNESS:    This is a very pleasant 71-year-old female with no previous cardiac history, she was driving with her  which began to feel very very strange dizzy lightheaded and very very weak.  She had a complete syncopal episode with loss of consciousness, she does not remember anything while driving the car after pulling over on her way to vacation again with her .  They arrived at the ER via EMS where there was loss of pulse reportedly which prompted CPR for 1 to 2 minutes with regain of spontaneous circulation and when she was on the monitor was in sinus rhythm.  She has never had anything like this before.  No unstable anginal symptoms recently, no exertional anginal symptoms recently, no prior syncopal events, no conduction abnormality, EKG and telemetry reviewed by me and interpreted by me today demonstrates only sinus rhythm with a nonspecific ST-T wave abnormality.  Her electrolytes were normal although TSH is slightly high at 8.0, hemoglobin is normal platelets are normal, no evidence of stroke or seizure-like episode, no tonic-clonic event.  No loss of bladder or bowel incontinence.  She regained normal mentation and is up alert talking to me on room air with normal blood pressures on my encounter.  I reviewed her 2D echo which was reviewed and interpreted by me demonstrates normal EF of 60 to 65% with normal valvular function, no masses or effusions, no LVH or regional thickening, no RVH or RV enlargement, no evidence of PE by echo findings, no history of thromboembolic event.  She remains chest pain-free at bedside with family.  After long conversation and no obvious etiology for syncopal event which could be vasovagal as no evidence of seizure-like episode or CVA for loss of consciousness  "but also could have been arrhythmia genic in etiology.  Given severity of her collapse with loss of consciousness, possible hemodynamic compromise with documented CPR administered I did advise patient to have diagnostic left catheterization to rule out obvious ischemic or arrhythmogenic event for completeness which they wholeheartedly agreed with.    2D echo is reviewed interpreted by me demonstrates the above results with normal LV RV size and function  EKG is reviewed interpreted by me demonstrate nonspecific ST-T wave abnormality otherwise sinus rhythm no conduction abnormalities    Risk and benefits of cardiac catheterization including death heart attack stroke pain bleeding infection need for vascular cardiovascular surgery and or urgent intervention as well as others were discussed with the patient and they wish to proceed today respective to the patient and family    All questions answered posed by family    Medications reviewed and discussed with risk and benefits of each necessity of each discussed          Past Medical History:   Diagnosis Date   • Anxiety    • Disease of thyroid gland    • Hypertension      Past Surgical History:   Procedure Laterality Date   • BACK SURGERY      rods    • KNEE SURGERY       No family history on file.  Social History     Tobacco Use   • Smoking status: Current Every Day Smoker     Packs/day: 1.00   Substance Use Topics   • Alcohol use: Not on file   • Drug use: Not on file     (Not in a hospital admission)    Allergies:  Patient has no known allergies.    REVIEW OF SYSTEMS:  Please see the above history of present illness for pertinent positives and negatives.  The remainder of the patient's systems have been reviewed and are negative.     Vital Signs  Temp:  [98 °F (36.7 °C)-98.7 °F (37.1 °C)] 98.7 °F (37.1 °C)  Heart Rate:  [69-93] 85  Resp:  [16-17] 16  BP: (103-167)/() 132/93    Flowsheet Rows      First Filed Value   Admission Height  162.6 cm (64\") Documented " at 08/09/2021 1122   Admission Weight  81.6 kg (180 lb) Documented at 08/09/2021 1122           Physical Exam:  Physical Exam   Constitutional: Patient appears well-developed and well-nourished and in no acute distress   HEENT:   Head: Normocephalic and atraumatic.   Eyes:  Pupils are equal, round, and reactive to light. EOM are intact. Sclerae are anicteric and noninjected.  Mouth and Throat: Patient has moist mucous membranes. Oropharynx is clear of any erythema or exudate.     Neck: Neck supple. No JVD present. No thyromegaly present. No lymphadenopathy present.  Cardiovascular: Regular rate, regular rhythm, S1 normal and S2 normal.  Exam reveals no gallop and no friction rub.  No murmur heard.  Pulmonary/Chest: Lungs are clear to auscultation bilaterally. No respiratory distress. No wheezes. No rhonchi. No rales.   Abdominal: Soft.  Obese bowel sounds are normal. No distension and no mass. There is no hepatosplenomegaly. There is no tenderness.   Musculoskeletal: Normal muscle tone  Extremities: No edema. Pulses are palpable in all 4 extremities.  Neurological: Patient is alert and oriented to person, place, and time. Cranial nerves II-XII are grossly intact with no focal deficits.  Skin: Skin is warm. No rash noted. Nails show no clubbing.  No cyanosis or erythema.   Normal CV exam today  Results Review:    I reviewed the patient's new clinical results.  Lab Results (most recent)     Procedure Component Value Units Date/Time    TSH [[id number]]  (Abnormal) Collected: 08/10/21 0434    Specimen: Blood Updated: 08/10/21 1259     TSH 8.030 uIU/mL     Hemoglobin A1c [[id number]]  (Abnormal) Collected: 08/10/21 0434    Specimen: Blood Updated: 08/10/21 1029     Hemoglobin A1C 5.7 %     Narrative:      Hemoglobin A1C Reference Range:    <5.7 %        Normal  5.7-6.4 %     Increased risk for diabetes  > 6.4 %        Diabetes       These guidelines have been recommended by the American Diabetic Association for Hgb  A1c.      The following 2010 guidelines have been recommended by the American Diabetes Association for Hemoglobin A1c.    HBA1c 5.7-6.4% Increased risk for future diabetes (pre-diabetes)  HBA1c     >6.4% Diabetes      CBC Auto Differential [[id number]]  (Abnormal) Collected: 08/10/21 0434    Specimen: Blood Updated: 08/10/21 0540     WBC 9.00 10*3/mm3      RBC 3.14 10*6/mm3      Hemoglobin 11.5 g/dL      Hematocrit 33.2 %      .6 fL      MCH 36.8 pg      MCHC 34.8 g/dL      RDW 14.9 %      RDW-SD 54.7 fl      MPV 6.3 fL      Platelets 381 10*3/mm3      Neutrophil % 76.3 %      Lymphocyte % 13.0 %      Monocyte % 9.1 %      Eosinophil % 1.2 %      Basophil % 0.4 %      Neutrophils, Absolute 6.90 10*3/mm3      Lymphocytes, Absolute 1.20 10*3/mm3      Monocytes, Absolute 0.80 10*3/mm3      Eosinophils, Absolute 0.10 10*3/mm3      Basophils, Absolute 0.00 10*3/mm3      nRBC 0.1 /100 WBC     Comprehensive Metabolic Panel [[id number]]  (Abnormal) Collected: 08/10/21 0434    Specimen: Blood Updated: 08/10/21 0529     Glucose 120 mg/dL      BUN 15 mg/dL      Creatinine 1.06 mg/dL      Sodium 135 mmol/L      Potassium 4.3 mmol/L      Comment: Slight hemolysis detected by analyzer. Results may be affected.        Chloride 101 mmol/L      CO2 23.0 mmol/L      Calcium 9.1 mg/dL      Total Protein 6.6 g/dL      Albumin 3.70 g/dL      ALT (SGPT) 19 U/L      AST (SGOT) 25 U/L      Alkaline Phosphatase 97 U/L      Total Bilirubin 0.3 mg/dL      eGFR Non African Amer 51 mL/min/1.73      Globulin 2.9 gm/dL      A/G Ratio 1.3 g/dL      BUN/Creatinine Ratio 14.2     Anion Gap 11.0 mmol/L     Narrative:      GFR Normal >60  Chronic Kidney Disease <60  Kidney Failure <15      Troponin [[id number]]  (Normal) Collected: 08/09/21 1849    Specimen: Blood Updated: 08/09/21 1916     Troponin T <0.010 ng/mL     Narrative:      Troponin T Reference Range:  <= 0.03 ng/mL-   Negative for AMI  >0.03 ng/mL-     Abnormal for myocardial necrosis.   Clinicians would have to utilize clinical acumen, EKG, Troponin and serial changes to determine if it is an Acute Myocardial Infarction or myocardial injury due to an underlying chronic condition.       Results may be falsely decreased if patient taking Biotin.      Urine Drug Screen - Urine, Clean Catch [[id number]]  (Normal) Collected: 08/09/21 1708    Specimen: Urine, Clean Catch Updated: 08/09/21 1743     Amphet/Methamphet, Screen Negative     Barbiturates Screen, Urine Negative     Benzodiazepine Screen, Urine Negative     Cocaine Screen, Urine Negative     Opiate Screen Negative     THC, Screen, Urine Negative     Methadone Screen, Urine Negative     Oxycodone Screen, Urine Negative    Narrative:      Negative Thresholds Per Drugs Screened:    Amphetamines                 500 ng/ml  Barbiturates                 200 ng/ml  Benzodiazepines              100 ng/ml  Cocaine                      300 ng/ml  Methadone                    300 ng/ml  Opiates                      300 ng/ml  Oxycodone                    100 ng/ml  THC                           50 ng/ml    The Normal Value for all drugs tested is negative. This report includes final unconfirmed screening results to be used for medical treatment purposes only. Unconfirmed results must not be used for non-medical purposes such as employment or legal testing. Clinical consideration should be applied to any drug of abuse test, particularly when unconfirmed results are used.          All urine drugs of abuse requests without chain of custody are for medical screening purposes only.  False positives are possible.      COVID-19,CEPHEID/ALLEGRA/BDMAX,COR/KARTHIK/PAD/MIKAEL IN-HOUSE(OR EMERGENT/ADD-ON),NP SWAB IN TRANSPORT MEDIA 3-4 HR TAT, RT-PCR - Swab, Nasopharynx [[id number]]  (Normal) Collected: 08/09/21 1534    Specimen: Swab from Nasopharynx Updated: 08/09/21 1642     COVID19 Not Detected    Narrative:      Fact sheet for providers:  https://www.fda.gov/media/799036/download     Fact sheet for patients: https://www.fda.gov/media/148771/download  Fact sheet for providers: https://www.fda.gov/media/605645/download     Fact sheet for patients: https://www.fda.gov/media/314794/download    BNP [[id number]]  (Normal) Collected: 08/09/21 1156    Specimen: Blood Updated: 08/09/21 1228     proBNP 164.9 pg/mL     Narrative:      Among patients with dyspnea, NT-proBNP is highly sensitive for the detection of acute congestive heart failure. In addition NT-proBNP of <300 pg/ml effectively rules out acute congestive heart failure with 99% negative predictive value.    Results may be falsely decreased if patient taking Biotin.      Troponin [[id number]]  (Normal) Collected: 08/09/21 1156    Specimen: Blood Updated: 08/09/21 1228     Troponin T <0.010 ng/mL     Narrative:      Troponin T Reference Range:  <= 0.03 ng/mL-   Negative for AMI  >0.03 ng/mL-     Abnormal for myocardial necrosis.  Clinicians would have to utilize clinical acumen, EKG, Troponin and serial changes to determine if it is an Acute Myocardial Infarction or myocardial injury due to an underlying chronic condition.       Results may be falsely decreased if patient taking Biotin.      Basic Metabolic Panel [[id number]]  (Abnormal) Collected: 08/09/21 1156    Specimen: Blood Updated: 08/09/21 1220     Glucose 91 mg/dL      BUN 16 mg/dL      Creatinine 0.97 mg/dL      Sodium 137 mmol/L      Potassium 4.2 mmol/L      Chloride 106 mmol/L      CO2 19.0 mmol/L      Calcium 8.7 mg/dL      eGFR  African Amer 69 mL/min/1.73      eGFR Non African Amer 57 mL/min/1.73      BUN/Creatinine Ratio 16.5     Anion Gap 12.0 mmol/L     Narrative:      GFR Normal >60  Chronic Kidney Disease <60  Kidney Failure <15      Ethanol [[id number]] Collected: 08/09/21 1156    Specimen: Blood Updated: 08/09/21 1220     Ethanol % 0.057 %     Narrative:      Plasma Ethanol Clinical Symptoms:    ETOH (%)               Clinical  Symptom  .01-.05              No apparent influence  .03-.12              Euphoria, Diminished judgment and attention   .09-.25              Impaired comprehension, Muscle incoordination  .18-.30              Confusion, Staggered gait, Slurred speech  .25-.40              Markedly decreased response to stimuli, unable to stand or                        walk, vomitting, sleep or stupor  .35-.50              Comatose, Anesthesia, Subnormal body temperature        Magnesium [[id number]]  (Normal) Collected: 08/09/21 1156    Specimen: Blood Updated: 08/09/21 1220     Magnesium 1.9 mg/dL     CBC & Differential [[id number]]  (Abnormal) Collected: 08/09/21 1156    Specimen: Blood Updated: 08/09/21 1159    Narrative:      The following orders were created for panel order CBC & Differential.  Procedure                               Abnormality         Status                     ---------                               -----------         ------                     CBC Auto Differential[[id number]]        Abnormal            Final result                 Please view results for these tests on the individual orders.    CBC Auto Differential [[id number]]  (Abnormal) Collected: 08/09/21 1156    Specimen: Blood Updated: 08/09/21 1159     WBC 7.40 10*3/mm3      RBC 3.03 10*6/mm3      Hemoglobin 10.8 g/dL      Hematocrit 31.8 %      .8 fL      MCH 35.7 pg      MCHC 34.0 g/dL      RDW 14.6 %      RDW-SD 53.8 fl      MPV 6.1 fL      Platelets 379 10*3/mm3      Neutrophil % 71.8 %      Lymphocyte % 17.9 %      Monocyte % 8.4 %      Eosinophil % 1.3 %      Basophil % 0.6 %      Neutrophils, Absolute 5.30 10*3/mm3      Lymphocytes, Absolute 1.30 10*3/mm3      Monocytes, Absolute 0.60 10*3/mm3      Eosinophils, Absolute 0.10 10*3/mm3      Basophils, Absolute 0.00 10*3/mm3      nRBC 0.2 /100 WBC     POC Lactate [[id number]]  (Normal) Collected: 08/09/21 1158    Specimen: Blood Updated: 08/09/21 1159     Lactate 1.9 mmol/L      Comment:  Serial Number: [id number]:  [id number]       POC Glucose Once [[id number]]  (Abnormal) Collected: 08/09/21 1122    Specimen: Blood Updated: 08/09/21 1123     Glucose 127 mg/dL      Comment: Serial Number: [id number]:  [id number]             Imaging Results (Most Recent)     Procedure Component Value Units Date/Time    CT Chest Without Contrast Diagnostic [[id number]] Collected: 08/09/21 1530     Updated: 08/09/21 1537    Narrative:      CT CHEST WO CONTRAST DIAGNOSTIC-     Date of Exam: 8/9/2021 3:05 PM     Indication: Status post CPR with pain in the sternum rule out fracture     Comparison: AP portable chest 8/9/2021 at 1201.     Technique: Serial and axial CT images of the chest were obtained.  Reconstructions in the coronal and sagittal planes were performed.   Automated exposure control and iterative reconstruction methods were  used.     FINDINGS:     There is a nondisplaced fracture of the mid to upper sternal body, which  is best appreciated on the sagittal reformatted series (series 5 image  63). No retrosternal hematoma is identified.     There is a minimally displaced left second rib fracture anteriorly.  There are nondisplaced fractures of left third, fourth, fifth ribs  anteriorly. No displaced right rib fractures are identified.     There are advanced degenerative changes of both shoulders. 4.0 cm giant  cyst is seen within the humeral head. There are advanced degenerative  disc endplate changes in the cervical spine and thoracic spine.  Bilateral thoracolumbar spinal fusion rods are in place.     Mild emphysema is present. There is mild dependent atelectasis in the  lungs without acute airspace consolidation.     The heart size is within normal limits with dense coronary artery  calcifications. Calcified lymph nodes are present in the mediastinum. No  pathologically enlarged lymph nodes are seen. There is no pericardial  effusion or pleural effusion. There is 3.0 cm borderline  aneurysmal  dilation of the mid descending thoracic aorta.     There is a nodular surface contour of the liver raising the possibility  of cirrhosis. Diverticular changes are seen in the splenic flexure of  the colon. Remainder of included upper abdominal organs have a normal  noncontrast appearance.                Impression:         1. Nondisplaced fracture of the mid upper sternal body. No retrosternal  hematoma.  2. Minimally to nondisplaced fractures of the left second through fifth  ribs anteriorly. No pneumothorax.  3. 3.0 cm borderline aneurysm of the mid descending thoracic aorta.  4. Dense coronary artery calcifications. Correlate with cardiac history.  5. Nodular liver contour. Correlate for cirrhosis.  6. Mild emphysema.  7. Colonic diverticulosis.  8. Advanced degenerative changes in the spine with thoracolumbar spinal  posterior fusion.           Electronically Signed By-Vee Wills MD On:8/9/2021 3:35 PM  This report was finalized on [id number] by  Vee Wills MD.    CT Head Without Contrast [[id number]] Collected: 08/09/21 1255     Updated: 08/09/21 1308    Narrative:      CT HEAD WO CONTRAST-     Date of Exam: 8/9/2021 12:38 PM     Indication: Mental status change in post cardiac arrest.     Comparison: None available.     Technique:  Without contrast, contiguous axial CT images of the head  were obtained from skull base to vertex.  Coronal and sagittal  reconstructions were performed.  Automated exposure control and  iterative reconstruction methods were used.     FINDINGS  No evidence of intracranial hemorrhage, mass, or midline shift.  Sulci  and ventricles are symmetric.  Brain volume is appropriate for patient's  age.  The gray white matter differentiation is intact. The mastoid air  cells and paranasal sinuses are well aerated.  Globes and extraocular  muscles are normal.  No displaced or depressed skull fractures.  No  suspicious lytic or sclerotic osseous lesions. Mild intracranial  carotid  artery calcifications are present. Presumed bilateral cataract surgery  changes.       Impression:      No acute intracranial findings.     Electronically Signed By-Vee Wills MD On:8/9/2021 1:06 PM  This report was finalized on [id number] by  Vee Wills MD.    XR Chest 1 View [[id number]] Collected: 08/09/21 1239     Updated: 08/09/21 1242    Narrative:      XR CHEST 1 VW-     Date of Exam: 8/9/2021 12:25 PM     Indication: Status post full arrest.     Comparison: None available.     Technique: A single view of the chest was obtained.     FINDINGS:      Heart size and pulmonary vessels are within normal limits.  Lungs  are clear bilaterally.  No evidence pneumothorax.  No pleural effusion  is seen.  There are postoperative changes related to fusion of the lower  thoracic and upper lumbar spine.  Severe degenerative changes are noted  of both shoulders.             Impression:         1. No acute cardiopulmonary disease.        Electronically Signed By-Omar Bragg MD On:8/9/2021 12:40 PM  This report was finalized on [id number] by  Omar Bragg MD.        reviewed    ECG/EMG Results (most recent)     Procedure Component Value Units Date/Time    ECG 12 Lead [[id number]] Collected: 08/10/21 0949     Updated: 08/10/21 0958     QT Interval 413 ms     Narrative:      HEART RATE= 74  bpm  RR Interval= 808  ms  AL Interval= 149  ms  P Horizontal Axis= 0  deg  P Front Axis= 53  deg  QRSD Interval= 94  ms  QT Interval= 413  ms  QRS Axis= -11  deg  T Wave Axis= -8  deg  - OTHERWISE NORMAL ECG -  Sinus rhythm  Low voltage, precordial leads  When compared with ECG of 09-Aug-2021 11:24:06,  Significant axis, voltage or hypertrophy change  Electronically Signed By:   Date and Time of Study: 2021-08-10 09:49:03    Adult Transthoracic Echo Complete W/ Cont if Necessary Per Protocol [[id number]] Collected: 08/10/21 1011     Updated: 08/10/21 1559     BSA 1.9 m^2      RVIDd 2.2 cm      IVSd 1.1 cm       LVIDd 4.7 cm      LVIDs 3.3 cm      LVPWd 1.2 cm      IVS/LVPW 0.93     FS 30.0 %      EDV(Teich) 104.7 ml      ESV(Teich) 44.8 ml      EF(Teich) 57.2 %      EDV(cubed) 106.8 ml      ESV(cubed) 36.6 ml      EF(cubed) 65.7 %      LV mass(C)d 193.6 grams      LV mass(C)dI 103.5 grams/m^2      SV(Teich) 59.9 ml      SI(Teich) 32.0 ml/m^2      SV(cubed) 70.2 ml      SI(cubed) 37.6 ml/m^2      Ao root diam 2.7 cm      Ao root area 5.7 cm^2      ACS 2.0 cm      asc Aorta Diam 3.1 cm      LVOT diam 2.3 cm      LVOT area 4.0 cm^2      RVOT diam 2.7 cm      RVOT area 5.8 cm^2      EDV(MOD-sp4) 83.6 ml      ESV(MOD-sp4) 25.0 ml      EF(MOD-sp4) 70.1 %      EDV(MOD-sp2) 40.3 ml      ESV(MOD-sp2) 19.8 ml      EF(MOD-sp2) 50.8 %      SV(MOD-sp4) 58.5 ml      SI(MOD-sp4) 31.3 ml/m^2      SV(MOD-sp2) 20.5 ml      SI(MOD-sp2) 10.9 ml/m^2      Ao root area (BSA corrected) 1.4     LV Quijano Vol (BSA corrected) 44.7 ml/m^2      LV Sys Vol (BSA corrected) 13.4 ml/m^2      MV E max radha 83.7 cm/sec      MV A max radha 109.5 cm/sec      MV E/A 0.76     MV V2 max 106.8 cm/sec      MV max PG 4.6 mmHg      MV V2 mean 62.6 cm/sec      MV mean PG 1.8 mmHg      MV V2 VTI 29.8 cm      MVA(VTI) 3.4 cm^2      MV dec slope 337.6 cm/sec^2      MV dec time 0.25 sec      Ao pk radha 124.3 cm/sec      Ao max PG 6.2 mmHg      Ao max PG (full) -0.34 mmHg      Ao V2 mean 84.5 cm/sec      Ao mean PG 3.3 mmHg      Ao mean PG (full) 0.28 mmHg      Ao V2 VTI 25.3 cm      JACINTO(I,A) 4.0 cm^2      JACINTO(I,D) 4.0 cm^2      JACINTO(V,A) 4.1 cm^2      JACINTO(V,D) 4.1 cm^2      LV V1 max PG 6.5 mmHg      LV V1 mean PG 3.0 mmHg      LV V1 max 127.7 cm/sec      LV V1 mean 80.0 cm/sec      LV V1 VTI 25.1 cm      SV(Ao) 144.9 ml      SI(Ao) 77.4 ml/m^2      SV(LVOT) 100.5 ml      SV(RVOT) 115.5 ml      SI(LVOT) 53.8 ml/m^2      PA V2 max 121.7 cm/sec      PA max PG 5.9 mmHg      PA max PG (full) 2.4 mmHg      PA V2 mean 80.0 cm/sec      PA mean PG 3.0 mmHg      PA mean PG (full) 1.4  mmHg      PA V2 VTI 26.6 cm      PVA(I,A) 4.3 cm^2       CV ECHO GEOFFREY - PVA(I,D) 4.3 cm^2       CV ECHO GEOFFREY - PVA(V,A) 4.5 cm^2       CV ECHO GEOFFREY - PVA(V,D) 4.5 cm^2      PA acc time 0.12 sec      RV V1 max PG 3.6 mmHg      RV V1 mean PG 1.6 mmHg      RV V1 max 94.2 cm/sec      RV V1 mean 58.0 cm/sec      RV V1 VTI 19.8 cm      TR max sohail 255.3 cm/sec      RVSP(TR) 29.1 mmHg      RAP systole 3.0 mmHg      PA pr(Accel) 25.0 mmHg      Pulm Sys Sohail 77.4 cm/sec      Pulm Quijano Sohail 49.4 cm/sec      Pulm S/D 1.6     Qp/Qs 1.1     Pulm A Revs Dur 0.12 sec      Pulm A Revs Sohail 45.9 cm/sec       CV ECHO GEOFFREY - BZI_BMI 30.9 kilograms/m^2       CV ECHO GEOFFREY - BSA(HAYCOCK) 1.9 m^2       CV ECHO GEOFFREY - BZI_METRIC_WEIGHT 81.6 kg       CV ECHO GEOFFREY - BZI_METRIC_HEIGHT 162.6 cm      EF(MOD-bp) 70.0 %      LA dimension(2D) 3.4 cm     Narrative:      · Left ventricular ejection fraction appears to be 66 - 70%. Left   ventricular systolic function is normal.  · Left ventricular diastolic function was normal.  · Estimated right ventricular systolic pressure from tricuspid   regurgitation is normal (<35 mmHg).     Normal LV and RV size and function  Normal diastolic function by criteria  Normal atrial sizes grossly  No significant valvular abnormality  No thrombus in transit  No LVOT obstruction  Normal myocardial thickness with no LVH seen  EF estimated 65 to 70%  Normal caliber ascending aorta in available views  No masses or effusion seen          reviewed    Assessment/Plan     Cardiac arrest, syncope  CPR administered  Rule out obstructive CAD with arrhythmogenic arrest with diagnostic letter catheterization today  2D echo as above, normal EF 60 to 65% with no regional abnormality identified  No valvular dysfunction  No LV H or RVH, no thrombus in transit, no evidence of PE  We will recommend outpatient ECG monitoring for at least 30 days    Further recommendations to follow invasive work-up and findings    Richy  MD Herberth, PhD    I discussed the patient's findings and my recommendations with patient and staff and family    Richy Kevin MD  08/10/21  17:51 EDT      New patient to me with new problems above

## 2021-08-10 NOTE — ED NOTES
Cardiology consulted dr miranda states he will be here at 2pm to see pt and verbal order for echo and 30 day Holter monitor has been placed      Libby Rosales RN  08/10/21 4880

## 2021-08-10 NOTE — THERAPY EVALUATION
Patient Name: Jazmine Malik  : 1950    MRN: [id number]                              Today's Date: 8/10/2021       Admit Date: 2021    Visit Dx:     ICD-10-CM ICD-9-CM   1. Closed fracture of multiple ribs of left side, initial encounter  S22.42XA 807.09   2. Cardiopulmonary arrest with successful resuscitation (CMS/[facility])  I46.9 427.5   3. Closed fracture of body of sternum, initial encounter  S22.22XA 807.2     Patient Active Problem List   Diagnosis   • Cardiopulmonary arrest with successful resuscitation (CMS/[facility])     Past Medical History:   Diagnosis Date   • Anxiety    • Disease of thyroid gland    • Hypertension      Past Surgical History:   Procedure Laterality Date   • BACK SURGERY      rods    • KNEE SURGERY       General Information     Row Name 08/10/21 1313          Physical Therapy Time and Intention    Document Type  evaluation  -     Mode of Treatment  physical therapy  -     Row Name 08/10/21 1313          General Information    Patient Profile Reviewed  yes  -     Prior Level of Function  independent:  -     Row Name 08/10/21 1313          Living Environment    Lives With  spouse resides with  who is available to assist as needed  -     Row Name 08/10/21 1313          Home Main Entrance    Number of Stairs, Main Entrance  one  -     Row Name 08/10/21 1313          Cognition    Orientation Status (Cognition)  oriented x 4  -     Row Name 08/10/21 1313          Safety Issues, Functional Mobility    Impairments Affecting Function (Mobility)  endurance/activity tolerance;shortness of breath;pain  -       User Key  (r) = Recorded By, (t) = Taken By, (c) = Cosigned By    Initials Name Provider Type     Jaylin López, PT Physical Therapist        Mobility     Row Name 08/10/21 1314          Bed Mobility    Bed Mobility  supine-sit;sit-supine  -HC     Supine-Sit [place] (Bed Mobility)  moderate assist (50% patient effort)  -     Sit-Supine [place] (Bed  Mobility)  minimum assist (75% patient effort)  -     Row Name 08/10/21 1314          Sit-Stand Transfer    Sit-Stand [place] (Transfers)  minimum assist (75% patient effort)  -     Row Name 08/10/21 1314          Gait/Stairs (Locomotion)    [place] Level (Gait)  minimum assist (75% patient effort)  -     Assistive Device (Gait)  walker, front-wheeled  -     Distance in Feet (Gait)  2x75 ft  -     Deviations/Abnormal Patterns (Gait)  gait speed decreased  -     Comment (Gait/Stairs)  improved balance with use of RW  -       User Key  (r) = Recorded By, (t) = Taken By, (c) = Cosigned By    Initials Name Provider Type     Jaylin López, PT Physical Therapist        Obj/Interventions     Row Name 08/10/21 1314          Range of Motion Comprehensive    Comment, General Range of Motion  BUEs not tested  due to sternal and rib pain, BLEs WFL  -[facility] Name 08/10/21 1314          Strength Comprehensive (MMT)    Comment, General Manual Muscle Testing (MMT) Assessment  BLEs WFL  -HC     Row Name 08/10/21 1314          Balance    Balance Assessment  sitting static balance;sitting dynamic balance;standing static balance;standing dynamic balance  -     Static Sitting Balance  WFL  -HC     Dynamic Sitting Balance  WFL  -HC     Static Standing Balance  mild impairment  -     Dynamic Standing Balance  mild impairment  -HC     Row Name 08/10/21 1314          Sensory Assessment (Somatosensory)    Sensory Assessment (Somatosensory)  sensation intact  -       User Key  (r) = Recorded By, (t) = Taken By, (c) = Cosigned By    Initials Name Provider Type     Jaylin López, PT Physical Therapist        Goals/Plan     Row Name 08/10/21 1315          Bed Mobility Goal 1 (PT)    Activity/Assistive Device (Bed Mobility Goal 1, PT)  bed mobility activities, all  -     [place] Level/Cues Needed (Bed Mobility Goal 1, PT)  standby assist  -     Time Frame (Bed Mobility Goal 1, PT)  2 weeks   -HC     Row Name 08/10/21 1319          Transfer Goal 1 (PT)    Activity/Assistive Device (Transfer Goal 1, PT)  transfers, all  -HC     [place] Level/Cues Needed (Transfer Goal 1, PT)  standby assist  -HC     Time Frame (Transfer Goal 1, PT)  2 weeks  -HC     Row Name 08/10/21 1313          Gait Training Goal 1 (PT)    Activity/Assistive Device (Gait Training Goal 1, PT)  gait (walking locomotion);assistive device use  -HC     [place] Level (Gait Training Goal 1, PT)  standby assist  -HC     Distance (Gait Training Goal 1, PT)  200 ft  -HC     Time Frame (Gait Training Goal 1, PT)  2 weeks  -HC     Row Name 08/10/21 1314          Stairs Goal 1 (PT)    Activity/Assistive Device (Stairs Goal 1, PT)  stairs, all skills  -HC     [place] Level/Cues Needed (Stairs Goal 1, PT)  standby assist  -HC     Number of Stairs (Stairs Goal 1, PT)  2  -HC     Time Frame (Stairs Goal 1, PT)  2 weeks  -       User Key  (r) = Recorded By, (t) = Taken By, (c) = Cosigned By    Initials Name Provider Type    HC Jaylin López, PT Physical Therapist        Clinical Impression     Row Name 08/10/21 4755          Pain    Additional Documentation  Pain Scale: Numbers Pre/Post-Treatment (Group)  -     Row Name 08/10/21 4317          Pain Scale: Numbers Pre/Post-Treatment    Pretreatment Pain Rating  4/10  -HC     Posttreatment Pain Rating  4/10  -     Pre/Posttreatment Pain Comment  sternal and rib pain  -     Row Name 08/10/21 2943          Plan of Care Review    Outcome Summary  Pt is 74 yo female admitted for cardiac arrest s/p CPR.  Pt now with sternal and multiple ribs fractures on left side post CPR.  CT head (-) acute changes.  Further work up pending.  Pt presents with pain and educated on use of pillow for splinting with coughing and bed mobility.  Pt requiring up to modA for bed mobility, [place] for transfers.  Gait assessed with and without use of RW.  Pt exhibiting improved balance and endurance with use of  RW.  Pt reports she has RW at home and has strong support of .  Plan home with 24/7 assist of , use of RW.  PT will follow 3x/week while at [facility].  -HC     Row Name 08/10/21 1314          Therapy Assessment/Plan (PT)    Patient/Family Therapy Goals Statement (PT)  until d/c  -HC     Rehab Potential (PT)  good, to achieve stated therapy goals  -HC     Criteria for Skilled Interventions Met (PT)  yes  -HC     Predicted Duration of Therapy Intervention (PT)  until d/c  -HC     Row Name 08/10/21 1314          Vital Signs    Intra SpO2 (%)  92  -HC     Post SpO2 (%)  94  -HC     O2 Delivery Post Treatment  supplemental O2  -HC     Row Name 08/10/21 1314          Positioning and Restraints    Pre-Treatment Position  in bed  -HC     Post Treatment Position  bed  -HC     In Bed  notified nsg;call light within reach;encouraged to call for assist  -HC       User Key  (r) = Recorded By, (t) = Taken By, (c) = Cosigned By    Initials Name Provider Type    Jaylin Simmons, PT Physical Therapist        Outcome Measures     Row Name 08/10/21 1316          How much help from another person do you currently need...    Turning from your back to your side while in flat bed without using bedrails?  3  -HC     Moving from lying on back to sitting on the side of a flat bed without bedrails?  2  -HC     Moving to and from a bed to a chair (including a wheelchair)?  3  -HC     Standing up from a chair using your arms (e.g., wheelchair, bedside chair)?  3  -HC     Climbing 3-5 steps with a railing?  3  -HC     To walk in hospital room?  3  -HC     AM-PAC 6 Clicks Score (PT)  17  -HC     Row Name 08/10/21 1316          Functional Assessment    Outcome Measure Options  AM-PAC 6 Clicks Basic Mobility (PT)  -HC       User Key  (r) = Recorded By, (t) = Taken By, (c) = Cosigned By    Initials Name Provider Type    Jyalin Simmons, PT Physical Therapist                       Physical Therapy Education                 Title: PT  OT SLP Therapies (Done)     Topic: Physical Therapy (Done)     Point: Mobility training (Done)     Learning Progress Summary           Patient Acceptance, E, VU by  at 8/10/2021 1316                   Point: Precautions (Done)     Learning Progress Summary           Patient Acceptance, E, VU by  at 8/10/2021 1316                               User Key     Initials Effective Dates Name Provider Type [facility] 06/16/21 -  Jaylin López, PT Physical Therapist PT              PT Recommendation and Plan  Planned Therapy Interventions (PT): balance training, bed mobility training, gait training, neuromuscular re-education, stair training, strengthening, patient/family education, postural re-education, transfer training, ROM (range of motion)  Outcome Summary: Pt is 72 yo female admitted for cardiac arrest s/p CPR.  Pt now with sternal and multiple ribs fractures on left side post CPR.  CT head (-) acute changes.  Further work up pending.  Pt presents with pain and educated on use of pillow for splinting with coughing and bed mobility.  Pt requiring up to modA for bed mobility, [place] for transfers.  Gait assessed with and without use of RW.  Pt exhibiting improved balance and endurance with use of RW.  Pt reports she has RW at home and has strong support of .  Plan home with 24/7 assist of , use of RW.  PT will follow 3x/week while at [facility].     Time Calculation:   PT Charges     Row Name 08/10/21 1317             Time Calculation    Start Time  1157  -      Stop Time  1223  -      Time Calculation (min)  26 min  -      PT Received On  08/10/21  -      PT - Next Appointment  08/11/21  -      PT Goal Re-Cert Due Date  08/24/21  -         Time Calculation- PT    Total Timed Code Minutes- PT  0 minute(s)  -        User Key  (r) = Recorded By, (t) = Taken By, (c) = Cosigned By    Initials Name Provider Type     Jaylin López, PT Physical Therapist        Therapy Charges for Today      Code Description Service Date Service Provider Modifiers Qty    [id number] HC PT EVAL MOD COMPLEXITY 4 8/10/2021 Jaylin López, PT GP 1          PT G-Codes  Outcome Measure Options: AM-PAC 6 Clicks Basic Mobility (PT)  AM-PAC 6 Clicks Score (PT): 17    Jaylin López, PT  8/10/2021

## 2021-08-11 ENCOUNTER — APPOINTMENT (OUTPATIENT)
Dept: CT IMAGING | Facility: HOSPITAL | Age: 71
End: 2021-08-11

## 2021-08-11 ENCOUNTER — APPOINTMENT (OUTPATIENT)
Dept: GENERAL RADIOLOGY | Facility: HOSPITAL | Age: 71
End: 2021-08-11

## 2021-08-11 LAB
ANION GAP SERPL CALCULATED.3IONS-SCNC: 7 MMOL/L (ref 5–15)
BUN SERPL-MCNC: 12 MG/DL (ref 8–23)
BUN/CREAT SERPL: 14.3 (ref 7–25)
CALCIUM SPEC-SCNC: 9.1 MG/DL (ref 8.6–10.5)
CHLORIDE SERPL-SCNC: 101 MMOL/L (ref 98–107)
CO2 SERPL-SCNC: 25 MMOL/L (ref 22–29)
CREAT SERPL-MCNC: 0.84 MG/DL (ref 0.57–1)
GFR SERPL CREATININE-BSD FRML MDRD: 67 ML/MIN/1.73
GLUCOSE SERPL-MCNC: 119 MG/DL (ref 65–99)
POTASSIUM SERPL-SCNC: 4.5 MMOL/L (ref 3.5–5.2)
SODIUM SERPL-SCNC: 133 MMOL/L (ref 136–145)

## 2021-08-11 PROCEDURE — 97110 THERAPEUTIC EXERCISES: CPT

## 2021-08-11 PROCEDURE — 94799 UNLISTED PULMONARY SVC/PX: CPT

## 2021-08-11 PROCEDURE — 0 IOPAMIDOL PER 1 ML: Performed by: HOSPITALIST

## 2021-08-11 PROCEDURE — 99232 SBSQ HOSP IP/OBS MODERATE 35: CPT | Performed by: INTERNAL MEDICINE

## 2021-08-11 PROCEDURE — 97530 THERAPEUTIC ACTIVITIES: CPT

## 2021-08-11 PROCEDURE — 25010000002 ONDANSETRON PER 1 MG: Performed by: INTERNAL MEDICINE

## 2021-08-11 PROCEDURE — 63710000001 PREDNISONE PER 1 MG: Performed by: HOSPITALIST

## 2021-08-11 PROCEDURE — 25010000002 MORPHINE PER 10 MG: Performed by: INTERNAL MEDICINE

## 2021-08-11 PROCEDURE — 71275 CT ANGIOGRAPHY CHEST: CPT

## 2021-08-11 PROCEDURE — 94640 AIRWAY INHALATION TREATMENT: CPT

## 2021-08-11 PROCEDURE — 99232 SBSQ HOSP IP/OBS MODERATE 35: CPT | Performed by: HOSPITALIST

## 2021-08-11 PROCEDURE — 80048 BASIC METABOLIC PNL TOTAL CA: CPT | Performed by: INTERNAL MEDICINE

## 2021-08-11 PROCEDURE — 71045 X-RAY EXAM CHEST 1 VIEW: CPT

## 2021-08-11 RX ORDER — GUAIFENESIN 600 MG/1
600 TABLET, EXTENDED RELEASE ORAL EVERY 12 HOURS SCHEDULED
Status: DISCONTINUED | OUTPATIENT
Start: 2021-08-11 | End: 2021-08-12 | Stop reason: HOSPADM

## 2021-08-11 RX ORDER — DOCUSATE SODIUM 100 MG/1
100 CAPSULE, LIQUID FILLED ORAL 2 TIMES DAILY
Status: DISCONTINUED | OUTPATIENT
Start: 2021-08-11 | End: 2021-08-12 | Stop reason: HOSPADM

## 2021-08-11 RX ORDER — PREDNISONE 20 MG/1
40 TABLET ORAL ONCE
Status: COMPLETED | OUTPATIENT
Start: 2021-08-11 | End: 2021-08-11

## 2021-08-11 RX ORDER — IPRATROPIUM BROMIDE AND ALBUTEROL SULFATE 2.5; .5 MG/3ML; MG/3ML
3 SOLUTION RESPIRATORY (INHALATION)
Status: DISCONTINUED | OUTPATIENT
Start: 2021-08-11 | End: 2021-08-12 | Stop reason: HOSPADM

## 2021-08-11 RX ORDER — CHOLECALCIFEROL (VITAMIN D3) 125 MCG
5 CAPSULE ORAL NIGHTLY PRN
Status: DISCONTINUED | OUTPATIENT
Start: 2021-08-11 | End: 2021-08-12 | Stop reason: HOSPADM

## 2021-08-11 RX ADMIN — MORPHINE SULFATE 2 MG: 4 INJECTION INTRAVENOUS at 20:49

## 2021-08-11 RX ADMIN — Medication 3 ML: at 08:32

## 2021-08-11 RX ADMIN — Medication 1000 UNITS: at 08:32

## 2021-08-11 RX ADMIN — GABAPENTIN 600 MG: 600 TABLET, FILM COATED ORAL at 08:32

## 2021-08-11 RX ADMIN — GUAIFENESIN 600 MG: 600 TABLET, EXTENDED RELEASE ORAL at 20:48

## 2021-08-11 RX ADMIN — LOSARTAN POTASSIUM 100 MG: 25 TABLET, FILM COATED ORAL at 08:32

## 2021-08-11 RX ADMIN — ONDANSETRON 4 MG: 2 INJECTION INTRAMUSCULAR; INTRAVENOUS at 06:24

## 2021-08-11 RX ADMIN — IPRATROPIUM BROMIDE AND ALBUTEROL SULFATE 3 ML: 2.5; .5 SOLUTION RESPIRATORY (INHALATION) at 19:35

## 2021-08-11 RX ADMIN — PREDNISONE 40 MG: 20 TABLET ORAL at 16:03

## 2021-08-11 RX ADMIN — MORPHINE SULFATE 2 MG: 4 INJECTION INTRAVENOUS at 02:17

## 2021-08-11 RX ADMIN — IOPAMIDOL 100 ML: 755 INJECTION, SOLUTION INTRAVENOUS at 15:36

## 2021-08-11 RX ADMIN — MORPHINE SULFATE 2 MG: 4 INJECTION INTRAVENOUS at 16:02

## 2021-08-11 RX ADMIN — MORPHINE SULFATE 2 MG: 4 INJECTION INTRAVENOUS at 08:32

## 2021-08-11 RX ADMIN — GABAPENTIN 600 MG: 600 TABLET, FILM COATED ORAL at 20:48

## 2021-08-11 RX ADMIN — MORPHINE SULFATE 2 MG: 4 INJECTION INTRAVENOUS at 06:10

## 2021-08-11 RX ADMIN — LEVOTHYROXINE SODIUM 125 MCG: 0.12 TABLET ORAL at 06:10

## 2021-08-11 RX ADMIN — Medication 3 ML: at 20:52

## 2021-08-11 RX ADMIN — SODIUM CHLORIDE 100 ML/HR: 9 INJECTION, SOLUTION INTRAVENOUS at 06:35

## 2021-08-11 RX ADMIN — SERTRALINE HYDROCHLORIDE 100 MG: 100 TABLET ORAL at 08:32

## 2021-08-11 RX ADMIN — MORPHINE SULFATE 2 MG: 4 INJECTION INTRAVENOUS at 12:05

## 2021-08-11 RX ADMIN — GABAPENTIN 600 MG: 600 TABLET, FILM COATED ORAL at 16:03

## 2021-08-11 RX ADMIN — Medication 5 MG: at 20:48

## 2021-08-11 RX ADMIN — IPRATROPIUM BROMIDE AND ALBUTEROL SULFATE 3 ML: 2.5; .5 SOLUTION RESPIRATORY (INHALATION) at 15:49

## 2021-08-11 RX ADMIN — DOCUSATE SODIUM 100 MG: 100 CAPSULE ORAL at 21:25

## 2021-08-11 NOTE — PROGRESS NOTES
Exercise Oximetry    Patient Name:Connie Schoen   MRN: [id number]   Date: 08/11/21             ROOM AIR BASELINE   SpO2% 83% room air   Heart Rate    Blood Pressure      EXERCISE ON ROOM AIR SpO2% EXERCISE ON O2 @  LPM SpO2%   1 MINUTE  1 MINUTE    2 MINUTES  2 MINUTES    3 MINUTES  3 MINUTES    4 MINUTES  4 MINUTES    5 MINUTES  5 MINUTES    6 MINUTES  6 MINUTES               Distance Walked   Distance Walked   Dyspnea ([place] Scale)   Dyspnea ([place] Scale)   Fatigue ([place] Scale)   Fatigue ([place] Scale)   SpO2% Post Exercise 95% 2L NC SpO2% Post Exercise   HR Post Exercise   HR Post Exercise   Time to Recovery   Time to Recovery     Comments: Patient sats recovered to 95% on 2L nasal cannula.

## 2021-08-11 NOTE — PLAN OF CARE
Pt would benefit from Home with family assist at discharge from facility and requires rolling walker at discharge.  Pt. Has rwx. At b/s.  Pt desires Home with family assist at discharge. Pt cooperative; agreeable to therapeutic recommendations and plan of care.

## 2021-08-11 NOTE — PROGRESS NOTES
[facility] Daily Progress Note    Patient Name: Connie Schoen  : 1950  MRN: [id number]  Primary Care Physician:  Uyen Cleveland MD  Date of admission: 2021      Subjective      Chief Complaint: Cardiac arrest syncope    In and examined, complains of shortness of breath this morning.  Hypoxic.  No chest pain.    Pulmonary Short of breath, no cough  Cardiovascular no chest pain no palpitations  GI no nausea no vomiting      Objective      Vitals:   Temp:  [96.4 °F (35.8 °C)-98.7 °F (37.1 °C)] 97.6 °F (36.4 °C)  Heart Rate:  [] 77  Resp:  [15-18] 16  BP: ()/(57-98) 128/82  Flow (L/min):  [2] 2    Physical Exam   Constitutional:  oriented to person, place, and time. No distress.   HENT:   Head: Normocephalic and atraumatic.   Eyes: Conjunctivae and EOM are normal. Pupils are equal, round, and reactive to light.   Neck: No JVD present. No thyromegaly present.   Cardiovascular: Normal rate, regular rhythm, normal heart sounds and intact distal pulses. Exam reveals no gallop and no friction rub.   No murmur heard.  Pulmonary/Chest: Bilateral wheezes, no rhonchi no rails  Abdominal: Soft. Bowel sounds are normal.  no distension and no mass. There is no tenderness. There is no rebound and no guarding. No hernia.   Musculoskeletal: Normal range of motion.   Lymphadenopathy:     no cervical adenopathy.   Neurological:  alert and oriented to person, place, and time. No cranial nerve deficit or sensory deficit. exhibits normal muscle tone.   Skin: No rash noted.  not diaphoretic.   Psychiatric:  normal mood and affect.   Vitals reviewed.         Result Review    Result Review:  I have personally reviewed the results from the time of this admission to 2021 17:07 EDT and agree with these findings:  [x]  Laboratory  []  Microbiology  [x]  Radiology  []  EKG/Telemetry   []  Cardiology/Vascular   []  Pathology  []  Old records  []  Other:  Most notable  findings include: Sodium 133        Assessment/Plan      Brief Patient Summary:  71-year-old female presented with cardiovascular collapse and cardiac arrest successfully resuscitated.      cholecalciferol, 1,000 Units, Oral, Daily  gabapentin, 600 mg, Oral, TID  ipratropium-albuterol, 3 mL, Nebulization, 4x Daily - RT  levothyroxine, 125 mcg, Oral, Q AM  losartan, 100 mg, Oral, Daily  sertraline, 100 mg, Oral, Daily  sodium chloride, 3 mL, Intravenous, Q12H       sodium chloride, 100 mL/hr, Last Rate: 100 mL/hr (08/11/21 7168)         Active Hospital Problems:  Active Hospital Problems    Diagnosis    • Cardiopulmonary arrest with successful resuscitation (CMS/[facility])      Plan:   Cardiac arrest  Possibly due to stimulant/weight loss supplements causing an arrhythmia?  Ruled out ACS  Cardiac catheterization no culprits for presenting issues found.  No CAD  Remains in sinus rhythm on telemetry  Neurology cleared  Urine drug screen negative  2D echo nondiagnostic  Electrolytes within normal limits  Recommended avoiding any stimulant medication or stimulant supplements    Shortness of breath  CT ruled out PE, does have bilateral atelectasis and slight effusions  Also wheezing, started on steroids and duo nebs  Does have evidence of some emphysema     Rib/sternal fractures  Post chest compressions  Pain control with meds as needed     TITI  Proved however will continue fluids overnight as patient did receive contrast with cath and CT PE study  Read and improved    Humeral lesion on CT scan  Follow-up with orthopedic surgery as an outpatient    Liver disease?  Patient does not drink alcohol  We will follow-up with PCP as an outpatient for further imaging and management of this     Borderline diabetes  Hemoglobin A1c 5.7  Follow-up with PCP     Hypertension  Continue Cozaar     Hypothyroidism  Continue Synthroid  TSH pending     Depression  Continue Zoloft     Chronic back pain  We will hold Zanaflex and gabapentin for  now     Descending aortic aneurysm   Asymptomatic 3cm   F/u with vascular surgery      DVT PUD prophylaxis    DVT prophylaxis:  No DVT prophylaxis order currently exists.    CODE STATUS:    Level Of Support Discussed With: Patient  Code Status: CPR  Medical Interventions (Level of Support Prior to Arrest): Full      Disposition:  I expect patient to be discharged 24 hours.    Electronically signed by Eleonora Marcial MD, 08/11/21, 17:07 EDT.  [facility]

## 2021-08-11 NOTE — THERAPY TREATMENT NOTE
Subjective: Pt agreeable to therapeutic plan of care.    Objective:     Bed mobility - Min-A, instructed in log rolling, pt.required assist to sidelying. able to rise sidelying/sit c supervision.   Transfers - CGA and with rolling walker  Ambulation - 3 steps towards hob. CGA and with rolling walker. Pt. reporting too painful to amb. at this time.  Seated arom exercises using 4Es in available planes.  Pain: 8 VAS c mobility.  Education: Provided education on importance of mobility and skilled verbal / tactile cueing throughout intervention.     Assessment: Connie Schoen presents with functional mobility impairments which indicate the need for skilled intervention. Tolerating session today without incident. Will continue to follow and progress as tolerated.     Plan/Recommendations:   Pt would benefit from Home with family assist at discharge from facility and requires rolling walker at discharge.  Pt. Has rwx. At b/s.  Pt desires Home with family assist at discharge. Pt cooperative; agreeable to therapeutic recommendations and plan of care.     Basic Mobility 6-click:  Rollin = Total, A lot = 2, A little = 3; 4 = None  Supine>Sit:   1 = Total, A lot = 2, A little = 3; 4 = None   Sit>Stand with arms:  1 = Total, A lot = 2, A little = 3; 4 = None  Bed>Chair:   1 = Total, A lot = 2, A little = 3; 4 = None  Ambulate in room:  1 = Total, A lot = 2, A little = 3; 4 = None  3-5 Steps with railin = Total, A lot = 2, A little = 3; 4 = None  Score: 18    Modified [place]: 4 = Moderately severe disability (Unable to attend to own bodily needs without assistance, and unable to walk unassisted)     Post-Tx Position: Supine with HOB Elevated, Alarms activated and Call light and personal items within reach  PPE: gloves, surgical mask, eyewear protection

## 2021-08-11 NOTE — CONSULTS
Pt does not qualify for Cardiac Rehab due to no recent MI, OHS, coronary intervention, or EF less than 35%.

## 2021-08-11 NOTE — PLAN OF CARE
Goal Outcome Evaluation:              Outcome Summary: Pt resting with c/o pain in shoulder and chest.  PRN pain meds given per MAR appear effective.  Pt remains on 2l NC and will need oxygen to discharge.  V/S stable.

## 2021-08-11 NOTE — CASE MANAGEMENT/SOCIAL WORK
Continued Stay Note  YONATAN Irwin     Patient Name: Connie Schoen  MRN: [id number]  Today's Date: 8/11/2021    Admit Date: 8/9/2021    Discharge Plan     Row Name 08/11/21 1259       Plan    Plan  Anticipate routine home. May require repeat walking oximetry prior to d/c. Qualified on 8/11.    Patient/Family in Agreement with Plan  yes    Plan Comments  Met with patient at bedside, declined home health. She is agreeable to home oxygen if needed. Qualified on 8/11, however not discharging today. May require repeat walking oximetry prior to d/c. DC barriers: CT PE Protocol, CXR        Met with patient in room wearing PPE: mask, goggles.    Maintained distance greater than six feet and spent less than 15 minutes in the room.    Tamela Holt RN

## 2021-08-11 NOTE — PLAN OF CARE
Goal Outcome Evaluation:  Plan of Care Reviewed With: patient        Progress: no change  Outcome Summary: pt rested well through the night with no complaints, heart cath sceduled for today. Vitals stable at this time will continue to monitor.

## 2021-08-11 NOTE — PROGRESS NOTES
Cardiology progress note  Richy Kevin MD, PhD    Patient Care Team:  Uyen Cleveland MD as PCP - General (Internal Medicine)    Follow-up unexplained syncope, possible cardiac arrest  SUBJECTIVE: Seen and examined, no further syncopal events, no dizziness or lightheadedness, status post left her catheterization with nonobstructive CAD ANDREZ-3 flow and preserved LV systolic function, normal 2D echo, no high risk features of any QT prolongation, no conduction abnormality seen, no LVH or cardiomyopathy seen    As discussed with patient extensively she can go home today with 30-day Holter monitor, if there is any further events we would recommend implanted loop recorder for longer duration of cardiac monitoring    REVIEW OF SYSTEMS: Some 14-point review of systems is negative except what is mentioned in the HPI relative to the current complaint.     PAST MEDICAL HISTORY:   Past Medical History:   Diagnosis Date   • Anxiety    • Disease of thyroid gland    • Hypertension        ALLERGIES:   No Known Allergies      PAST SURGICAL HISTORY:   Past Surgical History:   Procedure Laterality Date   • BACK SURGERY      rods    • KNEE SURGERY            FAMILY HISTORY:   History reviewed. No pertinent family history.      SOCIAL HISTORY:   Social History     Socioeconomic History   • Marital status:      Spouse name: Not on file   • Number of children: Not on file   • Years of education: Not on file   • Highest education level: Not on file   Tobacco Use   • Smoking status: Current Every Day Smoker     Packs/day: 1.00   Substance and Sexual Activity   • Alcohol use: Yes     Alcohol/week: 10.0 standard drinks     Types: 10 Cans of beer per week   • Drug use: Not Currently       CURRENT MEDICATIONS:     Current Facility-Administered Medications:   •  acetaminophen (TYLENOL) tablet 650 mg, 650 mg, Oral, Q4H PRN, Richy Kevin MD  •  calcium carbonate (TUMS) chewable tablet 500 mg (200 mg elemental), 1 tablet,  Oral, BID PRN, Richy Kevin MD  •  cholecalciferol (VITAMIN D3) tablet 1,000 Units, 1,000 Units, Oral, Daily, Richy Kevin MD, 1,000 Units at 08/10/21 0833  •  gabapentin (NEURONTIN) tablet 600 mg, 600 mg, Oral, TID, Richy Kevin MD, 600 mg at 08/10/21 1607  •  levothyroxine (SYNTHROID, LEVOTHROID) tablet 125 mcg, 125 mcg, Oral, Q AM, Richy Kevin MD, 125 mcg at 08/11/21 0610  •  losartan (COZAAR) tablet 100 mg, 100 mg, Oral, Daily, Richy Kevin MD, 100 mg at 08/10/21 0833  •  Morphine sulfate (PF) injection 2 mg, 2 mg, Intravenous, Q2H PRN, Richy Kevin MD, 2 mg at 08/11/21 0610  •  ondansetron (ZOFRAN) tablet 4 mg, 4 mg, Oral, Q6H PRN **OR** ondansetron (ZOFRAN) injection 4 mg, 4 mg, Intravenous, Q6H PRN, Richy Kevin MD, 4 mg at 08/11/21 0624  •  sertraline (ZOLOFT) tablet 100 mg, 100 mg, Oral, Daily, Richy Kevin MD, 100 mg at 08/10/21 0833  •  [COMPLETED] Insert peripheral IV, , , Once **AND** sodium chloride 0.9 % flush 10 mL, 10 mL, Intravenous, PRN, Richy Kevin MD, 10 mL at 08/09/21 1417  •  sodium chloride 0.9 % flush 3 mL, 3 mL, Intravenous, Q12H, Richy Kevin MD  •  sodium chloride 0.9 % flush 3-10 mL, 3-10 mL, Intravenous, PRN, Richy Kevin MD  •  sodium chloride 0.9 % infusion, 100 mL/hr, Intravenous, Continuous, Richy Kevin MD, Last Rate: 100 mL/hr at 08/11/21 0635, 100 mL/hr at 08/11/21 0635  •  tiZANidine (ZANAFLEX) tablet 4 mg, 4 mg, Oral, Q8H PRN, Richy Kevin MD      DIAGNOSTIC DATA:     I reviewed the patient's new clinical results.    Lab Results (last 24 hours)     Procedure Component Value Units Date/Time    Basic Metabolic Panel [[id number]]  (Abnormal) Collected: 08/11/21 0245    Specimen: Blood Updated: 08/11/21 0418     Glucose 119 mg/dL      BUN 12 mg/dL      Creatinine 0.84 mg/dL      Sodium 133 mmol/L      Potassium 4.5 mmol/L       Chloride 101 mmol/L      CO2 25.0 mmol/L      Calcium 9.1 mg/dL      eGFR Non African Amer 67 mL/min/1.73      BUN/Creatinine Ratio 14.3     Anion Gap 7.0 mmol/L     Narrative:      GFR Normal >60  Chronic Kidney Disease <60  Kidney Failure <15      TSH [[id number]]  (Abnormal) Collected: 08/10/21 0434    Specimen: Blood Updated: 08/10/21 1259     TSH 8.030 uIU/mL           Imaging Results (Last 24 Hours)     ** No results found for the last 24 hours. **          Xray reviewed personally by physician.      ECG reviewed personally by physician  ECG/EMG Results (most recent)     Procedure Component Value Units Date/Time    ECG 12 Lead [[id number]] Collected: 08/10/21 0949     Updated: 08/10/21 0958     QT Interval 413 ms     Narrative:      HEART RATE= 74  bpm  RR Interval= 808  ms  PA Interval= 149  ms  P Horizontal Axis= 0  deg  P Front Axis= 53  deg  QRSD Interval= 94  ms  QT Interval= 413  ms  QRS Axis= -11  deg  T Wave Axis= -8  deg  - OTHERWISE NORMAL ECG -  Sinus rhythm  Low voltage, precordial leads  When compared with ECG of 09-Aug-2021 11:24:06,  Significant axis, voltage or hypertrophy change  Electronically Signed By:   Date and Time of Study: 2021-08-10 09:49:03    Adult Transthoracic Echo Complete W/ Cont if Necessary Per Protocol [[id number]] Collected: 08/10/21 1011     Updated: 08/10/21 1559     BSA 1.9 m^2      RVIDd 2.2 cm      IVSd 1.1 cm      LVIDd 4.7 cm      LVIDs 3.3 cm      LVPWd 1.2 cm      IVS/LVPW 0.93     FS 30.0 %      EDV(Teich) 104.7 ml      ESV(Teich) 44.8 ml      EF(Teich) 57.2 %      EDV(cubed) 106.8 ml      ESV(cubed) 36.6 ml      EF(cubed) 65.7 %      LV mass(C)d 193.6 grams      LV mass(C)dI 103.5 grams/m^2      SV(Teich) 59.9 ml      SI(Teich) 32.0 ml/m^2      SV(cubed) 70.2 ml      SI(cubed) 37.6 ml/m^2      Ao root diam 2.7 cm      Ao root area 5.7 cm^2      ACS 2.0 cm      asc Aorta Diam 3.1 cm      LVOT diam 2.3 cm      LVOT area 4.0 cm^2      RVOT diam 2.7 cm      RVOT area 5.8  cm^2      EDV(MOD-sp4) 83.6 ml      ESV(MOD-sp4) 25.0 ml      EF(MOD-sp4) 70.1 %      EDV(MOD-sp2) 40.3 ml      ESV(MOD-sp2) 19.8 ml      EF(MOD-sp2) 50.8 %      SV(MOD-sp4) 58.5 ml      SI(MOD-sp4) 31.3 ml/m^2      SV(MOD-sp2) 20.5 ml      SI(MOD-sp2) 10.9 ml/m^2      Ao root area (BSA corrected) 1.4     LV Quijano Vol (BSA corrected) 44.7 ml/m^2      LV Sys Vol (BSA corrected) 13.4 ml/m^2      MV E max sohail 83.7 cm/sec      MV A max sohail 109.5 cm/sec      MV E/A 0.76     MV V2 max 106.8 cm/sec      MV max PG 4.6 mmHg      MV V2 mean 62.6 cm/sec      MV mean PG 1.8 mmHg      MV V2 VTI 29.8 cm      MVA(VTI) 3.4 cm^2      MV dec slope 337.6 cm/sec^2      MV dec time 0.25 sec      Ao pk sohail 124.3 cm/sec      Ao max PG 6.2 mmHg      Ao max PG (full) -0.34 mmHg      Ao V2 mean 84.5 cm/sec      Ao mean PG 3.3 mmHg      Ao mean PG (full) 0.28 mmHg      Ao V2 VTI 25.3 cm      JACINTO(I,A) 4.0 cm^2      JACINTO(I,D) 4.0 cm^2      JACINTO(V,A) 4.1 cm^2      JACINTO(V,D) 4.1 cm^2      LV V1 max PG 6.5 mmHg      LV V1 mean PG 3.0 mmHg      LV V1 max 127.7 cm/sec      LV V1 mean 80.0 cm/sec      LV V1 VTI 25.1 cm      SV(Ao) 144.9 ml      SI(Ao) 77.4 ml/m^2      SV(LVOT) 100.5 ml      SV(RVOT) 115.5 ml      SI(LVOT) 53.8 ml/m^2      PA V2 max 121.7 cm/sec      PA max PG 5.9 mmHg      PA max PG (full) 2.4 mmHg      PA V2 mean 80.0 cm/sec      PA mean PG 3.0 mmHg      PA mean PG (full) 1.4 mmHg      PA V2 VTI 26.6 cm      PVA(I,A) 4.3 cm^2      BH CV ECHO GEOFFREY - PVA(I,D) 4.3 cm^2      BH CV ECHO GEOFFREY - PVA(V,A) 4.5 cm^2      BH CV ECHO GEOFFREY - PVA(V,D) 4.5 cm^2      PA acc time 0.12 sec      RV V1 max PG 3.6 mmHg      RV V1 mean PG 1.6 mmHg      RV V1 max 94.2 cm/sec      RV V1 mean 58.0 cm/sec      RV V1 VTI 19.8 cm      TR max sohail 255.3 cm/sec      RVSP(TR) 29.1 mmHg      RAP systole 3.0 mmHg      PA pr(Accel) 25.0 mmHg      Pulm Sys Sohail 77.4 cm/sec      Pulm Quijano Sohail 49.4 cm/sec      Pulm S/D 1.6     Qp/Qs 1.1     Pulm A Revs Dur 0.12 sec      Pulm  "A Revs Sohail 45.9 cm/sec       CV ECHO GEOFFREY - BZI_BMI 30.9 kilograms/m^2       CV ECHO GEOFFREY - BSA(HAYCOCK) 1.9 m^2       CV ECHO GEOFFREY - BZI_METRIC_WEIGHT 81.6 kg       CV ECHO GEOFFREY - BZI_METRIC_HEIGHT 162.6 cm      EF(MOD-bp) 70.0 %      LA dimension(2D) 3.4 cm     Narrative:      · Left ventricular ejection fraction appears to be 66 - 70%. Left   ventricular systolic function is normal.  · Left ventricular diastolic function was normal.  · Estimated right ventricular systolic pressure from tricuspid   regurgitation is normal (<35 mmHg).     Normal LV and RV size and function  Normal diastolic function by criteria  Normal atrial sizes grossly  No significant valvular abnormality  No thrombus in transit  No LVOT obstruction  Normal myocardial thickness with no LVH seen  EF estimated 65 to 70%  Normal caliber ascending aorta in available views  No masses or effusion seen              PHYSICAL EXAMINATION:  VITAL SIGNS: Temp:  [96.4 °F (35.8 °C)-98.7 °F (37.1 °C)] 96.4 °F (35.8 °C)  Heart Rate:  [69-96] 87  Resp:  [15-18] 16  BP: ()/() 120/85   Flowsheet Rows      First Filed Value   Admission Height  162.6 cm (64\") Documented at 08/09/2021 1122   Admission Weight  81.6 kg (180 lb) Documented at 08/09/2021 1122        GENERAL: Alert and oriented x3, afebrile. Vital signs stable, appeared comfortable, nondistressed, and appears stated age. Generalized weakness. Responds to questions appropriately.   HEENT: Normocephalic, atraumatic. Pupils equal, round and reactive to light. Extraocular movements intact bilaterally. Trachea midline.  Mucous membranes are moist.   NECK: Supple. No JVD. Trachea midline, no LAD  CHEST: Clear to auscultation bilaterally anteriorly; no rale, rhonchi, or wheezes  HEART: Regular rate and rhythm. No rubs, murmurs or gallops.   ABDOMEN: Soft, nontender, nondistended. Bowel sounds are otherwise positive.   EXTREMITIES: No clubbing, cyanosis, or edema. Moves all " extremities  SKIN: Warm and dry. No ecchymoses, petechiae or rashes.   MUSCULOSKELETAL: No bony abnormalities. Range of motion normal. No crepitus. No joint swelling or erythema.   NEUROLOGIC: Cranial nerves II-XII intact bilaterally. No focal neurologic deficits. Mini-Mental status exam was deferred.   LYMPHATICS: No lymphadenopathy.   Cath site stable at the wrist    ASSESSMENT AND PLAN:   Syncope, cardiac arrest  No obvious etiology  Nonobstructive coronary disease  ANDREZ-3 flow  Preserved LV systolic function with no cardiomyopathy, no LVH, no high risk features on EKG no conduction abnormality no QT prolongation    From CV standpoint can be discharged home today with outpatient follow-up  30-day Holter monitor to be placed  If has further events would recommend implanted loop recorder if indicated at that time    Richy Kevin MD, PhD      Richy Kevin MD  08/11/21  07:40 EDT

## 2021-08-12 ENCOUNTER — READMISSION MANAGEMENT (OUTPATIENT)
Dept: CALL CENTER | Facility: HOSPITAL | Age: 71
End: 2021-08-12

## 2021-08-12 ENCOUNTER — APPOINTMENT (OUTPATIENT)
Dept: RESPIRATORY THERAPY | Facility: HOSPITAL | Age: 71
End: 2021-08-12

## 2021-08-12 VITALS
SYSTOLIC BLOOD PRESSURE: 168 MMHG | DIASTOLIC BLOOD PRESSURE: 101 MMHG | HEIGHT: 64 IN | WEIGHT: 197.75 LBS | HEART RATE: 68 BPM | OXYGEN SATURATION: 94 % | RESPIRATION RATE: 20 BRPM | BODY MASS INDEX: 33.76 KG/M2 | TEMPERATURE: 97.4 F

## 2021-08-12 LAB
DEPRECATED RDW RBC AUTO: 56 FL (ref 37–54)
ERYTHROCYTE [DISTWIDTH] IN BLOOD BY AUTOMATED COUNT: 14.8 % (ref 12.3–15.4)
HCT VFR BLD AUTO: 31.8 % (ref 34–46.6)
HGB BLD-MCNC: 10.4 G/DL (ref 12–15.9)
HOLD SPECIMEN: NORMAL
MCH RBC QN AUTO: 34.7 PG (ref 26.6–33)
MCHC RBC AUTO-ENTMCNC: 32.7 G/DL (ref 31.5–35.7)
MCV RBC AUTO: 106.1 FL (ref 79–97)
PLATELET # BLD AUTO: 351 10*3/MM3 (ref 140–450)
PMV BLD AUTO: 6.7 FL (ref 6–12)
RBC # BLD AUTO: 3 10*6/MM3 (ref 3.77–5.28)
WBC # BLD AUTO: 11.7 10*3/MM3 (ref 3.4–10.8)

## 2021-08-12 PROCEDURE — 94799 UNLISTED PULMONARY SVC/PX: CPT

## 2021-08-12 PROCEDURE — 99232 SBSQ HOSP IP/OBS MODERATE 35: CPT | Performed by: HOSPITALIST

## 2021-08-12 PROCEDURE — 94618 PULMONARY STRESS TESTING: CPT

## 2021-08-12 PROCEDURE — 25010000002 MORPHINE PER 10 MG: Performed by: INTERNAL MEDICINE

## 2021-08-12 PROCEDURE — 99233 SBSQ HOSP IP/OBS HIGH 50: CPT | Performed by: INTERNAL MEDICINE

## 2021-08-12 PROCEDURE — 85027 COMPLETE CBC AUTOMATED: CPT | Performed by: HOSPITALIST

## 2021-08-12 RX ORDER — ALBUTEROL SULFATE 90 UG/1
2 AEROSOL, METERED RESPIRATORY (INHALATION) EVERY 4 HOURS PRN
Qty: 8.5 G | Refills: 0 | Status: SHIPPED | OUTPATIENT
Start: 2021-08-12 | End: 2021-10-12

## 2021-08-12 RX ORDER — PREDNISONE 20 MG/1
40 TABLET ORAL DAILY
Qty: 6 TABLET | Refills: 0 | Status: SHIPPED | OUTPATIENT
Start: 2021-08-12 | End: 2021-08-26

## 2021-08-12 RX ADMIN — SODIUM CHLORIDE 100 ML/HR: 9 INJECTION, SOLUTION INTRAVENOUS at 01:04

## 2021-08-12 RX ADMIN — IPRATROPIUM BROMIDE AND ALBUTEROL SULFATE 3 ML: 2.5; .5 SOLUTION RESPIRATORY (INHALATION) at 11:30

## 2021-08-12 RX ADMIN — GUAIFENESIN 600 MG: 600 TABLET, EXTENDED RELEASE ORAL at 08:08

## 2021-08-12 RX ADMIN — LEVOTHYROXINE SODIUM 125 MCG: 0.12 TABLET ORAL at 05:40

## 2021-08-12 RX ADMIN — GABAPENTIN 600 MG: 600 TABLET, FILM COATED ORAL at 08:07

## 2021-08-12 RX ADMIN — Medication 3 ML: at 08:08

## 2021-08-12 RX ADMIN — MORPHINE SULFATE 2 MG: 4 INJECTION INTRAVENOUS at 08:07

## 2021-08-12 RX ADMIN — DOCUSATE SODIUM 100 MG: 100 CAPSULE ORAL at 08:08

## 2021-08-12 RX ADMIN — MORPHINE SULFATE 2 MG: 4 INJECTION INTRAVENOUS at 01:02

## 2021-08-12 RX ADMIN — Medication 1000 UNITS: at 08:07

## 2021-08-12 RX ADMIN — SERTRALINE HYDROCHLORIDE 100 MG: 100 TABLET ORAL at 08:08

## 2021-08-12 RX ADMIN — LOSARTAN POTASSIUM 100 MG: 25 TABLET, FILM COATED ORAL at 08:07

## 2021-08-12 RX ADMIN — MORPHINE SULFATE 2 MG: 4 INJECTION INTRAVENOUS at 12:06

## 2021-08-12 NOTE — CASE MANAGEMENT/SOCIAL WORK
Continued Stay Note   Dc     Patient Name: Connie Schoen  MRN: [id number]  Today's Date: 8/12/2021    Admit Date: 8/9/2021    Discharge Plan     Row Name 08/12/21 1220       Plan    Plan  Anticipate routine home.    Plan Comments  Did not qualify for home O2 on repeat walking oximetry on 8/12. Discharge orders noted.    Final Discharge Disposition Code  01 - home or self-care    Final Note  Home        Phone communication or documentation only - no physical contact with patient or family.        Tamela Holt RN

## 2021-08-12 NOTE — PROGRESS NOTES
[facility] Medicine Services Daily Progress Note    Patient Name: Connie Schoen  : 1950  MRN: [id number]  Primary Care Physician:  Uyen Cleveland MD  Date of admission: 2021      Subjective      Chief Complaint: Cardiac arrest syncope    In and examined, complains of shortness of breath this morning.  Hypoxic.  No chest pain.    2021 doing much better this morning no chest pain no shortness of breath    Pulmonary Short of breath, no cough  Cardiovascular no chest pain no palpitations  GI no nausea no vomiting      Objective      Vitals:   Temp:  [97.4 °F (36.3 °C)-97.8 °F (36.6 °C)] 97.4 °F (36.3 °C)  Heart Rate:  [73-89] 73  Resp:  [16-20] 20  BP: (131-168)/() 168/101  Flow (L/min):  [2] 2    Physical Exam   Constitutional:  oriented to person, place, and time. No distress.   HENT:   Head: Normocephalic and atraumatic.   Eyes: Conjunctivae and EOM are normal. Pupils are equal, round, and reactive to light.   Neck: No JVD present. No thyromegaly present.   Cardiovascular: Normal rate, regular rhythm, normal heart sounds and intact distal pulses. Exam reveals no gallop and no friction rub.   No murmur heard.  Pulmonary/Chest: Bilateral wheezes, no rhonchi no rails  Abdominal: Soft. Bowel sounds are normal.  no distension and no mass. There is no tenderness. There is no rebound and no guarding. No hernia.   Musculoskeletal: Normal range of motion.   Lymphadenopathy:     no cervical adenopathy.   Neurological:  alert and oriented to person, place, and time. No cranial nerve deficit or sensory deficit. exhibits normal muscle tone.   Skin: No rash noted.  not diaphoretic.   Psychiatric:  normal mood and affect.   Vitals reviewed.         Result Review    Result Review:  I have personally reviewed the results from the time of this admission to 2021 11:28 EDT and agree with these findings:  [x]  Laboratory  []  Microbiology  [x]  Radiology  []  EKG/Telemetry   []   Cardiology/Vascular   []  Pathology  []  Old records  []  Other:  Most notable findings include: Normal creatinine        Assessment/Plan      Brief Patient Summary:  71-year-old female presented with cardiovascular collapse and cardiac arrest successfully resuscitated.      cholecalciferol, 1,000 Units, Oral, Daily  docusate sodium, 100 mg, Oral, BID  gabapentin, 600 mg, Oral, TID  guaiFENesin, 600 mg, Oral, Q12H  ipratropium-albuterol, 3 mL, Nebulization, 4x Daily - RT  levothyroxine, 125 mcg, Oral, Q AM  losartan, 100 mg, Oral, Daily  sertraline, 100 mg, Oral, Daily  sodium chloride, 3 mL, Intravenous, Q12H       sodium chloride, 100 mL/hr, Last Rate: 100 mL/hr (08/12/21 0104)         Active Hospital Problems:  Active Hospital Problems    Diagnosis    • Cardiopulmonary arrest with successful resuscitation (CMS/[facility])      Plan:   Cardiac arrest  Possibly due to stimulant/weight loss supplements causing an arrhythmia?  Ruled out ACS  Cardiac catheterization no culprits for presenting issues found.  No CAD  Remains in sinus rhythm on telemetry  Neurology cleared  Urine drug screen negative  2D echo nondiagnostic  Electrolytes within normal limits  Recommended avoiding any stimulant medication or stimulant supplements    Shortness of breath  Resolved  CT ruled out PE, does have bilateral atelectasis and slight effusions  Was wheezing which now resolved, start on steroids, given some bronchodilators wheezing resolved  Does have evidence of some emphysema     Rib/sternal fractures  Post chest compressions  Pain control with meds as needed     TITI  Resolved    Humeral lesion on CT scan  Follow-up with orthopedic surgery as an outpatient    Liver disease?  Patient does not drink alcohol  We will follow-up with PCP as an outpatient for further imaging and management of this     Borderline diabetes  Hemoglobin A1c 5.7  Follow-up with PCP     Hypertension  Continue Cozaar     Hypothyroidism  Continue Synthroid  TSH 8 however  patient quite is asymptomatic this value is in the context of acute cardiac arrest and hospitalization.  At this time would recommend repeating TSH as an outpatient.     Depression  Continue Zoloft     Chronic back pain  Restart on discharge     Descending aortic aneurysm   Asymptomatic 3cm   F/u with vascular surgery      DVT PUD prophylaxis    DVT prophylaxis:    Plan discharge home in stable condition with follow-up with PCP vascular surgeon cardiology and orthopedic surgery.  No DVT prophylaxis order currently exists.    CODE STATUS:    Level Of Support Discussed With: Patient  Code Status: CPR  Medical Interventions (Level of Support Prior to Arrest): Full      Disposition:  I expect patient to be discharged 24 hours.    Electronically signed by Eleonora Marcial MD, 08/12/21, 11:28 EDT.  [facility] Hospitalist Team

## 2021-08-12 NOTE — PLAN OF CARE
Goal Outcome Evaluation:           Progress: no change  Outcome Summary: Pt had some complaint in the beginning of pain but  has rested throughout the night. She turned independtly with help in the chair from her . I let her know to call but she said her  could help. No new complaints.

## 2021-08-12 NOTE — OUTREACH NOTE
Prep Survey      Responses   [facility] facility patient discharged from?  [facility]   Is LACE score < 7 ?  Yes   Emergency Room discharge w/ pulse ox?  No   Eligibility  [facility]   Date of Admission  08/09/21   Date of Discharge  08/12/21   Discharge Disposition  Home or Self Care   Discharge diagnosis  Cardiopulmonary arrest with successful resuscitation    Does the patient have one of the following disease processes/diagnoses(primary or secondary)?  Other   Prep survey completed?  Yes          Kellie Scott RN

## 2021-08-12 NOTE — PROGRESS NOTES
Cardiology    Patient Care Team:  Uyen Cleveland MD as PCP - General (Internal Medicine)        CHIEF COMPLAINT: Lightheadedness syncope    ADMITTING DIAGNOSES: Syncope    SUBJECTIVE: No complaints from cardiovascular standpoint    Review of Symptoms:  Constitutional: Patient afebrile no chills or unexpected weight changes  Respiratory: No cough, no wheezing or dyspnea  Cardiovascular: No chest pain, palpitations, dyspnea, orthopnea and no edema  Gastrointestinal: No nausea, vomiting, constipation or diarrhea.  No melena or dark stools    All other systems reviewed and are negative     PAST MEDICAL HISTORY:   Past Medical History:   Diagnosis Date   • Anxiety    • Disease of thyroid gland    • Hypertension        ALLERGIES:   No Known Allergies      PAST SURGICAL HISTORY:   Past Surgical History:   Procedure Laterality Date   • BACK SURGERY      rods    • CARDIAC CATHETERIZATION N/A 8/10/2021    Procedure: Left Heart Cath;  Surgeon: Richy Kevin MD;  Location: [facility] CATH INVASIVE LOCATION;  Service: Cardiology;  Laterality: N/A;   • CARDIAC CATHETERIZATION N/A 8/10/2021    Procedure: Coronary angiography;  Surgeon: Richy Kevin MD;  Location: [facility] CATH INVASIVE LOCATION;  Service: Cardiology;  Laterality: N/A;   • CARDIAC CATHETERIZATION N/A 8/10/2021    Procedure: Left ventriculography;  Surgeon: Richy Kevin MD;  Location: [facility] CATH INVASIVE LOCATION;  Service: Cardiology;  Laterality: N/A;   • KNEE SURGERY            FAMILY HISTORY:   History reviewed. No pertinent family history.      SOCIAL HISTORY:   Social History     Socioeconomic History   • Marital status:      Spouse name: Not on file   • Number of children: Not on file   • Years of education: Not on file   • Highest education level: Not on file   Tobacco Use   • Smoking status: Current Every Day Smoker     Packs/day: 1.00   Substance and Sexual Activity   • Alcohol use: Yes     Alcohol/week: 10.0  standard drinks     Types: 10 Cans of beer per week   • Drug use: Not Currently       CURRENT MEDICATIONS:     Current Facility-Administered Medications:   •  acetaminophen (TYLENOL) tablet 650 mg, 650 mg, Oral, Q4H PRN, Richy Kevin MD  •  calcium carbonate (TUMS) chewable tablet 500 mg (200 mg elemental), 1 tablet, Oral, BID PRN, Richy Kevin MD  •  cholecalciferol (VITAMIN D3) tablet 1,000 Units, 1,000 Units, Oral, Daily, Richy Kevin MD, 1,000 Units at 08/12/21 0807  •  docusate sodium (COLACE) capsule 100 mg, 100 mg, Oral, BID, Vito Nicholson MD, 100 mg at 08/12/21 0808  •  gabapentin (NEURONTIN) tablet 600 mg, 600 mg, Oral, TID, Richy Kevin MD, 600 mg at 08/12/21 0807  •  guaiFENesin (MUCINEX) 12 hr tablet 600 mg, 600 mg, Oral, Q12H, Vito Nicholson MD, 600 mg at 08/12/21 0808  •  ipratropium-albuterol (DUO-NEB) nebulizer solution 3 mL, 3 mL, Nebulization, 4x Daily - RT, Eleonora Marcial MD, 3 mL at 08/11/21 1935  •  levothyroxine (SYNTHROID, LEVOTHROID) tablet 125 mcg, 125 mcg, Oral, Q AM, Richy Kevin MD, 125 mcg at 08/12/21 0540  •  losartan (COZAAR) tablet 100 mg, 100 mg, Oral, Daily, Richy Kevin MD, 100 mg at 08/12/21 0807  •  melatonin tablet 5 mg, 5 mg, Oral, Nightly PRN, Vito Nicholson MD, 5 mg at 08/11/21 2048  •  Morphine sulfate (PF) injection 2 mg, 2 mg, Intravenous, Q2H PRN, Richy Kevin MD, 2 mg at 08/12/21 0807  •  ondansetron (ZOFRAN) tablet 4 mg, 4 mg, Oral, Q6H PRN **OR** ondansetron (ZOFRAN) injection 4 mg, 4 mg, Intravenous, Q6H PRN, Richy Kevin MD, 4 mg at 08/11/21 0624  •  sertraline (ZOLOFT) tablet 100 mg, 100 mg, Oral, Daily, Richy Kevin MD, 100 mg at 08/12/21 0808  •  [COMPLETED] Insert peripheral IV, , , Once **AND** sodium chloride 0.9 % flush 10 mL, 10 mL, Intravenous, PRN, Richy Kevin MD, 10 mL at 08/09/21 1417  •  sodium chloride 0.9 % flush 3 mL, 3 mL, Intravenous,  Q12H, Richy Kevin MD, 3 mL at 08/12/21 0808  •  sodium chloride 0.9 % flush 3-10 mL, 3-10 mL, Intravenous, PRN, Richy Kevin MD  •  sodium chloride 0.9 % infusion, 100 mL/hr, Intravenous, Continuous, Richy Kevin MD, Last Rate: 100 mL/hr at 08/12/21 0104, 100 mL/hr at 08/12/21 0104  •  tiZANidine (ZANAFLEX) tablet 4 mg, 4 mg, Oral, Q8H PRN, Richy Kevin MD      DIAGNOSTIC DATA:     I reviewed the patient's new clinical results.    Lab Results (last 24 hours)     Procedure Component Value Units Date/Time    Extra Tubes [[id number]] Collected: 08/12/21 0547    Specimen: Blood, Venous Line Updated: 08/12/21 0715    Narrative:      The following orders were created for panel order Extra Tubes.  Procedure                               Abnormality         Status                     ---------                               -----------         ------                     Green Top (No Gel)[[id number]]                               Final result                 Please view results for these tests on the individual orders.    Green Top (No Gel) [[id number]] Collected: 08/12/21 0547    Specimen: Blood Updated: 08/12/21 0715     Extra Tube Hold for add-ons.     Comment: Auto resulted.       CBC (No Diff) [[id number]]  (Abnormal) Collected: 08/12/21 0547    Specimen: Blood Updated: 08/12/21 0707     WBC 11.70 10*3/mm3      RBC 3.00 10*6/mm3      Hemoglobin 10.4 g/dL      Hematocrit 31.8 %      .1 fL      MCH 34.7 pg      MCHC 32.7 g/dL      RDW 14.8 %      RDW-SD 56.0 fl      MPV 6.7 fL      Platelets 351 10*3/mm3           Imaging Results (Last 24 Hours)     Procedure Component Value Units Date/Time    CT Chest Pulmonary Embolism [[id number]] Collected: 08/11/21 1542     Updated: 08/11/21 1606    Narrative:      CT CHEST PULMONARY EMBOLISM-     Date of Exam: 8/11/2021 3:35 PM     Indication: PE suspected, low/intermediate prob, neg D-dimer;  I46.9-Cardiac arrest, cause  unspecified; S22.22XA-Fracture of body of  sternum, initial encounter for closed fracture; S22.42XA-Multiple  fractures of ribs, left side, initial encounter for closed fracture.     Comparison: 08/09/2021     Technique: Serial and axial CT images of the chest were obtained  following the uneventful intravenous administration of 100 cc Isovue-370  contrast. Reconstructions in the coronal and sagittal planes were also  performed.  Automated exposure control and iterative reconstruction  methods were used.     FINDINGS:     There is limited opacification of the pulmonary arteries which somewhat  limits evaluation for small more distal pulmonary emboli. No pulmonary  arterial filling defects are identified to suggest PE within the  visualized portions, however. The pulmonary arteries appear normal  caliber. The ascending aorta is ectatic measuring 3.8 cm. Dense coronary  artery calcification is noted. There is a trace left pleural effusion.  No pericardial effusion is identified. There is scarring in the lung  apices. There is mild upper lung predominant pulmonary emphysematous  change. There is biapical pulmonary parenchymal scarring. Linear  consolidation in the posterior lungs and bases suggesting atelectasis or  pneumonia, left greater than right. The visualized central airways are  grossly patent.     Limited imaging through the upper abdomen demonstrates a diffusely  nodular contour to the liver suggesting cirrhotic change. There is dense  material within the gallbladder suggesting fine stones, sludge or  vicarious excretion of contrast material. Extensive thoracolumbar  fixation hardware is noted posteriorly. There is advanced degenerative  change in the spine. Minimally displaced fracture of the left second  rib, with nondisplaced fractures of the third through fifth again noted.  Additional nondisplaced fractures of the right second through fourth  anterior ribs. There is a nondisplaced fracture of the mid  upper sternal  body again noted. There is a 4.1 cm lytic lesion in the left humeral  head. There is some partial cortical disruption. The differential  diagnosis includes both benign and malignant etiologies. This could be  further evaluated by MRI, if this has not been previously worked up.       Impression:         1. No evidence of pulmonary embolus.  2. Trace left pleural effusion, with increasing bibasilar consolidation,  left greater than right, which may be due to atelectasis or pneumonia.  Correlate clinically.  3. Nondisplaced fracture of the mid sternal body.  4. Multiple bilateral anterior upper rib fractures, as above.  5. Nodular cirrhotic appearance of the liver.  6. Increased density throughout the gallbladder suggesting fine stones,  sludge or vicarious excretion of contrast material.  7. 4.1 cm indeterminate lytic lesion in the left humeral head.  Differential diagnosis includes both benign and malignant etiologies.  This could be further evaluated by MRI in nonemergent fashion, if this  has not been previously worked up.  7. Additional findings as given above.           Electronically Signed By-Jasvir Combs MD On:8/11/2021 3:58 PM  This report was finalized on [id number] by  Jasvir Combs MD.          Xray reviewed personally by physician.      ECG reviewed personally by physician  ECG/EMG Results (most recent)     Procedure Component Value Units Date/Time    ECG 12 Lead [[id number]] Collected: 08/10/21 0949     Updated: 08/10/21 0958     QT Interval 413 ms     Narrative:      HEART RATE= 74  bpm  RR Interval= 808  ms  TN Interval= 149  ms  P Horizontal Axis= 0  deg  P Front Axis= 53  deg  QRSD Interval= 94  ms  QT Interval= 413  ms  QRS Axis= -11  deg  T Wave Axis= -8  deg  - OTHERWISE NORMAL ECG -  Sinus rhythm  Low voltage, precordial leads  When compared with ECG of 09-Aug-2021 11:24:06,  Significant axis, voltage or hypertrophy change  Electronically Signed By:   Date and Time of Study:  2021-08-10 09:49:03    Adult Transthoracic Echo Complete W/ Cont if Necessary Per Protocol [[id number]] Collected: 08/10/21 1011     Updated: 08/10/21 1559     BSA 1.9 m^2      RVIDd 2.2 cm      IVSd 1.1 cm      LVIDd 4.7 cm      LVIDs 3.3 cm      LVPWd 1.2 cm      IVS/LVPW 0.93     FS 30.0 %      EDV(Teich) 104.7 ml      ESV(Teich) 44.8 ml      EF(Teich) 57.2 %      EDV(cubed) 106.8 ml      ESV(cubed) 36.6 ml      EF(cubed) 65.7 %      LV mass(C)d 193.6 grams      LV mass(C)dI 103.5 grams/m^2      SV(Teich) 59.9 ml      SI(Teich) 32.0 ml/m^2      SV(cubed) 70.2 ml      SI(cubed) 37.6 ml/m^2      Ao root diam 2.7 cm      Ao root area 5.7 cm^2      ACS 2.0 cm      asc Aorta Diam 3.1 cm      LVOT diam 2.3 cm      LVOT area 4.0 cm^2      RVOT diam 2.7 cm      RVOT area 5.8 cm^2      EDV(MOD-sp4) 83.6 ml      ESV(MOD-sp4) 25.0 ml      EF(MOD-sp4) 70.1 %      EDV(MOD-sp2) 40.3 ml      ESV(MOD-sp2) 19.8 ml      EF(MOD-sp2) 50.8 %      SV(MOD-sp4) 58.5 ml      SI(MOD-sp4) 31.3 ml/m^2      SV(MOD-sp2) 20.5 ml      SI(MOD-sp2) 10.9 ml/m^2      Ao root area (BSA corrected) 1.4     LV Quijano Vol (BSA corrected) 44.7 ml/m^2      LV Sys Vol (BSA corrected) 13.4 ml/m^2      MV E max radha 83.7 cm/sec      MV A max radha 109.5 cm/sec      MV E/A 0.76     MV V2 max 106.8 cm/sec      MV max PG 4.6 mmHg      MV V2 mean 62.6 cm/sec      MV mean PG 1.8 mmHg      MV V2 VTI 29.8 cm      MVA(VTI) 3.4 cm^2      MV dec slope 337.6 cm/sec^2      MV dec time 0.25 sec      Ao pk radha 124.3 cm/sec      Ao max PG 6.2 mmHg      Ao max PG (full) -0.34 mmHg      Ao V2 mean 84.5 cm/sec      Ao mean PG 3.3 mmHg      Ao mean PG (full) 0.28 mmHg      Ao V2 VTI 25.3 cm      JACINTO(I,A) 4.0 cm^2      JACINTO(I,D) 4.0 cm^2      JACINTO(V,A) 4.1 cm^2      JACINTO(V,D) 4.1 cm^2      LV V1 max PG 6.5 mmHg      LV V1 mean PG 3.0 mmHg      LV V1 max 127.7 cm/sec      LV V1 mean 80.0 cm/sec      LV V1 VTI 25.1 cm      SV(Ao) 144.9 ml      SI(Ao) 77.4 ml/m^2      SV(LVOT) 100.5 ml      " SV(RVOT) 115.5 ml      SI(LVOT) 53.8 ml/m^2      PA V2 max 121.7 cm/sec      PA max PG 5.9 mmHg      PA max PG (full) 2.4 mmHg      PA V2 mean 80.0 cm/sec      PA mean PG 3.0 mmHg      PA mean PG (full) 1.4 mmHg      PA V2 VTI 26.6 cm      PVA(I,A) 4.3 cm^2       CV ECHO GEOFFREY - PVA(I,D) 4.3 cm^2       CV ECHO GEOFFREY - PVA(V,A) 4.5 cm^2       CV ECHO GEOFFREY - PVA(V,D) 4.5 cm^2      PA acc time 0.12 sec      RV V1 max PG 3.6 mmHg      RV V1 mean PG 1.6 mmHg      RV V1 max 94.2 cm/sec      RV V1 mean 58.0 cm/sec      RV V1 VTI 19.8 cm      TR max sohail 255.3 cm/sec      RVSP(TR) 29.1 mmHg      RAP systole 3.0 mmHg      PA pr(Accel) 25.0 mmHg      Pulm Sys Sohail 77.4 cm/sec      Pulm Quijano Sohail 49.4 cm/sec      Pulm S/D 1.6     Qp/Qs 1.1     Pulm A Revs Dur 0.12 sec      Pulm A Revs Sohail 45.9 cm/sec       CV ECHO GEOFFREY - BZI_BMI 30.9 kilograms/m^2       CV ECHO GEOFFREY - BSA(HAYCOCK) 1.9 m^2       CV ECHO GEOFFREY - BZI_METRIC_WEIGHT 81.6 kg       CV ECHO GEOFFREY - BZI_METRIC_HEIGHT 162.6 cm      EF(MOD-bp) 70.0 %      LA dimension(2D) 3.4 cm     Narrative:      · Left ventricular ejection fraction appears to be 66 - 70%. Left   ventricular systolic function is normal.  · Left ventricular diastolic function was normal.  · Estimated right ventricular systolic pressure from tricuspid   regurgitation is normal (<35 mmHg).     Normal LV and RV size and function  Normal diastolic function by criteria  Normal atrial sizes grossly  No significant valvular abnormality  No thrombus in transit  No LVOT obstruction  Normal myocardial thickness with no LVH seen  EF estimated 65 to 70%  Normal caliber ascending aorta in available views  No masses or effusion seen                VITAL SIGNS: Temp:  [97.4 °F (36.3 °C)-97.8 °F (36.6 °C)] 97.4 °F (36.3 °C)  Heart Rate:  [] 73  Resp:  [16-20] 20  BP: (128-168)/() 168/101   Flowsheet Rows      First Filed Value   Admission Height  162.6 cm (64\") Documented at 08/09/2021 1122 "   Admission Weight  81.6 kg (180 lb) Documented at 08/09/2021 1122        Physical exam  Constitutional: well-nourished, and appears stated age in no acute distress  PERRL: Conjunctiva clear, no pallor, anicteric  HENMT: normocephalic, normal dentition, no cyanosis or pallor  Neck:no bruits, or thrills and bilateral normal carotid upstroke. Normal jugular venous pressure  Cardiovascular: No parasternal heaves an non-displaced focal PMI. Normal rate and rhythm: no rub, gallop, murmur or click and normal S1 and S2; no lower or upper extremity edema.   Lungs: unlabored, no wheezing with no rales or rhonchi on auscultation.  Extremities: Warm, no clubbing, cyanosis, or edema. Full and equal peripheral pulses in extremities with no bruits appreciated.   Abdomen: soft, non-tender, non-distended  Musculoskeletal: no joint tenderness or swelling and no erythema  Skin: Warm and dry, non-erythematous   Neuro:alert and normal affect. Oriented to time, place and person.     ASSESSMENT AND PLAN:   Syncope, cardiac arrest  No obvious etiology  Nonobstructive coronary disease      Syncope with cardiac arrest  No cardiogenic etiology obvious  Nonobstructive coronary artery disease  Preserved LV systolic function    Greater than 35 minutes total of face-to-face/floor  time was spent with the patient and family and nursing coordinating plan and patient management.  Of which counseling of patient with regard specifically to etiologies of syncope comprised 50% of this total time.             Oswald Yee MD  08/12/21  11:17 EDT

## 2021-08-12 NOTE — PROGRESS NOTES
Exercise Oximetry    Patient Name:Connie Schoen   MRN: [id number]   Date: 08/12/21             ROOM AIR BASELINE   SpO2% 94   Heart Rate 88   Blood Pressure      EXERCISE ON ROOM AIR SpO2% EXERCISE ON O2 @ room air  LPM SpO2%   1 MINUTE 92  1 MINUTE    2 MINUTES 92  2 MINUTES    3 MINUTES 89  3 MINUTES    4 MINUTES 92  4 MINUTES    5 MINUTES 91  5 MINUTES    6 MINUTES 94  6 MINUTES               Distance Walked  6 mins Distance Walked   Dyspnea (Landry Scale)   Dyspnea (Landry Scale)   Fatigue (Landry Scale)   Fatigue (Landry Scale)   SpO2% Post Exercise  94 SpO2% Post Exercise   HR Post Exercise  107 HR Post Exercise   Time to Recovery  N/A Time to Recovery     Comments: Patient did well. Does not qualify for home O2 at this time.

## 2021-08-13 ENCOUNTER — TRANSITIONAL CARE MANAGEMENT TELEPHONE ENCOUNTER (OUTPATIENT)
Dept: CALL CENTER | Facility: HOSPITAL | Age: 71
End: 2021-08-13

## 2021-08-13 NOTE — PROGRESS NOTES
PATIENT HAS 2 CHARTS AND HOSPITAL IS USING THE INCORRECT ONE. SHE DOES HAVE A HOSPITAL F/U SCHEDULED 8/18/21. HUB MARKED CHARTS FOR MERGE.
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
## 2021-08-23 ENCOUNTER — LAB (OUTPATIENT)
Dept: FAMILY MEDICINE CLINIC | Facility: CLINIC | Age: 71
End: 2021-08-23

## 2021-08-23 ENCOUNTER — OFFICE VISIT (OUTPATIENT)
Dept: FAMILY MEDICINE CLINIC | Facility: CLINIC | Age: 71
End: 2021-08-23

## 2021-08-23 VITALS
HEIGHT: 64 IN | HEART RATE: 96 BPM | DIASTOLIC BLOOD PRESSURE: 82 MMHG | WEIGHT: 194 LBS | TEMPERATURE: 96.8 F | OXYGEN SATURATION: 96 % | RESPIRATION RATE: 18 BRPM | BODY MASS INDEX: 33.12 KG/M2 | SYSTOLIC BLOOD PRESSURE: 130 MMHG

## 2021-08-23 DIAGNOSIS — I65.09: ICD-10-CM

## 2021-08-23 DIAGNOSIS — S22.42XA CLOSED FRACTURE OF MULTIPLE RIBS OF LEFT SIDE, INITIAL ENCOUNTER: ICD-10-CM

## 2021-08-23 DIAGNOSIS — R55 SYNCOPE, UNSPECIFIED SYNCOPE TYPE: Primary | ICD-10-CM

## 2021-08-23 DIAGNOSIS — I46.9 CARDIOPULMONARY ARREST WITH SUCCESSFUL RESUSCITATION (HCC): ICD-10-CM

## 2021-08-23 LAB
ALBUMIN SERPL-MCNC: 4.5 G/DL (ref 3.5–5.2)
ALBUMIN/GLOB SERPL: 1.5 G/DL
ALP SERPL-CCNC: 137 U/L (ref 39–117)
ALT SERPL W P-5'-P-CCNC: 20 U/L (ref 1–33)
ANION GAP SERPL CALCULATED.3IONS-SCNC: 12.6 MMOL/L (ref 5–15)
AST SERPL-CCNC: 23 U/L (ref 1–32)
BASOPHILS # BLD AUTO: 0.03 10*3/MM3 (ref 0–0.2)
BASOPHILS NFR BLD AUTO: 0.3 % (ref 0–1.5)
BILIRUB SERPL-MCNC: 0.2 MG/DL (ref 0–1.2)
BUN SERPL-MCNC: 14 MG/DL (ref 8–23)
BUN/CREAT SERPL: 19.4 (ref 7–25)
CALCIUM SPEC-SCNC: 10.2 MG/DL (ref 8.6–10.5)
CHLORIDE SERPL-SCNC: 97 MMOL/L (ref 98–107)
CO2 SERPL-SCNC: 20.4 MMOL/L (ref 22–29)
CREAT SERPL-MCNC: 0.72 MG/DL (ref 0.57–1)
DEPRECATED RDW RBC AUTO: 49.6 FL (ref 37–54)
EOSINOPHIL # BLD AUTO: 0.22 10*3/MM3 (ref 0–0.4)
EOSINOPHIL NFR BLD AUTO: 2.2 % (ref 0.3–6.2)
ERYTHROCYTE [DISTWIDTH] IN BLOOD BY AUTOMATED COUNT: 13.1 % (ref 12.3–15.4)
FOLATE SERPL-MCNC: 5.4 NG/ML (ref 4.78–24.2)
GFR SERPL CREATININE-BSD FRML MDRD: 80 ML/MIN/1.73
GLOBULIN UR ELPH-MCNC: 3 GM/DL
GLUCOSE SERPL-MCNC: 88 MG/DL (ref 65–99)
HCT VFR BLD AUTO: 36.7 % (ref 34–46.6)
HGB BLD-MCNC: 12.2 G/DL (ref 12–15.9)
IMM GRANULOCYTES # BLD AUTO: 0.05 10*3/MM3 (ref 0–0.05)
IMM GRANULOCYTES NFR BLD AUTO: 0.5 % (ref 0–0.5)
LYMPHOCYTES # BLD AUTO: 1.28 10*3/MM3 (ref 0.7–3.1)
LYMPHOCYTES NFR BLD AUTO: 12.8 % (ref 19.6–45.3)
MCH RBC QN AUTO: 34.3 PG (ref 26.6–33)
MCHC RBC AUTO-ENTMCNC: 33.2 G/DL (ref 31.5–35.7)
MCV RBC AUTO: 103.1 FL (ref 79–97)
MONOCYTES # BLD AUTO: 0.7 10*3/MM3 (ref 0.1–0.9)
MONOCYTES NFR BLD AUTO: 7 % (ref 5–12)
NEUTROPHILS NFR BLD AUTO: 7.74 10*3/MM3 (ref 1.7–7)
NEUTROPHILS NFR BLD AUTO: 77.2 % (ref 42.7–76)
NRBC BLD AUTO-RTO: 0 /100 WBC (ref 0–0.2)
PLATELET # BLD AUTO: 481 10*3/MM3 (ref 140–450)
PMV BLD AUTO: 8.3 FL (ref 6–12)
POTASSIUM SERPL-SCNC: 4.7 MMOL/L (ref 3.5–5.2)
PROT SERPL-MCNC: 7.5 G/DL (ref 6–8.5)
RBC # BLD AUTO: 3.56 10*6/MM3 (ref 3.77–5.28)
SODIUM SERPL-SCNC: 130 MMOL/L (ref 136–145)
VIT B12 BLD-MCNC: 909 PG/ML (ref 211–946)
WBC # BLD AUTO: 10.02 10*3/MM3 (ref 3.4–10.8)

## 2021-08-23 PROCEDURE — 82607 VITAMIN B-12: CPT | Performed by: INTERNAL MEDICINE

## 2021-08-23 PROCEDURE — 82746 ASSAY OF FOLIC ACID SERUM: CPT | Performed by: INTERNAL MEDICINE

## 2021-08-23 PROCEDURE — 99495 TRANSJ CARE MGMT MOD F2F 14D: CPT | Performed by: INTERNAL MEDICINE

## 2021-08-23 PROCEDURE — 1111F DSCHRG MED/CURRENT MED MERGE: CPT | Performed by: INTERNAL MEDICINE

## 2021-08-23 PROCEDURE — 80053 COMPREHEN METABOLIC PANEL: CPT | Performed by: INTERNAL MEDICINE

## 2021-08-23 PROCEDURE — 36415 COLL VENOUS BLD VENIPUNCTURE: CPT | Performed by: INTERNAL MEDICINE

## 2021-08-23 PROCEDURE — 85025 COMPLETE CBC W/AUTO DIFF WBC: CPT | Performed by: INTERNAL MEDICINE

## 2021-08-23 RX ORDER — MELOXICAM 7.5 MG/1
TABLET ORAL
COMMUNITY
Start: 2021-05-26 | End: 2021-08-26

## 2021-08-23 RX ORDER — HYDROCODONE BITARTRATE AND ACETAMINOPHEN 5; 325 MG/1; MG/1
1 TABLET ORAL EVERY 6 HOURS PRN
Qty: 10 TABLET | Refills: 0 | Status: SHIPPED | OUTPATIENT
Start: 2021-08-23 | End: 2021-10-12

## 2021-08-25 ENCOUNTER — DOCUMENTATION (OUTPATIENT)
Dept: PHYSICAL THERAPY | Facility: CLINIC | Age: 71
End: 2021-08-25

## 2021-08-25 DIAGNOSIS — E87.1 LOW SODIUM LEVELS: Primary | ICD-10-CM

## 2021-08-25 NOTE — PROGRESS NOTES
Discharge Summary  Discharge Summary from Physical Therapy Report      Date of Initial PT visit: 5/21/21  Number of Visits Seen: 5     SHORT TERM GOALS: 3-4 weeks   1. Pt will improve standing balance with eyes closed to require only supervision  > 15 sec.  --PARTIALLY MET (required CGA with lateral sway to L.)      2. Pt. will improve (B) LE strength to >4+/5 (in sitting) as needed to perform sit to stand txf x 2 without UE.  --NOT MET (4/5 R, 4-/5 L for hip flex MMT in sitting.)     3. Pt. Will demonstrate the ability to ambulate with SBA with proper adjustments and forward gaze for 4 min.  --PARTIALLY MET.    4. Pt. Will improve SLS to >5sec LINDSEY.  --NOT MET (2 sec SLS each LE with CGA.)     LONG TERM GOALS: 6-8 weeks   1. Pt will improve standing balance with narrow BRAXTON, eyes closed to require only supervision > 1 min. - NOT MET  2. Pt. will improve gluteal and gastrocs strength to5/5 (in sitting) as needed to improve 30 sec sit to stand to 10x with UE assist on thighs.   - NOT MET  3. Pt. Will demonstrate the ability to ambulate with SBA with proper adjustments and forward gaze for >7 min.  - NOT MET  4. Pt. Will decrease TUG score to < 15  Sec - NOT MET    Goals: Partially Met    Discharge Plan: Pt CX remaining appointments and did not schedule more, DC at this time.        Date of Discharge 8/25/21        Sherrie Schwartz, PT, DPT  Physical Therapist

## 2021-08-26 ENCOUNTER — OFFICE VISIT (OUTPATIENT)
Dept: ORTHOPEDIC SURGERY | Facility: CLINIC | Age: 71
End: 2021-08-26

## 2021-08-26 VITALS
HEART RATE: 82 BPM | DIASTOLIC BLOOD PRESSURE: 101 MMHG | SYSTOLIC BLOOD PRESSURE: 151 MMHG | HEIGHT: 64 IN | BODY MASS INDEX: 33.26 KG/M2 | WEIGHT: 194.8 LBS

## 2021-08-26 DIAGNOSIS — M25.512 ACUTE PAIN OF LEFT SHOULDER: Primary | ICD-10-CM

## 2021-08-26 DIAGNOSIS — M19.012 OSTEOARTHRITIS OF LEFT GLENOHUMERAL JOINT: ICD-10-CM

## 2021-08-26 PROCEDURE — 99203 OFFICE O/P NEW LOW 30 MIN: CPT | Performed by: ORTHOPAEDIC SURGERY
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
## 2021-08-31 RX ORDER — GABAPENTIN 600 MG/1
TABLET ORAL
Qty: 90 TABLET | Refills: 3 | Status: SHIPPED | OUTPATIENT
Start: 2021-08-31 | End: 2021-12-12

## 2021-09-01 ENCOUNTER — OFFICE VISIT (OUTPATIENT)
Dept: CARDIOLOGY | Facility: CLINIC | Age: 71
End: 2021-09-01

## 2021-09-01 VITALS
BODY MASS INDEX: 33.39 KG/M2 | SYSTOLIC BLOOD PRESSURE: 138 MMHG | HEART RATE: 90 BPM | HEIGHT: 64 IN | DIASTOLIC BLOOD PRESSURE: 98 MMHG | RESPIRATION RATE: 18 BRPM | WEIGHT: 195.6 LBS

## 2021-09-01 DIAGNOSIS — I10 ESSENTIAL HYPERTENSION: Primary | ICD-10-CM

## 2021-09-01 DIAGNOSIS — I46.9 CARDIOPULMONARY ARREST WITH SUCCESSFUL RESUSCITATION (HCC): ICD-10-CM

## 2021-09-01 PROCEDURE — 99214 OFFICE O/P EST MOD 30 MIN: CPT | Performed by: INTERNAL MEDICINE

## 2021-09-02 NOTE — PROGRESS NOTES
Cardiology clinic note  Richy Kevin MD, PhD  Subjective:     Encounter Date:09/01/2021      Patient ID: Connie A Schoen is a 71 y.o. female.    Chief Complaint:  Chief Complaint   Patient presents with   • Hospital Follow Up Visit       HPI:  History of Present Illness  I the pleasure of seeing this 71-year-old female today in clinic after hospitalization for unexplained syncope and questionable cardiac arrest versus syncope with premature CPR in the absence of any heart monitor being present.  She was sitting in the car and had a syncopal event, she had another syncopal event in the ER and CPR was started with return of ROSC and consciousness a short time later.  No drugs were pushed she was not intubated and around spontaneously.  She does not remember the event, she had no seizure-like activity noted alert neurologic dysfunction no bladder or bowel incontinence.  Cardiac etiology was suspected.  Cardiology was consulted ultimately and given severe nature with possible CPR and cardiac arrest she was ultimately taken to the Cath Lab demonstrating only mild luminal regularities in the coronaries with ANDREZ-3 flow in all vessels with normal epicardial coronary anatomy, normal LV filling pressures, structurally normal heart on 2D echo with EF of 60 to 65% with no significant valvular heart disease, normal right left-sided function, no indication of pulmonary hypertension or valvular abnormality.  No thrombus in transit was seen no LVOT obstruction, there was normal myocardial thickness without evidence of any LVH.  EKG did not demonstrate any QT prolongation and was sinus rhythm, no conduction abnormalities.  Telemetry throughout her hospital stay was also normal with sinus rhythm.  She was ultimately discharged with 30-day event monitor which she still has on.  She presents back to clinic otherwise doing well with no recurrent events.  Her blood pressure is 138/98 with heart rate of 90.  She is still smoking  occasionally which we did discuss quitting as well as all alcohol cessation temporarily.  She has no other complaints    Review of systems otherwise negative x14 point review of systems except as mentioned above    Historical data copied forward from previous encounters in EMR is unchanged    2D echo is reviewed interpreted by me demonstrates normal EF with normal left and right-sided function, EF 65 to 70% not, normal atrial sizes, no significant valvular abnormality    Left heart catheterization is reviewed interpreted by me demonstrates nonobstructive CAD with normal filling pressures, normal LV function      The following portions of the patient's history were reviewed and updated as appropriate: allergies, current medications, past family history, past medical history, past social history, past surgical history and problem list.    Problem List:  Patient Active Problem List   Diagnosis   • Cardiopulmonary arrest with successful resuscitation (CMS/[facility])   • Fusion of spine   • Scoliosis of lumbosacral region due to degenerative disease of spine in adult   • Generalized abdominal pain   • Low back pain   • Alcoholism (CMS/[facility])   • Anemia   • Ankle sprain   • Anxiety disorder   • Bursitis of hip   • Chest discomfort   • Cough   • Degeneration of intervertebral disc of lumbar region   • Depression   • Ecchymosis   • Elevated LFTs   • Folliculitis   • Full incontinence of feces   • Hypertension   • Hypothyroidism   • Knee pain, left   • Leg pain, bilateral   • Leg edema   • Swelling of lower extremity   • Leg length discrepancy   • Muscle pain   • Degenerative joint disease of left knee   • Osteoarthritis of shoulder   • Osteoarthritis of knee   • Osteoarthritis of glenohumeral joint   • Osteoarthritis   • Polyarthritis   • Other ovarian dysfunction   • Other specified dorsopathies, site unspecified   • Pain in joint of left shoulder   • Arm pain   • Fibromyalgia   • Pain in shoulder   • Polymyalgia rheumatica  (CMS/[facility])   • Personal history of other drug therapy   • Preoperative clearance   • Presence of left artificial knee joint   • Encounter for general adult medical examination without abnormal findings   • Screening for breast cancer   • Tear of rotator cuff   • Urinary tract infection   • Vitamin D deficiency   • Weight loss   • Lumbar radiculopathy   • Radiculopathy   • Spinal stenosis, lumbar   • Osteoarthritis of lumbosacral spine with radiculopathy   • Osteoarthritis of lumbosacral spine without myelopathy   • Screening for colon cancer   • Encounter for immunization    • Medicare annual wellness visit, subsequent   • Hypercalcemia   • Need for immunization against influenza   • Elevated serum alkaline phosphatase level   • Abnormality of gait due to impairment of balance       Past Medical History:  Past Medical History:   Diagnosis Date   • Anxiety    • Arthritis    • Depression    • Disease of thyroid gland    • HTN (hypertension)    • Hypertension    • RA (rheumatoid arthritis) (CMS/[facility])    • Shortness of breath    • Thyroid disease        Past Surgical History:  Past Surgical History:   Procedure Laterality Date   • APPENDECTOMY     • BACK SURGERY      T10-S1 poster lumbar fusion correction   • BACK SURGERY      rods    • CARDIAC CATHETERIZATION N/A 8/10/2021    Procedure: Left Heart Cath;  Surgeon: Richy Kevin MD;  Location: [facility] CATH INVASIVE LOCATION;  Service: Cardiology;  Laterality: N/A;   • CARDIAC CATHETERIZATION N/A 8/10/2021    Procedure: Coronary angiography;  Surgeon: Richy Kevin MD;  Location: [facility] CATH INVASIVE LOCATION;  Service: Cardiology;  Laterality: N/A;   • CARDIAC CATHETERIZATION N/A 8/10/2021    Procedure: Left ventriculography;  Surgeon: Richy Kevin MD;  Location: [facility] CATH INVASIVE LOCATION;  Service: Cardiology;  Laterality: N/A;   • KNEE SURGERY     • TOTAL KNEE ARTHROPLASTY Left        Social History:  Social History     Socioeconomic  "History   • Marital status:      Spouse name: Not on file   • Number of children: Not on file   • Years of education: Not on file   • Highest education level: Not on file   Tobacco Use   • Smoking status: Current Every Day Smoker     Packs/day: 1.00     Types: Cigarettes   • Smokeless tobacco: Never Used   Substance and Sexual Activity   • Alcohol use: Yes     Alcohol/week: 10.0 standard drinks     Types: 10 Cans of beer per week   • Drug use: Not Currently   • Sexual activity: Defer       Allergies:  No Known Allergies    Immunizations:  Immunization History   Administered Date(s) Administered   • COVID-19 (MODERNA) 02/02/2021, 03/06/2021   • Pneumococcal Polysaccharide (PPSV23) 08/25/2020   • Tdap 01/08/2009       ROS:  ROS       Objective:         /98 (BP Location: Left arm, Patient Position: Sitting)   Pulse 90   Resp 18   Ht 162.6 cm (64\")   Wt 88.7 kg (195 lb 9.6 oz)   BMI 33.57 kg/m²     Physical Exam    In-Office Procedure(s):  Procedures    ASCVD RIsk Score::  The 10-year ASCVD risk score (Ferny YI Jr., et al., 2013) is: 20%    Values used to calculate the score:      Age: 71 years      Sex: Female      Is Non- : No      Diabetic: No      Tobacco smoker: Yes      Systolic Blood Pressure: 138 mmHg      Is BP treated: Yes      HDL Cholesterol: 57 mg/dL      Total Cholesterol: 174 mg/dL    Recent Radiology:  Imaging Results (Most Recent)     None          Lab Review:   Office Visit on 08/23/2021   Component Date Value   • Glucose 08/23/2021 88    • BUN 08/23/2021 14    • Creatinine 08/23/2021 0.72    • Sodium 08/23/2021 130*   • Potassium 08/23/2021 4.7    • Chloride 08/23/2021 97*   • CO2 08/23/2021 20.4*   • Calcium 08/23/2021 10.2    • Total Protein 08/23/2021 7.5    • Albumin 08/23/2021 4.50    • ALT (SGPT) 08/23/2021 20    • AST (SGOT) 08/23/2021 23    • Alkaline Phosphatase 08/23/2021 137*   • Total Bilirubin 08/23/2021 0.2    • eGFR Non  Amer 08/23/2021 80 "    • Globulin 08/23/2021 3.0    • A/G Ratio 08/23/2021 1.5    • BUN/Creatinine Ratio 08/23/2021 19.4    • Anion Gap 08/23/2021 12.6    • Folate 08/23/2021 5.40    • Vitamin B-12 08/23/2021 909    • WBC 08/23/2021 10.02    • RBC 08/23/2021 3.56*   • Hemoglobin 08/23/2021 12.2    • Hematocrit 08/23/2021 36.7    • MCV 08/23/2021 103.1*   • MCH 08/23/2021 34.3*   • MCHC 08/23/2021 33.2    • RDW 08/23/2021 13.1    • RDW-SD 08/23/2021 49.6    • MPV 08/23/2021 8.3    • Platelets 08/23/2021 481*   • Neutrophil % 08/23/2021 77.2*   • Lymphocyte % 08/23/2021 12.8*   • Monocyte % 08/23/2021 7.0    • Eosinophil % 08/23/2021 2.2    • Basophil % 08/23/2021 0.3    • Immature Grans % 08/23/2021 0.5    • Neutrophils, Absolute 08/23/2021 7.74*   • Lymphocytes, Absolute 08/23/2021 1.28    • Monocytes, Absolute 08/23/2021 0.70    • Eosinophils, Absolute 08/23/2021 0.22    • Basophils, Absolute 08/23/2021 0.03    • Immature Grans, Absolute 08/23/2021 0.05    • nRBC 08/23/2021 0.0    No results displayed because visit has over 200 results.         GENERAL: Alert and oriented x3, afebrile. Vital signs stable, appeared comfortable, nondistressed, and appears stated age. Generalized weakness. Responds to questions appropriately.   HEENT: Normocephalic, atraumatic. Pupils equal, round and reactive to light. Extraocular movements intact bilaterally. Trachea midline.  Mucous membranes are moist.   NECK: Supple. No JVD. Trachea midline, no LAD  CHEST: Clear to auscultation bilaterally anteriorly; no rale, rhonchi, or wheezes  HEART: Regular rate and rhythm. No rubs, murmurs or gallops.   ABDOMEN: Soft, nontender, nondistended. Bowel sounds are otherwise positive.   EXTREMITIES: No clubbing, cyanosis, or edema. Moves all extremities  SKIN: Warm and dry. No ecchymoses, petechiae or rashes.   MUSCULOSKELETAL: No bony abnormalities. Range of motion normal. No crepitus. No joint swelling or erythema.   NEUROLOGIC: Cranial nerves II-XII intact  bilaterally. No focal neurologic deficits. Mini-Mental status exam was deferred.   LYMPHATICS: No lymphadenopathy.   Cath site stable at the wrist     ASSESSMENT AND PLAN:   Syncope, cardiac arrest?  No obvious etiology  Nonobstructive coronary disease, normal cardiac structure and function  Normal echo  Normal EKG with no high risk features no conduction abnormality  ANDREZ-3 flow  Preserved LV systolic function with no cardiomyopathy, no LVH, no high risk features on EKG no conduction abnormality no QT prolongation     Follow-up 30-day Holter  No changes to medicines  Smoking cessation counseling discussed at length  Stay away from tobacco and alcohol  Travel is okay as discussed with patient  Continue antihypertensives with losartan  She is not on beta-blocker at this time, no tachycardia or bradycardia arrhythmias no    Back to clinic 3 months, sooner if needed    Continue primary ventricles for CAD  Follow-up with PCP for fasting lipid panel per guidelines, goal blood pressure less than 1 35-1 40 systolic  Diet and exercise per [place] guidelines  Still would avoid driving at this point, would not recommend being in a pool unsupervised without clear etiology     Richy Kevin MD, PhD                  Richy Kevin MD  09/02/21  .

## 2021-09-07 LAB
Lab: 1
TOAL ENROLLMENT DAYS: 22

## 2021-09-07 PROCEDURE — 93228 REMOTE 30 DAY ECG REV/REPORT: CPT | Performed by: INTERNAL MEDICINE

## 2021-09-15 ENCOUNTER — HOSPITAL ENCOUNTER (OUTPATIENT)
Dept: MRI IMAGING | Facility: HOSPITAL | Age: 71
Discharge: HOME | End: 2021-09-15

## 2021-09-15 DIAGNOSIS — R55 SYNCOPE, UNSPECIFIED SYNCOPE TYPE: ICD-10-CM

## 2021-09-15 PROCEDURE — 70549 MR ANGIOGRAPH NECK W/O&W/DYE: CPT

## 2021-09-15 PROCEDURE — A9579 GAD-BASE MR CONTRAST NOS,1ML: HCPCS | Performed by: INTERNAL MEDICINE

## 2021-09-15 PROCEDURE — 25010000002 GADOTERIDOL PER 1 ML: Performed by: INTERNAL MEDICINE

## 2021-09-15 PROCEDURE — 70544 MR ANGIOGRAPHY HEAD W/O DYE: CPT

## 2021-09-15 RX ADMIN — GADOTERIDOL 20 ML: 279.3 INJECTION, SOLUTION INTRAVENOUS at 15:43

## 2021-09-19 PROBLEM — R55 SYNCOPE: Status: ACTIVE | Noted: 2021-09-19

## 2021-09-19 PROBLEM — Z09 HOSPITAL DISCHARGE FOLLOW-UP: Status: ACTIVE | Noted: 2021-09-19

## 2021-09-19 PROBLEM — S22.42XA MULTIPLE CLOSED FRACTURES OF RIBS OF LEFT SIDE: Status: ACTIVE | Noted: 2021-09-19

## 2021-09-20 NOTE — PROGRESS NOTES
I would like or you to see neurosurgery to keep watch of the stenosis with the event you had. Typically no treatment under 75%, but I would feel better having the specialist see you

## 2021-09-22 ENCOUNTER — HOSPITAL ENCOUNTER (OUTPATIENT)
Dept: MRI IMAGING | Facility: HOSPITAL | Age: 71
Discharge: HOME | End: 2021-09-22

## 2021-09-22 ENCOUNTER — HOSPITAL ENCOUNTER (OUTPATIENT)
Dept: CT IMAGING | Facility: HOSPITAL | Age: 71
Discharge: HOME | End: 2021-09-22

## 2021-09-22 DIAGNOSIS — M25.512 ACUTE PAIN OF LEFT SHOULDER: ICD-10-CM

## 2021-09-22 PROCEDURE — 73200 CT UPPER EXTREMITY W/O DYE: CPT

## 2021-09-22 PROCEDURE — 73223 MRI JOINT UPR EXTR W/O&W/DYE: CPT

## 2021-09-22 PROCEDURE — 25010000002 GADOTERIDOL PER 1 ML: Performed by: ORTHOPAEDIC SURGERY

## 2021-09-22 PROCEDURE — A9579 GAD-BASE MR CONTRAST NOS,1ML: HCPCS | Performed by: ORTHOPAEDIC SURGERY

## 2021-09-22 RX ADMIN — GADOTERIDOL 17 ML: 279.3 INJECTION, SOLUTION INTRAVENOUS at 10:37

## 2021-09-23 ENCOUNTER — OFFICE VISIT (OUTPATIENT)
Dept: ORTHOPEDIC SURGERY | Facility: CLINIC | Age: 71
End: 2021-09-23

## 2021-09-23 VITALS
BODY MASS INDEX: 33.29 KG/M2 | HEART RATE: 71 BPM | HEIGHT: 64 IN | WEIGHT: 195 LBS | SYSTOLIC BLOOD PRESSURE: 160 MMHG | DIASTOLIC BLOOD PRESSURE: 102 MMHG

## 2021-09-23 DIAGNOSIS — M25.512 ACUTE PAIN OF LEFT SHOULDER: Primary | ICD-10-CM

## 2021-09-23 PROCEDURE — 99213 OFFICE O/P EST LOW 20 MIN: CPT | Performed by: ORTHOPAEDIC SURGERY

## 2021-09-23 NOTE — PROGRESS NOTES
"     Patient ID: Connie A Schoen is a 71 y.o. female.  Left shoulder pain  Jazmine follows up with continued left shoulder pain    Review of Systems:  Left shoulder pain      Objective:    BP (!) 160/102   Pulse 71   Ht 162.6 cm (64\")   Wt 88.5 kg (195 lb)   BMI 33.47 kg/m²     Physical Examination:      Left shoulder demonstrates intact skin.  No skin changes.  She has diffuse tenderness around the proximal humerus, passive elevation 110 external rotation 20 internal rotation the left hip with crepitance.  She has pain weakness on Speed, Rew, supraspinatus testing.  Belly press and liftoff are 4/5.  Sensory and motor exam are intact all distributions. Radial pulse is palpable and capillary refill is less than two seconds to all digits    Imaging:   MRI with contrast and CT demonstrate on report to have a large areas of bone loss of the proximal humerus with some reactive marrow edema of the midshaft.  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  See me back in 4 weeks      Procedures          Disclaimer: Please note that areas of this note were completed with computer voice recognition software.  Quite often unanticipated grammatical, syntax, homophones, and other interpretive errors are inadvertently transcribed by the computer software. Please excuse any errors that have escaped final proofreading.  "

## 2021-09-24 ENCOUNTER — TELEPHONE (OUTPATIENT)
Dept: ORTHOPEDIC SURGERY | Facility: CLINIC | Age: 71
End: 2021-09-24

## 2021-09-24 NOTE — TELEPHONE ENCOUNTER
Caller: CONNIE SCHOEN     Relationship to patient: PATIENT    Best call back number: [phone]    Patient is needing: PATIENT CALLING BACK. UNABLE TO WARM TRANSFER. PLEASE CALL HER ON NUMBER ABOVE - IT IS HER CELL PHONE

## 2021-09-24 NOTE — TELEPHONE ENCOUNTER
SPOKE TO PATIENT AND ADVISED HER TO STOP BY THE OFFICE TO PICKUP DISC THAT IS AT  FOR APPOINTMENT WITH DR MAY.  SHE STATED SHE HAS APPONTMENT Tuesday.

## 2021-10-04 NOTE — PROGRESS NOTES
Subjective   Patient ID: Connie A Schoen is a 71 y.o. female is being seen for consultation today at the request of Uyen Cleveland MD for vertebral artery stenosis.  MRA head/neck done on 9/15/21. Today pt reports back related gait issues along with tinnitus and shortness of breath.    71-year-old female with recent admittion to [facility]- after having syncope and then ALTE- required CPR for 2 minutes- then regained consciousness- had  Cath, holter, echo all normal.  Has seen Shanks in past- seen Majd- and now suggesting dottie spine- and had myelogram that showed loose back hardware.  She has had episodes of  very bad balance- right side severe issues- has appt for neurology in March 2022.  She is a smoker consuming a pack per day and also has hypertension.  She presents today for discussion of possible vertebral artery stenosis and vertebrobasilar insufficiency as a cause of her ataxia.  She reports no dizziness and no double vision, but just ataxia when walking with tandem steps.  She had MR angiography which was reported to possibly show a stenosis in the now presents today to discuss and review these findings.                 The following portions of the patient's history were reviewed and updated as appropriate: allergies, current medications, past family history, past medical history, past social history, past surgical history and problem list.    Review of Systems   Constitutional: Negative for chills and fever.   HENT: Positive for tinnitus. Negative for ear pain.    Eyes: Negative for pain and visual disturbance.   Respiratory: Positive for shortness of breath (smoking related). Negative for cough.    Cardiovascular: Positive for chest pain (burning/cracked rib). Negative for palpitations.   Gastrointestinal: Negative for abdominal pain and vomiting.   Genitourinary: Positive for enuresis. Negative for difficulty urinating.   Musculoskeletal: Positive for gait problem (back related). Negative for neck  pain.   Skin: Negative for rash.   Neurological: Negative for dizziness, seizures, syncope, speech difficulty, light-headedness, numbness and headaches.   Psychiatric/Behavioral: Negative for confusion.       Objective    Vitals:    10/12/21 1316   BP: 140/90   Pulse: 68   Temp: 97.3 °F (36.3 °C)     Body mass index is 33.47 kg/m².    Physical Exam  Vitals and nursing note reviewed.   Constitutional:       General: She is not in acute distress.     Appearance: She is obese. She is not ill-appearing.   HENT:      Head: Normocephalic and atraumatic.      Nose: Nose normal.      Mouth/Throat:      Mouth: Mucous membranes are moist.   Eyes:      Extraocular Movements: Extraocular movements intact.      Conjunctiva/sclera: Conjunctivae normal.      Pupils: Pupils are equal, round, and reactive to light.   Cardiovascular:      Rate and Rhythm: Normal rate.      Pulses: Normal pulses.   Pulmonary:      Effort: Pulmonary effort is normal.   Musculoskeletal:      Cervical back: Normal range of motion.   Skin:     General: Skin is warm and dry.   Neurological:      General: No focal deficit present.      Mental Status: She is alert and oriented to person, place, and time.      Cranial Nerves: No cranial nerve deficit.      Sensory: No sensory deficit.      Motor: No weakness.      Coordination: Coordination normal.      Gait: Gait abnormal.   Psychiatric:         Mood and Affect: Mood normal.         Behavior: Behavior normal.         Thought Content: Thought content normal.         Judgment: Judgment normal.       Neurologic Exam     Mental Status   Oriented to person, place, and time.     Cranial Nerves     CN III, IV, VI   Pupils are equal, round, and reactive to light.      Assessment/Plan   Independent Review of Radiographic Studies:    The MRA dated 9/15/2021 was reviewed with the patient and shows no stenosis of significance within the cerebrovascular circulation although the vertebral artery origins are not well seen  due to the MR technique.  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  Medical Decision Makin-year-old female with recent extensive back surgery as well as knee surgery and having balance difficulties postoperatively.  Some question of vertebral artery stenosis on postoperative imaging, but this is felt to be more congenital than atherosclerotic.  Patient has no other symptoms of vertebrobasilar insufficiency as well.  She is a pack per day smoker and was encouraged to quit as this can lead to cerebrovascular disease with areas of stenosis.  A CTA of the vertebral artery origins will be obtained to exclude any stenosis and to better look at the vertebral morphology bilaterally.  Diagnoses and all orders for this visit:    1. Syncope, unspecified syncope type (Primary)    2. Abnormality of gait due to impairment of balance    3. Primary hypertension      Return in about 1 week (around 10/19/2021) for CTA Head/ Neck, Recheck.

## 2021-10-12 ENCOUNTER — OFFICE VISIT (OUTPATIENT)
Dept: NEUROSURGERY | Facility: CLINIC | Age: 71
End: 2021-10-12

## 2021-10-12 VITALS
DIASTOLIC BLOOD PRESSURE: 90 MMHG | TEMPERATURE: 97.3 F | HEIGHT: 64 IN | BODY MASS INDEX: 33.29 KG/M2 | SYSTOLIC BLOOD PRESSURE: 140 MMHG | HEART RATE: 68 BPM | WEIGHT: 195 LBS

## 2021-10-12 DIAGNOSIS — R55 SYNCOPE, UNSPECIFIED SYNCOPE TYPE: Primary | ICD-10-CM

## 2021-10-12 DIAGNOSIS — R26.89 ABNORMALITY OF GAIT DUE TO IMPAIRMENT OF BALANCE: ICD-10-CM

## 2021-10-12 DIAGNOSIS — I10 PRIMARY HYPERTENSION: ICD-10-CM

## 2021-10-12 PROCEDURE — 99214 OFFICE O/P EST MOD 30 MIN: CPT | Performed by: RADIOLOGY

## 2021-10-13 ENCOUNTER — HOSPITAL ENCOUNTER (OUTPATIENT)
Dept: CT IMAGING | Facility: HOSPITAL | Age: 71
Discharge: HOME | End: 2021-10-13
Admitting: NURSE PRACTITIONER

## 2021-10-13 ENCOUNTER — LAB (OUTPATIENT)
Dept: FAMILY MEDICINE CLINIC | Facility: CLINIC | Age: 71
End: 2021-10-13

## 2021-10-13 ENCOUNTER — OFFICE VISIT (OUTPATIENT)
Dept: FAMILY MEDICINE CLINIC | Facility: CLINIC | Age: 71
End: 2021-10-13

## 2021-10-13 VITALS
HEART RATE: 89 BPM | RESPIRATION RATE: 12 BRPM | DIASTOLIC BLOOD PRESSURE: 76 MMHG | BODY MASS INDEX: 33.02 KG/M2 | OXYGEN SATURATION: 98 % | WEIGHT: 193.4 LBS | SYSTOLIC BLOOD PRESSURE: 113 MMHG | HEIGHT: 64 IN

## 2021-10-13 DIAGNOSIS — R07.89 LEFT-SIDED CHEST WALL PAIN: Primary | ICD-10-CM

## 2021-10-13 DIAGNOSIS — R07.89 LEFT-SIDED CHEST WALL PAIN: ICD-10-CM

## 2021-10-13 DIAGNOSIS — S22.20XS CLOSED FRACTURE OF STERNUM, UNSPECIFIED PORTION OF STERNUM, SEQUELA: ICD-10-CM

## 2021-10-13 DIAGNOSIS — E87.1 LOW SODIUM LEVELS: ICD-10-CM

## 2021-10-13 DIAGNOSIS — R26.89 ABNORMALITY OF GAIT DUE TO IMPAIRMENT OF BALANCE: Primary | ICD-10-CM

## 2021-10-13 LAB — CREAT BLDA-MCNC: 0.8 MG/DL (ref 0.6–1.3)

## 2021-10-13 PROCEDURE — 99213 OFFICE O/P EST LOW 20 MIN: CPT | Performed by: NURSE PRACTITIONER

## 2021-10-13 PROCEDURE — 82565 ASSAY OF CREATININE: CPT

## 2021-10-13 PROCEDURE — 36415 COLL VENOUS BLD VENIPUNCTURE: CPT

## 2021-10-13 PROCEDURE — 0 IOPAMIDOL PER 1 ML: Performed by: NURSE PRACTITIONER

## 2021-10-13 PROCEDURE — 71275 CT ANGIOGRAPHY CHEST: CPT

## 2021-10-13 PROCEDURE — 80048 BASIC METABOLIC PNL TOTAL CA: CPT | Performed by: INTERNAL MEDICINE

## 2021-10-13 RX ADMIN — IOPAMIDOL 100 ML: 755 INJECTION, SOLUTION INTRAVENOUS at 15:14

## 2021-10-13 NOTE — PROGRESS NOTES
"Chief Complaint  Chest Pain    Subjective          Connie A Schoen presents to [facility]  History of Present Illness    H/o HTN, cardiopulmonary arrest with resuscitation, hypothyroidism, vitamin d def., anemia, anxiety/depression, polymalgia rheumatica, OA, fibromyalgia. Lumbar ddd,   Meds: vitamin d3, gabapentin, levothyroxine, losartan, zoloft    Here today is having burning pain in her left chest over the past 3 weeks  Hurts with palpation  Hurts with deep breath    CPR was performed on her on 8/9/21  She tells me that she was told it was not her heart, that her heart is fine  Is not sleeping well because of the pain  Is having some shortness of breath  Is a smoker  Is not moving around much - is also having knee problems  Shortness of breath occurs with going up and down steps or increased ambulation  Seems to be becoming more constant    Discussed concern for possible PE  Review of Systems   Constitutional: Positive for activity change and fatigue.   HENT: Negative.    Respiratory: Positive for shortness of breath.    Cardiovascular: Positive for chest pain.        Describes as burning pain rates at 7/10   Gastrointestinal: Negative.    Genitourinary: Negative.    Neurological: Negative.    Psychiatric/Behavioral: Negative.        Objective   Vital Signs:   /76   Pulse 89   Resp 12   Ht 162.6 cm (64\")   Wt 87.7 kg (193 lb 6.4 oz)   SpO2 98%   BMI 33.20 kg/m²     Physical Exam  Vitals reviewed.   Constitutional:       Appearance: Normal appearance.   Neck:      Vascular: No carotid bruit.   Cardiovascular:      Rate and Rhythm: Normal rate and regular rhythm.      Pulses: Normal pulses.      Heart sounds: Normal heart sounds.   Pulmonary:      Effort: Pulmonary effort is normal.      Breath sounds: Normal breath sounds. No wheezing, rhonchi or rales.   Chest:      Chest wall: Tenderness present.   Musculoskeletal:      Cervical back: Neck supple.   Skin:     " General: Skin is warm.      Capillary Refill: Capillary refill takes less than 2 seconds.   Neurological:      Mental Status: She is alert and oriented to person, place, and time.        Result Review :                 Assessment and Plan    Diagnoses and all orders for this visit:    1. Left-sided chest wall pain (Primary)  -     CT Chest Pulmonary Embolism With Contrast; Future  -     Ambulatory Referral to Cardiothoracic Surgery    2. Closed fracture of sternum, unspecified portion of sternum, sequela  -     Ambulatory Referral to Cardiothoracic Surgery    IMPRESSION:  1. No evidence of pulmonary embolism.  2. Previously identified nondisplaced sternal fracture has become  displaced with incomplete healing and some osteolysis. This may be  progressing to nonunion and a pseudarthrosis    Follow Up   Return if symptoms worsen or fail to improve.  Patient was given instructions and counseling regarding her condition or for health maintenance advice. Please see specific information pulled into the AVS if appropriate.

## 2021-10-14 ENCOUNTER — PATIENT ROUNDING (BHMG ONLY) (OUTPATIENT)
Dept: NEUROSURGERY | Facility: CLINIC | Age: 71
End: 2021-10-14

## 2021-10-14 LAB
ANION GAP SERPL CALCULATED.3IONS-SCNC: 15.6 MMOL/L (ref 5–15)
BUN SERPL-MCNC: 10 MG/DL (ref 8–23)
BUN/CREAT SERPL: 15.6 (ref 7–25)
CALCIUM SPEC-SCNC: 10.3 MG/DL (ref 8.6–10.5)
CHLORIDE SERPL-SCNC: 97 MMOL/L (ref 98–107)
CO2 SERPL-SCNC: 18.4 MMOL/L (ref 22–29)
CREAT SERPL-MCNC: 0.64 MG/DL (ref 0.57–1)
GFR SERPL CREATININE-BSD FRML MDRD: 92 ML/MIN/1.73
GLUCOSE SERPL-MCNC: 65 MG/DL (ref 65–99)
POTASSIUM SERPL-SCNC: 4.3 MMOL/L (ref 3.5–5.2)
SODIUM SERPL-SCNC: 131 MMOL/L (ref 136–145)

## 2021-10-14 NOTE — PROGRESS NOTES
Patient:   NAJMA SAVAGE            MRN: [id number]            FIN: [id number]              Age:   35 years     Sex:  MALE     :  88   Associated Diagnoses:   None   Author:   PAWAN, CHASE GASCA     Pt. was personally examined via telehealth. Pt was at [facility] and writer was at [place].  Pt provided verbal consent to participate in telehealth eval     Subjective   Chief complaint:  overdose      Consultation requested by: Dr. Carter  History of Present Illness:  Pt is a 34 yo male with a hx of depression, PTSD, dextromerthorphan and alcohol abuse who presented after a suicidal attempt by overdose. BAL on admission 144.  Pt states that he was drinking and took approximately 25 methocarbamol pills. He states that his intent was to never wake up. He states it was an impulsive action. His mood has been worsening for past 2 months. He notes increased depression, anhedonia, poor memory, appetite increase  with weight gain, and chronic insomnia. He sometimes goes the entire night without sleeping. States that he feels ashamed of the relapse. Currently, he denies feeling suicidal or wanting to die. Additional situational factors including feeling a sense of loneliness and does not feel like an adult or that he has enough responsibility in his current relationship. He is taking classes at [facility] state \"b/c it is the responsible thing to  do\" but notes that he is not interested in the classes and was to please his . He denies prior episodes of sustained euphoria, over-activation or irritability consistent with past deloris/hypomania. Pt states he was found by his  after having a seizure from the overdose, which prompted the hospitalization. Pt was reluctant to be hospitalized on inpatient unit. States he does not like groups and would prefer outpt treatment.  Past Psychiatric History:   Number of prior hospitalizations: has had multiple psych hospitalizations as a teenager       Past suicide  October 14, 2021    Hello, may I speak with Connie A Schoen?    My name is Rosetta      I am  with [facility] NEUROSURGERY  [address]  [place] [address] [phone].    Before we get started may I verify your date of birth? 1950    I am calling to officially welcome you to our practice and ask about your recent visit. Is this a good time to talk? yes    Tell me about your visit with us. What things went well?  The visit was fine, I was very pleased.        We're always looking for ways to make our patients' experiences even better. Do you have recommendations on ways we may improve?  no    Overall were you satisfied with your first visit to our practice? yes       I appreciate you taking the time to speak with me today. Is there anything else I can do for you? no      Thank you, and have a great day.       attempts: had several suicide attempts as a teenager, but none since 22 yo       Past medication trials: can't remember the names of past medication trials; had sexual side effects with SSRIs  Currently seeing a therapist through [employer]  Medical History:  Chronic pain  H/O: migraine  Hypospadias in male  Risk factors for obstructive sleep apnea  Urinary tract disease        Health Status   Allergies:    Allergies (1) Active Reaction  NKA None Documented    Current medications: Medications (12) Active  Scheduled: (9)  Enoxaparin 40 mg/0.4 mL syringe  40 mg 0.4 mL, Subcutaneous, Daily  Folic acid 1 mg tab  1 mg 1 tab, Oral, Daily  LevETIRAcetam 500 mg tab  500 mg 1 tab, Oral, Q12H  Magnesium oxide 400 mg tab [Mg 240 mg]  400 mg 1 tab, Oral, Once (scheduled)  Multivitamin (ADEKS) cap  1 cap, Oral, Daily  Pantoprazole 40 mg DR tab  40 mg 1 tab, Oral, Daily  [facility] Meds stored in Pharmacy  1-room temp + 1 refrig, N/A, On Call  Pneumococcal adult vaccine 23-polyvalent 0.5 mL IM inj SDV  0.5 mL, IM, Once (scheduled)  Thiamine 100 mg tab  100 mg 1 tab, Oral, Daily  Continuous: (1)  Lactated Ringers 1,000 mL  1,000 mL, IV, 125 mL/hr  PRN: (2)  Acetaminophen 325 mg tab  650 mg 2 tab, Oral, Q4H  Ondansetron 4 mg/2 mL inj SDV  4 mg 2 mL, Slow IV Push, Q6H   ,    Home Medications (6) Active  caffeine-ergotamine 100 mg-1 mg oral tablet 1 tab, PRN, Oral, Q1H  Emgality (gnlm) 120 mg/mL subcutaneous solution 120 mg, Subcutaneous, Q Month  Keppra oral 500 mg tablet 500 mg = 1 tab, Oral, Q12H  methocarbamol 750 mg oral tablet 750 mg = 1 tab, Oral, BID  pantoprazole oral 40 mg DR tablet 40 mg = 1 tab, Oral, Daily  promethazine 50 mg oral tablet 25 mg = 0.5 tab, PRN, Oral, Q12H      Family Psychiatric History:  Mother - PTSD ,depression  Father - depression  Sister - schizoaffective  States that parents and grandparents have alcohol use disorder  Chemical Dependency History:  Had been sober for 2 years until relapsing prior to  admission  In the year prior to becoming sober, was abusing dextromethorphan on a nearly daily basis.  Has abused alcohol and cannabis in the past  Stopped smoking 1 year ago  Social History:   Pt is domiciled with his . He graduated high school. Currently taking classes through [employer]. Works for [employer]        Objective   VS/Measurements     Vitals between:   05-MAY-2020 17:22:32   TO   06-MAY-2020 17:22:32                   LAST RESULT MINIMUM MAXIMUM  Temperature 36.8 36.1 36.8  Heart Rate 86 86 120  Respiratory Rate 17 12 27  NISBP           138 99 138  NIDBP           84 64 87  NIMBP           98 77 98  SpO2                    96 93 97    Mental Status Exam:   Musculoskeletal     gait not observed, no involuntary movements noted.     Psychiatric:  Cooperative, Appearance: makes decreased eye contact  Speech: normal rate, at times monotone  Attention: intact  .         Mood and affect: Anxious, Depressed, affect is dysthymic, anxious and blunted.         Behavior: no agitation.         Judgment: insight/judgment: impaired.         Abnormal / Psychotic thoughts: overdosed on admission as a suicide attempt, No delusions, No hallucinations, No homicidal ideation.         Thought process: coherent.          Results Review   Labs between:  05-MAY-2020 17:22 to 06-MAY-2020 17:22  CBC:                 WBC  HgB  Hct  Plt  MCV  RDW   05-MAY-2020 10.2  14.1  42.3  266  83.3  14.0   DIFF:                 Seg  Neutroph//ABS  Lymph//ABS  Mono//ABS  EOS/ABS  05-MAY-2020 NOT APPLICABLE  28 // 2.8 58 // (H) 6.0  8 // 0.8 4 // 0.4  BMP:                 Na  Cl  BUN  Glu   06-MAY-2020 139  105  13  96                              K  CO2  Cr  Ca                              3.9  27  0.84  9.0   BMP:                 Na  Cl  BUN  Glu   05-MAY-2020 139  102  18  (H) 211                              K  CO2  Cr  Ca                              (L) 2.9  21  1.04  8.9   CMP:                 AST  ALT  AlkPhos  Bili   Albumin   05-MAY-2020 24  37  75  0.7  3.7   Other Chem:             Mg  Phos  Triglycerides  GGTP  DirectBili                           (L) 1.6  3.4         POC GLU:                 Latest Result  Latest Date  Minimum  Min Date  Maximum  Max Date                             (H) 226  05-MAY-2020 (H) 226  05-MAY-2020 (H) 226              COAG:                 INR  PT  PTT  Ddimer  Fibrinogen    05-MAY-2020     24                          General results   Interpretation:   Result title:  CT HEAD OR BRAIN WO CON/CT CERVICAL SPINE WO CON  Result status:  Final  Verified by:  OBEY MERA on 05/06/2020 0:12  IMPRESSION:1. No acute intracranial process.2. No evidence of acute fracture in the cervical spine.  Result title:  XR CHEST 1V  Result status:  Final  Verified by:  Warren Caputo on 05/05/2020 3:40  IMPRESSION:1.  Submaximal inflation.  Slight bibasilar densities consistent with hypostasis.2.  No radiographic findings to suggest aspiration pneumonitis.       Labs between:  05-MAY-2020 17:22 to 06-MAY-2020 17:22  CBC:                 WBC  HgB  Hct  Plt  MCV  RDW   05-MAY-2020 10.2  14.1  42.3  266  83.3  14.0   DIFF:                 Seg  Neutroph//ABS  Lymph//ABS  Mono//ABS  EOS/ABS  05-MAY-2020 NOT APPLICABLE  28 // 2.8 58 // (H) 6.0  8 // 0.8 4 // 0.4  BMP:                 Na  Cl  BUN  Glu   06-MAY-2020 139  105  13  96                              K  CO2  Cr  Ca                              3.9  27  0.84  9.0   BMP:                 Na  Cl  BUN  Glu   05-MAY-2020 139  102  18  (H) 211                              K  CO2  Cr  Ca                              (L) 2.9  21  1.04  8.9   CMP:                 AST  ALT  AlkPhos  Bili  Albumin   05-MAY-2020 24  37  75  0.7  3.7   Other Chem:             Mg  Phos  Triglycerides  GGTP  DirectBili                           (L) 1.6  3.4         POC GLU:                 Latest Result  Latest Date  Minimum  Min Date  Maximum  Max Date                              (H) 226  05-MAY-2020 (H) 226  05-MAY-2020 (H) 226              COAG:                 INR  PT  PTT  Ddimer  Fibrinogen    05-MAY-2020     24                     TSH 0.677   Tox screen - negative  BAL: 144  Assessment:  Major depressive disorder, recurrent, severe  Generalized anxiety d/o  r/o PTSD        Impression and Plan   Pt is a 34 yo male with a hx of depression, anxeity, PTSD, substance abuse who presents after a suicide attempt by overdose. He was found by his  after having a seizure. He overdosed on approximately 25 methocarbamal which pt states was impulsive and done in setting of alcohol intoxication. Pt notes worsening depression over past 2 months. He feels intense shame of the relapse. Pt has a hx of multiple psych hospitalizations and suicide  attempts, although most recent suicide attempt was at age 21. Family history is significant for depression in his parents, schizoaffective d/o in his sister and multiple family members with alcohol or other substance use disorders. Prior to the relapse, he had been sober for 2 years.  --Continue 1:1 sitter and suicide precautions. When medically stabilized can transfer to inpatient psychiatry  --For depressive symptoms, will start Trintellix 5mg daily. Agent chosen due to his experience of sexual side effects with past SSRIs.  --TSH reviewed and wnl    --Encouraged abstinence from alcohol/illicit substances  --Will ask psychology service to see tomorrow for therapy

## 2021-10-22 ENCOUNTER — HOSPITAL ENCOUNTER (OUTPATIENT)
Dept: CT IMAGING | Facility: HOSPITAL | Age: 71
End: 2021-10-22

## 2021-10-29 ENCOUNTER — APPOINTMENT (OUTPATIENT)
Dept: CT IMAGING | Facility: HOSPITAL | Age: 71
End: 2021-10-29

## 2021-11-01 ENCOUNTER — OFFICE VISIT (OUTPATIENT)
Dept: CARDIAC SURGERY | Facility: CLINIC | Age: 71
End: 2021-11-01

## 2021-11-01 VITALS
DIASTOLIC BLOOD PRESSURE: 102 MMHG | HEART RATE: 87 BPM | TEMPERATURE: 97.3 F | HEIGHT: 65 IN | BODY MASS INDEX: 32.32 KG/M2 | WEIGHT: 194 LBS | SYSTOLIC BLOOD PRESSURE: 165 MMHG | OXYGEN SATURATION: 97 % | RESPIRATION RATE: 20 BRPM

## 2021-11-01 DIAGNOSIS — E03.9 ACQUIRED HYPOTHYROIDISM: ICD-10-CM

## 2021-11-01 DIAGNOSIS — I46.9 CARDIOPULMONARY ARREST WITH SUCCESSFUL RESUSCITATION (HCC): ICD-10-CM

## 2021-11-01 DIAGNOSIS — S22.22XA CLOSED FRACTURE OF BODY OF STERNUM, INITIAL ENCOUNTER: Primary | ICD-10-CM

## 2021-11-01 DIAGNOSIS — I10 PRIMARY HYPERTENSION: ICD-10-CM

## 2021-11-01 PROCEDURE — 99204 OFFICE O/P NEW MOD 45 MIN: CPT | Performed by: THORACIC SURGERY (CARDIOTHORACIC VASCULAR SURGERY)

## 2021-11-01 RX ORDER — ACETAMINOPHEN 500 MG
500 TABLET ORAL EVERY 6 HOURS PRN
COMMUNITY

## 2021-11-03 NOTE — PROGRESS NOTES
BCS CONSULT    Reason for Consultation: Surgical opinion regarding sternal fracture.    History of Present Illness:     This is a pleasant 71 year old woman with no prior cardiac history. In August she suffered an event where she was minimally responsive; she was driven directly to an emergency room and she did received chest compressions; she was obviously recovered. Her sternum was exceedingly tender following that event. A Ct scan at the time showed B/L ribs fractures and a nondisplaced sternal body fracture.     Her tenderness began to subside steadily and the patient began to exert herself more strenuously. Five weeks ago she began suffering increased chest tenderness and occasional pain. A repeat Ct scan of the chest (I have reviewed all CT studies) showed a displaced fracture of the upper sternal body with minimal changes to the surrounding soft tissue. The patient says that her tenderness and pain have improved greatly over the last two weeks (she is not exerting herself as much because of the pain).    Review of Systems   Constitutional: Positive for activity change. Negative for appetite change, chills, diaphoresis, fatigue, fever and unexpected weight change.   HENT: Negative for congestion, dental problem, ear pain, hearing loss, mouth sores, nosebleeds, postnasal drip, rhinorrhea, sore throat, tinnitus, trouble swallowing and voice change.    Eyes: Negative for photophobia, pain, discharge, redness, itching and visual disturbance.   Respiratory: Negative for apnea, cough, choking, chest tightness, shortness of breath, wheezing and stridor.    Cardiovascular: Positive for chest pain. Negative for palpitations and leg swelling.   Gastrointestinal: Negative for abdominal distention, abdominal pain, constipation, diarrhea, nausea and vomiting.   Endocrine: Negative for cold intolerance, heat intolerance and polyuria.   Genitourinary: Negative for difficulty urinating, dysuria, flank pain and hematuria.    Musculoskeletal: Positive for back pain. Negative for arthralgias, gait problem, joint swelling, myalgias, neck pain and neck stiffness.   Skin: Negative for color change, pallor, rash and wound.   Allergic/Immunologic: Negative for environmental allergies, food allergies and immunocompromised state.   Neurological: Negative for dizziness, tremors, seizures, syncope, facial asymmetry, speech difficulty, weakness, light-headedness, numbness and headaches.   Hematological: Negative for adenopathy. Does not bruise/bleed easily.   Psychiatric/Behavioral: Negative for agitation, behavioral problems, confusion, decreased concentration, dysphoric mood, hallucinations, self-injury, sleep disturbance and suicidal ideas. The patient is not nervous/anxious and is not hyperactive.         Past Medical History:   Diagnosis Date   • Anxiety    • Arthritis    • Depression    • Disease of thyroid gland    • HTN (hypertension)    • Hypertension    • RA (rheumatoid arthritis) (HCC)    • Shortness of breath    • Thyroid disease      Past Surgical History:   Procedure Laterality Date   • APPENDECTOMY     • BACK SURGERY      T10-S1 poster lumbar fusion correction   • BACK SURGERY      rods    • CARDIAC CATHETERIZATION N/A 8/10/2021    Procedure: Left Heart Cath;  Surgeon: Richy Kevin MD;  Location: [facility] CATH INVASIVE LOCATION;  Service: Cardiology;  Laterality: N/A;   • CARDIAC CATHETERIZATION N/A 8/10/2021    Procedure: Coronary angiography;  Surgeon: Richy Kevin MD;  Location: [facility] CATH INVASIVE LOCATION;  Service: Cardiology;  Laterality: N/A;   • CARDIAC CATHETERIZATION N/A 8/10/2021    Procedure: Left ventriculography;  Surgeon: Richy Kevin MD;  Location: [facility] CATH INVASIVE LOCATION;  Service: Cardiology;  Laterality: N/A;   • KNEE SURGERY     • TOTAL KNEE ARTHROPLASTY Left      Family History   Problem Relation Age of Onset   • Cancer Father    • Cancer Brother    • Diabetes Maternal  "Grandmother    • Heart disease Paternal Grandfather      Social History     Tobacco Use   • Smoking status: Current Every Day Smoker     Packs/day: 1.00     Types: Cigarettes   • Smokeless tobacco: Never Used   Vaping Use   • Vaping Use: Never used   Substance Use Topics   • Alcohol use: Yes     Alcohol/week: 10.0 standard drinks     Types: 10 Cans of beer per week   • Drug use: Not Currently     (Not in a hospital admission)      Current Outpatient Medications:   •  acetaminophen (TYLENOL) 500 MG tablet, Take 500 mg by mouth Every 6 (Six) Hours As Needed for Mild Pain ., Disp: , Rfl:   •  B Complex Vitamins (B COMPLEX-B12 PO), Take  by mouth Daily., Disp: , Rfl:   •  cholecalciferol (VITAMIN D3) 25 MCG (1000 UT) tablet, Take 1,000 Units by mouth Daily., Disp: , Rfl:   •  gabapentin (NEURONTIN) 600 MG tablet, TAKE ONE TABLET BY MOUTH THREE TIMES A DAY, Disp: 90 tablet, Rfl: 3  •  Green Tea 150 MG capsule, Take  by mouth., Disp: , Rfl:   •  levothyroxine (SYNTHROID, LEVOTHROID) 125 MCG tablet, TAKE ONE TABLET BY MOUTH DAILY, Disp: 30 tablet, Rfl: 9  •  losartan (COZAAR) 100 MG tablet, Take 1 tablet by mouth Daily., Disp: 90 tablet, Rfl: 3  •  sertraline (ZOLOFT) 100 MG tablet, TAKE 1 TABLET DAILY, Disp: 90 tablet, Rfl: 3    Allergies:  Patient has no known allergies.    Objective      Vital Signs       Flowsheet Rows      First Filed Value   Admission Height 165.1 cm (65\") Documented at 11/01/2021 1223   Admission Weight 88 kg (194 lb) Documented at 11/01/2021 1223        165.1 cm (65\")    Physical Exam  Vitals reviewed.   Constitutional:       General: She is awake. She is not in acute distress.     Appearance: Normal appearance. She is well-developed. She is obese. She is not ill-appearing, toxic-appearing or diaphoretic.      Interventions: She is not intubated.  HENT:      Head: Normocephalic and atraumatic.      Jaw: There is normal jaw occlusion.      Nose: Nose normal. No congestion or rhinorrhea.      " Mouth/Throat:      Lips: No lesions.      Mouth: Mucous membranes are moist.      Dentition: Normal dentition. No dental tenderness.      Tongue: No lesions.      Palate: No mass.      Pharynx: Oropharynx is clear. No oropharyngeal exudate or posterior oropharyngeal erythema.   Eyes:      General: No scleral icterus.     Extraocular Movements: Extraocular movements intact.      Conjunctiva/sclera: Conjunctivae normal.      Pupils: Pupils are equal, round, and reactive to light.   Neck:      Thyroid: No thyroid mass, thyromegaly or thyroid tenderness.      Vascular: Normal carotid pulses. No carotid bruit, hepatojugular reflux or JVD.      Trachea: No tracheostomy or tracheal deviation.   Cardiovascular:      Rate and Rhythm: Normal rate and regular rhythm.  No extrasystoles are present.     Chest Wall: PMI is not displaced. No thrill.      Pulses: No decreased pulses.           Radial pulses are 2+ on the right side and 2+ on the left side.        Femoral pulses are 2+ on the right side and 2+ on the left side.       Dorsalis pedis pulses are 1+ on the right side and 1+ on the left side.      Heart sounds: Murmur heard.    Systolic murmur is present with a grade of 2/6.  No friction rub. No gallop.    Pulmonary:      Effort: Pulmonary effort is normal. No tachypnea, bradypnea, accessory muscle usage, prolonged expiration, respiratory distress or retractions. She is not intubated.      Breath sounds: Normal air entry. No stridor. No decreased breath sounds, wheezing, rhonchi or rales.   Chest:      Chest wall: Swelling present. No mass, lacerations, tenderness, crepitus or edema.      Comments: Tenderness upper sternal body, no obvious step off. No chest wall echymossis.  Abdominal:      General: Abdomen is protuberant. Bowel sounds are normal. There is no distension.      Palpations: Abdomen is soft. There is no hepatomegaly or splenomegaly.      Tenderness: There is no abdominal tenderness. There is no right CVA  tenderness or left CVA tenderness.   Musculoskeletal:         General: No swelling, tenderness, deformity or signs of injury. Normal range of motion.      Cervical back: Full passive range of motion without pain, normal range of motion and neck supple. No edema, erythema, signs of trauma, rigidity, tenderness or crepitus. No pain with movement. Normal range of motion.      Right lower leg: No edema.      Left lower leg: No edema.   Lymphadenopathy:      Cervical: No cervical adenopathy.      Right cervical: No superficial cervical adenopathy.     Left cervical: No superficial cervical adenopathy.   Skin:     General: Skin is warm and dry.      Capillary Refill: Capillary refill takes 2 to 3 seconds.      Coloration: Skin is not jaundiced or pale.      Findings: No bruising, erythema, lesion or rash.   Neurological:      General: No focal deficit present.      Mental Status: She is alert and oriented to person, place, and time. Mental status is at baseline.      GCS: GCS eye subscore is 4. GCS verbal subscore is 5. GCS motor subscore is 6.      Cranial Nerves: Cranial nerves are intact. No cranial nerve deficit.      Sensory: Sensation is intact. No sensory deficit.      Motor: Motor function is intact. No weakness.      Coordination: Coordination is intact. Coordination normal.      Gait: Gait is intact. Gait normal.      Deep Tendon Reflexes: Reflexes normal.   Psychiatric:         Attention and Perception: Attention and perception normal.         Mood and Affect: Mood and affect normal.         Speech: Speech normal.         Behavior: Behavior normal. Behavior is not agitated, slowed, aggressive, withdrawn, hyperactive or combative. Behavior is cooperative.         Thought Content: Thought content normal.         Cognition and Memory: Cognition and memory normal.         Judgment: Judgment normal.       Results Review:       Assessment/Plan:     This is a pleasant 71 year old woman with an upper sternal body  fracture with some posterior displacement of the inferior edge of the fracture. There is no significant surrounding hematoma or fluid collection. There is moderate tenderness at the fracture site without obvious instability.     I believe this fracture may very well heal with sternal precautions; I outlined those precautions to Mrs. Schoen (no lifting over 20 pounds, no lifting arms overhead, no driving, and no heavy exertion). She is agreeable.    I would like to evaluate her again in another three months with a repeat CT scan of the chest. Should her symptoms resolve, there is no clear indication for surgical intervention. Should her symptoms worsen, she should contact us immediately that we might accelerate her follow up imaging and physical examination. This discussion was had in the presence of her daughter (a nurse).    Thank you for this kind consultation.      Landon Caballero MD  11/02/21  22:02 EDT

## 2021-11-04 ENCOUNTER — PATIENT ROUNDING (BHMG ONLY) (OUTPATIENT)
Dept: CARDIAC SURGERY | Facility: CLINIC | Age: 71
End: 2021-11-04

## 2021-11-04 NOTE — PROGRESS NOTES
November 4, 2021    Hello, may I speak with Connie A Schoen?    My name is Kami Iraheta       I am  with [facility] CARDIAC SURGERY  [address]  [place] [address] [phone].    Before we get started may I verify your date of birth? 1950    I am calling to officially welcome you to our practice and ask about your recent visit. Is this a good time to talk? yes    Tell me about your visit with us. What things went well?  It was real good, very satisfied.       We're always looking for ways to make our patients' experiences even better. Do you have recommendations on ways we may improve?  no    Overall were you satisfied with your first visit to our practice? yes       I appreciate you taking the time to speak with me today. Is there anything else I can do for you? Yes, I have noticed some pain when I am swallowing and didn't know if it was related to the sternal fracture. Patient states that it is mid to lower chest and feels more external. Advised most likely related to the fracture and that if she doesn't see improvement over the next week to give us a call and speak with the nurses.      Thank you, and have a great day.

## 2021-11-10 RX ORDER — LOSARTAN POTASSIUM 100 MG/1
TABLET ORAL
Qty: 90 TABLET | Refills: 3 | Status: SHIPPED | OUTPATIENT
Start: 2021-11-10 | End: 2022-11-11

## 2021-11-15 RX ORDER — LEVOTHYROXINE SODIUM 0.12 MG/1
TABLET ORAL
Qty: 30 TABLET | Refills: 9 | Status: SHIPPED | OUTPATIENT
Start: 2021-11-15 | End: 2022-08-04 | Stop reason: SDUPTHER

## 2021-12-06 ENCOUNTER — TELEPHONE (OUTPATIENT)
Dept: NEUROSURGERY | Facility: CLINIC | Age: 71
End: 2021-12-06

## 2021-12-12 RX ORDER — GABAPENTIN 600 MG/1
TABLET ORAL
Qty: 90 TABLET | Refills: 3 | Status: SHIPPED | OUTPATIENT
Start: 2021-12-12 | End: 2022-04-25

## 2022-01-19 ENCOUNTER — HOSPITAL ENCOUNTER (OUTPATIENT)
Dept: CT IMAGING | Facility: HOSPITAL | Age: 72
Discharge: HOME | End: 2022-01-19
Admitting: RADIOLOGY

## 2022-01-19 DIAGNOSIS — R55 SYNCOPE, UNSPECIFIED SYNCOPE TYPE: ICD-10-CM

## 2022-01-19 DIAGNOSIS — R26.89 ABNORMALITY OF GAIT DUE TO IMPAIRMENT OF BALANCE: ICD-10-CM

## 2022-01-19 LAB — CREAT BLDA-MCNC: 0.9 MG/DL (ref 0.6–1.3)

## 2022-01-19 PROCEDURE — 0 IOPAMIDOL PER 1 ML: Performed by: RADIOLOGY

## 2022-01-19 PROCEDURE — 70496 CT ANGIOGRAPHY HEAD: CPT

## 2022-01-19 PROCEDURE — 70498 CT ANGIOGRAPHY NECK: CPT

## 2022-01-19 PROCEDURE — 82565 ASSAY OF CREATININE: CPT

## 2022-01-19 RX ADMIN — IOPAMIDOL 100 ML: 755 INJECTION, SOLUTION INTRAVENOUS at 15:47

## 2022-01-27 ENCOUNTER — HOSPITAL ENCOUNTER (OUTPATIENT)
Dept: CARDIOLOGY | Facility: HOSPITAL | Age: 72
Discharge: HOME | End: 2022-01-27

## 2022-01-27 ENCOUNTER — LAB (OUTPATIENT)
Dept: LAB | Facility: HOSPITAL | Age: 72
End: 2022-01-27

## 2022-01-27 ENCOUNTER — HOSPITAL ENCOUNTER (OUTPATIENT)
Dept: GENERAL RADIOLOGY | Facility: HOSPITAL | Age: 72
Discharge: HOME | End: 2022-01-27

## 2022-01-27 LAB
ABO GROUP BLD: NORMAL
ALBUMIN SERPL-MCNC: 3.9 G/DL (ref 3.5–5.2)
ANION GAP SERPL CALCULATED.3IONS-SCNC: 7.2 MMOL/L (ref 5–15)
APTT PPP: 26.1 SECONDS (ref 24–31)
BLD GP AB SCN SERPL QL: NEGATIVE
BUN SERPL-MCNC: 16 MG/DL (ref 8–23)
BUN/CREAT SERPL: 18.2 (ref 7–25)
CALCIUM SPEC-SCNC: 10.1 MG/DL (ref 8.6–10.5)
CHLORIDE SERPL-SCNC: 102 MMOL/L (ref 98–107)
CO2 SERPL-SCNC: 21.8 MMOL/L (ref 22–29)
CREAT SERPL-MCNC: 0.88 MG/DL (ref 0.57–1)
DEPRECATED RDW RBC AUTO: 46 FL (ref 37–54)
ERYTHROCYTE [DISTWIDTH] IN BLOOD BY AUTOMATED COUNT: 12.5 % (ref 12.3–15.4)
GFR SERPL CREATININE-BSD FRML MDRD: 64 ML/MIN/1.73
GLUCOSE SERPL-MCNC: 97 MG/DL (ref 65–99)
HCT VFR BLD AUTO: 34.6 % (ref 34–46.6)
HGB BLD-MCNC: 11.7 G/DL (ref 12–15.9)
INR PPP: <0.93 (ref 0.93–1.1)
MCH RBC QN AUTO: 34.1 PG (ref 26.6–33)
MCHC RBC AUTO-ENTMCNC: 33.8 G/DL (ref 31.5–35.7)
MCV RBC AUTO: 100.9 FL (ref 79–97)
MRSA DNA SPEC QL NAA+PROBE: NORMAL
PLATELET # BLD AUTO: 441 10*3/MM3 (ref 140–450)
PMV BLD AUTO: 9.1 FL (ref 6–12)
POTASSIUM SERPL-SCNC: 4.3 MMOL/L (ref 3.5–5.2)
PROTHROMBIN TIME: 10.2 SECONDS (ref 9.6–11.7)
RBC # BLD AUTO: 3.43 10*6/MM3 (ref 3.77–5.28)
RH BLD: POSITIVE
SODIUM SERPL-SCNC: 131 MMOL/L (ref 136–145)
T&S EXPIRATION DATE: NORMAL
WBC NRBC COR # BLD: 9.29 10*3/MM3 (ref 3.4–10.8)

## 2022-01-27 PROCEDURE — 86900 BLOOD TYPING SEROLOGIC ABO: CPT

## 2022-01-27 PROCEDURE — 86901 BLOOD TYPING SEROLOGIC RH(D): CPT

## 2022-01-27 PROCEDURE — 85610 PROTHROMBIN TIME: CPT

## 2022-01-27 PROCEDURE — 93005 ELECTROCARDIOGRAM TRACING: CPT | Performed by: ORTHOPAEDIC SURGERY

## 2022-01-27 PROCEDURE — 71046 X-RAY EXAM CHEST 2 VIEWS: CPT

## 2022-01-27 PROCEDURE — 87641 MR-STAPH DNA AMP PROBE: CPT

## 2022-01-27 PROCEDURE — 36415 COLL VENOUS BLD VENIPUNCTURE: CPT

## 2022-01-27 PROCEDURE — 93010 ELECTROCARDIOGRAM REPORT: CPT | Performed by: INTERNAL MEDICINE

## 2022-01-27 PROCEDURE — 85730 THROMBOPLASTIN TIME PARTIAL: CPT

## 2022-01-27 PROCEDURE — 82040 ASSAY OF SERUM ALBUMIN: CPT

## 2022-01-27 PROCEDURE — 83036 HEMOGLOBIN GLYCOSYLATED A1C: CPT

## 2022-01-27 PROCEDURE — 85027 COMPLETE CBC AUTOMATED: CPT

## 2022-01-27 PROCEDURE — 80048 BASIC METABOLIC PNL TOTAL CA: CPT

## 2022-01-27 PROCEDURE — 86850 RBC ANTIBODY SCREEN: CPT

## 2022-01-28 ENCOUNTER — TELEPHONE (OUTPATIENT)
Dept: CARDIOLOGY | Facility: CLINIC | Age: 72
End: 2022-01-28

## 2022-01-28 ENCOUNTER — LAB (OUTPATIENT)
Dept: LAB | Facility: HOSPITAL | Age: 72
End: 2022-01-28

## 2022-01-28 LAB
HBA1C MFR BLD: 5.6 % (ref 3.5–5.6)
QT INTERVAL: 387 MS
SARS-COV-2 ORF1AB RESP QL NAA+PROBE: NOT DETECTED

## 2022-01-28 PROCEDURE — C9803 HOPD COVID-19 SPEC COLLECT: HCPCS

## 2022-01-28 PROCEDURE — U0004 COV-19 TEST NON-CDC HGH THRU: HCPCS

## 2022-01-28 NOTE — TELEPHONE ENCOUNTER
She is suppose to get knee surgery on Monday   wants you to to look at her EKG she just had done before her surgery on Monday

## 2022-01-30 ENCOUNTER — ANESTHESIA EVENT (OUTPATIENT)
Dept: PERIOP | Facility: HOSPITAL | Age: 72
End: 2022-01-30

## 2022-01-31 ENCOUNTER — HOSPITAL ENCOUNTER (OUTPATIENT)
Facility: HOSPITAL | Age: 72
Setting detail: HOSPITAL OUTPATIENT SURGERY
Discharge: HOME | End: 2022-01-31
Attending: ORTHOPAEDIC SURGERY | Admitting: ORTHOPAEDIC SURGERY

## 2022-01-31 ENCOUNTER — HOME HEALTH ADMISSION (OUTPATIENT)
Dept: HOME HEALTH SERVICES | Facility: HOME HEALTHCARE | Age: 72
End: 2022-01-31

## 2022-01-31 ENCOUNTER — ANESTHESIA (OUTPATIENT)
Dept: PERIOP | Facility: HOSPITAL | Age: 72
End: 2022-01-31

## 2022-01-31 VITALS
HEART RATE: 77 BPM | OXYGEN SATURATION: 99 % | TEMPERATURE: 96.2 F | DIASTOLIC BLOOD PRESSURE: 88 MMHG | BODY MASS INDEX: 30.35 KG/M2 | WEIGHT: 182.2 LBS | SYSTOLIC BLOOD PRESSURE: 146 MMHG | HEIGHT: 65 IN | RESPIRATION RATE: 16 BRPM

## 2022-01-31 DIAGNOSIS — Z96.651 STATUS POST TOTAL KNEE REPLACEMENT USING CEMENT, RIGHT: ICD-10-CM

## 2022-01-31 DIAGNOSIS — Z96.651 STATUS POST TOTAL KNEE REPLACEMENT, RIGHT: Primary | ICD-10-CM

## 2022-01-31 PROCEDURE — C1713 ANCHOR/SCREW BN/BN,TIS/BN: HCPCS | Performed by: ORTHOPAEDIC SURGERY

## 2022-01-31 PROCEDURE — C1889 IMPLANT/INSERT DEVICE, NOC: HCPCS | Performed by: ORTHOPAEDIC SURGERY

## 2022-01-31 PROCEDURE — 25010000002 PROPOFOL 10 MG/ML EMULSION

## 2022-01-31 PROCEDURE — 25010000002 KETOROLAC TROMETHAMINE PER 15 MG: Performed by: ORTHOPAEDIC SURGERY

## 2022-01-31 PROCEDURE — 25010000002 DEXAMETHASONE PER 1 MG

## 2022-01-31 PROCEDURE — 25010000002 HYDROMORPHONE PER 4 MG

## 2022-01-31 PROCEDURE — 25010000002 ROPIVACAINE PER 1 MG: Performed by: ORTHOPAEDIC SURGERY

## 2022-01-31 PROCEDURE — 25010000002 EPINEPHRINE PER 0.1 MG: Performed by: ORTHOPAEDIC SURGERY

## 2022-01-31 PROCEDURE — 25010000002 HYDRALAZINE PER 20 MG

## 2022-01-31 PROCEDURE — 25010000002 DEXAMETHASONE SODIUM PHOSPHATE 20 MG/5ML SOLUTION 5 ML VIAL: Performed by: ORTHOPAEDIC SURGERY

## 2022-01-31 PROCEDURE — 25010000002 ONDANSETRON PER 1 MG

## 2022-01-31 PROCEDURE — 25010000002 PROPOFOL 1000 MG/100ML EMULSION

## 2022-01-31 PROCEDURE — 97110 THERAPEUTIC EXERCISES: CPT

## 2022-01-31 PROCEDURE — 97162 PT EVAL MOD COMPLEX 30 MIN: CPT

## 2022-01-31 PROCEDURE — 25010000002 VANCOMYCIN PER 500 MG: Performed by: ORTHOPAEDIC SURGERY

## 2022-01-31 PROCEDURE — 25010000002 TRIAMCINOLONE PER 10 MG: Performed by: ORTHOPAEDIC SURGERY

## 2022-01-31 PROCEDURE — 25010000002 FENTANYL CITRATE (PF) 100 MCG/2ML SOLUTION

## 2022-01-31 PROCEDURE — C1776 JOINT DEVICE (IMPLANTABLE): HCPCS | Performed by: ORTHOPAEDIC SURGERY

## 2022-01-31 DEVICE — CMT BONE PALACOS R HI/VISC 1X40: Type: IMPLANTABLE DEVICE | Site: PATELLA | Status: FUNCTIONAL

## 2022-01-31 DEVICE — IMPLANTABLE DEVICE: Type: IMPLANTABLE DEVICE | Site: KNEE | Status: FUNCTIONAL

## 2022-01-31 DEVICE — DEV WND/CLS CONTRL TISS STRATAFIX SYMM PDS PLS CTX 60CM VIL: Type: IMPLANTABLE DEVICE | Site: PATELLA | Status: FUNCTIONAL

## 2022-01-31 DEVICE — IMPLANTABLE DEVICE
Type: IMPLANTABLE DEVICE | Site: KNEE | Status: FUNCTIONAL
Brand: CEMENTED FEMORAL/TIBIAL STEM

## 2022-01-31 DEVICE — IMPLANTABLE DEVICE
Type: IMPLANTABLE DEVICE | Site: KNEE | Status: FUNCTIONAL
Brand: PROVEN GEN-FLEX MODULAR/REVISION KNEE SYSTEM

## 2022-01-31 DEVICE — WASHR SCRW SM 7.0MM: Type: IMPLANTABLE DEVICE | Site: KNEE | Status: FUNCTIONAL

## 2022-01-31 RX ORDER — HYDRALAZINE HYDROCHLORIDE 20 MG/ML
10 INJECTION INTRAMUSCULAR; INTRAVENOUS CONTINUOUS PRN
Status: DISCONTINUED | OUTPATIENT
Start: 2022-01-31 | End: 2022-01-31 | Stop reason: HOSPADM

## 2022-01-31 RX ORDER — CEPHALEXIN 500 MG/1
500 CAPSULE ORAL EVERY 12 HOURS
Qty: 60 CAPSULE | Refills: 0 | Status: SHIPPED | OUTPATIENT
Start: 2022-01-31 | End: 2022-03-02

## 2022-01-31 RX ORDER — NALOXONE HCL 0.4 MG/ML
0.4 VIAL (ML) INJECTION AS NEEDED
Status: DISCONTINUED | OUTPATIENT
Start: 2022-01-31 | End: 2022-01-31 | Stop reason: HOSPADM

## 2022-01-31 RX ORDER — FENTANYL CITRATE 50 UG/ML
INJECTION, SOLUTION INTRAMUSCULAR; INTRAVENOUS AS NEEDED
Status: DISCONTINUED | OUTPATIENT
Start: 2022-01-31 | End: 2022-01-31 | Stop reason: SURG

## 2022-01-31 RX ORDER — PROPOFOL 10 MG/ML
VIAL (ML) INTRAVENOUS AS NEEDED
Status: DISCONTINUED | OUTPATIENT
Start: 2022-01-31 | End: 2022-01-31 | Stop reason: SURG

## 2022-01-31 RX ORDER — PHENYLEPHRINE HCL IN 0.9% NACL 1 MG/10 ML
SYRINGE (ML) INTRAVENOUS AS NEEDED
Status: DISCONTINUED | OUTPATIENT
Start: 2022-01-31 | End: 2022-01-31 | Stop reason: SURG

## 2022-01-31 RX ORDER — PROPOFOL 10 MG/ML
INJECTION, EMULSION INTRAVENOUS CONTINUOUS PRN
Status: DISCONTINUED | OUTPATIENT
Start: 2022-01-31 | End: 2022-01-31 | Stop reason: SURG

## 2022-01-31 RX ORDER — OXYCODONE HYDROCHLORIDE 5 MG/1
10 TABLET ORAL EVERY 4 HOURS PRN
Status: DISCONTINUED | OUTPATIENT
Start: 2022-01-31 | End: 2022-01-31 | Stop reason: HOSPADM

## 2022-01-31 RX ORDER — FENTANYL CITRATE 50 UG/ML
25 INJECTION, SOLUTION INTRAMUSCULAR; INTRAVENOUS
Status: DISCONTINUED | OUTPATIENT
Start: 2022-01-31 | End: 2022-01-31 | Stop reason: HOSPADM

## 2022-01-31 RX ORDER — HYDROCODONE BITARTRATE AND ACETAMINOPHEN 7.5; 325 MG/1; MG/1
1 TABLET ORAL EVERY 6 HOURS PRN
Qty: 30 TABLET | Refills: 0 | Status: SHIPPED | OUTPATIENT
Start: 2022-01-31 | End: 2022-08-10

## 2022-01-31 RX ORDER — TRANEXAMIC ACID 100 MG/ML
INJECTION, SOLUTION INTRAVENOUS AS NEEDED
Status: DISCONTINUED | OUTPATIENT
Start: 2022-01-31 | End: 2022-01-31 | Stop reason: HOSPADM

## 2022-01-31 RX ORDER — TRAMADOL HYDROCHLORIDE 50 MG/1
50 TABLET ORAL EVERY 6 HOURS PRN
Qty: 42 TABLET | Refills: 0 | Status: SHIPPED | OUTPATIENT
Start: 2022-01-31 | End: 2022-08-10

## 2022-01-31 RX ORDER — PREDNISONE 2.5 MG
5 TABLET ORAL DAILY
Qty: 42 TABLET | Refills: 0 | Status: SHIPPED | OUTPATIENT
Start: 2022-01-31 | End: 2022-02-21

## 2022-01-31 RX ORDER — HYDROMORPHONE HCL 110MG/55ML
0.25 PATIENT CONTROLLED ANALGESIA SYRINGE INTRAVENOUS
Status: DISCONTINUED | OUTPATIENT
Start: 2022-01-31 | End: 2022-01-31 | Stop reason: HOSPADM

## 2022-01-31 RX ORDER — ACETAMINOPHEN 500 MG
1000 TABLET ORAL EVERY 6 HOURS
Status: DISCONTINUED | OUTPATIENT
Start: 2022-01-31 | End: 2022-01-31 | Stop reason: HOSPADM

## 2022-01-31 RX ORDER — ONDANSETRON 2 MG/ML
INJECTION INTRAMUSCULAR; INTRAVENOUS AS NEEDED
Status: DISCONTINUED | OUTPATIENT
Start: 2022-01-31 | End: 2022-01-31 | Stop reason: SURG

## 2022-01-31 RX ORDER — LABETALOL HYDROCHLORIDE 5 MG/ML
INJECTION, SOLUTION INTRAVENOUS AS NEEDED
Status: DISCONTINUED | OUTPATIENT
Start: 2022-01-31 | End: 2022-01-31 | Stop reason: SURG

## 2022-01-31 RX ORDER — OXYCODONE HYDROCHLORIDE 5 MG/1
5 TABLET ORAL ONCE AS NEEDED
Status: DISCONTINUED | OUTPATIENT
Start: 2022-01-31 | End: 2022-01-31 | Stop reason: HOSPADM

## 2022-01-31 RX ORDER — ASPIRIN 81 MG/1
81 TABLET ORAL 2 TIMES DAILY
Qty: 42 TABLET | Refills: 0 | Status: SHIPPED | OUTPATIENT
Start: 2022-01-31 | End: 2022-02-21

## 2022-01-31 RX ORDER — BUPIVACAINE HYDROCHLORIDE 5 MG/ML
INJECTION, SOLUTION EPIDURAL; INTRACAUDAL
Status: DISCONTINUED | OUTPATIENT
Start: 2022-01-31 | End: 2022-01-31 | Stop reason: SURG

## 2022-01-31 RX ORDER — HYDRALAZINE HYDROCHLORIDE 20 MG/ML
INJECTION INTRAMUSCULAR; INTRAVENOUS AS NEEDED
Status: DISCONTINUED | OUTPATIENT
Start: 2022-01-31 | End: 2022-01-31 | Stop reason: SURG

## 2022-01-31 RX ORDER — VANCOMYCIN HYDROCHLORIDE 500 MG/10ML
INJECTION, POWDER, LYOPHILIZED, FOR SOLUTION INTRAVENOUS AS NEEDED
Status: DISCONTINUED | OUTPATIENT
Start: 2022-01-31 | End: 2022-01-31 | Stop reason: HOSPADM

## 2022-01-31 RX ORDER — MELOXICAM 15 MG/1
15 TABLET ORAL ONCE
Status: COMPLETED | OUTPATIENT
Start: 2022-01-31 | End: 2022-01-31

## 2022-01-31 RX ORDER — ALBUTEROL SULFATE 2.5 MG/3ML
2.5 SOLUTION RESPIRATORY (INHALATION) ONCE AS NEEDED
Status: DISCONTINUED | OUTPATIENT
Start: 2022-01-31 | End: 2022-01-31 | Stop reason: HOSPADM

## 2022-01-31 RX ORDER — LIDOCAINE HYDROCHLORIDE 10 MG/ML
INJECTION, SOLUTION EPIDURAL; INFILTRATION; INTRACAUDAL; PERINEURAL AS NEEDED
Status: DISCONTINUED | OUTPATIENT
Start: 2022-01-31 | End: 2022-01-31 | Stop reason: SURG

## 2022-01-31 RX ORDER — SODIUM CHLORIDE, SODIUM LACTATE, POTASSIUM CHLORIDE, CALCIUM CHLORIDE 600; 310; 30; 20 MG/100ML; MG/100ML; MG/100ML; MG/100ML
INJECTION, SOLUTION INTRAVENOUS CONTINUOUS PRN
Status: DISCONTINUED | OUTPATIENT
Start: 2022-01-31 | End: 2022-01-31 | Stop reason: SURG

## 2022-01-31 RX ORDER — ONDANSETRON 2 MG/ML
4 INJECTION INTRAMUSCULAR; INTRAVENOUS ONCE AS NEEDED
Status: DISCONTINUED | OUTPATIENT
Start: 2022-01-31 | End: 2022-01-31 | Stop reason: HOSPADM

## 2022-01-31 RX ORDER — HYDROMORPHONE HCL 110MG/55ML
1 PATIENT CONTROLLED ANALGESIA SYRINGE INTRAVENOUS
Status: DISCONTINUED | OUTPATIENT
Start: 2022-01-31 | End: 2022-01-31 | Stop reason: HOSPADM

## 2022-01-31 RX ORDER — MEPERIDINE HYDROCHLORIDE 25 MG/ML
12.5 INJECTION INTRAMUSCULAR; INTRAVENOUS; SUBCUTANEOUS
Status: DISCONTINUED | OUTPATIENT
Start: 2022-01-31 | End: 2022-01-31 | Stop reason: HOSPADM

## 2022-01-31 RX ORDER — HYDROMORPHONE HCL 110MG/55ML
PATIENT CONTROLLED ANALGESIA SYRINGE INTRAVENOUS AS NEEDED
Status: DISCONTINUED | OUTPATIENT
Start: 2022-01-31 | End: 2022-01-31 | Stop reason: SURG

## 2022-01-31 RX ORDER — FENTANYL CITRATE 50 UG/ML
50 INJECTION, SOLUTION INTRAMUSCULAR; INTRAVENOUS
Status: DISCONTINUED | OUTPATIENT
Start: 2022-01-31 | End: 2022-01-31 | Stop reason: HOSPADM

## 2022-01-31 RX ORDER — TRIAMCINOLONE ACETONIDE 40 MG/ML
INJECTION, SUSPENSION INTRA-ARTICULAR; INTRAMUSCULAR AS NEEDED
Status: DISCONTINUED | OUTPATIENT
Start: 2022-01-31 | End: 2022-01-31 | Stop reason: HOSPADM

## 2022-01-31 RX ORDER — ONDANSETRON 4 MG/1
4 TABLET, FILM COATED ORAL EVERY 6 HOURS PRN
Status: DISCONTINUED | OUTPATIENT
Start: 2022-01-31 | End: 2022-01-31 | Stop reason: HOSPADM

## 2022-01-31 RX ORDER — LABETALOL HYDROCHLORIDE 5 MG/ML
5 INJECTION, SOLUTION INTRAVENOUS
Status: DISCONTINUED | OUTPATIENT
Start: 2022-01-31 | End: 2022-01-31 | Stop reason: HOSPADM

## 2022-01-31 RX ORDER — HYDROMORPHONE HCL 110MG/55ML
0.5 PATIENT CONTROLLED ANALGESIA SYRINGE INTRAVENOUS
Status: DISCONTINUED | OUTPATIENT
Start: 2022-01-31 | End: 2022-01-31 | Stop reason: HOSPADM

## 2022-01-31 RX ORDER — MELOXICAM 15 MG/1
15 TABLET ORAL DAILY
Qty: 30 TABLET | Refills: 0 | Status: SHIPPED | OUTPATIENT
Start: 2022-01-31 | End: 2022-03-02

## 2022-01-31 RX ORDER — TRANEXAMIC ACID 10 MG/ML
1000 INJECTION, SOLUTION INTRAVENOUS ONCE
Status: COMPLETED | OUTPATIENT
Start: 2022-01-31 | End: 2022-01-31

## 2022-01-31 RX ORDER — ACETAMINOPHEN 500 MG
1000 TABLET ORAL ONCE
Status: COMPLETED | OUTPATIENT
Start: 2022-01-31 | End: 2022-01-31

## 2022-01-31 RX ORDER — SODIUM CHLORIDE, SODIUM LACTATE, POTASSIUM CHLORIDE, AND CALCIUM CHLORIDE .6; .31; .03; .02 G/100ML; G/100ML; G/100ML; G/100ML
20 INJECTION, SOLUTION INTRAVENOUS ONCE
Status: COMPLETED | OUTPATIENT
Start: 2022-01-31 | End: 2022-01-31

## 2022-01-31 RX ORDER — DEXAMETHASONE SODIUM PHOSPHATE 4 MG/ML
INJECTION, SOLUTION INTRA-ARTICULAR; INTRALESIONAL; INTRAMUSCULAR; INTRAVENOUS; SOFT TISSUE AS NEEDED
Status: DISCONTINUED | OUTPATIENT
Start: 2022-01-31 | End: 2022-01-31 | Stop reason: SURG

## 2022-01-31 RX ORDER — ONDANSETRON 2 MG/ML
4 INJECTION INTRAMUSCULAR; INTRAVENOUS EVERY 6 HOURS PRN
Status: DISCONTINUED | OUTPATIENT
Start: 2022-01-31 | End: 2022-01-31 | Stop reason: HOSPADM

## 2022-01-31 RX ADMIN — BUPIVACAINE HYDROCHLORIDE 25 ML: 5 INJECTION, SOLUTION EPIDURAL; INTRACAUDAL at 16:07

## 2022-01-31 RX ADMIN — ONDANSETRON 4 MG: 2 INJECTION INTRAMUSCULAR; INTRAVENOUS at 13:04

## 2022-01-31 RX ADMIN — LABETALOL 20 MG/4 ML (5 MG/ML) INTRAVENOUS SYRINGE 5 MG: at 13:04

## 2022-01-31 RX ADMIN — PROPOFOL 50 MG: 10 INJECTION, EMULSION INTRAVENOUS at 12:00

## 2022-01-31 RX ADMIN — FENTANYL CITRATE 50 MCG: 50 INJECTION, SOLUTION INTRAMUSCULAR; INTRAVENOUS at 12:19

## 2022-01-31 RX ADMIN — Medication 100 MCG: at 12:07

## 2022-01-31 RX ADMIN — DEXAMETHASONE SODIUM PHOSPHATE 4 MG: 4 INJECTION, SOLUTION INTRAMUSCULAR; INTRAVENOUS at 12:04

## 2022-01-31 RX ADMIN — PROPOFOL 150 MG: 10 INJECTION, EMULSION INTRAVENOUS at 11:58

## 2022-01-31 RX ADMIN — HYDROMORPHONE HYDROCHLORIDE 0.5 MG: 2 INJECTION, SOLUTION INTRAMUSCULAR; INTRAVENOUS; SUBCUTANEOUS at 12:14

## 2022-01-31 RX ADMIN — CEFAZOLIN SODIUM 2 G: 1 INJECTION, POWDER, FOR SOLUTION INTRAMUSCULAR; INTRAVENOUS at 12:02

## 2022-01-31 RX ADMIN — TRANEXAMIC ACID 1000 MG: 10 INJECTION, SOLUTION INTRAVENOUS at 13:15

## 2022-01-31 RX ADMIN — PROPOFOL 100 MCG/KG/MIN: 10 INJECTION, EMULSION INTRAVENOUS at 12:01

## 2022-01-31 RX ADMIN — ACETAMINOPHEN 1000 MG: 500 TABLET, FILM COATED ORAL at 10:40

## 2022-01-31 RX ADMIN — SODIUM CHLORIDE, SODIUM LACTATE, POTASSIUM CHLORIDE, AND CALCIUM CHLORIDE: .6; .31; .03; .02 INJECTION, SOLUTION INTRAVENOUS at 11:36

## 2022-01-31 RX ADMIN — TRANEXAMIC ACID 1000 MG: 10 INJECTION, SOLUTION INTRAVENOUS at 12:04

## 2022-01-31 RX ADMIN — OXYCODONE HYDROCHLORIDE 10 MG: 5 TABLET ORAL at 14:32

## 2022-01-31 RX ADMIN — FENTANYL CITRATE 50 MCG: 50 INJECTION, SOLUTION INTRAMUSCULAR; INTRAVENOUS at 12:09

## 2022-01-31 RX ADMIN — LABETALOL 20 MG/4 ML (5 MG/ML) INTRAVENOUS SYRINGE 5 MG: at 12:24

## 2022-01-31 RX ADMIN — SODIUM CHLORIDE, POTASSIUM CHLORIDE, SODIUM LACTATE AND CALCIUM CHLORIDE 20 ML/HR: 600; 310; 30; 20 INJECTION, SOLUTION INTRAVENOUS at 10:40

## 2022-01-31 RX ADMIN — HYDROMORPHONE HYDROCHLORIDE 0.5 MG: 2 INJECTION, SOLUTION INTRAMUSCULAR; INTRAVENOUS; SUBCUTANEOUS at 12:22

## 2022-01-31 RX ADMIN — HYDRALAZINE HYDROCHLORIDE 5 MG: 20 INJECTION INTRAMUSCULAR; INTRAVENOUS at 13:21

## 2022-01-31 RX ADMIN — LIDOCAINE HYDROCHLORIDE 50 MG: 10 INJECTION, SOLUTION EPIDURAL; INFILTRATION; INTRACAUDAL; PERINEURAL at 11:58

## 2022-01-31 RX ADMIN — DEXAMETHASONE SODIUM PHOSPHATE 20 MG: 4 INJECTION, SOLUTION INTRAMUSCULAR; INTRAVENOUS at 10:40

## 2022-01-31 RX ADMIN — HYDROMORPHONE HYDROCHLORIDE 0.5 MG: 2 INJECTION, SOLUTION INTRAMUSCULAR; INTRAVENOUS; SUBCUTANEOUS at 14:12

## 2022-01-31 RX ADMIN — LABETALOL 20 MG/4 ML (5 MG/ML) INTRAVENOUS SYRINGE 5 MG: at 12:56

## 2022-01-31 RX ADMIN — MELOXICAM 15 MG: 15 TABLET ORAL at 10:40

## 2022-01-31 NOTE — ADDENDUM NOTE
Addendum  created 01/31/22 1618 by Nehemiah Al MD    Child order released for a procedure order, Clinical Note Signed, Intraprocedure Blocks edited, Pend clinical note

## 2022-01-31 NOTE — ADDENDUM NOTE
Addendum  created 01/31/22 1620 by Nehemiah Al MD    Clinical Note Signed, Intraprocedure Blocks edited

## 2022-01-31 NOTE — ANESTHESIA POSTPROCEDURE EVALUATION
Patient: Connie A Schoen    Procedure Summary     Date: 01/31/22 Room / Location: [facility] OR 06 / [facility] MAIN OR    Anesthesia Start: 1136 Anesthesia Stop: 1333    Procedure: TOTAL KNEE ARTHROPLASTY (Right Knee) Diagnosis:       Osteoarthritis      (Osteoarthritis [M19.90])    Surgeons: Zaid Walsh MD Provider: Shayne Villegas MD    Anesthesia Type: general ASA Status: 3          Anesthesia Type: general    Vitals  Vitals Value Taken Time   /80 01/31/22 1350   Temp 97.4 °F (36.3 °C) 01/31/22 1328   Pulse 64 01/31/22 1351   Resp 16 01/31/22 1343   SpO2 94 % 01/31/22 1351   Vitals shown include unvalidated device data.        Post Anesthesia Care and Evaluation    Patient location during evaluation: PACU  Patient participation: complete - patient participated  Level of consciousness: awake  Pain scale: See nurse's notes for pain score.  Pain management: adequate  Airway patency: patent  Anesthetic complications: No anesthetic complications  PONV Status: none  Cardiovascular status: acceptable  Respiratory status: acceptable  Hydration status: acceptable    Comments: Patient seen and examined postoperatively; vital signs stable; SpO2 greater than or equal to 90%; cardiopulmonary status stable; nausea/vomiting adequately controlled; pain adequately controlled; no apparent anesthesia complications; patient discharged from anesthesia care when discharge criteria were met

## 2022-01-31 NOTE — ANESTHESIA PROCEDURE NOTES
Peripheral Block      Patient reassessed immediately prior to procedure    Patient location during procedure: pre-op  Start time: 1/31/2022 4:10 PM  Stop time: 1/31/2022 4:19 PM  Reason for block: at surgeon's request and post-op pain management  Performed by  Anesthesiologist: Nehemiah Al MD  Preanesthetic Checklist  Completed: patient identified, IV checked, site marked, risks and benefits discussed, surgical consent, monitors and equipment checked, pre-op evaluation and timeout performed  Prep:  Sterile barriers:cap, gloves, mask and sterile barriers  Patient monitoring: blood pressure monitoring, continuous pulse oximetry and EKG  Procedure    Sedation: no  Performed under: PNB  Guidance:ultrasound guided and direct live US visual of nerve, needle and location.    ULTRASOUND INTERPRETATION. Using ultrasound guidance a 20 G gauge needle was placed in close proximity to the nerve, at which point, under ultrasound guidance anesthetic was injected in the area of the nerve and spread of the anesthesia was seen on ultrasound in close proximity thereto.  There were no abnormalities seen on ultrasound; a digital image was taken; and the patient tolerated the procedure with no complications. Images:still images obtained, printed/placed on chart    Laterality:right  Block Type:adductor canal block  Injection Technique:single-shot  Needle Type:echogenic  Resistance on Injection: none    Medications Used: bupivacaine PF (MARCAINE) injection 0.5%, 25 mL      Medications  Comment:4 mg decadron added    Post Assessment  Injection Assessment: negative aspiration for heme, no paresthesia on injection and incremental injection  Complications:no

## 2022-01-31 NOTE — ANESTHESIA PROCEDURE NOTES
Airway  Urgency: elective    Date/Time: 1/31/2022 11:59 AM  Airway not difficult    General Information and Staff    Patient location during procedure: OR  Anesthesiologist: Shayne Villegas MD  CRNA: Lakisha Machado CAA    Indications and Patient Condition  Indications for airway management: airway protection    Preoxygenated: yes  Mask difficulty assessment: 0 - not attempted    Final Airway Details  Final airway type: supraglottic airway      Successful airway: unique  Size 4    Number of attempts at approach: 1  Assessment: lips, teeth, and gum same as pre-op and atraumatic intubation    Additional Comments  LMA placement by MONICA Morales

## 2022-01-31 NOTE — ANESTHESIA PREPROCEDURE EVALUATION
Anesthesia Evaluation     Patient summary reviewed and Nursing notes reviewed   no history of anesthetic complications:  NPO Solid Status: > 8 hours  NPO Liquid Status: > 8 hours           Airway   Dental      Pulmonary    (+) a smoker Current Smoked day of surgery, shortness of breath,   Cardiovascular     ECG reviewed    (+) hypertension, CAD,       Neuro/Psych  (+) syncope, numbness, psychiatric history Anxiety and Depression,     GI/Hepatic/Renal/Endo    (+) obesity,   thyroid problem hypothyroidism    Musculoskeletal     (+) back pain, myalgias, radiculopathy  Abdominal    Substance History   (+) alcohol use,      OB/GYN          Other   arthritis, autoimmune disease rheumatoid arthritis, blood dyscrasia anemia,     ROS/Med Hx Other: Hyponatremia, h/o cardiac arrest, fibromyalgia, arm pain, alcoholism, rib fx's, DDD, spinal stenosis, scoliosis, hip bursitis, abd pain, chest pain, edema, leg and knee pain, polymyalgia rheumatica, UTI, RCT, arm and shoulder pain, folliculitis, ovarian dysfunction    Echocardiogram Findings    Left Ventricle Calculated left ventricular EF = 70% Left ventricular ejection fraction appears to be 66 - 70%. Left ventricular systolic function is normal.   Septal wall motion is normal. Normal left ventricular cavity size and wall thickness noted. All left ventricular wall segments contract normally. Left ventricular diastolic function was normal. No evidence of a left ventricular mass present.  Right Ventricle Normal right ventricular cavity size, wall thickness, systolic function and septal motion noted.  Left Atrium Normal left atrial size and volume noted. There is no spontaneous echo contrast present. No evidence of cardiac transplantation present. The interatrial septum does not appear to be redundant. No interatrial septal aneurysm present. No lipomatous hypertrophy of the interatrial septum present. Cannot exclude the presence of a patent foramen ovale.  Right Atrium Normal right  atrial cavity size noted. The inferior vena cava is normally sized.  Aortic Valve The aortic valve is grossly normal in structure. The aortic valve appears trileaflet. No significant aortic valve regurgitation is present. No hemodynamically significant aortic valve stenosis is present.  Mitral Valve The mitral valve is grossly normal in structure. Trace mitral valve regurgitation is present with a posteriorly-directed jet noted. No significant mitral valve stenosis is present.  Tricuspid Valve Mild tricuspid valve regurgitation is present. Estimated right ventricular systolic pressure from tricuspid regurgitation is normal (<35 mmHg). No evidence of pulmonary hypertension is present. No evidence of significant tricuspid valve stenosis is present.  Pulmonic Valve The pulmonic valve is grossly normal in structure. There is no significant pulmonic valve regurgitation present. There is no pulmonic valve stenosis present.  Greater Vessels No dilation of the aortic root is present. No dilation of the sinuses of Valsalva is present.  Pericardium There is no evidence of pericardial effusion. .    Cath  Findings  1 open aortic pressure 128/62  2.,  LV function 65 to 70% hyperdynamic no regional abnormalities     Angiography  1 left main large caliber vessel with no angiographic disease giving LAD and dominant circumflex  2.  LAD is a large-caliber vessel coursing to around the apex with a diagonal branch there is nonbifurcating small with high takeoff, mid LAD bifurcating diagonal branch has no obstructive disease, luminal regularities less than 20% throughout the LAD and diagonal distributions with ANDREZ-3 flow throughout  3.  The circumflex is dominant with a proximal 40% concentric narrowing that is not flow-limiting and a very large caliber vessel likely least 4 mm distally, there is 3 obtuse marginal branches as well as an L PDA and L PLV branch, there is mildly regularities distal to this more proximal 40% concentric  narrowing, there is no obstructive disease in the circumflex distribution with ANDREZ-3 flow throughout, limb regularities throughout the L PDA and L PLV branches as well  4.  The RCA is a small nondominant branch with very small caliber RPDA and marginal branch the takes approximately, there is luminal regularities only throughout the RCA and nothing obstructive, no ostial lesion no catheter dampening     Conclusions and recommendations  1 nonobstructive epicardial coronary disease most significantly in the proximal circumflex at 40% a very large caliber vessel  Preserved LV systolic function EF of 65 to 70%     No obvious etiology for cardiac arrest versus severe syncope with respect to cardiac structure function coronary flow.     Work-up other causes, recommend ambulatory ECG monitoring will consider Holter versus implanted loop recorder  If recurrent event we will consider EP study    PSH  BACK SURGERY APPENDECTOMY  TOTAL KNEE ARTHROPLASTY BACK SURGERY  KNEE SURGERY CARDIAC CATHETERIZATION  CARDIAC CATHETERIZATION CARDIAC CATHETERIZATION                Anesthesia Plan    ASA 3     spinal   (Patient identified; pre-operative vital signs, all relevant labs/studies, complete medical/surgical/anesthetic history, full medication list, full allergy list, and NPO status obtained/reviewed; physical assessment performed; anesthetic options, side effects, potential complications, risks, and benefits discussed; questions answered; written anesthesia consent obtained; patient cleared for procedure; anesthesia machine and equipment checked and functioning    ERAS)    Anesthetic plan, all risks, benefits, and alternatives have been provided, discussed and informed consent has been obtained with: patient.    Plan discussed with CRNA and CAA.        CODE STATUS:

## 2022-02-07 ENCOUNTER — HOME CARE VISIT (OUTPATIENT)
Dept: HOME HEALTH SERVICES | Facility: HOME HEALTHCARE | Age: 72
End: 2022-02-07

## 2022-02-07 VITALS
HEART RATE: 86 BPM | TEMPERATURE: 97.9 F | OXYGEN SATURATION: 93 % | SYSTOLIC BLOOD PRESSURE: 128 MMHG | DIASTOLIC BLOOD PRESSURE: 88 MMHG | RESPIRATION RATE: 18 BRPM

## 2022-02-07 PROCEDURE — G0151 HHCP-SERV OF PT,EA 15 MIN: HCPCS

## 2022-02-07 NOTE — HOME HEALTH
PT EVAL  Patient is a 71 year female referred for skilled PT interventions after recent hospital stay due to R knee replacement  SOCIAL SUPPORT/HOME LIVING SITUATION. Lives with spouse, ambulates with a RW inside the home for safety. Steps to get in and out of the home.    PLOF. Px reports being independent without an AD prior to hospitalization  Vital Signs. Please see oasis/eval on this visit  Homebound status. Due to dec act tolerance, weakness, decline in transfers and ambulation. Patient requires a taxing effort to leave home, putting patient at risk for falls or injury  Skilled PT needed to improve patient's functional mobility, improve safety for transfers and ambulation and return patient to PLOF.  Patient at time of eval required Min A for transfers, Min A for gait with a RW with dec hip and knee flexion, antalgic gait pattern and discontinuous steps. R knee AROM  degrees

## 2022-02-09 ENCOUNTER — HOME CARE VISIT (OUTPATIENT)
Dept: HOME HEALTH SERVICES | Facility: HOME HEALTHCARE | Age: 72
End: 2022-02-09

## 2022-02-09 VITALS
OXYGEN SATURATION: 96 % | RESPIRATION RATE: 17 BRPM | HEART RATE: 78 BPM | DIASTOLIC BLOOD PRESSURE: 68 MMHG | TEMPERATURE: 97.6 F | SYSTOLIC BLOOD PRESSURE: 132 MMHG

## 2022-02-09 PROCEDURE — G0157 HHC PT ASSISTANT EA 15: HCPCS

## 2022-02-10 ENCOUNTER — TRANSCRIBE ORDERS (OUTPATIENT)
Dept: PHYSICAL THERAPY | Facility: CLINIC | Age: 72
End: 2022-02-10

## 2022-02-10 DIAGNOSIS — Z96.651 S/P TOTAL KNEE ARTHROPLASTY, RIGHT: Primary | ICD-10-CM

## 2022-02-10 NOTE — HOME HEALTH
pt up and feeling fair today with only minimal R knee pain.  pt states she has been performing hep as tolerated but with limitations and weakness.    incision covered with a dressing and with only minimal drainage noted distally.  pt reports she was seen by the surgeon last week and khushbu was removed due to excessive drainage.   today no new drainage and steri strips in place distally.    pt has moderate bruising and significant bruising     pt was compliant throughout PT session but needed cues to remain on task and to perform the activities correctly, mostly to sustain contractions and to maintain proper hip position   pt was educated during gait trg to properly advance the RLE with correct heel strike and knee flex/extension as able.

## 2022-02-11 ENCOUNTER — HOME CARE VISIT (OUTPATIENT)
Dept: HOME HEALTH SERVICES | Facility: HOME HEALTHCARE | Age: 72
End: 2022-02-11

## 2022-02-11 PROCEDURE — G0157 HHC PT ASSISTANT EA 15: HCPCS

## 2022-02-13 VITALS
SYSTOLIC BLOOD PRESSURE: 140 MMHG | HEART RATE: 78 BPM | OXYGEN SATURATION: 95 % | DIASTOLIC BLOOD PRESSURE: 64 MMHG | TEMPERATURE: 97.8 F | RESPIRATION RATE: 16 BRPM

## 2022-02-14 ENCOUNTER — HOME CARE VISIT (OUTPATIENT)
Dept: HOME HEALTH SERVICES | Facility: HOME HEALTHCARE | Age: 72
End: 2022-02-14

## 2022-02-14 PROCEDURE — G0157 HHC PT ASSISTANT EA 15: HCPCS

## 2022-02-14 NOTE — HOME HEALTH
pt sitting up and feeling fair today,   pt has mild knee pain and last took pain meds last night.   pt states she is motivated to improve and very much wants to return to plf.   pt is using her walker at all times and reports only minimal pain.      pt was compliant and participated well with PT but displayed weakness and rom deficits.    pt is motivated to improve and tolerated this visit well   pt was better able to ambulate today with less fatigue and improved R knee flex during swing phase with cues.   incision appears dry but with steri strips in place.  pt was encouraged to ice/elevate following PT session

## 2022-02-15 VITALS
TEMPERATURE: 97.8 F | SYSTOLIC BLOOD PRESSURE: 132 MMHG | DIASTOLIC BLOOD PRESSURE: 66 MMHG | OXYGEN SATURATION: 96 % | RESPIRATION RATE: 15 BRPM | HEART RATE: 78 BPM

## 2022-02-15 NOTE — HOME HEALTH
pt up and prepared for PT session.   pt reporting very minimal pain at this time,   attempting to wean from pain meds.  pt states she has been performing hep daily but with limitations and weakness.    pt is using her walker at all times but wants to transition to the cane soon.    incision covered with dressing,  no noted issues.         pt tolerated this visit well today,  no mentioned c/o's but did have mildy increased soreness in the R knee with flexion activities.   pt was given several cues today to fully contract/hold at end rom/sustain contractions and to avoid hip rotation.   pt is making gains with PT intervention,  rom was improved.       pt was encouraged to ice/elevate following PT session.

## 2022-02-16 ENCOUNTER — HOME CARE VISIT (OUTPATIENT)
Dept: HOME HEALTH SERVICES | Facility: HOME HEALTHCARE | Age: 72
End: 2022-02-16

## 2022-02-16 VITALS
OXYGEN SATURATION: 96 % | TEMPERATURE: 97.8 F | RESPIRATION RATE: 17 BRPM | DIASTOLIC BLOOD PRESSURE: 60 MMHG | HEART RATE: 72 BPM | SYSTOLIC BLOOD PRESSURE: 110 MMHG

## 2022-02-16 PROCEDURE — G0157 HHC PT ASSISTANT EA 15: HCPCS

## 2022-02-17 NOTE — HOME HEALTH
pt up and prepared for PT session,  feeling fair with no new  c/o's but with continued R knee soreness.    pt was to md yesterday, reports satisfactory progress and returns to 8 weeks.    pt continues to use her walker at all times and feels her strength/balance are improving but that she does not yet feel safe to go without an a.d.   incision intact and no noted drainage or other issues.           pt was compliant throughout PT session and continues to advance at this time.   rom is improving and pt is tolerating PT better.    pt was cued during gait trg to attempt to avoid excessive R hip ext rotation and to improve R side knee flex/extension and to heel strike.    advised continued use of ice/elevation following hep review

## 2022-02-18 ENCOUNTER — HOME CARE VISIT (OUTPATIENT)
Dept: HOME HEALTH SERVICES | Facility: HOME HEALTHCARE | Age: 72
End: 2022-02-18

## 2022-02-18 VITALS
OXYGEN SATURATION: 95 % | HEART RATE: 80 BPM | TEMPERATURE: 97.2 F | SYSTOLIC BLOOD PRESSURE: 150 MMHG | DIASTOLIC BLOOD PRESSURE: 95 MMHG

## 2022-02-18 PROCEDURE — G0151 HHCP-SERV OF PT,EA 15 MIN: HCPCS

## 2022-02-18 NOTE — HOME HEALTH
Patient seen today for discharge, no new issues or concerns. Patient agreeable with dc this visit and verbalizes compliance with HEP daily. Patient to start outpatient PT on Wednesday

## 2022-02-23 ENCOUNTER — HOSPITAL ENCOUNTER (OUTPATIENT)
Dept: CARDIOLOGY | Facility: HOSPITAL | Age: 72
End: 2022-02-23

## 2022-02-23 ENCOUNTER — TRANSCRIBE ORDERS (OUTPATIENT)
Dept: ADMINISTRATIVE | Facility: HOSPITAL | Age: 72
End: 2022-02-23

## 2022-02-23 ENCOUNTER — TREATMENT (OUTPATIENT)
Dept: PHYSICAL THERAPY | Facility: CLINIC | Age: 72
End: 2022-02-23

## 2022-02-23 ENCOUNTER — HOSPITAL ENCOUNTER (OUTPATIENT)
Dept: CARDIOLOGY | Facility: HOSPITAL | Age: 72
Discharge: HOME | End: 2022-02-23
Admitting: ORTHOPAEDIC SURGERY

## 2022-02-23 DIAGNOSIS — M25.451 SWELLING OF JOINT OF PELVIC REGION OR THIGH, RIGHT: ICD-10-CM

## 2022-02-23 DIAGNOSIS — M25.451 SWELLING OF JOINT OF PELVIC REGION OR THIGH, RIGHT: Primary | ICD-10-CM

## 2022-02-23 DIAGNOSIS — Z96.651 S/P TOTAL KNEE ARTHROPLASTY, RIGHT: Primary | ICD-10-CM

## 2022-02-23 LAB
BH CV LOWER VASCULAR LEFT COMMON FEMORAL AUGMENT: NORMAL
BH CV LOWER VASCULAR LEFT COMMON FEMORAL COMPETENT: NORMAL
BH CV LOWER VASCULAR LEFT COMMON FEMORAL COMPRESS: NORMAL
BH CV LOWER VASCULAR LEFT COMMON FEMORAL PHASIC: NORMAL
BH CV LOWER VASCULAR LEFT COMMON FEMORAL SPONT: NORMAL
BH CV LOWER VASCULAR RIGHT COMMON FEMORAL AUGMENT: NORMAL
BH CV LOWER VASCULAR RIGHT COMMON FEMORAL COMPETENT: NORMAL
BH CV LOWER VASCULAR RIGHT COMMON FEMORAL COMPRESS: NORMAL
BH CV LOWER VASCULAR RIGHT COMMON FEMORAL PHASIC: NORMAL
BH CV LOWER VASCULAR RIGHT COMMON FEMORAL SPONT: NORMAL
BH CV LOWER VASCULAR RIGHT DISTAL FEMORAL COMPRESS: NORMAL
BH CV LOWER VASCULAR RIGHT GASTRONEMIUS COMPRESS: NORMAL
BH CV LOWER VASCULAR RIGHT GREATER SAPH AK COMPRESS: NORMAL
BH CV LOWER VASCULAR RIGHT GREATER SAPH BK COMPRESS: NORMAL
BH CV LOWER VASCULAR RIGHT LESSER SAPH COMPRESS: NORMAL
BH CV LOWER VASCULAR RIGHT MID FEMORAL AUGMENT: NORMAL
BH CV LOWER VASCULAR RIGHT MID FEMORAL COMPETENT: NORMAL
BH CV LOWER VASCULAR RIGHT MID FEMORAL COMPRESS: NORMAL
BH CV LOWER VASCULAR RIGHT MID FEMORAL PHASIC: NORMAL
BH CV LOWER VASCULAR RIGHT MID FEMORAL SPONT: NORMAL
BH CV LOWER VASCULAR RIGHT PERONEAL COMPRESS: NORMAL
BH CV LOWER VASCULAR RIGHT POPLITEAL AUGMENT: NORMAL
BH CV LOWER VASCULAR RIGHT POPLITEAL COMPETENT: NORMAL
BH CV LOWER VASCULAR RIGHT POPLITEAL COMPRESS: NORMAL
BH CV LOWER VASCULAR RIGHT POPLITEAL PHASIC: NORMAL
BH CV LOWER VASCULAR RIGHT POPLITEAL SPONT: NORMAL
BH CV LOWER VASCULAR RIGHT POSTERIOR TIBIAL COMPRESS: NORMAL
BH CV LOWER VASCULAR RIGHT PROXIMAL FEMORAL COMPRESS: NORMAL
BH CV LOWER VASCULAR RIGHT SAPHENOFEMORAL JUNCTION COMPRESS: NORMAL
MAXIMAL PREDICTED HEART RATE: 151 BPM
STRESS TARGET HR: 128 BPM

## 2022-02-23 PROCEDURE — 93971 EXTREMITY STUDY: CPT

## 2022-02-23 PROCEDURE — 97163 PT EVAL HIGH COMPLEX 45 MIN: CPT | Performed by: PHYSICAL THERAPIST

## 2022-02-23 NOTE — PROGRESS NOTES
Physical Therapy Initial Evaluation and Plan of Care    Patient:  Connie A Schoen   :  1950  Diagnosis/ICD-10 Code:  S/P total knee arthroplasty, right [Z96.651]  Referring practitioner:  Zaid Walsh MD  Date of Initial Visit:  2022  Today's Date:  2022  Patient seen for 1 session         Visit Diagnoses:      ICD-10-CM ICD-9-CM   1. S/P total knee arthroplasty, right  Z96.651 V43.65       PRECAUTIONS:  Monitor BP PRN; continue to monitor swelling and signs of DVT.    Subjective Questionnaire:  LEFS:  .    Subjective Evaluation    History of Present Illness  Mechanism of injury: PT referral for:  Z96.651 (ICD-10-CM) - S/P total knee arthroplasty, right on 2022 without complications.    Pt presents today with:  Intermittent pain in R inferior medial knee joint line and lateral quad.    Acute care PT in the hospital.  D/c from acute setting with HHPT to follow; d/c from HHPT last Friday.    Denies numbness/tingling or sharp/shooting pains down either LE.  Denies LB pain/dysfunction; therefore, per time this area will not be assessed today; will assess in a subsequent session PRN.    Back feels better since surgery.    Functional deficits:  Using rollator today; MD wants her to use this for 4 weeks post-op; no AD used at baseline.  Trouble getting up from low-toilets since she does not have UE support.  Uses shower chair post-op.  Has not resumed driving yet.    Independent in bathing, grooming, and dressing.    Occupation:  None.    Imaging:      Denies changes in bowel/bladder function, dizziness/vision issues, or other systemic problems since onset of symptoms.    Quality of life: good    Pain  Current pain ratin  At worst pain ratin  Relieving factors: change in position, relaxation, ice, rest and medications    Treatments  Previous treatment: physical therapy  Discharged from (in last 30 days): inpatient hospitalization and home health care         Vitals in sitting  after subjective hx-taking in LUE:  BP:  139/98 mmHg.  HR:  71 bpm.  O2:  99%.    Took BP medication today at 9:00am; no systemic issues reported per elevated BP today.    Objective          Static Posture     Knee   Genu valgus.     Observations     Right Knee   Positive for incision. Negative for drainage.     Additional Knee Observation Details  Incision:  Healing well.  No redness, warmth, drainage, or other signs of infection noted.  No sutures/steri-strips remain.    DVT assessment RLE vs. LLE:  Positive for moderate tightness/tenderness in calf (tenderness noted in mid-gastroc region and inferiorly) with palpation and gentle calf squeeze.  Slight redness with shiny skin also noted with no warmth.  Pitting edema 1-2 seconds R>LLE.    Pockets of swelling noted in R knee:  Distal lateral quad region and medial/inferior to patella.    Edema measures taken in supported long-sitting:  Circumference around mid-patella:  R (49cm), L (44cm).  Circumference around calf (measured 20cm up from center of medial malleolus):  R (39.7cm), L (35.7cm).    Pt reports her knee was not this swollen at her MD appt last week.    Per signs/symptoms consistent with possible DVT, pt was instructed to seek further medical attention.  Pt stated she first wanted to call LUCY Sánchez (who she has a good relationship with) who works at Dr. Walsh's office to determine next steps.  Pt called [facility] after her PT appt.  Pt's  came back in to the clinic shortly after her PT appt was over to notify us that she has a Doppler scheduled for 1:00pm today and a follow-up visit with Dr. Walsh tomorrow morning.      Palpation     Additional Palpation Details  Not completed per time.    Neurological Testing     Sensation     Lumbar   Left   Intact: light touch    Right   Intact: light touch    Knee     Right Knee   Paresthesia: sharp/dull discrimination     Reflexes   Left   Patellar (L4): trace (1+)  Achilles (S1): normal (2+)  Clonus sign:  negative    Right   Achilles (S1): normal (2+)  Clonus sign: negative    Comments   Right Patellar (L4): not tested per incision    Active Range of Motion     Additional Active Range of Motion Details  ROM not completed today per time per increased duration of session spent monitoring/evaluating/measuring signs/symptoms of possible DVT.    Strength/Myotome Testing     Additional Strength Details  MMT not completed today per time per increased duration of session spent monitoring/evaluating/measuring signs/symptoms of possible DVT.    Ambulation     Comments   Rollator used during ambulation with R>LLE genuvalgum during ipsilateral stance.    Functional Assessment     Single Leg Stance   Left: 1 (no UE support, SBA, stable surface) seconds  Right: 1 (no UE support, SBA, stable surface) seconds    Single Leg Stance - Eyes Open   Left  Trial 1: 1 seconds  Trial 2: 1 seconds    Right  Trial 1: 1 seconds  Trial 2: 1 seconds    Comments  30 sec sit<>stand test:  6.5x, arianna UE support, supervision, R knee pain, increased WB'ing onto LLE, R>LLE genuvalgum.        Assessment & Plan     Assessment    Assessment details: PT referral for:  Z96.651 (ICD-10-CM) - S/P total knee arthroplasty, right.    Pt presents today with the following impairments:  --  Subjective questionnaire score (LEFS).  --  Functional strength.  --  Tenderness with palpation with signs/symptoms consistent with DVT.  --  Healing incision and significant swelling noted to RLE with pitting edema.  --  Posture.  --  Balance.  --  Ambulation.  --  Functional sit<>stand transfers.    The above impairments contribute to the following functional deficits:  Using rollator today; MD wants her to use this for 4 weeks post-op; no AD used at baseline.  Trouble getting up from low-toilets since she does not have UE support.  Uses shower chair post-op.  Has not resumed driving yet.    The pt would benefit from skilled PT sessions to address impairments noted above in  order to improve functional mobility and quality of life.    The pt tolerated tests/measures during the initial evaluation without problems.  Skilled PT treatment was not initiated today per time due to increased duration of session spent monitoring/evaluating/measuring signs/symptoms of possible DVT.  No complications noted during today's session.  Prognosis: good  Prognosis details: Potential barriers to therapy that may impede prognosis:  possible DVT or infection post-op, significant and complex PMHx, reliance on others for transportation, increased risk of falling per inability to hold static SLS without UE support >5 sec on either LE, elevated BP at rest.    Goals  Plan Goals: STGs to be met in 4 weeks:  --  Obtain objective measures that were unable to be measured at initial evaluation to include:  MMT and ROM.  --  Safely d/c use of rollator and resume ambulation with cane or no AD in clinic at 4-weeks post-op per MD requirements.  --  Require </= 3 cues with HEP performance.  --  Pain levels at worst </= 4/10.  --  LEFS score >/= 35/80 function.    LTGs to be met by end of POC:  --  Require no cues with HEP performance for d/c planning.  --  Pain levels at worst </= 2/10.  --  Resume baseline PLOF with no AD used for community ambulation with min increase in pain.  --  R knee ext AROM in supine to >/= 0 degrees without pain.  --  R knee flex AROM in supine to >/= 115 degrees without pain.  --  Global BLE MMT to >/= 4+/5 without pain.  --  LEFS score >/= 60/80 function.  --  Report >/= 75% reduction in pain with getting off of a low toilet without use of UE support.  --  Safely d/c use of shower chair to return to PLOF.  --  Resume independent driving without pain.  --  SLS on each LE >/= 7 sec with supervision on level terrain with no UE support to reduce risk of falls.  --  30 sec sit<>stand test:  6x, no UE support, with supervision, no pain, and equal WB'ing LEs.    Plan  Therapy options: will be seen  for skilled therapy services  Planned modality interventions: cryotherapy, dry needling, electrical stimulation/Russian stimulation, TENS, thermotherapy (hydrocollator packs) and ultrasound  Planned therapy interventions: abdominal trunk stabilization, ADL retraining, balance/weight-bearing training, flexibility, functional ROM exercises, gait training, home exercise program, joint mobilization, manual therapy, neuromuscular re-education, soft tissue mobilization, spinal/joint mobilization, strengthening, stretching and therapeutic activities  Frequency: 2x week  Duration in weeks: 13  Treatment plan discussed with: patient  Plan details: Await Doppler results and clearance from MD to return to therapy.    Palpation assessment PRN.    Obtain objective measures that were unable to be measured at initial evaluation to include:  MMT and ROM.    Initiate there-ex and distribute HEP as able.        History # of Personal Factors and/or Comorbidities: HIGH (3+)  Examination of Body System(s): # of elements: HIGH (4+)  Clinical Presentation: UNSTABLE   Clinical Decision Making: HIGH          Un-Timed:  High Eval                       40     Mins  [id number]      Timed Treatment:   40   mins   Total Treatment:     40   mins      PT SIGNATURE:  Lyndon Duron PT   [place] PT License #:  [id number]  DATE TREATMENT INITIATED:  2/23/2022    Medicare Initial Certification  Certification Period: 2/23/2022 thru 5/23/2022  I certify that the therapy services are furnished while this patient is under my care.  The services outlined above are required by this patient, and will be reviewed every 90 days.      Physician Signature: ________________________________________    PHYSICIAN: Zaid Walsh MD        DATE: ____________________________________________________    Please sign and return via fax to [phone]. Thank you, [facility].

## 2022-02-25 ENCOUNTER — TELEPHONE (OUTPATIENT)
Dept: PHYSICAL THERAPY | Facility: CLINIC | Age: 72
End: 2022-02-25

## 2022-02-25 NOTE — TELEPHONE ENCOUNTER
CALLED PATIENT TO SCHEDULE ADDITIONAL APPTS FOR PHYSICAL THERAPY VISITS - PATIENT STATED SHE WENT TO THE DOCTOR AND THE DOCTOR DOESN'T WANT HER TO SCHEDULE APPTS FOR PHYSICAL THERAPY FOR 2 WEEKS WHICH IS THE WEEK OF  3/14/22 - SCHEDULED TWO APPTS FOR PHYSICAL THERAPY STARTING WEEK OF 3/21/22.  HAD TO PUT PATIENT ON WAITING LIST FOR APPTS WEEK OF 3/14/22 BECAUSE THERAPIST WERE COMPLETELY BOOKED.  SCHEDULE 2X WEEK ADDITIONAL APPTS THROUGH END OF April.

## 2022-03-14 ENCOUNTER — TREATMENT (OUTPATIENT)
Dept: PHYSICAL THERAPY | Facility: CLINIC | Age: 72
End: 2022-03-14

## 2022-03-14 DIAGNOSIS — Z96.651 S/P TOTAL KNEE ARTHROPLASTY, RIGHT: Primary | ICD-10-CM

## 2022-03-14 PROCEDURE — 97140 MANUAL THERAPY 1/> REGIONS: CPT | Performed by: PHYSICAL THERAPIST

## 2022-03-14 PROCEDURE — 97112 NEUROMUSCULAR REEDUCATION: CPT | Performed by: PHYSICAL THERAPIST

## 2022-03-14 PROCEDURE — 97110 THERAPEUTIC EXERCISES: CPT | Performed by: PHYSICAL THERAPIST

## 2022-03-14 NOTE — PROGRESS NOTES
Physical Therapy Daily Progress Note    Patient: Connie A Schoen   : 1950  Referring practitioner: Zaid Walsh MD  Today's Date: 3/14/2022    VISIT#: 2    Subjective Evaluation    History of Present Illness  Mechanism of injury: Pt reports she saw the surgeon and they did an xray and it was good.  He told her to stop PT for about 2 weeks.  She is here today for her first visit back after the evaluation.  She has not been doing very much activity or exercise.      Pain  Current pain rating: 3         Objective     See Exercise, Manual, and Modality Logs for complete treatment.     Initiated ex and man therapy today with pt tolerating well      Assessment & Plan     Assessment    Assessment details: iav well today with all ex and man therapy      Progress per Plan of Care        Timed:         Manual Therapy:  10       mins  61448;     Therapeutic Exercise:   20      mins  95069;     Neuromuscular Brooklynn: 10       mins  81253;    Therapeutic Activity:          mins  20888;     Gait Training:           mins  23613;     Ultrasound:          mins  19027;    Ionto                                   mins   55296  Self Care                            mins   35759  Canalith Repos                   mins  4209    Un-Timed:  Electrical Stimulation:         mins  66306 ( );  Traction          mins 67008  Low Eval          Mins  17002  Mod Eval          Mins  63135  High Eval                            Mins  69514  Re-Eval                               mins  05324    Timed Treatment: 40     mins   Total Treatment:   40     mins    Vicki Deshpande PT  Physical Therapist  [place] Lic. # [id number]

## 2022-03-16 ENCOUNTER — TREATMENT (OUTPATIENT)
Dept: PHYSICAL THERAPY | Facility: CLINIC | Age: 72
End: 2022-03-16

## 2022-03-16 DIAGNOSIS — Z96.651 S/P TOTAL KNEE ARTHROPLASTY, RIGHT: Primary | ICD-10-CM

## 2022-03-16 PROCEDURE — 97140 MANUAL THERAPY 1/> REGIONS: CPT | Performed by: PHYSICAL THERAPIST

## 2022-03-16 PROCEDURE — 97110 THERAPEUTIC EXERCISES: CPT | Performed by: PHYSICAL THERAPIST

## 2022-03-16 NOTE — PROGRESS NOTES
"Physical Therapy Daily Progress Note    Patient: Connie A Schoen  : 1950  Referring practitioner: Zaid Walsh MD  Today's Date: 3/16/2022    VISIT#: 3    Subjective   Connie Schoen reports: Says she is a little sore today, walked a little downhill and that made her sore last night, had a hard time sleeping.       Objective          Active Range of Motion     Right Knee   Flexion: 105 degrees   Extension: 4 degrees         See Exercise, Manual, and Modality Logs for complete treatment.     Patient Education: continue HEP    Assessment/Plan  Good response to session, increased posterior knee pain after session so ended with ice for pain management. Cues for correct form, held sidelying hip abduction due to \"popping\" in lateral thigh, felt like ITB sliding over greater trochanter.    Progress per Plan of Care            Timed:         Manual Therapy:    10     mins  74291;     Therapeutic Exercise:    30     mins  10230;     Neuromuscular Brooklynn:        mins  11409;    Therapeutic Activity:          mins  98587;     Gait Training:           mins  40275;     Ultrasound:          mins  67977;    Ionto:                                   mins   61858  Self Care:                            mins   59903    Un-Timed:  Electrical Stimulation:         mins  48175 ( );  Dry Needling          mins self-pay  Traction          mins 15698  Re-Eval                               mins  15794    Timed Treatment:   40   mins   Total Treatment:     50   mins    Mila Flaherty PT  Physical Therapist  [place] PT license #: [id number]  "

## 2022-03-21 ENCOUNTER — TREATMENT (OUTPATIENT)
Dept: PHYSICAL THERAPY | Facility: CLINIC | Age: 72
End: 2022-03-21

## 2022-03-21 DIAGNOSIS — R26.89 BALANCE PROBLEMS: ICD-10-CM

## 2022-03-21 DIAGNOSIS — Z96.651 S/P TOTAL KNEE ARTHROPLASTY, RIGHT: Primary | ICD-10-CM

## 2022-03-21 PROCEDURE — 97530 THERAPEUTIC ACTIVITIES: CPT | Performed by: PHYSICAL THERAPIST

## 2022-03-21 PROCEDURE — 97110 THERAPEUTIC EXERCISES: CPT | Performed by: PHYSICAL THERAPIST

## 2022-03-21 NOTE — PROGRESS NOTES
Physical Therapy Daily Progress Note    Patient: Connie A Schoen  : 1950  Referring practitioner: Zaid Walsh MD  Today's Date: 3/21/2022    VISIT#: 4    Subjective   Connie Schoen reports: Doing ok, knee was pretty sore all night and still sore today.       Objective     See Exercise, Manual, and Modality Logs for complete treatment.     Patient Education: continue HEP    Assessment/Plan  Patient with quite a bit of pain today with exercise progression. Ended with ice for pain control. She has significant valgus deformity of right knee in standing. Patient confirms today that was present prior to knee replacement.     Progress per Plan of Care            Timed:         Manual Therapy:         mins  93273;     Therapeutic Exercise:    25     mins  86948;     Neuromuscular Brooklynn:        mins  57752;    Therapeutic Activity:     15     mins  91407;     Gait Training:           mins  85913;     Ultrasound:          mins  16979;    Ionto:                                   mins   23618  Self Care:                            mins   98568    Un-Timed:  Electrical Stimulation:         mins  79317 ( );  Dry Needling          mins self-pay  Traction          mins 24268  Re-Eval                               mins  94729    Timed Treatment:   40   mins   Total Treatment:     40   mins    Mila Flaherty PT  Physical Therapist  [place] PT license #: [id number]

## 2022-03-24 ENCOUNTER — TREATMENT (OUTPATIENT)
Dept: PHYSICAL THERAPY | Facility: CLINIC | Age: 72
End: 2022-03-24

## 2022-03-24 DIAGNOSIS — Z96.651 S/P TOTAL KNEE ARTHROPLASTY, RIGHT: Primary | ICD-10-CM

## 2022-03-24 PROCEDURE — 97530 THERAPEUTIC ACTIVITIES: CPT | Performed by: PHYSICAL THERAPIST

## 2022-03-24 PROCEDURE — 97110 THERAPEUTIC EXERCISES: CPT | Performed by: PHYSICAL THERAPIST

## 2022-03-24 NOTE — PROGRESS NOTES
Physical Therapy Daily Progress Note    Patient: Connie A Schoen  : 1950  Referring practitioner: Zaid Walsh MD  Today's Date: 3/24/2022    VISIT#: 5  S/p R TKR  22    Subjective   Pt reports: co pain under the knee cap today. Rates pain 2/10 presently.       Objective     See Exercise, Manual, and Modality Logs for complete treatment.     Patient Education:    Assessment & Plan     Assessment    Assessment details: Good iva to today's rx session. Co pain with step ups otherwise tolerated today's rx well.           Progress per Plan of Care            Timed:         Manual Therapy:   5      mins  04361;     Therapeutic Exercise:    30     mins  71844;     Neuromuscular Brooklynn:        mins  69700;    Therapeutic Activity:    15      mins  94783;     Gait Training:           mins  99165;     Ultrasound:          mins  23634;    Ionto                                   mins   07328  Self Care                            mins   98512  Canal repositioning           mins    49759    Un-Timed:  Electrical Stimulation:         mins  74059 ( );  Traction          mins 97906  Low Eval          Mins  09905  Mod Eval          Mins  65745  High Eval                            Mins  00694  Re-Eval                               mins  98680    Timed Treatment:  50   mins   Total Treatment:     50  mins    Mayank Gallardo PT, CLT  Physical Therapist  [place] License:  # [id number]

## 2022-03-29 ENCOUNTER — TREATMENT (OUTPATIENT)
Dept: PHYSICAL THERAPY | Facility: CLINIC | Age: 72
End: 2022-03-29

## 2022-03-29 DIAGNOSIS — Z96.651 S/P TOTAL KNEE ARTHROPLASTY, RIGHT: Primary | ICD-10-CM

## 2022-03-29 PROCEDURE — 97110 THERAPEUTIC EXERCISES: CPT | Performed by: PHYSICAL THERAPIST

## 2022-03-29 PROCEDURE — 97530 THERAPEUTIC ACTIVITIES: CPT | Performed by: PHYSICAL THERAPIST

## 2022-03-29 NOTE — PROGRESS NOTES
Physical Therapy Daily Progress Note    Patient: Connie A Schoen  : 1950  Referring practitioner: Zaid Walsh MD  Today's Date: 3/29/2022    VISIT#: 6  S/p R TKR 22    Subjective   Pt reports: her back has been bothering her, inc pain with prolonged standing. Has been putting ice on her back. Her knee feels pretty good this morning, no more than a 2/10 presently.       Objective          Active Range of Motion     Right Knee   Flexion: 108 degrees   Extension: 4 degrees     Additional Active Range of Motion Details  supine    Passive Range of Motion     Right Knee   Flexion: 110 degrees   Extension: 3 degrees         See Exercise, Manual, and Modality Logs for complete treatment.     Patient Education:    Assessment & Plan     Assessment    Assessment details: Good iva to today's rx session. Making steady progress. Improved ROM. ambulating without a.d.   2/5 STGs met so far.     Goals  Plan Goals: Plan Goals: STGs to be met in 4 weeks:  --  Obtain objective measures that were unable to be measured at initial evaluation to include:  MMT and ROM. ( MET)  --  Safely d/c use of rollator and resume ambulation with cane or no AD in clinic at 4-weeks post-op per MD requirements. ( MET)  --  Require </= 3 cues with HEP performance.  --  Pain levels at worst </= 4/10.  --  LEFS score >/= 35/80 function.    LTGs to be met by end of POC:  --  Require no cues with HEP performance for d/c planning.  --  Pain levels at worst </= 2/10.  --  Resume baseline PLOF with no AD used for community ambulation with min increase in pain.  --  R knee ext AROM in supine to >/= 0 degrees without pain.  --  R knee flex AROM in supine to >/= 115 degrees without pain.  --  Global BLE MMT to >/= 4+/5 without pain.  --  LEFS score >/= 60/80 function.  --  Report >/= 75% reduction in pain with getting off of a low toilet without use of UE support.  --  Safely d/c use of shower chair to return to PLOF.  --  Resume independent  driving without pain.  --  SLS on each LE >/= 7 sec with supervision on level terrain with no UE support to reduce risk of falls.  --  30 sec sit<>stand test:  6x, no UE support, with supervision, no pain, and equal WB'ing LEs.             Progress per Plan of Care            Timed:         Manual Therapy:         mins  52399;     Therapeutic Exercise:    30     mins  76714;     Neuromuscular Brooklynn:        mins  33480;    Therapeutic Activity:   15       mins  76601;     Gait Training:           mins  41331;     Ultrasound:          mins  06136;    Ionto                                   mins   01541  Self Care                            mins   67656  Canal repositioning           mins    06830    Un-Timed:  Electrical Stimulation:         mins  01448 ( );  Traction          mins 93641  Low Eval          Mins  22428  Mod Eval          Mins  83900  High Eval                            Mins  56168  Re-Eval                               mins  83621    Timed Treatment: 45    mins   Total Treatment:    45    mins    Mayank Gallardo PT, CLT  Physical Therapist  [place] License:  # [id number]

## 2022-03-31 RX ORDER — SERTRALINE HYDROCHLORIDE 100 MG/1
TABLET, FILM COATED ORAL
Qty: 90 TABLET | Refills: 3 | Status: SHIPPED | OUTPATIENT
Start: 2022-03-31

## 2022-04-05 ENCOUNTER — TREATMENT (OUTPATIENT)
Dept: PHYSICAL THERAPY | Facility: CLINIC | Age: 72
End: 2022-04-05

## 2022-04-05 DIAGNOSIS — Z96.651 S/P TOTAL KNEE ARTHROPLASTY, RIGHT: Primary | ICD-10-CM

## 2022-04-05 PROCEDURE — 97112 NEUROMUSCULAR REEDUCATION: CPT | Performed by: PHYSICAL THERAPIST

## 2022-04-05 PROCEDURE — 97110 THERAPEUTIC EXERCISES: CPT | Performed by: PHYSICAL THERAPIST

## 2022-04-05 PROCEDURE — 97140 MANUAL THERAPY 1/> REGIONS: CPT | Performed by: PHYSICAL THERAPIST

## 2022-04-05 NOTE — PROGRESS NOTES
Physical Therapy Daily Progress Note      Patient: Connie A Schoen   : 1950  Diagnosis/ICD-10 Code:  S/P total knee arthroplasty, right [Z96.651]  Referring practitioner: Zaid Walsh MD. 22  Date of Initial Visit: 2022  Today's Date: 2022  Patient seen for 7 sessions.  POC 2x/wk x 13 weeks, exp 22    S/p R TKA 22      VISIT#: 7      Subjective :  Pain 2/10 R knee.  Went to [place] over weekend and did increased physical activity.       Objective     See Exercise, Manual, and Modality Logs for complete treatment. Progression as noted.       Patient Education:      Assessment/PlanPt. With quick fatigue and SOA with exertion, frequent rest breaks needed.       Progress per Plan of Care            Timed:         Manual Therapy:  10       mins  91290;     Therapeutic Exercise:    20     mins  69689;     Neuromuscular Brooklynn:   10     mins  52136;    Therapeutic Activity:          mins  46546;     Gait Training:           mins  49212;     Ultrasound:          mins  57868;    Ionto                                   mins   55359  Self Care                            mins   08238  Aquatic                               mins 79406    Un-Timed:  Electrical Stimulation:         mins  68249 (MC );  Traction          mins 20486      Timed Treatment:   40   mins   Total Treatment:     40   mins    July Hunter PTA  Physical Therapist Assistant   [place] license:  #[id number]

## 2022-04-08 ENCOUNTER — TREATMENT (OUTPATIENT)
Dept: PHYSICAL THERAPY | Facility: CLINIC | Age: 72
End: 2022-04-08

## 2022-04-08 DIAGNOSIS — Z96.651 S/P TOTAL KNEE ARTHROPLASTY, RIGHT: Primary | ICD-10-CM

## 2022-04-08 PROCEDURE — 97110 THERAPEUTIC EXERCISES: CPT | Performed by: PHYSICAL THERAPIST

## 2022-04-08 PROCEDURE — 97112 NEUROMUSCULAR REEDUCATION: CPT | Performed by: PHYSICAL THERAPIST

## 2022-04-08 PROCEDURE — 97140 MANUAL THERAPY 1/> REGIONS: CPT | Performed by: PHYSICAL THERAPIST

## 2022-04-08 NOTE — PROGRESS NOTES
Physical Therapy Daily Progress Note      Patient: Connie A Schoen   : 1950  Diagnosis/ICD-10 Code:  S/P total knee arthroplasty, right [Z96.651]  Referring practitioner: Zaid Walsh MD. 22  Date of Initial Visit: 2022  Today's Date: 2022  Patient seen for 8 sessions.  POC 2x/wk x 13 weeks, exp 22    S/p R TKA 22      VISIT#: 8      Subjective :  Pt. Has been told that she has 1 leg longer than another but cannot remember which leg.  She thinks that this is what makes her off balance.  Pain 2/10 R patella.     Objective     See Exercise, Manual, and Modality Logs for complete treatment. Progression as noted. Pt. Checked for leg length discrepancy with R LE being longer than L LE.      Patient Education:  Pt. Educated on impact of leg length discrepancy and possible use of heel lift. Pt. Given information on ordering heel lift from [employer].      Assessment/Plan:  Gait deficits largely due to leg length discrepancy.  Pt. To discuss with surgeon.  R knee extension 0 degrees.       Progress per Plan of Care            Timed:         Manual Therapy:  10       mins  90632;     Therapeutic Exercise:    20     mins  33633;     Neuromuscular Brooklynn:   10     mins  11343;    Therapeutic Activity:          mins  54810;     Gait Training:           mins  76376;     Ultrasound:          mins  35500;    Ionto                                   mins   55504  Self Care                            mins   75702  Aquatic                               mins 19316    Un-Timed:  Electrical Stimulation:         mins  11871 (MC );  Traction          mins 60502      Timed Treatment:   40   mins   Total Treatment:     40   mins    July Hunter PTA  Physical Therapist Assistant   [place] license:  #[id number]

## 2022-04-19 ENCOUNTER — TREATMENT (OUTPATIENT)
Dept: PHYSICAL THERAPY | Facility: CLINIC | Age: 72
End: 2022-04-19

## 2022-04-19 DIAGNOSIS — Z96.651 S/P TOTAL KNEE ARTHROPLASTY, RIGHT: Primary | ICD-10-CM

## 2022-04-19 PROCEDURE — 97140 MANUAL THERAPY 1/> REGIONS: CPT | Performed by: PHYSICAL THERAPIST

## 2022-04-19 PROCEDURE — 97110 THERAPEUTIC EXERCISES: CPT | Performed by: PHYSICAL THERAPIST

## 2022-04-19 PROCEDURE — 97112 NEUROMUSCULAR REEDUCATION: CPT | Performed by: PHYSICAL THERAPIST

## 2022-04-19 NOTE — PROGRESS NOTES
Physical Therapy Daily Progress Note      Patient: Connie A Schoen   : 1950  Diagnosis/ICD-10 Code:  S/P total knee arthroplasty, right [Z96.651]  Referring practitioner: Zaid Walsh MD. 22  Date of Initial Visit: 2022  Today's Date: 2022  Patient seen for 9 sessions.  POC 2x/wk x 13 weeks, exp 22    S/p R TKA 22      VISIT#: 9      Subjective :  R knee sore, 2/10.  She is sleeping well. Pt. saw Dr. Walsh, no issues.  He did not really address leg length discrepancy, telling her to give it some time. (this is per patient)  She does not have to see him for another year. She has been out of town for about 11 days.    Objective     See Exercise, Manual, and Modality Logs for complete treatment.     AAROM in supine 0- 108 degrees.     Patient Education:  Emphasized importance of performing HEP consistently.      Assessment/Plan:  Concern that pt. not performing HEP as does not seem to know exercises well. Endurance low, becomes SOA frequently, requiring rest.       Progress per Plan of Care            Timed:         Manual Therapy:  10       mins  42299;     Therapeutic Exercise:    20     mins  11469;     Neuromuscular Brooklynn:   15     mins  84684;    Therapeutic Activity:          mins  01502;     Gait Training:           mins  91486;     Ultrasound:          mins  12953;    Ionto                                   mins   18904  Self Care                            mins   70608  Aquatic                               mins 44344    Un-Timed:  Electrical Stimulation:         mins  83936 ( );  Traction          mins 68919      Timed Treatment:   45   mins   Total Treatment:     45   mins    July Hunter PTA  Physical Therapist Assistant   [place] license:  #[id number]

## 2022-04-21 ENCOUNTER — TREATMENT (OUTPATIENT)
Dept: PHYSICAL THERAPY | Facility: CLINIC | Age: 72
End: 2022-04-21

## 2022-04-21 DIAGNOSIS — Z96.651 S/P TOTAL KNEE ARTHROPLASTY, RIGHT: Primary | ICD-10-CM

## 2022-04-21 PROCEDURE — 97110 THERAPEUTIC EXERCISES: CPT | Performed by: PHYSICAL THERAPIST

## 2022-04-21 PROCEDURE — 97112 NEUROMUSCULAR REEDUCATION: CPT | Performed by: PHYSICAL THERAPIST

## 2022-04-21 NOTE — PROGRESS NOTES
Physical Therapy Progress Note ( 10th visit pn)    Patient: Connie A Schoen  : 1950  Referring practitioner: Zaid Walsh MD  Today's Date: 2022    VISIT#: 10  S/p R TKA 22 visits  Exp: /    Subjective   Pt reports: still having pain in R knee when she walks but no more than 1-2/10.  Her HEP is going ok. Feels she is about 50% improvement so far.       Objective          Active Range of Motion     Right Knee   Flexion: 110 degrees   Extension: 0 degrees     Additional Active Range of Motion Details  Measured in sup    Passive Range of Motion     Right Knee   Flexion: 115 degrees         See Exercise, Manual, and Modality Logs for complete treatment.     Patient Education:    Assessment & Plan     Assessment    Assessment details: Pt is s/p R TKR on 22.  She is tolerating therapy and making slow but steady progress. Improved ROM and functional mobility. Her pain seems to be under control. 4/5 STGs met so far, making progress towards LTGs.   LEFS score: 26/80 ( 67% disability )    Goals  Plan Goals: Plan Goals: STGs to be met in 4 weeks:  --  Obtain objective measures that were unable to be measured at initial evaluation to include:  MMT and ROM. ( MET)  --  Safely d/c use of rollator and resume ambulation with cane or no AD in clinic at 4-weeks post-op per MD requirements. ( MET)  --  Require </= 3 cues with HEP performance. ( MET)  --  Pain levels at worst </= 4/10. ( MET)  --  LEFS score >/= 35/80 function.    LTGs to be met by end of POC:  --  Require no cues with HEP performance for d/c planning.  --  Pain levels at worst </= 2/10.  --  Resume baseline PLOF with no AD used for community ambulation with min increase in pain.  --  R knee ext AROM in supine to >/= 0 degrees without pain.  --  R knee flex AROM in supine to >/= 115 degrees without pain.  --  Global BLE MMT to >/= 4+/5 without pain.  --  LEFS score >/= 60/80 function.  --  Report >/= 75% reduction in pain with  getting off of a low toilet without use of UE support.  --  Safely d/c use of shower chair to return to PLOF.  --  Resume independent driving without pain.  --  SLS on each LE >/= 7 sec with supervision on level terrain with no UE support to reduce risk of falls.  --  30 sec sit<>stand test:  6x, no UE support, with supervision, no pain, and equal WB'ing LEs.             Progress per Plan of Care            Timed:         Manual Therapy:         mins  94211;     Therapeutic Exercise:   25      mins  72217;     Neuromuscular Brooklynn:  20      mins  81058;    Therapeutic Activity:          mins  58730;     Gait Training:           mins  68445;     Ultrasound:          mins  46070;    Ionto                                   mins   24969  Self Care                            mins   01786  Canal repositioning           mins    96542    Un-Timed:  Electrical Stimulation:         mins  54228 ( );  Traction          mins 07888  Low Eval          Mins  82049  Mod Eval          Mins  50083  High Eval                            Mins  40056  Re-Eval                               mins  75300    Timed Treatment:   45   mins   Total Treatment:     45   mins    Mayank Gallardo PT, CLT  Physical Therapist  [place] License:  # [id number]

## 2022-04-25 RX ORDER — GABAPENTIN 600 MG/1
TABLET ORAL
Qty: 90 TABLET | Refills: 3 | Status: SHIPPED | OUTPATIENT
Start: 2022-04-25 | End: 2022-06-03 | Stop reason: SDUPTHER

## 2022-04-29 ENCOUNTER — TREATMENT (OUTPATIENT)
Dept: PHYSICAL THERAPY | Facility: CLINIC | Age: 72
End: 2022-04-29

## 2022-04-29 DIAGNOSIS — Z96.651 S/P TOTAL KNEE ARTHROPLASTY, RIGHT: Primary | ICD-10-CM

## 2022-04-29 PROCEDURE — 97140 MANUAL THERAPY 1/> REGIONS: CPT | Performed by: PHYSICAL THERAPIST

## 2022-04-29 PROCEDURE — 97110 THERAPEUTIC EXERCISES: CPT | Performed by: PHYSICAL THERAPIST

## 2022-04-29 PROCEDURE — 97112 NEUROMUSCULAR REEDUCATION: CPT | Performed by: PHYSICAL THERAPIST

## 2022-04-29 NOTE — PROGRESS NOTES
Physical Therapy Daily Progress Note      Patient: Connie A Schoen   : 1950  Diagnosis/ICD-10 Code:  S/P total knee arthroplasty, right [Z96.651]  Referring practitioner: Zaid Walsh MD. 22  Date of Initial Visit: 2022  Today's Date: 2022  Patient seen for 11 sessions.  POC 2x/wk x 13 weeks, exp 22  Insurance 10/12, exp 22    S/p R TKA 22      VISIT#: 11      Subjective :  Pt. Notes that she is very sore B buttocks and backs of legs, unsure why.  She does not think that she did anything to warrant being sore.   Pain R knee medially 4/10.     Objective     See Exercise, Manual, and Modality Logs for complete treatment. Progression as noted.       Patient Education:        Assessment/Plan:  Endurance improved, less rest needed.  She was able to progress without issue.       Progress per Plan of Care            Timed:         Manual Therapy:  10       mins  10316;     Therapeutic Exercise:    20     mins  72693;     Neuromuscular Brooklynn:   15     mins  87066;    Therapeutic Activity:          mins  37440;     Gait Training:           mins  83836;     Ultrasound:          mins  38726;    Ionto                                   mins   49199  Self Care                            mins   34940  Aquatic                               mins 71557    Un-Timed:  Electrical Stimulation:         mins  40598 ( );  Traction          mins 85200      Timed Treatment:   45   mins   Total Treatment:     45   mins    July Hunter PTA  Physical Therapist Assistant   [place] license:  #[id number]

## 2022-05-02 ENCOUNTER — TELEPHONE (OUTPATIENT)
Dept: PHYSICAL THERAPY | Facility: CLINIC | Age: 72
End: 2022-05-02

## 2022-05-05 ENCOUNTER — TREATMENT (OUTPATIENT)
Dept: PHYSICAL THERAPY | Facility: CLINIC | Age: 72
End: 2022-05-05

## 2022-05-05 DIAGNOSIS — Z96.651 S/P TOTAL KNEE ARTHROPLASTY, RIGHT: Primary | ICD-10-CM

## 2022-05-05 PROCEDURE — 97530 THERAPEUTIC ACTIVITIES: CPT | Performed by: PHYSICAL THERAPIST

## 2022-05-05 PROCEDURE — 97110 THERAPEUTIC EXERCISES: CPT | Performed by: PHYSICAL THERAPIST

## 2022-05-05 NOTE — PROGRESS NOTES
Physical Therapy Daily Progress Note    Patient: Connie A Schoen  : 1950  Referring practitioner: Zaid Walsh MD  Today's Date: 2022    VISIT#: 12    Subjective   Connie Schoen reports: Still having increased bilateral buttock and posterior thigh pain, has been there for about 2 weeks and really limiting her walking. Also strained her right knee getting out of the car the other day.      Objective     See Exercise, Manual, and Modality Logs for complete treatment.     Patient Education: encouraged patient to call her doctor about relatively new onset bilateral leg/back pain. Lumbar decompression 15-20 minutes at home x 2/day.     Assessment/Plan  Began session with lumbar decompression - completely eliminated pain, but almost immediately upon standing the posterior leg/glut pain returned and again had trouble walking. Decreased tolerance to exercise today due to increased glut/post thigh pain.    Progress per Plan of Care          Timed:         Manual Therapy:         mins  39432;     Therapeutic Exercise:    25     mins  38182;     Neuromuscular Brooklynn:        mins  80852;    Therapeutic Activity:     15     mins  43190;     Gait Training:           mins  56178;     Ultrasound:          mins  07760;    Ionto:                                   mins   67177  Self Care:                            mins   20551    Un-Timed:  Electrical Stimulation:         mins  45402 ( );  Dry Needling          mins self-pay  Traction          mins 17018  Re-Eval                               mins  75002    Timed Treatment:   40   mins   Total Treatment:     40   mins    Mila Flaherty PT  Physical Therapist  [place] PT license #: [id number]

## 2022-05-11 ENCOUNTER — TREATMENT (OUTPATIENT)
Dept: PHYSICAL THERAPY | Facility: CLINIC | Age: 72
End: 2022-05-11

## 2022-05-11 DIAGNOSIS — Z96.651 S/P TOTAL KNEE ARTHROPLASTY, RIGHT: Primary | ICD-10-CM

## 2022-05-11 PROCEDURE — 97110 THERAPEUTIC EXERCISES: CPT | Performed by: PHYSICAL THERAPIST

## 2022-05-11 PROCEDURE — 97530 THERAPEUTIC ACTIVITIES: CPT | Performed by: PHYSICAL THERAPIST

## 2022-05-11 NOTE — PROGRESS NOTES
Physical Therapy Progress Note     Patient: Connie A Schoen  : 1950  Referring practitioner: Zaid Walsh MD  Today's Date: 2022    VISIT#: 13   visits Exp: 22    Subjective Evaluation    Pain  Current pain ratin  At best pain ratin  At worst pain ratin         Pt reports: still having pain B buttocks/ post thighs. Has a hard time walking and has inc pain at night. Slept in the recliner last night which was more comfortable.  The decompression position with legs elevated helps and reduce her pain at least for a while. She called [facility] to make an appt but she hasn't heard from them yet. Her knee is doing fine but still has mild pain.       Objective          Active Range of Motion     Right Knee   Flexion: 115 degrees   Extension: 0 degrees     Additional Active Range of Motion Details  Measured in sup    Passive Range of Motion     Right Knee   Flexion: 118 degrees   Extension: 0 degrees         See Exercise, Manual, and Modality Logs for complete treatment.     Patient Education:    Assessment & Plan     Assessment    Assessment details: Pt has been seen for 13 visits .  Her progress has been slow due to co pain B buttocks and post thighs in the past 2-3 weeks . She has hx of previous back surgery. Her knee ROM is improving. She ambulates w/o an a.d. with a noticeable valgus deformity.  She is able to drive short distance. Her ROM and general mobility is slowly progress. She will benefit from continued skilled physical therapy to continue to address impairments and max function.   3/5 STGs and 2/12 LTGs met so far.   LEFS score: 19/80 ( 76%)     Goals  Plan Goals: STGs to be met in 4 weeks:  --  Obtain objective measures that were unable to be measured at initial evaluation to include:  MMT and ROM. ( MET)  --  Safely d/c use of rollator and resume ambulation with cane or no AD in clinic at 4-weeks post-op per MD requirements. ( MET)  --  Require </= 3  cues with HEP performance. ( MET)  --  Pain levels at worst </= 4/10.  --  LEFS score >/= 35/80 function.    LTGs to be met by end of POC:  --  Require no cues with HEP performance for d/c planning.  --  Pain levels at worst </= 2/10.  --  Resume baseline PLOF with no AD used for community ambulation with min increase in pain.  --  R knee ext AROM in supine to >/= 0 degrees without pain. ( MET)  --  R knee flex AROM in supine to >/= 115 degrees without pain. ( MET)  --  Global BLE MMT to >/= 4+/5 without pain.  --  LEFS score >/= 60/80 function.  --  Report >/= 75% reduction in pain with getting off of a low toilet without use of UE support.  --  Safely d/c use of shower chair to return to PLOF.  --  Resume independent driving without pain.  --  SLS on each LE >/= 7 sec with supervision on level terrain with no UE support to reduce risk of falls.  --  30 sec sit<>stand test:  6x, no UE support, with supervision, no pain, and equal WB'ing LEs.       Plan  Therapy options: will be seen for skilled therapy services  Planned therapy interventions: abdominal trunk stabilization, body mechanics training, functional ROM exercises, gait training, home exercise program, manual therapy, neuromuscular re-education, postural training, strengthening, stretching and therapeutic activities  Frequency: 2x week  Duration in weeks: 12  Treatment plan discussed with: patient  Plan details: Additional visits requested at this visit                      Timed:         Manual Therapy:         mins  39888;     Therapeutic Exercise:    25     mins  47864;     Neuromuscular Bor[place]nn:        mins  41716;    Therapeutic Activity:    20      mins  62801;     Gait Training:           mins  65251;     Ultrasound:          mins  67105;    Ionto                                   mins   82072  Self Care                            mins   03968  Canal repositioning           mins    95178        Timed Treatment:  45    mins   Total Treatment:     45    francisco javier Gallardo, PT, CLT  Physical Therapist  [place] License:  # [id number]

## 2022-05-19 NOTE — PROGRESS NOTES
Re-Assessment / Re-Certification        Patient: Connie A Schoen   : 1950  Diagnosis/ICD-10 Code:  S/P total knee arthroplasty, right [Z96.651]  Referring practitioner: Zaid Walsh MD  Date of Initial Visit: Type: THERAPY  Noted: 2022  Today's Date: 2022  Patient seen for 13 sessions      Subjective:   Connie Schoen reports: see below  Subjective Questionnaire: LEFS:  (76% disability)  Clinical Progress: improved  Home Program Compliance: Yes  Treatment has included: therapeutic exercise, neuromuscular re-education, manual therapy, therapeutic activity, gait training and electrical stimulation    Subjective Evaluation    History of Present Illness  Mechanism of injury: Pt reports she is still having pain B buttocks/ post thighs. Has a hard time walking and has inc pain at night. Slept in the recliner last night which was more comfortable.  The decompression position with legs elevated helps and reduce her pain at least for a while. She called [facility] to make an appt but she hasn't heard from them yet. Her knee is doing fine but still has mild pain.            Objective          Active Range of Motion     Right Knee   Flexion: 115 degrees with pain  Extension: 0 degrees     Additional Active Range of Motion Details  In supine    Passive Range of Motion     Right Knee   Flexion: 118 degrees with pain  Extension: 0 degrees       Assessment & Plan     Assessment  Impairments: abnormal gait, abnormal muscle firing, abnormal or restricted ROM, activity intolerance, impaired balance, impaired physical strength, pain with function and weight-bearing intolerance  Functional Limitations: lifting, walking, standing and stooping  Assessment details: Pt has been seen for 13 visits .  Her progress has been slow due to co pain B buttocks and post thighs in the past 2-3 weeks . She has hx of previous back surgery. Her knee ROM is improving. She ambulates w/o an a.d. with a noticeable  valgus deformity.  She is able to drive short distance. Her ROM and general mobility is slowly progress. She will benefit from continued skilled physical therapy to continue to address impairments and max function.   3/5 STGs and 2/12 LTGs met so far.   LEFS score: 19/80 ( 76%)        Goals  Plan Goals: STGs to be met in 4 weeks:  --  Obtain objective measures that were unable to be measured at initial evaluation to include:  MMT and ROM. ( MET)  --  Safely d/c use of rollator and resume ambulation with cane or no AD in clinic at 4-weeks post-op per MD requirements. ( MET)  --  Require </= 3 cues with HEP performance. ( MET)  --  Pain levels at worst </= 4/10.  --  LEFS score >/= 35/80 function.    LTGs to be met by end of POC:  --  Require no cues with HEP performance for d/c planning.  --  Pain levels at worst </= 2/10.  --  Resume baseline PLOF with no AD used for community ambulation with min increase in pain.  --  R knee ext AROM in supine to >/= 0 degrees without pain. ( MET)  --  R knee flex AROM in supine to >/= 115 degrees without pain. ( MET)  --  Global BLE MMT to >/= 4+/5 without pain.  --  LEFS score >/= 60/80 function.  --  Report >/= 75% reduction in pain with getting off of a low toilet without use of UE support.  --  Safely d/c use of shower chair to return to PLOF.  --  Resume independent driving without pain.  --  SLS on each LE >/= 7 sec with supervision on level terrain with no UE support to reduce risk of falls.  --  30 sec sit<>stand test:  6x, no UE support, with supervision, no pain, and equal WB'ing LEs.      Plan  Therapy options: will be seen for skilled therapy services  Planned modality interventions: high voltage pulsed current (pain management)  Planned therapy interventions: abdominal trunk stabilization, balance/weight-bearing training, functional ROM exercises, gait training, home exercise program, manual therapy, neuromuscular re-education, postural training, strengthening,  stretching and therapeutic activities  Frequency: 2x week  Duration in weeks: 12  Treatment plan discussed with: patient      Progress toward previous goals: Partially Met        Recommendations: Continue as planned  Certification Period: 8/16/2022  Prognosis to achieve goals: good    PT Signature: Mayank Gallardo PT, CLT  [place] License number:  [id number]    Based upon review of the patient's progress and continued therapy plan, it is my medical opinion that Connie Schoen should continue physical therapy treatment at [facility] [address] [place] [address] PHYSICAL THERAPY  [address] [place] [address] [place] [address] [phone].    Signature: __________________________________  Zaid Walsh MD  Please sign and return via fax to [phone].. Thank you, [facility].

## 2022-05-20 ENCOUNTER — TELEPHONE (OUTPATIENT)
Dept: PHYSICAL THERAPY | Facility: OTHER | Age: 72
End: 2022-05-20

## 2022-05-23 ENCOUNTER — TREATMENT (OUTPATIENT)
Dept: PHYSICAL THERAPY | Facility: CLINIC | Age: 72
End: 2022-05-23

## 2022-05-23 DIAGNOSIS — R26.89 BALANCE PROBLEMS: ICD-10-CM

## 2022-05-23 DIAGNOSIS — Z98.1 ARTHRODESIS STATUS: ICD-10-CM

## 2022-05-23 DIAGNOSIS — Z96.651 S/P TOTAL KNEE ARTHROPLASTY, RIGHT: Primary | ICD-10-CM

## 2022-05-23 PROCEDURE — 97530 THERAPEUTIC ACTIVITIES: CPT | Performed by: PHYSICAL THERAPIST

## 2022-05-23 PROCEDURE — 97112 NEUROMUSCULAR REEDUCATION: CPT | Performed by: PHYSICAL THERAPIST

## 2022-05-23 PROCEDURE — 97110 THERAPEUTIC EXERCISES: CPT | Performed by: PHYSICAL THERAPIST

## 2022-05-23 NOTE — PROGRESS NOTES
Physical Therapy Daily Progress Note    Patient:  Connie A Schoen  :  1950  Referring practitioner:  Zaid Walsh MD  Date of Initial Visit:  Type: THERAPY  Noted: 2022  Today's Date:  2022      Visit Diagnoses:    ICD-10-CM ICD-9-CM   1. S/P total knee arthroplasty, right  Z96.651 V43.65   2. Balance problems  R26.89 781.99   3. Arthrodesis status  Z98.1 V45.4       VISIT#:  14 in POC.    Subjective   Connie Schoen reports the back of her legs have been bothering her lately; went to spine specialist; radiographs taken to reveal screws have come loose and she may need to have them surgically removed in the future.  Did 1-week of steriod pack.  Will follow up PRN.  She also complains of balance problems lately; the spine specialist doesn't think balance probs are coming from her back issues.      Objective   See exercise, manual, and modality logs for complete treatment.   Posterior buttock pain with bed mobility; education for proper rolling techniques and bed mobility with supine>sitting.      ASSESSMENT  Limited iva to many exercises today per issues with LBP and pain in posterior thighs and held many standing exercises today as well due to this pain.  Cues as noted.  No complications today.      PLAN  Continue current POC and progress as tolerated per PT evaluation.      Timed:  Therapeutic Exercise:    19     mins  15839;     Neuromuscular Brooklynn:    8    mins  63227;    Therapeutic Activity:     11     mins  14026;       Timed Treatment:   38   mins   Total Treatment:     42   mins      Lyndon Duron, PT  [place] License # [id number]  Physical Therapist

## 2022-05-26 ENCOUNTER — TREATMENT (OUTPATIENT)
Dept: PHYSICAL THERAPY | Facility: CLINIC | Age: 72
End: 2022-05-26

## 2022-05-26 DIAGNOSIS — Z96.651 S/P TOTAL KNEE ARTHROPLASTY, RIGHT: Primary | ICD-10-CM

## 2022-05-26 DIAGNOSIS — R26.89 BALANCE PROBLEMS: ICD-10-CM

## 2022-05-26 PROCEDURE — 97530 THERAPEUTIC ACTIVITIES: CPT | Performed by: PHYSICAL THERAPIST

## 2022-05-26 PROCEDURE — 97110 THERAPEUTIC EXERCISES: CPT | Performed by: PHYSICAL THERAPIST

## 2022-05-26 NOTE — PROGRESS NOTES
Physical Therapy Daily Progress Note    Patient: Connie A Schoen  : 1950  Referring practitioner: Zaid Walsh MD  Today's Date: 2022    VISIT#: 15    Subjective   Connie Schoen reports: Says her back is still really bad, hurting down the back of her legs. Wants to continue therapy though and just do what she can.      Objective     See Exercise, Manual, and Modality Logs for complete treatment.     Patient Education: continue HEP to tolerance    Assessment/Plan  Good tolerance to supine exercises, standing activities significantly limited due to high level of back pain. Encouraged pt to follow back up with spine doctor due to steroid pack not helping at all. Better tolerance to Nustep today due to scooting seat back.     Progress per Plan of Care            Timed:         Manual Therapy:         mins  68077;     Therapeutic Exercise:    25     mins  17370;     Neuromuscular Brooklynn:        mins  49202;    Therapeutic Activity:     15     mins  04683;     Gait Training:           mins  00542;     Ultrasound:          mins  07645;    Ionto:                                   mins   44766  Self Care:                            mins   71839    Un-Timed:  Electrical Stimulation:         mins  47250 ( );  Dry Needling          mins self-pay  Traction          mins 30825  Re-Eval                               mins  76142    Timed Treatment:   40   mins   Total Treatment:     40   mins    Mila Flaherty PT  Physical Therapist  [place] PT license #: [id number]

## 2022-05-27 ENCOUNTER — TELEPHONE (OUTPATIENT)
Dept: CARDIAC SURGERY | Facility: CLINIC | Age: 72
End: 2022-05-27

## 2022-05-27 NOTE — TELEPHONE ENCOUNTER
Called pt to check to see how she has been feeling since her appt in November of 2021. Pt states she has been feeling much better. Pt stated she is not having pain, but does notice difficulty swallowing at times, Encouraged pt to reach out to PCP for swallowing difficulties. Pt verbalized understanding. Pt will call our office with any concern that may arise in the future.

## 2022-06-03 ENCOUNTER — TELEPHONE (OUTPATIENT)
Dept: FAMILY MEDICINE CLINIC | Facility: CLINIC | Age: 72
End: 2022-06-03

## 2022-06-03 DIAGNOSIS — M51.36 DEGENERATION OF INTERVERTEBRAL DISC OF LUMBAR REGION: Primary | ICD-10-CM

## 2022-06-03 NOTE — TELEPHONE ENCOUNTER
Incoming Refill Request      Medication requested (name and dose):    gabapentin (NEURONTIN) 600 MG tablet   3 ordered         Pharmacy where request should be sent: [facility] #[id number] - [place], [place] - [address] [place] [address] [address] AT [facility] [address]  - [phone] PH - [phone] FX  [phone]    Additional details provided by patient: PATIENT WENT TO [place] AND LEFT HER GABAPENTIN MEDICATION AT HOME    THEY ARE WANTING TO SEE IF A REFILL CAN BE SENT TO THE [facility] IN [place]     Best call back number: [phone]    Does the patient have less than a 3 day supply:  [x] Yes  [] No    Chris Fisher Rep  06/03/22, 11:59 EDT

## 2022-06-06 RX ORDER — GABAPENTIN 600 MG/1
600 TABLET ORAL 3 TIMES DAILY
Qty: 90 TABLET | Refills: 0 | Status: SHIPPED | OUTPATIENT
Start: 2022-06-06 | End: 2022-08-29

## 2022-08-04 RX ORDER — LEVOTHYROXINE SODIUM 0.12 MG/1
125 TABLET ORAL DAILY
Qty: 30 TABLET | Refills: 9 | Status: SHIPPED | OUTPATIENT
Start: 2022-08-04

## 2022-08-10 ENCOUNTER — E-VISIT (OUTPATIENT)
Dept: FAMILY MEDICINE CLINIC | Facility: TELEHEALTH | Age: 72
End: 2022-08-10

## 2022-08-10 ENCOUNTER — TELEMEDICINE (OUTPATIENT)
Dept: FAMILY MEDICINE CLINIC | Facility: TELEHEALTH | Age: 72
End: 2022-08-10

## 2022-08-10 ENCOUNTER — TELEPHONE (OUTPATIENT)
Dept: FAMILY MEDICINE CLINIC | Facility: CLINIC | Age: 72
End: 2022-08-10

## 2022-08-10 VITALS — BODY MASS INDEX: 30.32 KG/M2 | HEIGHT: 65 IN | WEIGHT: 182 LBS

## 2022-08-10 DIAGNOSIS — U07.1 COVID: Primary | ICD-10-CM

## 2022-08-10 PROCEDURE — 99213 OFFICE O/P EST LOW 20 MIN: CPT | Performed by: NURSE PRACTITIONER

## 2022-08-10 RX ORDER — FUROSEMIDE 40 MG/1
TABLET ORAL
COMMUNITY
Start: 2022-05-18 | End: 2022-09-12

## 2022-08-10 NOTE — E-VISIT ESCALATED
Patient escalated   Provider Betsy Olivera chose to escalate patient to another level of care because: Patient's free text comments   Patient was sent the following message:   Please create a video visit so that we can discuss treatment options.   What to do now:   Setup a video visit   Please, schedule your video visit   Video visit     You won't be charged for your eVisit. If you paid with a credit card, the charge will be reversed.   Chief Complaint: Coronavirus (COVID-19), cold, sinus pain, allergy, or flu   Patient introduction   Patient is 72-year-old female with cough, nasal discharge, sore throat, voice hoarseness, headache, myalgia, and fatigue that started less than 48 hours ago. Regarding date of symptom onset, patient writes: 08/09/2022.   When asked why they are seeking care online today, patient responds that they just want to feel better.   Patient has not requested COVID testing.   COVID-19 exposure, testing history, and vaccination status:    Patient had a self-test within the last week. Patient specifies date of test as 08/10/2022. Test result was positive.    Received 3 doses of the COVID-19 vaccine (Moderna, Moderna, Moderna).   Received their most recent dose of the vaccine more than 14 days ago.   Risk factors for severe disease from COVID-19 infection:    Age 65 or older.    BMI >= 25.    Smokes tobacco daily.    Rheumatoid arthritis.   Warning. The following may warrant further investigation:    Hypertension.   Patient did not request an excuse note.   General presentation   Symptoms came on suddenly.   Fever:    No fever.   Sinus and nasal symptoms:    Nasal discharge.    White nasal drainage.    Nasal drainage is thin.    Postnasal drip.    No nasal or sinus congestion.    No itchy nose or sneezing.   Throat symptoms:    Sore throat.    Patient can swallow liquids and solid foods with ease.    Voice is mildly hoarse. Patient does not believe hoarseness is due to voice strain.   Head and  body aches:    Headache, described as moderate (4 to 6 on a scale of 1 to 10).    Myalgia.    Fatigue.    No sweats.    No chills.   Cough:    Cough is worse at night/while sleeping.    Cough is mildly productive of sputum.    Describes color of sputum as white/frothy.   Wheezing and shortness of breath:    Has previously used a steroid inhaler for URI, bronchitis, or pneumonia.    No COPD diagnosis.    No asthma diagnosis.    No shortness of breath.    No previous albuterol inhaler use for URIs, bronchitis, or pneumonia.   Chest pain:    No chest pain.   Ear symptoms:    Current symptoms include fullness and crackling or popping in the ear(s).   Dizziness:    No dizziness.   Flu exposure:    Has not had a flu vaccine this season.   Review of red flags/alarm symptoms:    No changes in alertness or awareness.    No symptoms suggesting airway obstruction.    No symptoms suggesting intracranial hemorrhage.    No decreased urination.   Risk factors for antibiotic resistance:    Smokes tobacco daily.   Pregnancy/menstrual status/breastfeeding:    Patient is postmenopausal.   Self-exam:    No difficulty moving their chin toward their chest.    Tonsillar edema.    No palatal petechiae.    Neck lymph nodes feel normal.   Current medications   Not taking any over-the-counter medications for current symptoms.   Taking Ibuprofen, Losartan, Sertraline, Vitamin A / Vitamin D, Complex B, gabapentin, and Levothyroxine Sodium.   Medication allergies   None.   Medication contraindication review   Patient may be eligible for treatment with Paxlovid. Use of Paxlovid with drugs that either induce CYP3 or are highly dependent on CYP3 for clearance may lead to significant drug interactions. Patient is willing to take Paxlovid.   Patient has history of hypertension. Therefore, the following medication(s) will not be prescribed:    Metoclopramide.    Acetaminophen-diphenhydramine-phenylephrine.    Aspirin-chlorpheniramine-phenylephrine.    No history of metoclopramide-associated dystonic reaction and tardive dyskinesia.   No known history of amoxicillin-clavulanate-associated cholestatic jaundice or hepatic impairment.   No known history of azithromycin-associated cholestatic jaundice or hepatic impairment.   Past medical history   Immune conditions: rheumatoid arthritis. Patient is not currently taking medications for their condition(s). No history of cancer.   Social history   High-risk household contacts: Patient's household includes one or more members of a group with risk factors for influenza complications, including a person 65 years or older.   Smokes tobacco daily.   Assessment:   Patient determined to need a level of care not appropriate to be delivered through eVisit.   Plan:   Patient informed of need to seek in-person care   ----------   Electronically signed by MISAEL Hardwick on 2022-08-10 at 15:25PM   ----------   Patient Interview Transcript:   Why are you getting care through eVisit today? We can't guarantee a specific treatment or test. Your provider will decide what's best for you. Select all that apply.    I just want to feel better!   Not selected:    I want to know if I have a cold or something more serious    I want to know if I need to be seen by a provider    I need a doctor's note    I want to be tested for COVID-19    I want to get the COVID-19 vaccine    I think I'm having side effects from the COVID-19 vaccine    None of the above   COVID-19 symptoms are similar to symptoms of other illnesses. This makes it difficult to diagnose. Please carefully consider each question and answer as best you can. This will help your provider make the right diagnosis. Which of these symptoms are bothering you? Select all that apply.    Cough    Runny nose    Sore throat    Hoarse voice or loss of voice    Headache    Muscle or body aches    Fatigue or tiredness   Not selected:    Shortness of breath    Fever    Stuffed-up nose or  sinuses    Itchy or watery eyes    Itchy nose or sneezing    Loss of smell or taste    Sweats    Chills    Nausea or vomiting    Diarrhea    I don't have any of these symptoms   Do you have any of these conditions? These conditions can put you at increased risk for severe disease if you're infected with COVID-19. Select all that apply.    None of the above   Not selected:    Chronic lung disease, such as cystic fibrosis or interstitial fibrosis    Heart disease, such as congenital heart disease, congestive heart failure, or coronary artery disease    Disorder of the brain, spinal cord, or nerves and muscles, such as dementia, cerebral palsy, epilepsy, muscular dystrophy, or developmental delay    Metabolic disorder or mitochondrial disease    Cerebrovascular disease, such as stroke or another condition affecting the blood vessels or blood supply to the brain    Down syndrome    Mood disorder, including depression or schizophrenia spectrum disorders    Substance use disorder, such as alcohol, opioid, or cocaine use disorder    Tuberculosis   Do you live in a group care setting? Examples include: - Nursing home - Residential care - Psychiatric treatment facility - [facility] - [facility] - Board and care home - Homeless shelter - Foster care setting Select one.    No   Not selected:    Yes   Have you ever been tested for COVID-19? Select one.    Yes   Not selected:    No   When was your most recent COVID-19 test? Select one.    Within the last week (specify date as MM/DD/YY): 08/10/2022   Not selected:    7 to 14 days ago    15 to 30 days ago    1 to 3 months ago    More than 3 months ago   What type of COVID-19 test did you most recently have? There are two types of COVID-19 tests: - Viral tests check if you're currently infected with COVID-19. For these tests, a nose swab or saliva sample is taken. Viral tests include self-tests and tests done at a doctor's office, lab, or testing site. - Antibody tests check if  you've been infected in the past. For these tests, your blood is drawn. Antibody tests can only be done at a doctor's office, lab, or testing site. Select one.    Viral self-test   Not selected:    Viral test at a doctor's office, lab, or testing site    Antibody test   What was the result of your most recent COVID-19 test? Select one.    Positive   Not selected:    Negative    I'm not sure   Have you gotten the COVID-19 vaccine? Select one.    Yes   Not selected:    No   How many doses of the COVID-19 vaccine have you gotten? This includes boosters as well as third or fourth doses for those who are immunocompromised. Select one.    3 doses   Not selected:    1 dose    2 doses    4 doses   1st dose    Moderna   Not selected:    J&J/Phil    Novavax    Pfizer   2nd dose    Moderna   Not selected:    J&J/Phil    Novavax    Pfizer   3rd dose    Moderna   Not selected:    J&J/Phil    Novavax    Pfizer   When did you get your most recent dose of the COVID-19 vaccine?    More than 14 days ago   Not selected:    Less than 48 hours (2 days) ago    48 to 72 hours (3 days) ago    3 to 5 days ago    5 to 7 days ago    7 to 14 days ago   When did your current symptoms start? Select one.    Less than 48 hours ago   Not selected:    3 to 5 days ago    6 to 9 days ago    10 to 14 days ago    2 to 4 weeks ago    More than a month ago   Do you know the exact date your symptoms started? If so, enter the date as MM/DD/YY. Select one.    Yes (specify): 08/09/2022   Not selected:    No   Did your symptoms come on suddenly or gradually? Select one.    Suddenly   Not selected:    Gradually    I'm not sure   You mentioned having a headache. On a scale of 1 to 10, how severe is your headache pain? Select one.    Moderate (4 to 6)   Not selected:    Mild (1 to 3)    Severe (7 to 9)    Unbearable (10)    The worst headache of my life (10+)   Do you cough so hard that it's made you gag or vomit? By gag, we mean has your coughing made  "you choke or dry heave? Select all that apply.    No   Not selected:    Yes, my coughing has made me gag    Yes, my coughing has made me vomit   When is your cough the worst? Select all that apply.    At nighttime, or while I'm sleeping   Not selected:    In the morning, or when I wake up    During the day    I haven't noticed a difference depending on time of day   Are you coughing up mucus or phlegm? Select one.    Yes, a little   Not selected:    No, my cough is dry    Yes, a lot   What color is most of the mucus or phlegm that you're coughing up? Select one.    White/frothy   Not selected:    Clear    Yellow    Green    Red or pink    I'm not sure   What color is your nasal drainage? Select one.    White   Not selected:    Clear    Yellow    Green    My nose is stuffed but not draining or running    I'm not sure   Is your nasal drainage thick or thin? Select one.    Thin   Not selected:    Thick    I'm not sure   Is there any drainage (mucus) going down the back of your throat? This kind of drainage is also called \"postnasal drip.\" Select one.    Yes   Not selected:    No    I'm not sure   Can you swallow liquids and solid foods? A sore throat may be painful when swallowing, but it shouldn't prevent you from swallowing. Select one.    Yes, with ease   Not selected:    Yes, but it's uncomfortable    Yes, but it's painful    It's hard to swallow anything because it feels like liquids and food get stuck in my throat    No, I can't swallow anything, liquid or solid foods   Since your symptoms started, have you felt dizzy? Select one.    No   Not selected:    Yes, but I can continue with my regular daily activities    Yes, and it makes it hard to stand, walk, or do daily activities   Do you have chest pain? You might also feel it as discomfort, aching, tightness, or squeezing in the chest. Select one.    No   Not selected:    Yes   Have you urinated at least 3 times in the last 24 hours? Select one.    Yes   Not " selected:    No    I'm not sure   Changes in alertness or awareness may mean you need emergency care. Since your symptoms started, have you had any of these? Select all that apply.    None of the above   Not selected:    Confusion    Slurred speech    Not knowing where you are or what day it is    Difficulty staying conscious    Fainting or passing out   Do your symptoms include a whistling sound, or wheezing, when you breathe? Select one.    I'm not sure   Not selected:    Yes    No   Early in this interview, you told us you were hoarse or you'd lost your voice. How would you describe the changes to your voice? Select one.    It just sounds a little raspy   Not selected:    It's harder than usual to talk    I can barely talk at all   Is it possible that you strained your voice? Singing, yelling, or talking more or louder than usual can cause voice strain. Select one.    No   Not selected:    Yes    I'm not sure   Do you have any of these symptoms in your ear(s)? Select all that apply.    Fullness    Crackling or popping   Not selected:    Pain    Pressure    Plugged or blocked sensation    None of the above   Can you move your chin toward your chest?    Yes   Not selected:    No, my neck is too stiff   Are your tonsils larger than usual?    Yes   Not selected:    No    I've had my tonsils removed    I'm not sure   Is there any white or yellow pus on your tonsils?    I'm not sure   Not selected:    Yes    No   Are there red spots on the roof of your mouth or the back of your throat?    No   Not selected:    Yes    I'm not sure   Are your glands/lymph nodes swollen, or does it hurt when you touch them?    No   Not selected:    Yes    I'm not sure   People with a very high body mass index (BMI) are at higher risk for developing complications from the flu and severe illness from COVID-19. To determine your BMI, we need to know your weight and height. Please enter your weight (in pounds).    Weight   Please enter your  height.    Height   Have you ever been diagnosed with asthma? Select one.    No   Not selected:    Yes   Have you ever been prescribed albuterol to use for wheezing, cough, or shortness of breath caused by a cold, bronchitis, or pneumonia? Albuterol (ProAir, Proventil, Ventolin) is prescribed as an inhaler or a solution to be used with a nebulizer machine. Select one.    No   Not selected:    Yes    I'm not sure   Have you ever been prescribed a steroid inhaler to use for wheezing, cough, or shortness of breath caused by a cold, bronchitis, or pneumonia? Some examples of steroid inhalers include Pulmicort, Flovent, Qvar, and Alvesco. Select one.    Yes   Not selected:    No    I'm not sure   Have you ever been diagnosed with chronic obstructive pulmonary disease (COPD)? Select one.    No   Not selected:    Yes    I'm not sure   Have you had a flu shot this season? Select one.    No   Not selected:    Yes, less than 2 weeks ago    Yes, 2 to 4 weeks ago    Yes, 1 to 3 months ago    Yes, 3 to 6 months ago    Yes, more than 6 months ago    I'm not sure   The flu and COVID-19 can be more serious for people with certain conditions or characteristics. These questions help us figure out if you or anyone you live with is at higher risk for complications from these infections. Do either of these statements apply to you? Select all that apply.    None of the above   Not selected:    I'm  or Native [place]    I'm a healthcare worker   Do you smoke tobacco? Select one.    Yes, every day   Not selected:    Yes, some days    No, I quit    No   Do you have any of these conditions that can affect the immune system? Scroll to see all options. Select all that apply.    Rheumatoid arthritis   Not selected:    History of bone marrow transplant    Chronic kidney disease    Chronic liver disease (including cirrhosis)    HIV/AIDS    Inflammatory bowel disease (Crohn's disease or ulcerative colitis)    Lupus    Moderate to  severe plaque psoriasis    Multiple sclerosis    Sickle cell anemia    Alpha or beta thalassemia    History of solid organ transplant (kidney, liver, or heart)    History of spleen removal    An autoimmune disorder not listed here    A condition requiring treatment with long-term use of oral steroids (such as prednisone, prednisolone, or dexamethasone)    None of these   Do you take medications for your condition? This includes oral and injectable medications that are taken daily, weekly, or monthly. Select one.    No   Not selected:    Yes, regularly    Yes, for flare-ups only   Have you ever been diagnosed with cancer? Select one.    No   Not selected:    Yes, I have cancer now    Yes, but I'm in remission   Some conditions can put you at risk for more serious infections. Do any of these apply to you? Select all that apply.    None of the above   Not selected:    I've been hospitalized within the last 5 days    I have diabetes    I'm in close contact with a child in    Are you currently being treated for any of these conditions? Scroll to see all options. Select all that apply.    High blood pressure   Not selected:    Aspirin triad (also known as Samter's triad or ASA triad)    Asthma or hives from taking aspirin or other NSAIDs, such as ibuprofen or naproxen    Blockage or narrowing of the blood vessels of the heart    Blood dyscrasia, such anemia, leukemia, lymphoma, or myeloma    Bone marrow depression    Catecholamine-releasing paraganglioma    Blood clotting disorder    Congenital long QT syndrome    Depression    Difficulty urinating or completely emptying your bladder    Uncorrected electrolyte abnormalities    Fungal infection    Gastrointestinal (GI) bleeding    Gastrointestinal (GI) obstruction    G6PD deficiency    Recent heart attack    Irregular heartbeat or heart rhythm    Mononucleosis (mono)    Myasthenia gravis    Parkinson's disease    Pheochromocytoma    Reye syndrome    Seizure  disorder    Ulcerative colitis    None of the above   Have you ever had either of these conditions? Select all that apply.    No   Not selected:    Metoclopramide-associated dystonic reaction    Tardive dyskinesia   Have you gone through menopause? Select one.    Yes   Not selected:    No    I'm going through it now   Do any of these apply to the people who live with you? Select all that apply.    An adult 65 or older   Not selected:    A child under the age of 5    A person who is pregnant    A person who has given birth, had a miscarriage, had a pregnancy loss, or had an  in the last 2 weeks    An  or Native [place]    None of the above   Does any member of your household have any of these medical conditions? Select all that apply.    None of the above   Not selected:    Asthma    Disorders of the brain, spinal cord, or nerves and muscles, such as dementia, cerebral palsy, epilepsy, muscular dystrophy, or developmental delay    Chronic lung disease, such as COPD or cystic fibrosis    Heart disease, such as congenital heart disease, congestive heart failure, or coronary artery disease    Cerebrovascular disease, such as stroke or another condition affecting the blood vessels or blood supply to the brain    Blood disorders, such as sickle cell disease    Diabetes    Metabolic disorders such as inherited metabolic disorders or mitochondrial disease    Kidney disorders    Liver disorders    Weakened immune system due to illness or medications such as chemotherapy or steroids    Children under the age of 19 who are on long-term aspirin therapy    Extreme obesity (BMI > 40)   If your provider determines that you're eligible for treatment with Paxlovid, would you be willing to take the medication? This doesn't guarantee that you'll be prescribed Paxlovid. Your provider will make the final decision on the best treatment for you. Select one.    Yes   Not selected:    No    I'm not sure   Just a few  more questions about medications, and then you're finished. Have you used any non-prescription medications or nasal sprays for your current symptoms? Examples include saline sprays, decongestants, NyQuil, and Tylenol. Select one.    No   Not selected:    Yes   Have you taken any monoamine oxidase inhibitor (MAOI) medications in the last 14 days? Examples include rasagiline (Azilect), selegiline (Eldepryl, Zelapar), isocarboxazid (Marplan), phenelzine (Nardil), and tranylcypromine (Parnate). Select one.    No, not that I know of   Not selected:    Yes   Do you take Kynmobi or Apokyn (apomorphine)? Select one.    No   Not selected:    Yes    I'm not sure   Are you taking any other medications or supplements? On the next screen, you need to list all vitamins, supplements, non-prescription medications (such as aspirin or Aleve), and prescription medications that you're taking. Select one.    Yes   Not selected:    Yes, but I'm not sure what they are    No   Have you ever had an allergic or bad reaction to any medication? Select one.    No   Not selected:    Yes   Are you allergic to milk or to the proteins found in milk (for example, whey or casein)? A milk allergy is different from lactose intolerance. Select one.    No   Not selected:    Yes    I'm not sure   Have you ever had jaundice or liver problems as a result of taking amoxicillin-clavulanate (Augmentin)? Jaundice is a condition in which the skin and the whites of the eyes turn yellow. Select all that apply.    No, not that I know of   Not selected:    Yes, jaundice    Yes, liver problems   Have you ever had jaundice or liver problems as a result of taking azithromycin (Zithromax, Zmax)? Jaundice is a condition in which the skin and the whites of the eyes turn yellow. Select all that apply.    No, not that I know of   Not selected:    Yes, jaundice    Yes, liver problems   Do you need a doctor's note? A doctor's note confirms that you received care today and  states when you can return to school or work. It does not contain information about your diagnosis or treatment plan. Your provider will make the final decision on whether to give you a doctor's note and for how long. Doctor's notes CANNOT be backdated. We can't provide medical leave paperwork through this type of visit. If more paperwork is needed to request time off, contact your primary care provider. Select one.    No   Not selected:    Today only (1 day)    Today and tomorrow (2 days)    3 days    5 days    7 days    10 days    14 days   Is there anything else you'd like to tell us about your symptoms?   The patient did not enter any additional information.   ----------   Medical history   Medical history data does not currently exist for this patient.

## 2022-08-10 NOTE — TELEPHONE ENCOUNTER
Please make appt for pt to see me in person or she needs to go to [facility] [place]-    needs to be able to see her breathing on video or better if can listen to her- can not just send in medication for COVID for her   also has not seen me in a year so needs [facility] scheduled and labs drawn in near future

## 2022-08-10 NOTE — PROGRESS NOTES
You have chosen to receive care through a telehealth visit.  Do you consent to use a video/audio connection for your medical care today? Yes     This visit was performed via telephone encounter. This patient was escalated from an evisit and then had connection problems for video visit    CHIEF COMPLAINT  Cc: COVID    HPI  Connie A Schoen is a 72 y.o. female  presents with complaint of COVID,. She tested positive for COVID today. Her symptoms started 08/09/2022. She had no known exposure to COVID. She is vaccinated for COVID via two doses and a booster of the Moderna vaccine. Her symptoms are noted in the ROS portion of this visit. She is taking ibuprofen, Mucinex, and vitamins C, D and zinc for her symptoms.     Review of Systems   Constitutional: Positive for fatigue. Negative for fever.   HENT: Positive for ear pain (ears feel full), postnasal drip, sinus pressure, sinus pain and sore throat. Negative for congestion, rhinorrhea (occassional) and sneezing. Tinnitus: on occasion.         Bad taste in mouth and baseline   Respiratory: Positive for cough and shortness of breath. Negative for chest tightness and wheezing.    Cardiovascular: Negative for chest pain.   Gastrointestinal: Negative for diarrhea, nausea and vomiting.   Musculoskeletal: Positive for myalgias.   Neurological: Positive for headaches.       Past Medical History:   Diagnosis Date   • Anxiety    • Arthritis    • Depression    • Disease of thyroid gland    • HTN (hypertension)    • Hypertension    • Injury of back     back surgery   • RA (rheumatoid arthritis) (HCC)    • Shortness of breath    • Thyroid disease        Family History   Problem Relation Age of Onset   • Cancer Father    • Cancer Brother    • Diabetes Maternal Grandmother    • Heart disease Paternal Grandfather        Social History     Socioeconomic History   • Marital status:    Tobacco Use   • Smoking status: Current Every Day Smoker     Packs/day: 1.00     Types: Cigarettes  "  • Smokeless tobacco: Never Used   Vaping Use   • Vaping Use: Never used   Substance and Sexual Activity   • Alcohol use: Not Currently     Alcohol/week: 10.0 standard drinks     Types: 10 Cans of beer per week   • Drug use: Not Currently   • Sexual activity: Shayla       Connie A Schoen  reports that she has been smoking cigarettes. She has been smoking about 1.00 pack per day. She has never used smokeless tobacco..       Ht 165.1 cm (65\")   Wt 82.6 kg (182 lb)   BMI 30.29 kg/m²     PHYSICAL EXAM  Physical Exam   Constitutional: She is oriented to person, place, and time.   Neurological: She is alert and oriented to person, place, and time.   Psychiatric: Her speech is normal and behavior is normal. Judgment and thought content normal.       Results for orders placed or performed during the hospital encounter of 02/23/22   Duplex venous lower extremity right CAR   Result Value Ref Range    Target HR (85%) 128 bpm    Max. Pred. HR (100%) 151 bpm    Right Common Femoral Spont Y     Right Common Femoral Phasic Y     Right Common Femoral Augment Y     Right Common Femoral Competent Y     Right Common Femoral Compress C     Right Saphenofemoral Junction Compress C     Right Proximal Femoral Compress C     Right Mid Femoral Spont Y     Right Mid Femoral Phasic Y     Right Mid Femoral Augment Y     Right Mid Femoral Competent Y     Right Mid Femoral Compress C     Right Distal Femoral Compress C     Right Popliteal Spont Y     Right Popliteal Phasic Y     Right Popliteal Augment Y     Right Popliteal Competent Y     Right Popliteal Compress C     Right Posterior Tibial Compress C     Right Peroneal Compress C     Right Gastronemius Compress C     Right Greater Saph AK Compress C     Right Greater Saph BK Compress C     Right Lesser Saph Compress C     Left Common Femoral Spont Y     Left Common Femoral Phasic Y     Left Common Femoral Augment Y     Left Common Femoral Competent Y     Left Common Femoral Compress C  "       Diagnoses and all orders for this visit:    1. COVID (Primary)    Other orders  -     Molnupiravir (LAGEVRIO) 200 MG capsule; Take 4 capsules by mouth Every 12 (Twelve) Hours for 5 days.  Dispense: 40 capsule; Refill: 0    Patient has read the Molnupiravir patient fact sheet. She is aware that this is an Emergency Use Authorization DRug. Her  and son are also present for this visit.  Continue Mucinex with plenty of fluids especially water to thin secretions and help with congestion.May alternate tylenol and ibuprofen  Hydrate well  May take vitamins C, D and Zinc  Alternate rest and mild exercise as tolerated  If you test positive for COVID-19 (isolate)  Regardless of vaccine status                 * Stay home x 5 days              * If you have no symptoms or your symptoms are resolving after 5 days, you can leave your house.              * Continue to wear a mask around other for 5 additional days                             If you have a fever, continue to stay home until your fever resolves.                 If you were exposed to someone with COVID-19 (quarantine)      IF you:                 Have had your booster vaccination                             OR                            Completed the primary series of Pfizer or Moderna vaccination within the last 6 months                 OR                             Completed the primary series of J & J vaccination within the last 2 months                                           * Wear a mask around others for 10 days              * Test on Day 5, if possible.               * If you develop symptoms, get a test and stay home      IF you:                  Completed the primary series of Pfizer or Moderna vaccination over 6 months ago and have NOT had a booster                 OR                             Completed the Primary series of J & J over 2 months ago and have not received a booster                  OR                  You are unvaccinated                   * Stay home for 5 days. After that continue to wear a mask around others for 5 additional days               * If you can't quarantine, you must wear a mask for 10 days.               * Test on day 5 if possible              * IF you develop symptoms, get a test and stay home.      FOLLOW-UP  If symptoms worsen or persist follow up with PCP.Virtual Care or Urgent Care    Patient verbalizes understanding of medication dosage, comfort measures, instructions for treatment and follow-up.    Betsy Olivera, MISAEL  08/10/2022  16:56 EDT    The use of a video visit has been reviewed with the patient and verbal informed consent has been obtained. Myself and Connie A Schoen participated in this visit. The patient is located in [address] [facility] [address] [facility] [place] [facility].    I am located in [place], [place]. Mychart and Zoom were utilized. I spent 30 minutes in the patient's chart for this visit.

## 2022-08-10 NOTE — TELEPHONE ENCOUNTER
Caller: JOANN     Relationship: Self    Best call back number: [phone]    What medication are you requesting: DR LEYDI HANSON    What are your current symptoms: HEADACHE, BODY ACHE, TEETH, FACIAL PAIN    How long have you been experiencing symptoms: 2 DAYS   Have you had these symptoms before:    [] Yes  [] No    Have you been treated for these symptoms before:   [] Yes  [] No    If a prescription is needed, what is your preferred pharmacy and phone number:      [facility] - [place], [place] - [address] - [phone]  - [phone] FX  [phone]      Additional notes:          JOANN SAYS HER MOTHER US NOT ABLE TO DO A VIDEO VISIT SHE WOULD LIKE MEDICATION SENT TO PHARMACY

## 2022-08-11 NOTE — TELEPHONE ENCOUNTER
HUB TO SHARE: SENT [employer] MESSAGE SAYING It looks like you were able to do an e visit yesterday and got medication called in. You are also due for a medicare wellness exam. Please call the office at [phone] to schedule an exam with Dr Cleveland.

## 2022-08-17 RX ORDER — AMOXICILLIN 500 MG/1
1000 CAPSULE ORAL 2 TIMES DAILY
Qty: 40 CAPSULE | Refills: 1 | Status: SHIPPED | OUTPATIENT
Start: 2022-08-17 | End: 2022-08-27

## 2022-08-19 ENCOUNTER — OFFICE VISIT (OUTPATIENT)
Dept: FAMILY MEDICINE CLINIC | Facility: CLINIC | Age: 72
End: 2022-08-19

## 2022-08-19 VITALS
HEART RATE: 79 BPM | SYSTOLIC BLOOD PRESSURE: 124 MMHG | OXYGEN SATURATION: 97 % | DIASTOLIC BLOOD PRESSURE: 76 MMHG | RESPIRATION RATE: 16 BRPM | TEMPERATURE: 98.5 F | BODY MASS INDEX: 30.79 KG/M2 | WEIGHT: 185 LBS

## 2022-08-19 DIAGNOSIS — U07.1 COVID-19: Primary | ICD-10-CM

## 2022-08-19 DIAGNOSIS — R05.9 COUGH: ICD-10-CM

## 2022-08-19 PROCEDURE — 99213 OFFICE O/P EST LOW 20 MIN: CPT | Performed by: NURSE PRACTITIONER

## 2022-08-19 NOTE — PROGRESS NOTES
Chief Complaint  Cough    Subjective          Connie A Schoen presents to [facility]  History of Present Illness    Is here today for follow up cervical adenopathy with covid-19 infection  Tested positive on 8/10 & 8/14  On Wednesday she notice a swollen lymph node in her in left neck, called the on call and started amoxicillin  She tells me that by the time she went to bed the swelling was improved, it is gone at this time    Review of Systems   Constitutional: Positive for appetite change and fatigue. Negative for chills, diaphoresis and fever.   HENT: Positive for congestion and postnasal drip. Negative for ear pain, sneezing and sore throat.    Respiratory: Positive for cough and shortness of breath. Negative for chest tightness and wheezing.         Cough is productive of clear sputum, which has turned from brown to clear   Cardiovascular: Negative.  Negative for chest pain.   Gastrointestinal: Positive for nausea.   Musculoskeletal: Negative.  Negative for arthralgias and myalgias.   Neurological: Positive for headaches.        Had a headache earlier in the week, is resolved now   Psychiatric/Behavioral: Negative for sleep disturbance.     Objective   Vital Signs:  /76 (BP Location: Right arm)   Pulse 79   Temp 98.5 °F (36.9 °C)   Resp 16   Wt 83.9 kg (185 lb)   SpO2 97%   BMI 30.79 kg/m²     BP Readings from Last 3 Encounters:   08/19/22 124/76   02/18/22 150/95   02/16/22 110/60        Wt Readings from Last 3 Encounters:   08/19/22 83.9 kg (185 lb)   08/10/22 82.6 kg (182 lb)   01/31/22 82.6 kg (182 lb 3.2 oz)              Physical Exam  Vitals reviewed.   Constitutional:       Appearance: Normal appearance.   HENT:      Right Ear: Tympanic membrane and ear canal normal.      Left Ear: Tympanic membrane and ear canal normal.      Nose: Nose normal.      Mouth/Throat:      Mouth: Mucous membranes are moist.      Pharynx: Oropharynx is clear.   Eyes:       Extraocular Movements: Extraocular movements intact.   Neck:      Vascular: No carotid bruit.   Cardiovascular:      Rate and Rhythm: Normal rate and regular rhythm.      Pulses: Normal pulses.      Heart sounds: Normal heart sounds.   Pulmonary:      Effort: Pulmonary effort is normal.      Breath sounds: Normal breath sounds.   Musculoskeletal:      Cervical back: Neck supple. No tenderness.      Right lower leg: No edema.      Left lower leg: No edema.   Lymphadenopathy:      Cervical: No cervical adenopathy.   Skin:     General: Skin is warm.   Neurological:      Mental Status: She is alert and oriented to person, place, and time.        Result Review :                 Assessment and Plan    Diagnoses and all orders for this visit:    1. COVID-19 (Primary)    2. Cough    continue supportive care, declines any prescription cough medicine, finish antibiotics       Follow Up   Return if symptoms worsen or fail to improve.  Patient was given instructions and counseling regarding her condition or for health maintenance advice. Please see specific information pulled into the AVS if appropriate.

## 2022-08-19 NOTE — PATIENT INSTRUCTIONS
You can use mucinex to help thin your secretions, be sure that you are drinking plenty of fluids, start using your flonase  Stay active, but allow rest, do coughing and deep breathing as discussed

## 2022-08-27 DIAGNOSIS — M51.36 DEGENERATION OF INTERVERTEBRAL DISC OF LUMBAR REGION: ICD-10-CM

## 2022-08-29 RX ORDER — GABAPENTIN 600 MG/1
TABLET ORAL
Qty: 90 TABLET | Refills: 0 | Status: SHIPPED | OUTPATIENT
Start: 2022-08-29 | End: 2022-09-26

## 2022-08-31 ENCOUNTER — DOCUMENTATION (OUTPATIENT)
Dept: PHYSICAL THERAPY | Facility: CLINIC | Age: 72
End: 2022-08-31

## 2022-08-31 DIAGNOSIS — Z96.651 S/P TOTAL KNEE ARTHROPLASTY, RIGHT: Primary | ICD-10-CM

## 2022-08-31 DIAGNOSIS — R26.89 BALANCE PROBLEMS: ICD-10-CM

## 2022-08-31 NOTE — PROGRESS NOTES
Physical Therapy Discharge Summary    Patient:Connie A Schoen  : 1950  Diagnosis/ICD-10 Code: S/P total knee arthroplasty, right [Z96.651]  Referring Practitioner: No ref. provider found  Date of Initial Visit: 2022  Today's Date: 2022  Patient was seen for Visit count could not be calculated. Make sure you are using a visit which is associated with an episode. sessions    Visit Diagnoses:    ICD-10-CM ICD-9-CM   1. S/P total knee arthroplasty, right  Z96.651 V43.65   2. Balance problems  R26.89 781.99       Pt did not return to therapy after her last attended appt.  Therefore, she will be d/c today.      Lyndon Duron, PT  Physical Therapist  [place] License #: [id number]

## 2022-09-12 ENCOUNTER — OFFICE VISIT (OUTPATIENT)
Dept: CARDIOLOGY | Facility: CLINIC | Age: 72
End: 2022-09-12

## 2022-09-12 VITALS
HEART RATE: 85 BPM | BODY MASS INDEX: 31.82 KG/M2 | OXYGEN SATURATION: 99 % | DIASTOLIC BLOOD PRESSURE: 84 MMHG | WEIGHT: 191 LBS | SYSTOLIC BLOOD PRESSURE: 134 MMHG | HEIGHT: 65 IN

## 2022-09-12 DIAGNOSIS — I10 BENIGN ESSENTIAL HTN: Primary | ICD-10-CM

## 2022-09-12 PROCEDURE — 99214 OFFICE O/P EST MOD 30 MIN: CPT | Performed by: INTERNAL MEDICINE

## 2022-09-12 NOTE — PROGRESS NOTES
Cardiology Clinic Note  Richy Kevin MD, PhD    Subjective:     Encounter Date:09/12/2022      Patient ID: Connie A Schoen is a 72 y.o. female.    Chief Complaint:  Chief Complaint   Patient presents with   • Hypertension   • Follow-up       HPI:      History of Present Illness  I the pleasure of seeing this 72-year-old female today in clinic after previous hospitalization for unexplained syncope and questionable cardiac arrest versus syncope with premature CPR in the absence of any heart monitor being present.  She was sitting in the car and had a syncopal event, she had another syncopal event in the ER and CPR was started with return of ROSC and consciousness a short time later.  No drugs were pushed she was not intubated and around spontaneously.  She does not remember the event, she had no seizure-like activity noted alert neurologic dysfunction no bladder or bowel incontinence.  Cardiac etiology was suspected.  Cardiology was consulted ultimately and given severe nature with possible CPR and cardiac arrest she was ultimately taken to the Cath Lab demonstrating only minimal mild luminal regularities in the coronaries with ANDREZ-3 flow in all vessels with normal epicardial coronary anatomy, normal LV filling pressures, structurally normal heart on 2D echo with EF of 60 to 65% with no significant valvular heart disease, normal right left-sided function, no indication of pulmonary hypertension or valvular abnormality.  No thrombus in transit was seen no LVOT obstruction, there was normal myocardial thickness without evidence of any LVH.  EKG did not demonstrate any QT prolongation and was sinus rhythm, no conduction abnormalities.  Telemetry throughout her hospital stay was also normal with sinus rhythm.  She was ultimately discharged with 30-day event monitor which she still has on.  She presents back to clinic otherwise doing well with no recurrent events.  She continues to be slightly overweight BMI greater  "than 30 and is working for diet exercise and weight reduction with healthy lifestyle.  She has no further events of any syncope.  Continues to do well on today's encounter without issues     Primary prevention goals discussed    Review of systems otherwise negative x14 point review of systems except as mentioned above     Historical data copied forward from previous encounters in EMR is unchanged     2D echo is reviewed interpreted by me demonstrates normal EF with normal left and right-sided function, EF 65 to 70% not, normal atrial sizes, no significant valvular abnormality    Historical data copied forward from previous encounters in EMR including the history, exam, and assessment/plan has been reviewed and is unchanged unless noted otherwise.    Cardiac medicines reviewed with risk, benefits, and necessity of each discussed.    Risk and benefit of cardiac testing reviewed including death heart attack stroke pain bleeding infection need for vascular /cardiovascular surgery were discussed and the patient     Objective:         /84 (BP Location: Left arm, Patient Position: Sitting, Cuff Size: Adult)   Pulse 85   Ht 165.1 cm (65\")   Wt 86.6 kg (191 lb)   SpO2 99%   BMI 31.78 kg/m²     Physical Exam  Regular rate and rhythm no rubs gallops heave or lift  No clubbing cyanosis or edema  Obese soft nontender nondistended  Clear to auscultation bilaterally  No carotid bruits or JVD  Intact grossly  Assessment:         Syncope, cardiac arrest?  Unexplained thus far  No obvious etiology  Nonobstructive coronary disease, normal cardiac structure and function  Normal echo  Normal EKG with no high risk features no conduction abnormality  ANDREZ-3 flow  Preserved LV systolic function with no cardiomyopathy, no LVH, no high risk features on EKG no conduction abnormality no QT prolongation     Holter not able to be found, no further events, will avoid repeating unless she has further episodes of palpitations presyncope " other incidence of any issues    No changes to medicines  Smoking cessation counseling discussed   Alcohol cessation  Travel is okay as discussed with patient  Continue antihypertensives with losartan  She is not on beta-blocker at this time, no tachycardia or bradycardia arrhythmias no     Back to clinic 3 1 year, primary care in the interim     Continue primary v prevention goals for CAD  Follow-up with PCP for fasting lipid panel per guidelines, goal blood pressure less than 1 35-1 40 systolic  Diet and exercise per [place] guidelines  Okay to drive from CV standpoint, no issues     Richy Kevin MD, PhD         The pleasure to be involved in this patient's cardiovascular care.  Please call with any questions or concerns  Richy Kevin MD, PhD    Most recent EKG as reviewed and interpreted by me:  Procedures     Most recent echo as reviewed and interpreted by me:  Results for orders placed during the hospital encounter of 08/09/21    Adult Transthoracic Echo Complete W/ Cont if Necessary Per Protocol    Interpretation Summary  · Left ventricular ejection fraction appears to be 66 - 70%. Left ventricular systolic function is normal.  · Left ventricular diastolic function was normal.  · Estimated right ventricular systolic pressure from tricuspid regurgitation is normal (<35 mmHg).    Normal LV and RV size and function  Normal diastolic function by criteria  Normal atrial sizes grossly  No significant valvular abnormality  No thrombus in transit  No LVOT obstruction  Normal myocardial thickness with no LVH seen  EF estimated 65 to 70%  Normal caliber ascending aorta in available views  No masses or effusion seen      Most recent stress test as reviewed and interpreted by me:      Most recent cardiac catheterization as reviewed interpreted by me:  Results for orders placed during the hospital encounter of 08/09/21    Cardiac Catheterization/Vascular Study    Narrative   Richy Kevin MD, PhD  [facility]  [facility]  Date of service 8-10-21    Procedure letter catheterization with coronary angiography left ventriculography in NEAL position    Indication  Cardiac arrest resuscitated    After informed consent the patient was brought to catheterization lab sterilely prepped and draped with exposure the right wrist for right radial access via micropuncture and modified Seldinger technique with placement of a 5/6 slender sheath under ultrasound guidance which was aspirated and flushed with heparinized saline along with standard cocktail of heparin nitroglycerin and Cardene.  An 035 guidewire was advanced to the level aortic valve easily followed by 5 Sinhala JL4 and 6 Sinhala JR4 catheters for selective left and right coronary angiography.  The JR4 used across the aortic valve easily followed by hand-injection of 8 to 10 cc under low pressure for left ventriculography revealing normal LVEF with normal regional wall motion, there was no obstructive coronary artery disease and all catheters equipment were ultimately removed, TR band placed with immediate hemostasis after the sheath was removed in the Cath Lab.  There were no complications    Patient left the Cath Lab chest pain-free hemodynamically electrically stable or talking staff neurologically grossly intact bilaterally    Complications none  Blood loss less than 5 cc  Conscious sedation time of 30 minutes with IV Versed and fentanyl administered by registered nurse with complete ECG pulse oximetry and hemodynamic monitoring throughout the entirety the case observed by me    Findings  1 open aortic pressure 128/62  2.,  LV function 65 to 70% hyperdynamic no regional abnormalities    Angiography  1 left main large caliber vessel with no angiographic disease giving LAD and dominant circumflex  2.  LAD is a large-caliber vessel coursing to around the apex with a diagonal branch there is nonbifurcating small with high takeoff, mid LAD bifurcating diagonal branch  has no obstructive disease, luminal regularities less than 20% throughout the LAD and diagonal distributions with ANDREZ-3 flow throughout  3.  The circumflex is dominant with a proximal 40% concentric narrowing that is not flow-limiting and a very large caliber vessel likely least 4 mm distally, there is 3 obtuse marginal branches as well as an L PDA and L PLV branch, there is mildly regularities distal to this more proximal 40% concentric narrowing, there is no obstructive disease in the circumflex distribution with ANDREZ-3 flow throughout, limb regularities throughout the L PDA and L PLV branches as well  4.  The RCA is a small nondominant branch with very small caliber RPDA and marginal branch the takes approximately, there is luminal regularities only throughout the RCA and nothing obstructive, no ostial lesion no catheter dampening    Conclusions and recommendations  1 nonobstructive epicardial coronary disease most significantly in the proximal circumflex at 40% a very large caliber vessel  Preserved LV systolic function EF of 65 to 70%    No obvious etiology for cardiac arrest versus severe syncope with respect to cardiac structure function coronary flow.    Work-up other causes, recommend ambulatory ECG monitoring will consider Holter versus implanted loop recorder  If recurrent event we will consider EP study      Richy Kevin MD, PhD    The following portions of the patient's history were reviewed and updated as appropriate: allergies, current medications, past family history, past medical history, past social history, past surgical history and problem list.      ROS:  14 point review of systems negative except as mentioned above    Current Outpatient Medications:   •  acetaminophen (TYLENOL) 500 MG tablet, Take 500 mg by mouth Every 6 (Six) Hours As Needed for Mild Pain ., Disp: , Rfl:   •  Adipex-P 37.5 MG tablet, Take 37.5 mg by mouth Daily., Disp: , Rfl:   •  B Complex Vitamins (B COMPLEX-B12 PO), Take   by mouth Daily., Disp: , Rfl:   •  cholecalciferol (VITAMIN D3) 25 MCG (1000 UT) tablet, Take 1,000 Units by mouth Daily., Disp: , Rfl:   •  gabapentin (NEURONTIN) 600 MG tablet, TAKE ONE TABLET BY MOUTH THREE TIMES A DAY, Disp: 90 tablet, Rfl: 0  •  Green Tea 150 MG capsule, Take  by mouth., Disp: , Rfl:   •  levothyroxine (SYNTHROID, LEVOTHROID) 125 MCG tablet, Take 1 tablet by mouth Daily., Disp: 30 tablet, Rfl: 9  •  losartan (COZAAR) 100 MG tablet, TAKE ONE TABLET BY MOUTH DAILY (Patient taking differently: Take 100 mg by mouth Daily. HOLD 24 hours before surgery), Disp: 90 tablet, Rfl: 3  •  sertraline (ZOLOFT) 100 MG tablet, TAKE 1 TABLET DAILY, Disp: 90 tablet, Rfl: 3    Problem List:  Patient Active Problem List   Diagnosis   • Cardiopulmonary arrest with successful resuscitation ([facility])   • Fusion of spine   • Scoliosis of lumbosacral region due to degenerative disease of spine in adult   • Generalized abdominal pain   • Low back pain   • Alcoholism ([facility])   • Anemia   • Ankle sprain   • Anxiety disorder   • Bursitis of hip   • Chest discomfort   • Cough   • Degeneration of intervertebral disc of lumbar region   • Depression   • Ecchymosis   • Elevated LFTs   • Folliculitis   • Full incontinence of feces   • Hypothyroidism   • Knee pain, left   • Leg pain, bilateral   • Leg edema   • Swelling of lower extremity   • Leg length discrepancy   • Muscle pain   • Degenerative joint disease of left knee   • Osteoarthritis of shoulder   • Osteoarthritis of knee   • Osteoarthritis of glenohumeral joint   • Osteoarthritis   • Polyarthritis   • Other ovarian dysfunction   • Other specified dorsopathies, site unspecified   • Pain in joint of left shoulder   • Arm pain   • Fibromyalgia   • Pain in shoulder   • Polymyalgia rheumatica ([facility])   • Personal history of other drug therapy   • Preoperative clearance   • Presence of left artificial knee joint   • Encounter for general adult medical examination without abnormal  findings   • Screening for breast cancer   • Tear of rotator cuff   • Urinary tract infection   • Vitamin D deficiency   • Weight loss   • Lumbar radiculopathy   • Radiculopathy   • Spinal stenosis, lumbar   • Osteoarthritis of lumbosacral spine with radiculopathy   • Osteoarthritis of lumbosacral spine without myelopathy   • Screening for colon cancer   • Encounter for immunization    • Medicare annual wellness visit, subsequent   • Hypercalcemia   • Need for immunization against influenza   • Elevated serum alkaline phosphatase level   • Abnormality of gait due to impairment of balance   • Syncope   • Multiple closed fractures of ribs of left side   • Hospital discharge follow-up   • Benign essential HTN     Past Medical History:  Past Medical History:   Diagnosis Date   • Anxiety    • Arthritis    • Depression    • Disease of thyroid gland    • HTN (hypertension)    • Hypertension    • Injury of back     back surgery   • RA (rheumatoid arthritis) (HCC)    • Shortness of breath    • Thyroid disease      Past Surgical History:  Past Surgical History:   Procedure Laterality Date   • APPENDECTOMY     • BACK SURGERY      T10-S1 poster lumbar fusion correction   • BACK SURGERY      rods    • CARDIAC CATHETERIZATION N/A 8/10/2021    Procedure: Left Heart Cath;  Surgeon: Richy Kevin MD;  Location: [facility] CATH INVASIVE LOCATION;  Service: Cardiology;  Laterality: N/A;   • CARDIAC CATHETERIZATION N/A 8/10/2021    Procedure: Coronary angiography;  Surgeon: Richy Kevin MD;  Location: [facility] CATH INVASIVE LOCATION;  Service: Cardiology;  Laterality: N/A;   • CARDIAC CATHETERIZATION N/A 8/10/2021    Procedure: Left ventriculography;  Surgeon: Richy Kevin MD;  Location: [facility] CATH INVASIVE LOCATION;  Service: Cardiology;  Laterality: N/A;   • KNEE SURGERY     • TOTAL KNEE ARTHROPLASTY Left    • TOTAL KNEE ARTHROPLASTY Right 1/31/2022    Procedure: TOTAL KNEE ARTHROPLASTY;  Surgeon: Jillian  Zaid Gruber MD;  Location: [facility] MAIN OR;  Service: Orthopedics;  Laterality: Right;     Social History:  Social History     Socioeconomic History   • Marital status:    Tobacco Use   • Smoking status: Current Every Day Smoker     Packs/day: 1.00     Types: Cigarettes   • Smokeless tobacco: Never Used   Vaping Use   • Vaping Use: Never used   Substance and Sexual Activity   • Alcohol use: Not Currently     Alcohol/week: 10.0 standard drinks     Types: 10 Cans of beer per week   • Drug use: Not Currently   • Sexual activity: Defer     Allergies:  No Known Allergies  Immunizations:  Immunization History   Administered Date(s) Administered   • COVID-19 (MODERNA) 1st, 2nd, 3rd Dose Only 02/02/2021, 03/06/2021   • Pneumococcal Polysaccharide (PPSV23) 08/25/2020   • Tdap 01/08/2009            In-Office Procedure(s):  No orders to display        ASCVD RIsk Score::  The 10-year ASCVD risk score (Ferny YI Jr., et al., 2013) is: 20.7%    Values used to calculate the score:      Age: 72 years      Sex: Female      Is Non- : No      Diabetic: No      Tobacco smoker: Yes      Systolic Blood Pressure: 134 mmHg      Is BP treated: Yes      HDL Cholesterol: 57 mg/dL      Total Cholesterol: 174 mg/dL    Imaging:    Results for orders placed during the hospital encounter of 01/27/22    XR Chest PA & Lateral    Narrative  DATE OF EXAM:  1/27/2022 3:30 PM    PROCEDURE:  XR CHEST PA AND LATERAL-    INDICATIONS:  Preoperative evaluation for right knee replacement. Current smoker.  Shortness breath, cough. Hypertension.    COMPARISON:  AP portable chest 11 2021, CT chest 10/13/2021    TECHNIQUE:  Two radiologic views of the chest.    FINDINGS:  Faint opacities projecting over the right anterior fourth and fifth rib  shows correspond to healing fractures which can be seen on the prior CT  chest study. No suspicious pulmonary nodules are identified. There is no  acute airspace disease. The heart size is  normal. There is no pleural  effusion or pneumothorax. Moderately advanced degenerative disc endplate  changes in the midthoracic spine. Bilateral Clement santos and pedicle  screw fixation changes are present in the lower thoracic spine and  lumbar spine. Advanced degenerative changes of both shoulders.    Impression  1. No acute chest findings.    Electronically Signed By-Vee Wills MD On:1/27/2022 3:39 PM  This report was finalized on [id number] by  Vee Wills MD.       Results for orders placed during the hospital encounter of 01/19/22    CT Angiogram Carotids    Narrative  DATE OF EXAM:  1/19/2022 3:12 PM    PROCEDURE:  CT ANGIOGRAM HEAD-, CT ANGIOGRAM CAROTIDS-    INDICATIONS:  Ataxia, acute or sub-acute, infection suspected; R55-Syncope and  collapse; R26.89-Other abnormalities of gait and mobility, dizziness and  ataxia.    COMPARISON:  MRA of the head and neck dated 09/15/2020    TECHNIQUE:  CTA of the head and neck was performed after the intravenous  administration of 100 mL Isovue 370. Reconstructed coronal and sagittal  images were also obtained. In addition, a 3 D volume rendered image was  obtained after post processing. Automated exposure control and iterative  reconstruction methods were used    FINDINGS:  VASCULAR FINDINGS: There is minimal plaque at the origins of the left  subclavian and left common carotid artery without significant stenosis  there is no significant plaque in the right carotid bifurcation. On the  left, there is atherosclerotic plaque in the carotid bulb extending into  the proximal ICA. Computer-generated model suggest a stenosis of 39%  that is not hemodynamically significant. The distal internal carotid  arteries appear. There is only minimal plaque in the carotid siphons.  There is no significant vertebrobasilar stenosis. Intracranially, there  is normal filling of the middle and anterior cerebral arteries. No  significant vascular stenosis or occlusion is  identified. Both posterior  cerebral arteries appear to fill normally. There is hypoplasia of the  right A1 segment.    NONVASCULAR FINDINGS: The lung apices are clear. There is no  mediastinal, supraclavicular, or significant cervical lymphadenopathy.  Skull base appears unremarkable. No intracranial acute abnormalities are  identified.    Impression  1. Less than 50% narrowing of the left carotid bifurcation.  Computer-generated model suggest a stenosis of 39%.  2. No significant plaque identified in the right carotid bifurcation.  3. No significant intracranial vascular abnormalities.    Electronically Signed By-Nehemiah Subramanian MD On:1/20/2022 8:22 AM  This report was finalized on [id number] by  Nehemiah Subramanian MD.      Results for orders placed during the hospital encounter of 01/19/22    CT Angiogram Carotids    Narrative  DATE OF EXAM:  1/19/2022 3:12 PM    PROCEDURE:  CT ANGIOGRAM HEAD-, CT ANGIOGRAM CAROTIDS-    INDICATIONS:  Ataxia, acute or sub-acute, infection suspected; R55-Syncope and  collapse; R26.89-Other abnormalities of gait and mobility, dizziness and  ataxia.    COMPARISON:  MRA of the head and neck dated 09/15/2020    TECHNIQUE:  CTA of the head and neck was performed after the intravenous  administration of 100 mL Isovue 370. Reconstructed coronal and sagittal  images were also obtained. In addition, a 3 D volume rendered image was  obtained after post processing. Automated exposure control and iterative  reconstruction methods were used    FINDINGS:  VASCULAR FINDINGS: There is minimal plaque at the origins of the left  subclavian and left common carotid artery without significant stenosis  there is no significant plaque in the right carotid bifurcation. On the  left, there is atherosclerotic plaque in the carotid bulb extending into  the proximal ICA. Computer-generated model suggest a stenosis of 39%  that is not hemodynamically significant. The distal internal carotid  arteries appear. There is  only minimal plaque in the carotid siphons.  There is no significant vertebrobasilar stenosis. Intracranially, there  is normal filling of the middle and anterior cerebral arteries. No  significant vascular stenosis or occlusion is identified. Both posterior  cerebral arteries appear to fill normally. There is hypoplasia of the  right A1 segment.    NONVASCULAR FINDINGS: The lung apices are clear. There is no  mediastinal, supraclavicular, or significant cervical lymphadenopathy.  Skull base appears unremarkable. No intracranial acute abnormalities are  identified.    Impression  1. Less than 50% narrowing of the left carotid bifurcation.  Computer-generated model suggest a stenosis of 39%.  2. No significant plaque identified in the right carotid bifurcation.  3. No significant intracranial vascular abnormalities.    Electronically Signed By-Nehemiah Subramanian MD On:1/20/2022 8:22 AM  This report was finalized on [id number] by  Nehemiah Subramanian MD.      Lab Review:   No visits with results within 6 Month(s) from this visit.   Latest known visit with results is:   Hospital Outpatient Visit on 02/23/2022   Component Date Value   • Target HR (85%) 02/23/2022 128    • Max. Pred. HR (100%) 02/23/2022 151    • Right Common Femoral Spo* 02/23/2022 Y    • Right Common Femoral Pha* 02/23/2022 Y    • Right Common Femoral Aug* 02/23/2022 Y    • Right Common Femoral Com* 02/23/2022 Y    • Right Common Femoral Com* 02/23/2022 C    • Right Saphenofemoral Chad* 02/23/2022 C    • Right Proximal Femoral C* 02/23/2022 C    • Right Mid Femoral Spont 02/23/2022 Y    • Right Mid Femoral Phasic 02/23/2022 Y    • Right Mid Femoral Augment 02/23/2022 Y    • Right Mid Femoral Compet* 02/23/2022 Y    • Right Mid Femoral Compre* 02/23/2022 C    • Right Distal Femoral Com* 02/23/2022 C    • Right Popliteal Spont 02/23/2022 Y    • Right Popliteal Phasic 02/23/2022 Y    • Right Popliteal Augment 02/23/2022 Y    • Right Popliteal Competent 02/23/2022 Y     • Right Popliteal Compress 02/23/2022 C    • Right Posterior Tibial C* 02/23/2022 C    • Right Peroneal Compress 02/23/2022 C    • Right Gastronemius Compr* 02/23/2022 C    • Right Greater Saph AK Co* 02/23/2022 C    • Right Greater Saph BK Co* 02/23/2022 C    • Right Lesser Saph Compre* 02/23/2022 C    • Left Common Femoral Spont 02/23/2022 Y    • Left Common Femoral Phas* 02/23/2022 Y    • Left Common Femoral Augm* 02/23/2022 Y    • Left Common Femoral Comp* 02/23/2022 Y    • Left Common Femoral Comp* 02/23/2022 C      Recent labs reviewed and interpreted for clinical significance and application            Level of Care:           Richy Kevin MD  09/12/22  .

## 2022-09-26 DIAGNOSIS — M51.36 DEGENERATION OF INTERVERTEBRAL DISC OF LUMBAR REGION: ICD-10-CM

## 2022-09-26 RX ORDER — GABAPENTIN 600 MG/1
TABLET ORAL
Qty: 90 TABLET | Refills: 0 | Status: SHIPPED | OUTPATIENT
Start: 2022-09-26 | End: 2022-10-23

## 2022-10-22 DIAGNOSIS — M51.36 DEGENERATION OF INTERVERTEBRAL DISC OF LUMBAR REGION: ICD-10-CM

## 2022-10-23 RX ORDER — GABAPENTIN 600 MG/1
TABLET ORAL
Qty: 90 TABLET | Refills: 0 | Status: SHIPPED | OUTPATIENT
Start: 2022-10-23 | End: 2022-11-22

## 2022-11-11 RX ORDER — LOSARTAN POTASSIUM 100 MG/1
TABLET ORAL
Qty: 90 TABLET | Refills: 3 | Status: SHIPPED | OUTPATIENT
Start: 2022-11-11

## 2022-11-22 DIAGNOSIS — M51.36 DEGENERATION OF INTERVERTEBRAL DISC OF LUMBAR REGION: ICD-10-CM

## 2022-11-22 RX ORDER — GABAPENTIN 600 MG/1
TABLET ORAL
Qty: 90 TABLET | Refills: 0 | Status: SHIPPED | OUTPATIENT
Start: 2022-11-22 | End: 2022-12-21

## 2022-11-29 ENCOUNTER — OFFICE VISIT (OUTPATIENT)
Dept: ORTHOPEDIC SURGERY | Facility: CLINIC | Age: 72
End: 2022-11-29

## 2022-11-29 VITALS — OXYGEN SATURATION: 96 % | HEART RATE: 91 BPM

## 2022-11-29 DIAGNOSIS — S46.112A LABRAL TEAR OF LONG HEAD OF LEFT BICEPS TENDON, INITIAL ENCOUNTER: Primary | ICD-10-CM

## 2022-11-29 PROCEDURE — 99212 OFFICE O/P EST SF 10 MIN: CPT | Performed by: PHYSICIAN ASSISTANT

## 2022-12-21 DIAGNOSIS — M51.36 DEGENERATION OF INTERVERTEBRAL DISC OF LUMBAR REGION: ICD-10-CM

## 2022-12-21 RX ORDER — GABAPENTIN 600 MG/1
TABLET ORAL
Qty: 90 TABLET | Refills: 0 | Status: SHIPPED | OUTPATIENT
Start: 2022-12-21 | End: 2023-01-19

## 2023-01-19 DIAGNOSIS — M51.36 DEGENERATION OF INTERVERTEBRAL DISC OF LUMBAR REGION: ICD-10-CM

## 2023-01-19 RX ORDER — GABAPENTIN 600 MG/1
TABLET ORAL
Qty: 90 TABLET | Refills: 0 | Status: SHIPPED | OUTPATIENT
Start: 2023-01-19 | End: 2023-02-17

## 2023-02-17 DIAGNOSIS — M51.36 DEGENERATION OF INTERVERTEBRAL DISC OF LUMBAR REGION: ICD-10-CM

## 2023-02-17 RX ORDER — GABAPENTIN 600 MG/1
TABLET ORAL
Qty: 90 TABLET | Refills: 0 | Status: SHIPPED | OUTPATIENT
Start: 2023-02-17 | End: 2023-03-20

## 2023-03-18 DIAGNOSIS — M51.36 DEGENERATION OF INTERVERTEBRAL DISC OF LUMBAR REGION: ICD-10-CM

## 2023-03-20 RX ORDER — GABAPENTIN 600 MG/1
TABLET ORAL
Qty: 90 TABLET | Refills: 0 | Status: SHIPPED | OUTPATIENT
Start: 2023-03-20

## 2023-04-07 NOTE — PROGRESS NOTES
Jazmine is a 72 y.o. year old female presents to [facility]    Chief Complaint   Patient presents with   • Left Shoulder - Initial Evaluation       History of Present Illness  This is a 72-year-old female presenting with her  for evaluation of chronic left shoulder pain secondary to known osteoarthritis.  She has been worked up by Dr. Miner in the past with CT and MRI of the left shoulder in preparation for a total shoulder arthroplasty, but the patient did not proceed with the surgery.  There was an incidental finding of a lesion on one of her preoperative imaging studies, CT, the led her to get worked up by an oncologist but the lesion was benign.  Patient is presenting today for evaluation of a lump over the anterior aspect of her proximal bicep.  She reports this to be mildly tender.  Patient states its been present for at least 5 months but she denies any trauma or incident that could have led to this formation as she did not experience any pain over this area before. She reports only mild irritation over the lump.    I have reviewed the patient's medical, family, and social history in detail and updated the computerized patient record.    Objective:  Pulse 91   SpO2 96%      Physical Exam    Mask worn throughout the encounter  Vital signs reviewed.   General: No acute distress.  Eyes: conjunctiva clear; pupils equally round  ENT: external ears atraumatic  CV: no peripheral edema  Resp: normal respiratory effort  Skin: no rashes or wounds; normal turgor  Psych: mood and affect appropriate; recent and remote memory intact  Neuro: sensation to light touch intact    MSK Exam  Left shoulder:  Intact skin.  No signs of effusion or infection.  Mildly tender to palpation over the bicep groove.  Theo deformation over proximal bicep area of upper arm with mild tenderness to palpation.    Negative Speed; negative Yergason    Imaging:   None     Assessment:  Diagnoses and all orders  Onset: 3-4 days now  Location / description: Patient reports that he had a TURP three weeks ago. Scheduled to see urology next Friday. Last 3-4 days, he is urinating more frequently. Endorses burning and tingling. Foul odor to the urine. More blood than he has had since surgery \"neon red\". Denies any back or abdominal pain. Does feel like he empties bladder. Denies any fever. No n/v/d. Each day it has become a little worse. No on blood thinners.   Precipitating Factors: TURP 3/15  Pain Scale (1-10), 10 highest: 3/10  What  improves / worsens symptoms: Nothing  Symptom specific medications: flomax; procar  Recent visits (last 3-4 weeks) for same reason or recent surgery:  3/15/23    PLAN:  Home Care Advice provided  See Provider within 24 hours   Connected patient with urology nurse who will assist from here.    Patient/Caller agrees to follow recommendations.    Reason for Disposition  • Blood in urine (red, pink, or tea-colored)    Protocols used: URINATION PAIN - MALE-A-AH       for this visit:    Labral tear of long head of left biceps tendon, initial encounter      Plan: Recommend continuing shoulder range of motion exercies to maintain current ROM. Follow up as needed with regards to left shoulder and planning of TSA.       Follow Up   Return if symptoms worsen or fail to improve.  Patient was given instructions and counseling regarding her condition or for health maintenance advice. Please see specific information pulled into the AVS if appropriate.     EMR Dragon/Transcription disclaimer:    Much of this encounter note is an electronic transcription/translation of spoken language to printed text.  The electronic translation of spoken language may permit erroneous, or at times, nonsensical words or phrases to be inadvertently transcribed.  Although I have reviewed the note for such errors some may still exist.

## 2023-04-12 ENCOUNTER — OFFICE VISIT (OUTPATIENT)
Dept: FAMILY MEDICINE CLINIC | Facility: CLINIC | Age: 73
End: 2023-04-12
Payer: MEDICARE

## 2023-04-12 ENCOUNTER — LAB (OUTPATIENT)
Dept: FAMILY MEDICINE CLINIC | Facility: CLINIC | Age: 73
End: 2023-04-12
Payer: MEDICARE

## 2023-04-12 VITALS
TEMPERATURE: 98 F | DIASTOLIC BLOOD PRESSURE: 80 MMHG | HEIGHT: 65 IN | HEART RATE: 89 BPM | WEIGHT: 195 LBS | OXYGEN SATURATION: 95 % | SYSTOLIC BLOOD PRESSURE: 126 MMHG | RESPIRATION RATE: 18 BRPM | BODY MASS INDEX: 32.49 KG/M2

## 2023-04-12 DIAGNOSIS — E55.9 VITAMIN D DEFICIENCY: ICD-10-CM

## 2023-04-12 DIAGNOSIS — Z12.11 SCREENING FOR COLON CANCER: ICD-10-CM

## 2023-04-12 DIAGNOSIS — F10.20 ALCOHOLISM: ICD-10-CM

## 2023-04-12 DIAGNOSIS — R73.9 ELEVATED BLOOD SUGAR: ICD-10-CM

## 2023-04-12 DIAGNOSIS — M51.36 DEGENERATION OF INTERVERTEBRAL DISC OF LUMBAR REGION: Primary | ICD-10-CM

## 2023-04-12 DIAGNOSIS — I10 BENIGN ESSENTIAL HTN: ICD-10-CM

## 2023-04-12 DIAGNOSIS — M25.551 BILATERAL HIP PAIN: ICD-10-CM

## 2023-04-12 DIAGNOSIS — M25.552 BILATERAL HIP PAIN: ICD-10-CM

## 2023-04-12 DIAGNOSIS — Z12.31 SCREENING MAMMOGRAM FOR BREAST CANCER: ICD-10-CM

## 2023-04-12 PROCEDURE — 3074F SYST BP LT 130 MM HG: CPT | Performed by: INTERNAL MEDICINE

## 2023-04-12 PROCEDURE — 83036 HEMOGLOBIN GLYCOSYLATED A1C: CPT | Performed by: INTERNAL MEDICINE

## 2023-04-12 PROCEDURE — 36415 COLL VENOUS BLD VENIPUNCTURE: CPT | Performed by: INTERNAL MEDICINE

## 2023-04-12 PROCEDURE — 85025 COMPLETE CBC W/AUTO DIFF WBC: CPT | Performed by: INTERNAL MEDICINE

## 2023-04-12 PROCEDURE — 82306 VITAMIN D 25 HYDROXY: CPT | Performed by: INTERNAL MEDICINE

## 2023-04-12 PROCEDURE — 99214 OFFICE O/P EST MOD 30 MIN: CPT | Performed by: INTERNAL MEDICINE

## 2023-04-12 PROCEDURE — 80061 LIPID PANEL: CPT | Performed by: INTERNAL MEDICINE

## 2023-04-12 PROCEDURE — 84443 ASSAY THYROID STIM HORMONE: CPT | Performed by: INTERNAL MEDICINE

## 2023-04-12 PROCEDURE — 3079F DIAST BP 80-89 MM HG: CPT | Performed by: INTERNAL MEDICINE

## 2023-04-12 PROCEDURE — 80053 COMPREHEN METABOLIC PANEL: CPT | Performed by: INTERNAL MEDICINE

## 2023-04-12 RX ORDER — B-COMPLEX WITH VITAMIN C
TABLET ORAL
COMMUNITY

## 2023-04-12 NOTE — PROGRESS NOTES
"Rooming Tab(CC,VS,Pt Hx,Fall Screen)  Chief Complaint   Patient presents with   • Difficulty Walking     Trouble walking, balance is off       Subjective     I have reviewed and updated her medications, medical history and problem list during today's office visit.     Patient Care Team:  Uyen Cleveland MD as PCP - General (Internal Medicine)  Uyen Cleveland MD as PCP - Family Medicine  Uyen Cleveland MD (Internal Medicine)    Problem List Tab  Medications Tab  Synopsis Tab  Chart Review Tab  Care Everywhere Tab  Immunizations Tab  Patient History Tab    Social History     Tobacco Use   • Smoking status: Every Day     Packs/day: 1.00     Types: Cigarettes   • Smokeless tobacco: Never   Substance Use Topics   • Alcohol use: Not Currently     Alcohol/week: 10.0 standard drinks     Types: 10 Cans of beer per week       Review of Systems    Objective     Rooming Tab(CC,VS,Pt Hx,Fall Screen)  /80   Pulse 89   Temp 98 °F (36.7 °C)   Resp 18   Ht 165.1 cm (65\")   Wt 88.5 kg (195 lb)   SpO2 95%   BMI 32.45 kg/m²     Body mass index is 32.45 kg/m².    Physical Exam     Statin Choice Calculator  Data Reviewed:         {AMBULATORY LABS (Optional):40868}          Assessment & Plan   Order Review Tab  Health Maintenance Tab  Patient Plan/Order Tab  Diagnoses and all orders for this visit:    1. Degeneration of intervertebral disc of lumbar region (Primary)    2. Bilateral hip pain  -     XR Hips Bilateral With or Without Pelvis 2 View; Future    3. Screening mammogram for breast cancer  -     Mammo Screening Digital Tomosynthesis Bilateral With CAD; Future        Wrapup Tab  No follow-ups on file.       They were informed of the diagnosis and treatment plan and directed to f/u for any further problems or concerns.              "

## 2023-04-12 NOTE — PROGRESS NOTES
"Rooming Tab(CC,VS,Pt Hx,Fall Screen)  Chief Complaint   Patient presents with   • Difficulty Walking     Trouble walking, balance is off       Subjective      Balance issues  The patient reports that she is unable to ambulate due to balance issues. Her balance has been worsening since her right knee surgery. She notes that she went for her yearly checkup in 01/2023 with Dr. Zaid Walsh, and she underwent an x-ray of her right leg and was informed that it looked good. She was extremely concerned with her right leg \"going out\" and it looked \"crooked.\" She was informed that it would normalize within a year; however, the right leg still looks \"crooked.\" she has never had problems with her left leg. She had a total left knee surgery by Dr. Kostas Vega. Her right leg was already probably looking \"turned\" before surgery. She was being told by other people that she was \"knock kneed.\" She has all of her paperwork to where she can do everything at home; however, she has not been doing it. She has been extremely careful with her right leg. She experiences right leg pain when she goes down her basement steps. She has to hold onto something because she is scared that she will fall. She does not prefer using a cane. She does not have a peddler at home. She had an incident at [employer] on Monday, 04/10/2023, where she went to move to look at an object and had the feeling of having her feet stopped on the carpet and she fell.    Back pain  The patient underwent back surgery by Dr. Emmanuel Ley due to all the pain in her lower extremities. Now, the pain has radiated up her middle back. Standing for a long time causes her severe back pain. She has not seen a doctor at [facility]. When Dr. Emmanuel Ley left, she was referred to an orthopedic doctor who already retired. She went to [facility] in 2021. She states that she was extremely worried about the flight then. When she went to " [place], she had extreme lower extremity pain to the point of screaming. She was prescribed a steroid pack, and she did fine. She had another steroid pack for that trip, which helped. She was recommended water therapy, which she never did. She did not want to go to the [facility] or anywhere. She was suggested that she might need to see one of the surgeons, which she has not done as she is terrified of the idea. She notes that her hips pop. She has not had an x-ray of her hips. If she did, it would have been a long time ago. Occasionally, she feels as if there are grinding sounds in her lower extremities.    Dizziness  The patient had an incident going to [place] where she slumped over in the car. She remembering not feeling well on the road and she was rushed to the hospital. She notes that she does not remember anything except waking up in the hospital. She kept saying why she has chest pain. She was informed that that they broke her sternum. She might have been in the hospital 3 to 4 days. There are 3 times where she felt that way. The first time was the year on [place] [address] where she had severe rectal bleeding. The other time was when she was at [facility] where she was standing at a high table, doing some exercises, and remembers saying that she did not feel right. They laid her down, and her blood pressure had dropped and that is when they reduced her blood pressure medication dosage in half. She states that it has not happened recently. She had the same feeling of her blood pressure dropping on those 3 times. It has not happened recently. She has not been trying to drink plenty of water. She used to drink water with lemon in it all the time. She does not feel that the gabapentin causes her to feel dizzy. She has been taking 2 in the mornings instead of the middle one in the day. She has been taking 2 in the evening. Taking 2 gabapentins in the morning does not cause her to feel dizzy. She  is still taking a whole tablet of her blood pressure medication.    Alcoholic drinking  The patient mentions that she had a relapse on drinking alcohol and just decided that she did not like the way it made her feel. She does not even want to drink alcohol.    Masses  The patient was found to have a white mass on one of her arms. She was sent to Dr. Alf Miner who then referred her to a bone oncologist in [place]. They were not concerned about that mass. She then started developing a big lump, so she called Dr. Alf Miner who informed there is nothing to worry about.    Fatty tumor  Two months ago, the patient woke up one morning at her friend's place and she found a marble-sized knot in one of her hands. She states that it does cause numbness to her pinky finger and it does not bother her.    Arthritis  The patient had hand pain yesterday, which she believed is from arthritis.    Weight loss  The patient saw Dr. Richy Kevin who suggested for her to lose 30 pounds. She inquires what medication she can take to lose weight.    Family history of colon cancer  The patient had 2 half brothers die of colon cancer, so she is a high-risk for colon cancer.    Lower extremity pain  The patient has an appointment for her toes. If she lays on her stomach, her toes will ache and cramp. She has a hammertoe on her toe.    Health maintenance  The patient needs a referral for her mammogram. She also needs a colonoscopy. She ate a small piece of ham with [employer] this morning, 04/12/2023.      I have reviewed and updated her medications, medical history and problem list during today's office visit.     Patient Care Team:  Uyen Cleveland MD as PCP - General (Internal Medicine)  Uyen Cleveland MD as PCP - Family Medicine  Uyen Cleveland MD (Internal Medicine)    Problem List Tab  Medications Tab  Synopsis Tab  Chart Review Tab  Care Everywhere Tab  Immunizations Tab  Patient  "History Tab    Social History     Tobacco Use   • Smoking status: Every Day     Packs/day: 1.00     Types: Cigarettes   • Smokeless tobacco: Never   Substance Use Topics   • Alcohol use: Not Currently     Alcohol/week: 10.0 standard drinks     Types: 10 Cans of beer per week       Review of Systems    Objective     Rooming Tab(CC,VS,Pt Hx,Fall Screen)  /80   Pulse 89   Temp 98 °F (36.7 °C)   Resp 18   Ht 165.1 cm (65\")   Wt 88.5 kg (195 lb)   SpO2 95%   BMI 32.45 kg/m²     Body mass index is 32.45 kg/m².    Physical Exam  Vitals and nursing note reviewed.   Constitutional:       Appearance: Normal appearance. She is well-developed. She is obese.   HENT:      Head: Normocephalic and atraumatic.      Right Ear: Tympanic membrane normal.      Left Ear: Tympanic membrane normal.      Nose: No rhinorrhea.      Mouth/Throat:      Pharynx: No posterior oropharyngeal erythema.   Eyes:      Pupils: Pupils are equal, round, and reactive to light.   Cardiovascular:      Rate and Rhythm: Normal rate and regular rhythm.      Pulses: Normal pulses.      Heart sounds: Normal heart sounds. No murmur heard.  Pulmonary:      Effort: Pulmonary effort is normal.      Breath sounds: Normal breath sounds.   Abdominal:      General: Bowel sounds are normal. There is no distension.      Palpations: Abdomen is soft.   Musculoskeletal:         General: Swelling and tenderness present.      Cervical back: Normal range of motion and neck supple.   Skin:     Capillary Refill: Capillary refill takes less than 2 seconds.   Neurological:      Mental Status: She is alert and oriented to person, place, and time. Mental status is at baseline.      Motor: Weakness present.   Psychiatric:         Mood and Affect: Mood normal.         Behavior: Behavior normal.          Statin Choice Calculator  Data Reviewed:         The data below has been reviewed by Uyen Cleveland MD on 04/12/2023.      Lab Results   Component Value Date    BUN " 13 04/12/2023    CREATININE 0.88 04/12/2023     (L) 04/12/2023    K 4.5 04/12/2023     04/12/2023    CALCIUM 10.1 04/12/2023    ALBUMIN 4.4 04/12/2023    BILITOT <0.2 04/12/2023    ALKPHOS 93 04/12/2023    AST 23 04/12/2023    ALT 19 04/12/2023    TRIG 131 04/12/2023    HDL 70 (H) 04/12/2023    VLDL 23 04/12/2023     (H) 04/12/2023    LDLHDL 1.48 04/12/2023    WBC 9.61 04/12/2023    RBC 3.54 (L) 04/12/2023    HCT 35.3 04/12/2023    MCV 99.7 (H) 04/12/2023    MCH 34.5 (H) 04/12/2023    TSH 8.710 (H) 04/12/2023    [id number] 57.6 04/12/2023      Assessment & Plan   Order Review Tab  Health Maintenance Tab  Patient Plan/Order Tab  Diagnoses and all orders for this visit:    1. Degeneration of intervertebral disc of lumbar region (Primary)  -     Ambulatory Referral to Physical Therapy Evaluate and treat    2. Bilateral hip pain  -     XR Hips Bilateral With or Without Pelvis 2 View; Future  -     Ambulatory Referral to Physical Therapy Evaluate and treat    3. Screening mammogram for breast cancer  -     Mammo Screening Digital Tomosynthesis Bilateral With CAD; Future    4. Benign essential HTN  -     CBC Auto Differential  -     TSH  -     Vitamin D,25-Hydroxy  -     Lipid Panel    5. Alcoholism  -     Comprehensive Metabolic Panel  -     TSH    6. Elevated blood sugar  -     Hemoglobin A1c    7. Vitamin D deficiency  -     Vitamin D,25-Hydroxy    8. Screening for colon cancer  -     Ambulatory Referral For Screening Colonoscopy    Other orders  -     Semaglutide-Weight Management 0.5 MG/0.5ML solution auto-injector; Inject 0.5 mL under the skin into the appropriate area as directed 1 (One) Time Per Week.  Dispense: 2 mL; Refill: 1  -     levothyroxine (Synthroid) 150 MCG tablet; Take 1 tablet by mouth Daily.  Dispense: 90 tablet; Refill: 1      Balance issues  - The patient is advised to exercise to build her quadriceps.  - The patient is advised the use of a cane to help with her balance until she  improves.  - The patient is advised the possibility of going back to physical therapy.  - The patient is advised the use of a cart when she goes grocery shopping to keep her stay still.  - The patient is advised to let the office know where she wants to do physical therapy.    Back pain  - The patient is advised the possibility of obtaining x-ray of her hips, which will be scheduled at [facility].    Dizziness  - The patient is advised that her blood pressure becomes low due to dehydration.  - The patient is advised to drink plenty of water.    Alcoholic drinking  - The patient is advised that she needs coping mechanisms.    Fatty tumor  - The patient is advised that she has what looks like a fatty tumor.  - The patient is advised the possiblity of removal of the fatty tumor if she starts to develop numbness in her pinky finger or she could not move her elbow due to pain.    Desire for weight loss  - The patient is advised the use of weekly injections of Wegovy for weight loss.    Health maintenance  - The patient will be ordered a mammogram and will be contacted for it to be scheduled.  - The patient will be ordered a colonoscopy and will be contacted for it to be scheduled.  - The patient will be ordered blood tests today, 04/12/2023.  - The patient will be ordered colonoscopy today, 04/12/2023.      Wrapup Tab  No follow-ups on file.       They were informed of the diagnosis and treatment plan and directed to f/u for any further problems or concerns.      Transcribed from ambient dictation for Uyen Cleveland MD by Lizzy Campbell.  04/12/23   19:15 EDT    Patient or patient representative verbalized consent to the visit recording.  I have personally performed the services described in this document as transcribed by the above individual, and it is both accurate and complete.  Uyen Cleveland MD  4/24/2023  13:48 EDT

## 2023-04-13 ENCOUNTER — TELEPHONE (OUTPATIENT)
Dept: FAMILY MEDICINE CLINIC | Facility: CLINIC | Age: 73
End: 2023-04-13
Payer: MEDICARE

## 2023-04-13 LAB
25(OH)D3 SERPL-MCNC: 57.6 NG/ML (ref 30–100)
ALBUMIN SERPL-MCNC: 4.4 G/DL (ref 3.5–5.2)
ALBUMIN/GLOB SERPL: 1.7 G/DL
ALP SERPL-CCNC: 93 U/L (ref 39–117)
ALT SERPL W P-5'-P-CCNC: 19 U/L (ref 1–33)
ANION GAP SERPL CALCULATED.3IONS-SCNC: 9.7 MMOL/L (ref 5–15)
AST SERPL-CCNC: 23 U/L (ref 1–32)
BASOPHILS # BLD AUTO: 0.01 10*3/MM3 (ref 0–0.2)
BASOPHILS NFR BLD AUTO: 0.1 % (ref 0–1.5)
BILIRUB SERPL-MCNC: <0.2 MG/DL (ref 0–1.2)
BUN SERPL-MCNC: 13 MG/DL (ref 8–23)
BUN/CREAT SERPL: 14.8 (ref 7–25)
CALCIUM SPEC-SCNC: 10.1 MG/DL (ref 8.6–10.5)
CHLORIDE SERPL-SCNC: 100 MMOL/L (ref 98–107)
CHOLEST SERPL-MCNC: 200 MG/DL (ref 0–200)
CO2 SERPL-SCNC: 23.3 MMOL/L (ref 22–29)
CREAT SERPL-MCNC: 0.88 MG/DL (ref 0.57–1)
DEPRECATED RDW RBC AUTO: 43.7 FL (ref 37–54)
EGFRCR SERPLBLD CKD-EPI 2021: 70.4 ML/MIN/1.73
EOSINOPHIL # BLD AUTO: 0.28 10*3/MM3 (ref 0–0.4)
EOSINOPHIL NFR BLD AUTO: 2.9 % (ref 0.3–6.2)
ERYTHROCYTE [DISTWIDTH] IN BLOOD BY AUTOMATED COUNT: 12 % (ref 12.3–15.4)
GLOBULIN UR ELPH-MCNC: 2.6 GM/DL
GLUCOSE SERPL-MCNC: 90 MG/DL (ref 65–99)
HBA1C MFR BLD: 5.8 % (ref 4.8–5.6)
HCT VFR BLD AUTO: 35.3 % (ref 34–46.6)
HDLC SERPL-MCNC: 70 MG/DL (ref 40–60)
HGB BLD-MCNC: 12.2 G/DL (ref 12–15.9)
IMM GRANULOCYTES # BLD AUTO: 0.04 10*3/MM3 (ref 0–0.05)
IMM GRANULOCYTES NFR BLD AUTO: 0.4 % (ref 0–0.5)
LDLC SERPL CALC-MCNC: 107 MG/DL (ref 0–100)
LDLC/HDLC SERPL: 1.48 {RATIO}
LYMPHOCYTES # BLD AUTO: 2.16 10*3/MM3 (ref 0.7–3.1)
LYMPHOCYTES NFR BLD AUTO: 22.5 % (ref 19.6–45.3)
MCH RBC QN AUTO: 34.5 PG (ref 26.6–33)
MCHC RBC AUTO-ENTMCNC: 34.6 G/DL (ref 31.5–35.7)
MCV RBC AUTO: 99.7 FL (ref 79–97)
MONOCYTES # BLD AUTO: 0.69 10*3/MM3 (ref 0.1–0.9)
MONOCYTES NFR BLD AUTO: 7.2 % (ref 5–12)
NEUTROPHILS NFR BLD AUTO: 6.43 10*3/MM3 (ref 1.7–7)
NEUTROPHILS NFR BLD AUTO: 66.9 % (ref 42.7–76)
NRBC BLD AUTO-RTO: 0 /100 WBC (ref 0–0.2)
PLATELET # BLD AUTO: 407 10*3/MM3 (ref 140–450)
PMV BLD AUTO: 9 FL (ref 6–12)
POTASSIUM SERPL-SCNC: 4.5 MMOL/L (ref 3.5–5.2)
PROT SERPL-MCNC: 7 G/DL (ref 6–8.5)
RBC # BLD AUTO: 3.54 10*6/MM3 (ref 3.77–5.28)
SODIUM SERPL-SCNC: 133 MMOL/L (ref 136–145)
TRIGL SERPL-MCNC: 131 MG/DL (ref 0–150)
TSH SERPL DL<=0.05 MIU/L-ACNC: 8.71 UIU/ML (ref 0.27–4.2)
VLDLC SERPL-MCNC: 23 MG/DL (ref 5–40)
WBC NRBC COR # BLD: 9.61 10*3/MM3 (ref 3.4–10.8)

## 2023-04-13 NOTE — TELEPHONE ENCOUNTER
Caller: Schoen, Connie A    Relationship: Self    Best call back number: [phone]    What was the call regarding: PATIENT STATED THAT HER INSURANCE DENIED COVERING THE Semaglutide-Weight Management 0.5 MG/0.5ML solution auto-injector.     PATIENT IS WANTING TO KNOW IF AN ALTERNATIVE MEDICATION CAN BE PRESCRIBE THAT IS COVERED BY HER INSURANCE.     PLEASE CALL TO DISCUSS AND ADVISE.

## 2023-04-13 NOTE — TELEPHONE ENCOUNTER
She is not a candidate for adipex/phentermine . Can check with her insurance and see if any other weight loss are covered.

## 2023-04-14 DIAGNOSIS — M51.36 DEGENERATION OF INTERVERTEBRAL DISC OF LUMBAR REGION: ICD-10-CM

## 2023-04-14 RX ORDER — GABAPENTIN 600 MG/1
TABLET ORAL
Qty: 90 TABLET | Refills: 0 | Status: SHIPPED | OUTPATIENT
Start: 2023-04-14

## 2023-04-14 RX ORDER — LEVOTHYROXINE SODIUM 0.15 MG/1
150 TABLET ORAL DAILY
Qty: 90 TABLET | Refills: 1 | Status: SHIPPED | OUTPATIENT
Start: 2023-04-14

## 2023-04-15 NOTE — PROGRESS NOTES
Thyroid is underactive- will send in new Rx for thyroid med. Sugar is in pre diabetic range- need to work on diet/exercise

## 2023-04-17 ENCOUNTER — TELEPHONE (OUTPATIENT)
Dept: FAMILY MEDICINE CLINIC | Facility: CLINIC | Age: 73
End: 2023-04-17
Payer: MEDICARE

## 2023-04-17 NOTE — TELEPHONE ENCOUNTER
HUB to Share the following:  ----- Message from Uyen Cleveland MD sent at 4/14/2023  9:03 PM EDT -----  Thyroid is underactive- will send in new Rx for thyroid med. Sugar is in pre diabetic range- need to work on diet/exercise   Left detailed voicemail for Connie A Schoen as per verbal release. Advised Connie A Schoen to call the office with any additional questions.

## 2023-04-20 ENCOUNTER — TRANSCRIBE ORDERS (OUTPATIENT)
Dept: ADMINISTRATIVE | Facility: HOSPITAL | Age: 73
End: 2023-04-20
Payer: MEDICARE

## 2023-04-20 DIAGNOSIS — I70.213 ATHEROSCLEROSIS OF NATIVE ARTERIES OF EXTREMITIES WITH INTERMITTENT CLAUDICATION, BILATERAL LEGS: Primary | ICD-10-CM

## 2023-04-24 PROBLEM — M25.551 BILATERAL HIP PAIN: Status: ACTIVE | Noted: 2023-04-24

## 2023-04-24 PROBLEM — M25.552 BILATERAL HIP PAIN: Status: ACTIVE | Noted: 2023-04-24

## 2023-04-24 PROBLEM — Z12.31 SCREENING MAMMOGRAM FOR BREAST CANCER: Status: ACTIVE | Noted: 2023-04-24

## 2023-04-28 ENCOUNTER — HOSPITAL ENCOUNTER (OUTPATIENT)
Dept: CARDIOLOGY | Facility: HOSPITAL | Age: 73
Discharge: HOME | End: 2023-04-28
Payer: MEDICARE

## 2023-04-28 DIAGNOSIS — I70.213 ATHEROSCLEROSIS OF NATIVE ARTERIES OF EXTREMITIES WITH INTERMITTENT CLAUDICATION, BILATERAL LEGS: ICD-10-CM

## 2023-04-28 LAB
BH CV LOWER ARTERIAL LEFT ABI RATIO: 1.29
BH CV LOWER ARTERIAL LEFT DORSALIS PEDIS SYS MAX: 161
BH CV LOWER ARTERIAL LEFT GREAT TOE SYS MAX: 154
BH CV LOWER ARTERIAL LEFT POST TIBIAL SYS MAX: 177
BH CV LOWER ARTERIAL LEFT TBI RATIO: 1.12
BH CV LOWER ARTERIAL RIGHT ABI RATIO: 1.24
BH CV LOWER ARTERIAL RIGHT DORSALIS PEDIS SYS MAX: 161
BH CV LOWER ARTERIAL RIGHT GREAT TOE SYS MAX: 120
BH CV LOWER ARTERIAL RIGHT POST TIBIAL SYS MAX: 170
BH CV LOWER ARTERIAL RIGHT TBI RATIO: 0.88
MAXIMAL PREDICTED HEART RATE: 149 BPM
STRESS TARGET HR: 127 BPM
UPPER ARTERIAL LEFT ARM BRACHIAL SYS MAX: 133 MMHG
UPPER ARTERIAL RIGHT ARM BRACHIAL SYS MAX: 137 MMHG

## 2023-04-28 PROCEDURE — 93922 UPR/L XTREMITY ART 2 LEVELS: CPT

## 2023-05-13 DIAGNOSIS — M51.36 DEGENERATION OF INTERVERTEBRAL DISC OF LUMBAR REGION: ICD-10-CM

## 2023-05-15 RX ORDER — GABAPENTIN 600 MG/1
TABLET ORAL
Qty: 90 TABLET | Refills: 0 | Status: SHIPPED | OUTPATIENT
Start: 2023-05-15

## 2023-05-25 RX ORDER — SERTRALINE HYDROCHLORIDE 100 MG/1
100 TABLET, FILM COATED ORAL DAILY
Qty: 90 TABLET | Refills: 1 | Status: SHIPPED | OUTPATIENT
Start: 2023-05-25

## 2023-05-25 NOTE — TELEPHONE ENCOUNTER
Caller: Schoen, Connie A    Relationship: Self    Best call back number: [phone]    Requested Prescriptions:   Requested Prescriptions     Pending Prescriptions Disp Refills   • sertraline (ZOLOFT) 100 MG tablet 90 tablet 3     Sig: Take 1 tablet by mouth Daily.      Pharmacy where request should be sent: [facility] [id number] [facility] [place] [place] - [address] - [phone]  - [phone] FX     Last office visit with prescribing clinician: 4/12/2023   Last telemedicine visit with prescribing clinician: Visit date not found   Next office visit with prescribing clinician: Visit date not found     Additional details provided by patient: PT HAS 2 DAYS LEFT OF MEDICATION.     Does the patient have less than a 3 day supply:  [x] Yes  [] No    Would you like a call back once the refill request has been completed: [] Yes [x] No    If the office needs to give you a call back, can they leave a voicemail: [] Yes [] No    Chris Bojorquez Rep   05/25/23 11:11 EDT

## 2023-06-05 RX ORDER — LEVOTHYROXINE SODIUM 0.12 MG/1
TABLET ORAL
Qty: 90 TABLET | Refills: 1 | Status: SHIPPED | OUTPATIENT
Start: 2023-06-05

## 2023-06-14 DIAGNOSIS — M51.36 DEGENERATION OF INTERVERTEBRAL DISC OF LUMBAR REGION: ICD-10-CM

## 2023-06-14 RX ORDER — GABAPENTIN 600 MG/1
TABLET ORAL
Qty: 90 TABLET | Refills: 0 | Status: SHIPPED | OUTPATIENT
Start: 2023-06-14

## 2023-09-15 DIAGNOSIS — M51.36 DEGENERATION OF INTERVERTEBRAL DISC OF LUMBAR REGION: ICD-10-CM

## 2023-09-15 RX ORDER — GABAPENTIN 600 MG/1
600 TABLET ORAL 3 TIMES DAILY
Qty: 90 TABLET | Refills: 0 | OUTPATIENT
Start: 2023-09-15

## 2023-09-15 NOTE — TELEPHONE ENCOUNTER
Caller: Schoen, Connie A    Relationship: Self    Best call back number: [phone]     Requested Prescriptions:   Requested Prescriptions     Pending Prescriptions Disp Refills    gabapentin (NEURONTIN) 600 MG tablet 90 tablet 0     Sig: Take 1 tablet by mouth 3 (Three) Times a Day.        Pharmacy where request should be sent:  [facility] [id number] [facility], [place] - [address] - [phone]  - [phone] FX     Last office visit with prescribing clinician: Visit date not found   Last telemedicine visit with prescribing clinician: Visit date not found   Next office visit with prescribing clinician: 9/20/2023     Additional details provided by patient:     Does the patient have less than a 3 day supply:  [x] Yes  [] No    Would you like a call back once the refill request has been completed: [x] Yes [] No    If the office needs to give you a call back, can they leave a voicemail: [x] Yes [] No    April Chris Bennett Rep   09/15/23 09:43 EDT

## 2023-09-29 ENCOUNTER — TELEPHONE (OUTPATIENT)
Dept: NEUROSURGERY | Facility: CLINIC | Age: 73
End: 2023-09-29
Payer: MEDICARE

## 2023-09-29 NOTE — TELEPHONE ENCOUNTER
Caller: Schoen, Connie A    Relationship to patient: Self    Best call back number: [phone]     Patient is needing:   PATIENT CALLED IN TO GET SEEN BY DR ROQUE AGAIN, HAS BEEN SEEING LEYDI BURGESS WITH [facility] SEPULV[facility], AND THEY FEEL PATIENT'S BALANCE ISSUES ARE NOT RELATED TO HER BACK PROBLEMS, AND POSSIBLY BRAIN RELATED.    HAVING BALANCE ISSUES   TROUBLE WALKING UP AND DOWN STEPS  FEELS SHE IS NOT LEVEL FEELING.      PLEASE CALL TO SCHEDULE IF OKAY

## 2023-10-10 RX ORDER — LEVOTHYROXINE SODIUM 0.15 MG/1
150 TABLET ORAL DAILY
Qty: 30 TABLET | Refills: 0 | Status: SHIPPED | OUTPATIENT
Start: 2023-10-10

## 2023-10-11 RX ORDER — LOSARTAN POTASSIUM 100 MG/1
100 TABLET ORAL DAILY
Qty: 90 TABLET | Refills: 3 | Status: SHIPPED | OUTPATIENT
Start: 2023-10-11

## 2023-10-23 ENCOUNTER — LAB (OUTPATIENT)
Dept: FAMILY MEDICINE CLINIC | Facility: CLINIC | Age: 73
End: 2023-10-23
Payer: MEDICARE

## 2023-10-23 ENCOUNTER — OFFICE VISIT (OUTPATIENT)
Dept: FAMILY MEDICINE CLINIC | Facility: CLINIC | Age: 73
End: 2023-10-23
Payer: MEDICARE

## 2023-10-23 VITALS
SYSTOLIC BLOOD PRESSURE: 114 MMHG | RESPIRATION RATE: 20 BRPM | HEART RATE: 85 BPM | BODY MASS INDEX: 29.66 KG/M2 | HEIGHT: 65 IN | DIASTOLIC BLOOD PRESSURE: 82 MMHG | WEIGHT: 178 LBS | OXYGEN SATURATION: 98 %

## 2023-10-23 DIAGNOSIS — M51.36 DEGENERATION OF INTERVERTEBRAL DISC OF LUMBAR REGION: ICD-10-CM

## 2023-10-23 DIAGNOSIS — Z87.891 PERSONAL HISTORY OF NICOTINE DEPENDENCE: ICD-10-CM

## 2023-10-23 DIAGNOSIS — E03.9 ACQUIRED HYPOTHYROIDISM: ICD-10-CM

## 2023-10-23 DIAGNOSIS — Z23 NEED FOR VACCINATION: ICD-10-CM

## 2023-10-23 DIAGNOSIS — E03.9 ACQUIRED HYPOTHYROIDISM: Primary | ICD-10-CM

## 2023-10-23 DIAGNOSIS — Z23 NEED FOR INFLUENZA VACCINATION: ICD-10-CM

## 2023-10-23 DIAGNOSIS — Z72.0 TOBACCO ABUSE: ICD-10-CM

## 2023-10-23 PROBLEM — N39.0 URINARY TRACT INFECTION: Status: RESOLVED | Noted: 2017-01-06 | Resolved: 2023-10-23

## 2023-10-23 PROBLEM — M25.551 BILATERAL HIP PAIN: Status: RESOLVED | Noted: 2023-04-24 | Resolved: 2023-10-23

## 2023-10-23 PROBLEM — M25.552 BILATERAL HIP PAIN: Status: RESOLVED | Noted: 2023-04-24 | Resolved: 2023-10-23

## 2023-10-23 PROBLEM — R55 SYNCOPE: Status: RESOLVED | Noted: 2021-09-19 | Resolved: 2023-10-23

## 2023-10-23 PROBLEM — L73.9 FOLLICULITIS: Status: RESOLVED | Noted: 2017-02-21 | Resolved: 2023-10-23

## 2023-10-23 PROBLEM — Z01.818 PREOPERATIVE CLEARANCE: Status: RESOLVED | Noted: 2018-06-05 | Resolved: 2023-10-23

## 2023-10-23 PROBLEM — M79.605 LEG PAIN, BILATERAL: Status: RESOLVED | Noted: 2017-03-07 | Resolved: 2023-10-23

## 2023-10-23 PROBLEM — M79.604 LEG PAIN, BILATERAL: Status: RESOLVED | Noted: 2017-03-07 | Resolved: 2023-10-23

## 2023-10-23 PROBLEM — Z00.00 MEDICARE ANNUAL WELLNESS VISIT, SUBSEQUENT: Status: RESOLVED | Noted: 2020-08-31 | Resolved: 2023-10-23

## 2023-10-23 PROBLEM — M25.562 KNEE PAIN, LEFT: Status: RESOLVED | Noted: 2018-05-15 | Resolved: 2023-10-23

## 2023-10-23 PROBLEM — E55.9 VITAMIN D DEFICIENCY: Status: RESOLVED | Noted: 2018-04-09 | Resolved: 2023-10-23

## 2023-10-23 PROBLEM — M17.9 OSTEOARTHRITIS OF KNEE: Status: RESOLVED | Noted: 2018-04-09 | Resolved: 2023-10-23

## 2023-10-23 PROCEDURE — 36415 COLL VENOUS BLD VENIPUNCTURE: CPT

## 2023-10-23 PROCEDURE — 84443 ASSAY THYROID STIM HORMONE: CPT | Performed by: NURSE PRACTITIONER

## 2023-10-23 RX ORDER — GABAPENTIN 600 MG/1
600 TABLET ORAL 3 TIMES DAILY
Qty: 270 TABLET | Refills: 2 | Status: SHIPPED | OUTPATIENT
Start: 2023-10-23

## 2023-10-23 RX ORDER — LEVOTHYROXINE SODIUM 0.15 MG/1
150 TABLET ORAL DAILY
Qty: 30 TABLET | Refills: 0 | Status: SHIPPED | OUTPATIENT
Start: 2023-10-23 | End: 2023-10-24

## 2023-10-23 RX ORDER — LOSARTAN POTASSIUM 100 MG/1
100 TABLET ORAL DAILY
Qty: 90 TABLET | Refills: 3 | Status: SHIPPED | OUTPATIENT
Start: 2023-10-23

## 2023-10-23 RX ORDER — SERTRALINE HYDROCHLORIDE 100 MG/1
100 TABLET, FILM COATED ORAL DAILY
Qty: 90 TABLET | Refills: 1 | Status: SHIPPED | OUTPATIENT
Start: 2023-10-23

## 2023-10-23 RX ORDER — PANTOPRAZOLE SODIUM 40 MG/1
40 TABLET, DELAYED RELEASE ORAL DAILY
COMMUNITY
Start: 2023-09-08

## 2023-10-23 NOTE — TELEPHONE ENCOUNTER
Tried to contact patient and her spouse to schedule appt for pt. LM for them to call back to schedule appt with Dr. John.

## 2023-10-23 NOTE — PROGRESS NOTES
"Chief Complaint  Establish Care  Subjective        Connie A Schoen presents to [facility]  History of Present Illness  Pt comes in today get established. Used to see Dr. Cleveland. Needing refills on medications.   Overall doing well.   At last appt in April, her levothyroxine was adjusted based on results. Due for repeat labs. Pt is not taking medication correctly, and we discussed this. Will start taking it first thing every morning alone.          Objective     Vital Signs:   /82   Pulse 85   Resp 20   Ht 165.1 cm (65\")   Wt 80.7 kg (178 lb)   SpO2 98%   BMI 29.62 kg/m²       BP Readings from Last 3 Encounters:   10/23/23 114/82   04/12/23 126/80   09/12/22 134/84       Wt Readings from Last 3 Encounters:   10/23/23 80.7 kg (178 lb)   04/12/23 88.5 kg (195 lb)   09/12/22 86.6 kg (191 lb)     Physical Exam  Constitutional:       Appearance: She is well-developed.   Eyes:      Pupils: Pupils are equal, round, and reactive to light.   Cardiovascular:      Rate and Rhythm: Normal rate and regular rhythm.   Pulmonary:      Effort: Pulmonary effort is normal.      Breath sounds: Normal breath sounds.   Neurological:      Mental Status: She is alert and oriented to person, place, and time.        Result Review :                 Assessment and Plan    Diagnoses and all orders for this visit:    1. Acquired hypothyroidism (Primary)  -     TSH; Future    2. Degeneration of intervertebral disc of lumbar region  -     gabapentin (NEURONTIN) 600 MG tablet; Take 1 tablet by mouth 3 (Three) Times a Day.  Dispense: 270 tablet; Refill: 2    3. Need for vaccination  -     Pneumococcal Conjugate Vaccine 20-Valent All    4. Tobacco abuse  -     CT Chest Low Dose Wo; Future    5. Personal history of nicotine dependence  -     CT Chest Low Dose Wo; Future    6. Need for influenza vaccination  -     Fluzone High-Dose 65+yrs    Other orders  -     levothyroxine (SYNTHROID, LEVOTHROID) 150 MCG " tablet; Take 1 tablet by mouth Daily.  Dispense: 30 tablet; Refill: 0  -     losartan (COZAAR) 100 MG tablet; Take 1 tablet by mouth Daily.  Dispense: 90 tablet; Refill: 3  -     sertraline (ZOLOFT) 100 MG tablet; Take 1 tablet by mouth Daily.  Dispense: 90 tablet; Refill: 1    Refill m eds  Check labs  Prevnar 20 today  Flu vaccine today  Low dose CT chest  Discussed the hazards of tobacco use. Smoking cessation recommended and techniques and options to hep patient quit were discussed.     During this office visit, we discussed the pertinent aspects of the visit and treatment recommendations. Pt verbalizes understanding. Follow up was discussed. Patient was given the opportunity to ask questions and discuss other concerns.         Follow Up   Return in about 2 months (around 12/23/2023) for Medicare Wellness.  Patient was given instructions and counseling regarding her condition or for health maintenance advice. Please see specific information pulled into the AVS if appropriate.

## 2023-10-23 NOTE — TELEPHONE ENCOUNTER
"Caller: Schoen, Connie A    Relationship to patient: Self    Best call back number: [phone] IF NO ANSWER [phone]    Chief complaint: NEEDS TO BE RE-EVALUATED    Type of visit:FOLLOW UP    Requested date: ASAP     If rescheduling, when is the original appointment: NA     Additional notes:PT STATES THAT SHE NEVER RECEIVED A CALL BACK REGARDING AN APPT. PT STATES SHE IS STILL HAVING ISSUES AND WAS TOLD BY DERICK SHE NEEDED TO FOLLOW BACK WITH US-PER PREVIOUS MESSAGE \"PATIENT CALLED IN TO GET SEEN BY DR ROQUE AGAIN, HAS BEEN SEEING LEYDI BURGESS WITH [facility], AND THEY FEEL PATIENT'S BALANCE ISSUES ARE NOT RELATED TO HER BACK PROBLEMS, AND POSSIBLY BRAIN RELATED.     HAVING BALANCE ISSUES   TROUBLE WALKING UP AND DOWN STEPS  FEELS SHE IS NOT LEVEL FEELING.    SENDING TO OFFICE TO REVIEW FOR SCHEDULING THANK YOU PLEASE CONTACT PATIENT BACK FOR APPT.           "

## 2023-10-24 LAB — TSH SERPL DL<=0.05 MIU/L-ACNC: <0.005 UIU/ML (ref 0.27–4.2)

## 2023-10-24 RX ORDER — LEVOTHYROXINE SODIUM 0.12 MG/1
125 TABLET ORAL DAILY
Qty: 30 TABLET | Refills: 1 | Status: SHIPPED | OUTPATIENT
Start: 2023-10-24

## 2023-10-24 NOTE — PROGRESS NOTES
Now she is on too much levothyroxine. I will decrease this and make sure she is taking it correctly. Please repeat labs in 6 weeks.

## 2023-11-01 ENCOUNTER — TELEPHONE (OUTPATIENT)
Dept: NEUROSURGERY | Facility: CLINIC | Age: 73
End: 2023-11-01
Payer: MEDICARE

## 2023-11-01 DIAGNOSIS — I65.09 VERTEBRAL ARTERY STENOSIS, UNSPECIFIED LATERALITY: Primary | ICD-10-CM

## 2023-11-01 NOTE — TELEPHONE ENCOUNTER
Spoke with patient's  and advised that patient will need CTA head and neck prior to being seen. Appt with Dr. John postponed until scans are complete. He voiced understanding.

## 2023-11-08 RX ORDER — LEVOTHYROXINE SODIUM 0.15 MG/1
150 TABLET ORAL DAILY
Qty: 90 TABLET | Refills: 1 | Status: SHIPPED | OUTPATIENT
Start: 2023-11-08

## 2023-11-20 RX ORDER — SERTRALINE HYDROCHLORIDE 100 MG/1
100 TABLET, FILM COATED ORAL DAILY
Qty: 90 TABLET | Refills: 1 | Status: SHIPPED | OUTPATIENT
Start: 2023-11-20

## 2023-11-27 ENCOUNTER — HOSPITAL ENCOUNTER (OUTPATIENT)
Dept: CT IMAGING | Facility: HOSPITAL | Age: 73
Discharge: HOME | End: 2023-11-27
Admitting: NURSE PRACTITIONER
Payer: MEDICARE

## 2023-11-27 DIAGNOSIS — I65.09 VERTEBRAL ARTERY STENOSIS, UNSPECIFIED LATERALITY: ICD-10-CM

## 2023-11-27 DIAGNOSIS — Z87.891 PERSONAL HISTORY OF NICOTINE DEPENDENCE: ICD-10-CM

## 2023-11-27 DIAGNOSIS — Z72.0 TOBACCO ABUSE: ICD-10-CM

## 2023-11-27 LAB
CREAT BLDA-MCNC: 0.9 MG/DL (ref 0.6–1.3)
EGFRCR SERPLBLD CKD-EPI 2021: 68.5 ML/MIN/1.73

## 2023-11-27 PROCEDURE — 25510000001 IOPAMIDOL PER 1 ML: Performed by: RADIOLOGY

## 2023-11-27 PROCEDURE — 70498 CT ANGIOGRAPHY NECK: CPT

## 2023-11-27 PROCEDURE — 70496 CT ANGIOGRAPHY HEAD: CPT

## 2023-11-27 PROCEDURE — 82565 ASSAY OF CREATININE: CPT

## 2023-11-27 PROCEDURE — 71271 CT THORAX LUNG CANCER SCR C-: CPT

## 2023-11-27 RX ADMIN — IOPAMIDOL 100 ML: 755 INJECTION, SOLUTION INTRAVENOUS at 17:35

## 2023-11-29 NOTE — PROGRESS NOTES
Ct chest was negative for pulmonary nodules or malignancy, but did show mild emphysema and cirrhosis of the liver. Is this something she has been evaluated for ? If not, then we need to place a referral to [facility].

## 2023-11-30 ENCOUNTER — TELEPHONE (OUTPATIENT)
Dept: FAMILY MEDICINE CLINIC | Facility: CLINIC | Age: 73
End: 2023-11-30
Payer: MEDICARE

## 2023-11-30 DIAGNOSIS — F10.20 ALCOHOLISM: Primary | ICD-10-CM

## 2023-11-30 DIAGNOSIS — K70.30 ALCOHOLIC CIRRHOSIS, UNSPECIFIED WHETHER ASCITES PRESENT: ICD-10-CM

## 2023-11-30 NOTE — TELEPHONE ENCOUNTER
----- Message from MISAEL Sanchez sent at 11/29/2023  8:10 AM EST -----  Ct chest was negative for pulmonary nodules or malignancy, but did show mild emphysema and cirrhosis of the liver. Is this something she has been evaluated for ? If not, then we need to place a referral to [facility].

## 2024-01-03 RX ORDER — LEVOTHYROXINE SODIUM 0.15 MG/1
150 TABLET ORAL DAILY
Qty: 90 TABLET | Refills: 1 | Status: SHIPPED | OUTPATIENT
Start: 2024-01-03

## 2024-02-07 RX ORDER — LEVOTHYROXINE SODIUM 0.12 MG/1
125 TABLET ORAL DAILY
Qty: 30 TABLET | Refills: 1 | OUTPATIENT
Start: 2024-02-07

## 2024-02-26 RX ORDER — SERTRALINE HYDROCHLORIDE 100 MG/1
100 TABLET, FILM COATED ORAL DAILY
Qty: 90 TABLET | Refills: 1 | Status: SHIPPED | OUTPATIENT
Start: 2024-02-26

## 2024-06-01 DIAGNOSIS — M51.36 DEGENERATION OF INTERVERTEBRAL DISC OF LUMBAR REGION: ICD-10-CM

## 2024-06-03 RX ORDER — GABAPENTIN 600 MG/1
600 TABLET ORAL 3 TIMES DAILY
Qty: 270 TABLET | Refills: 1 | Status: SHIPPED | OUTPATIENT
Start: 2024-06-03

## 2024-06-03 RX ORDER — LEVOTHYROXINE SODIUM 0.15 MG/1
150 TABLET ORAL DAILY
Qty: 90 TABLET | Refills: 1 | Status: SHIPPED | OUTPATIENT
Start: 2024-06-03

## 2024-07-19 RX ORDER — SERTRALINE HYDROCHLORIDE 100 MG/1
100 TABLET, FILM COATED ORAL DAILY
Qty: 90 TABLET | Refills: 1 | Status: SHIPPED | OUTPATIENT
Start: 2024-07-19

## 2024-10-23 DIAGNOSIS — M51.369 DEGENERATION OF INTERVERTEBRAL DISC OF LUMBAR REGION: ICD-10-CM

## 2024-10-24 RX ORDER — LEVOTHYROXINE SODIUM 150 UG/1
150 TABLET ORAL DAILY
Qty: 90 TABLET | Refills: 1 | Status: SHIPPED | OUTPATIENT
Start: 2024-10-24

## 2024-10-24 RX ORDER — GABAPENTIN 600 MG/1
600 TABLET ORAL 3 TIMES DAILY
Qty: 270 TABLET | Refills: 1 | Status: SHIPPED | OUTPATIENT
Start: 2024-10-24

## 2024-11-19 RX ORDER — LOSARTAN POTASSIUM 100 MG/1
100 TABLET ORAL DAILY
Qty: 90 TABLET | Refills: 3 | Status: SHIPPED | OUTPATIENT
Start: 2024-11-19

## 2024-12-11 RX ORDER — SERTRALINE HYDROCHLORIDE 100 MG/1
100 TABLET, FILM COATED ORAL DAILY
Qty: 90 TABLET | Refills: 1 | Status: SHIPPED | OUTPATIENT
Start: 2024-12-11

## 2025-01-01 ENCOUNTER — APPOINTMENT (OUTPATIENT)
Dept: GENERAL RADIOLOGY | Facility: HOSPITAL | Age: 75
End: 2025-01-01
Payer: MEDICARE

## 2025-01-01 ENCOUNTER — HOSPITAL ENCOUNTER (INPATIENT)
Facility: HOSPITAL | Age: 75
LOS: 5 days | End: 2025-01-07
Attending: EMERGENCY MEDICINE | Admitting: EMERGENCY MEDICINE
Payer: MEDICARE

## 2025-01-01 ENCOUNTER — APPOINTMENT (OUTPATIENT)
Dept: CT IMAGING | Facility: HOSPITAL | Age: 75
End: 2025-01-01
Payer: MEDICARE

## 2025-01-01 ENCOUNTER — APPOINTMENT (OUTPATIENT)
Dept: CARDIOLOGY | Facility: HOSPITAL | Age: 75
End: 2025-01-01
Payer: MEDICARE

## 2025-01-01 VITALS
TEMPERATURE: 101.3 F | RESPIRATION RATE: 20 BRPM | HEART RATE: 80 BPM | WEIGHT: 170 LBS | OXYGEN SATURATION: 90 % | SYSTOLIC BLOOD PRESSURE: 71 MMHG | HEIGHT: 65 IN | DIASTOLIC BLOOD PRESSURE: 38 MMHG | BODY MASS INDEX: 28.32 KG/M2

## 2025-01-01 DIAGNOSIS — N17.9 ACUTE RENAL FAILURE, UNSPECIFIED ACUTE RENAL FAILURE TYPE: Primary | ICD-10-CM

## 2025-01-01 DIAGNOSIS — N39.0 ACUTE UTI: ICD-10-CM

## 2025-01-01 DIAGNOSIS — J96.01 ACUTE RESPIRATORY FAILURE WITH HYPOXIA: ICD-10-CM

## 2025-01-01 LAB
ALBUMIN SERPL-MCNC: 2.3 G/DL (ref 3.5–5.2)
ALBUMIN SERPL-MCNC: 2.4 G/DL (ref 3.5–5.2)
ALBUMIN SERPL-MCNC: 2.5 G/DL (ref 3.5–5.2)
ALBUMIN SERPL-MCNC: 2.5 G/DL (ref 3.5–5.2)
ALBUMIN SERPL-MCNC: 2.8 G/DL (ref 3.5–5.2)
ALBUMIN SERPL-MCNC: 2.9 G/DL (ref 3.5–5.2)
ALBUMIN SERPL-MCNC: 3 G/DL (ref 3.5–5.2)
ALBUMIN/GLOB SERPL: 0.6 G/DL
ALBUMIN/GLOB SERPL: 0.6 G/DL
ALBUMIN/GLOB SERPL: 0.7 G/DL
ALBUMIN/GLOB SERPL: 0.8 G/DL
ALP SERPL-CCNC: 226 U/L (ref 39–117)
ALP SERPL-CCNC: 244 U/L (ref 39–117)
ALP SERPL-CCNC: 245 U/L (ref 39–117)
ALP SERPL-CCNC: 248 U/L (ref 39–117)
ALP SERPL-CCNC: 251 U/L (ref 39–117)
ALP SERPL-CCNC: 265 U/L (ref 39–117)
ALP SERPL-CCNC: 293 U/L (ref 39–117)
ALT SERPL W P-5'-P-CCNC: 13 U/L (ref 1–33)
ALT SERPL W P-5'-P-CCNC: 16 U/L (ref 1–33)
ALT SERPL W P-5'-P-CCNC: 16 U/L (ref 1–33)
ALT SERPL W P-5'-P-CCNC: 17 U/L (ref 1–33)
ALT SERPL W P-5'-P-CCNC: 18 U/L (ref 1–33)
ALT SERPL W P-5'-P-CCNC: 18 U/L (ref 1–33)
ALT SERPL W P-5'-P-CCNC: 23 U/L (ref 1–33)
AMYLASE SERPL-CCNC: 313 U/L (ref 28–100)
ANION GAP SERPL CALCULATED.3IONS-SCNC: 12.5 MMOL/L (ref 5–15)
ANION GAP SERPL CALCULATED.3IONS-SCNC: 12.7 MMOL/L (ref 5–15)
ANION GAP SERPL CALCULATED.3IONS-SCNC: 12.9 MMOL/L (ref 5–15)
ANION GAP SERPL CALCULATED.3IONS-SCNC: 13.3 MMOL/L (ref 5–15)
ANION GAP SERPL CALCULATED.3IONS-SCNC: 14 MMOL/L (ref 5–15)
ANION GAP SERPL CALCULATED.3IONS-SCNC: 14.6 MMOL/L (ref 5–15)
ANION GAP SERPL CALCULATED.3IONS-SCNC: 16.2 MMOL/L (ref 5–15)
ANION GAP SERPL CALCULATED.3IONS-SCNC: 16.6 MMOL/L (ref 5–15)
ANION GAP SERPL CALCULATED.3IONS-SCNC: 16.9 MMOL/L (ref 5–15)
ANION GAP SERPL CALCULATED.3IONS-SCNC: 17.7 MMOL/L (ref 5–15)
ANION GAP SERPL CALCULATED.3IONS-SCNC: 19.3 MMOL/L (ref 5–15)
AORTIC DIMENSIONLESS INDEX: 0.55 (DI)
APTT PPP: 35.4 SECONDS (ref 22.7–35.4)
ARTERIAL PATENCY WRIST A: ABNORMAL
ARTERIAL PATENCY WRIST A: POSITIVE
AST SERPL-CCNC: 24 U/L (ref 1–32)
AST SERPL-CCNC: 24 U/L (ref 1–32)
AST SERPL-CCNC: 25 U/L (ref 1–32)
AST SERPL-CCNC: 26 U/L (ref 1–32)
AST SERPL-CCNC: 29 U/L (ref 1–32)
AST SERPL-CCNC: 29 U/L (ref 1–32)
AST SERPL-CCNC: 34 U/L (ref 1–32)
ATMOSPHERIC PRESS: ABNORMAL MM[HG]
AV MEAN PRESS GRAD SYS DOP V1V2: 3 MMHG
AV VMAX SYS DOP: 125 CM/SEC
B PARAPERT DNA SPEC QL NAA+PROBE: NOT DETECTED
B PERT DNA SPEC QL NAA+PROBE: NOT DETECTED
BACTERIA SPEC AEROBE CULT: ABNORMAL
BACTERIA SPEC AEROBE CULT: NORMAL
BACTERIA SPEC AEROBE CULT: NORMAL
BACTERIA SPEC RESP CULT: NORMAL
BACTERIA UR QL AUTO: ABNORMAL /HPF
BASE EXCESS BLDA CALC-SCNC: -13.5 MMOL/L (ref 0–3)
BASE EXCESS BLDA CALC-SCNC: -4.4 MMOL/L (ref 0–3)
BASE EXCESS BLDA CALC-SCNC: -4.7 MMOL/L (ref 0–3)
BASE EXCESS BLDA CALC-SCNC: -7.7 MMOL/L (ref 0–3)
BASE EXCESS BLDA CALC-SCNC: 1.4 MMOL/L (ref 0–3)
BASE EXCESS BLDA CALC-SCNC: 1.8 MMOL/L (ref 0–3)
BASE EXCESS BLDA CALC-SCNC: 2.9 MMOL/L (ref 0–3)
BASOPHILS # BLD AUTO: 0.08 10*3/MM3 (ref 0–0.2)
BASOPHILS # BLD AUTO: 0.1 10*3/MM3 (ref 0–0.2)
BASOPHILS # BLD AUTO: 0.13 10*3/MM3 (ref 0–0.2)
BASOPHILS NFR BLD AUTO: 0.3 % (ref 0–1.5)
BASOPHILS NFR BLD AUTO: 0.3 % (ref 0–1.5)
BASOPHILS NFR BLD AUTO: 0.4 % (ref 0–1.5)
BDY SITE: ABNORMAL
BH CV ECHO LEFT VENTRICLE GLOBAL LONGITUDINAL STRAIN: -14.2 %
BH CV ECHO MEAS - ACS: 1.5 CM
BH CV ECHO MEAS - AO MAX PG: 6.3 MMHG
BH CV ECHO MEAS - AO V2 VTI: 30.5 CM
BH CV ECHO MEAS - AVA(I,D): 2.34 CM2
BH CV ECHO MEAS - EDV(CUBED): 103.8 ML
BH CV ECHO MEAS - EDV(MOD-SP4): 91.4 ML
BH CV ECHO MEAS - EF(MOD-SP4): 57.1 %
BH CV ECHO MEAS - ESV(CUBED): 32.8 ML
BH CV ECHO MEAS - ESV(MOD-SP4): 39.2 ML
BH CV ECHO MEAS - FS: 31.9 %
BH CV ECHO MEAS - IVS/LVPW: 1.1 CM
BH CV ECHO MEAS - IVSD: 1.1 CM
BH CV ECHO MEAS - LA DIMENSION: 2.5 CM
BH CV ECHO MEAS - LAT PEAK E' VEL: 8.4 CM/SEC
BH CV ECHO MEAS - LV DIASTOLIC VOL/BSA (35-75): 49.5 CM2
BH CV ECHO MEAS - LV MASS(C)D: 175.8 GRAMS
BH CV ECHO MEAS - LV MAX PG: 1.89 MMHG
BH CV ECHO MEAS - LV MEAN PG: 1 MMHG
BH CV ECHO MEAS - LV SYSTOLIC VOL/BSA (12-30): 21.2 CM2
BH CV ECHO MEAS - LV V1 MAX: 68.7 CM/SEC
BH CV ECHO MEAS - LV V1 VTI: 18.8 CM
BH CV ECHO MEAS - LVIDD: 4.7 CM
BH CV ECHO MEAS - LVIDS: 3.2 CM
BH CV ECHO MEAS - LVOT AREA: 3.8 CM2
BH CV ECHO MEAS - LVOT DIAM: 2.2 CM
BH CV ECHO MEAS - LVPWD: 1 CM
BH CV ECHO MEAS - MED PEAK E' VEL: 6.1 CM/SEC
BH CV ECHO MEAS - MV A MAX VEL: 69.2 CM/SEC
BH CV ECHO MEAS - MV DEC SLOPE: 98.4 CM/SEC2
BH CV ECHO MEAS - MV DEC TIME: 0.24 SEC
BH CV ECHO MEAS - MV E MAX VEL: 65 CM/SEC
BH CV ECHO MEAS - MV E/A: 0.94
BH CV ECHO MEAS - MV MAX PG: 1.84 MMHG
BH CV ECHO MEAS - MV MEAN PG: 1 MMHG
BH CV ECHO MEAS - MV P1/2T: 195.3 MSEC
BH CV ECHO MEAS - MV V2 VTI: 33.3 CM
BH CV ECHO MEAS - MVA(P1/2T): 1.13 CM2
BH CV ECHO MEAS - MVA(VTI): 2.15 CM2
BH CV ECHO MEAS - PA ACC TIME: 0.21 SEC
BH CV ECHO MEAS - PA V2 MAX: 65.6 CM/SEC
BH CV ECHO MEAS - PULM A REVS DUR: 0.1 SEC
BH CV ECHO MEAS - PULM A REVS VEL: 22.5 CM/SEC
BH CV ECHO MEAS - PULM DIAS VEL: 29 CM/SEC
BH CV ECHO MEAS - PULM S/D: 1.1
BH CV ECHO MEAS - PULM SYS VEL: 32 CM/SEC
BH CV ECHO MEAS - RV MAX PG: 0.92 MMHG
BH CV ECHO MEAS - RV V1 MAX: 47.9 CM/SEC
BH CV ECHO MEAS - RV V1 VTI: 14.6 CM
BH CV ECHO MEAS - RVDD: 3.9 CM
BH CV ECHO MEAS - SV(LVOT): 71.5 ML
BH CV ECHO MEAS - SV(MOD-SP4): 52.2 ML
BH CV ECHO MEAS - SVI(LVOT): 38.7 ML/M2
BH CV ECHO MEAS - SVI(MOD-SP4): 28.3 ML/M2
BH CV ECHO MEAS - TAPSE (>1.6): 1.94 CM
BH CV ECHO MEAS - TR MAX PG: 27.9 MMHG
BH CV ECHO MEAS - TR MAX VEL: 264 CM/SEC
BH CV ECHO MEASUREMENTS AVERAGE E/E' RATIO: 8.97
BH CV XLRA - TDI S': 8.4 CM/SEC
BILIRUB SERPL-MCNC: 0.2 MG/DL (ref 0–1.2)
BILIRUB SERPL-MCNC: 0.3 MG/DL (ref 0–1.2)
BILIRUB SERPL-MCNC: 0.4 MG/DL (ref 0–1.2)
BILIRUB SERPL-MCNC: 0.5 MG/DL (ref 0–1.2)
BILIRUB UR QL STRIP: NEGATIVE
BUN SERPL-MCNC: 62 MG/DL (ref 8–23)
BUN SERPL-MCNC: 64 MG/DL (ref 8–23)
BUN SERPL-MCNC: 64 MG/DL (ref 8–23)
BUN SERPL-MCNC: 66 MG/DL (ref 8–23)
BUN SERPL-MCNC: 66 MG/DL (ref 8–23)
BUN SERPL-MCNC: 67 MG/DL (ref 8–23)
BUN SERPL-MCNC: 67 MG/DL (ref 8–23)
BUN SERPL-MCNC: 69 MG/DL (ref 8–23)
BUN SERPL-MCNC: 77 MG/DL (ref 8–23)
BUN SERPL-MCNC: 77 MG/DL (ref 8–23)
BUN SERPL-MCNC: 78 MG/DL (ref 8–23)
BUN/CREAT SERPL: 15.1 (ref 7–25)
BUN/CREAT SERPL: 15.8 (ref 7–25)
BUN/CREAT SERPL: 15.8 (ref 7–25)
BUN/CREAT SERPL: 16.9 (ref 7–25)
BUN/CREAT SERPL: 17.1 (ref 7–25)
BUN/CREAT SERPL: 17.1 (ref 7–25)
BUN/CREAT SERPL: 18 (ref 7–25)
BUN/CREAT SERPL: 18.2 (ref 7–25)
BUN/CREAT SERPL: 18.7 (ref 7–25)
BUN/CREAT SERPL: 20.4 (ref 7–25)
BUN/CREAT SERPL: 23.9 (ref 7–25)
BURR CELLS BLD QL SMEAR: ABNORMAL
C PNEUM DNA NPH QL NAA+NON-PROBE: NOT DETECTED
C3 FRG RBC-MCNC: ABNORMAL
CA-I BLDA-SCNC: 1.13 MMOL/L (ref 1.15–1.33)
CA-I BLDA-SCNC: 1.25 MMOL/L (ref 1.15–1.33)
CA-I BLDA-SCNC: 1.25 MMOL/L (ref 1.15–1.33)
CA-I SERPL ISE-MCNC: 0.98 MMOL/L (ref 1.15–1.3)
CA-I SERPL ISE-MCNC: 1.14 MMOL/L (ref 1.15–1.3)
CALCIUM SPEC-SCNC: 7.7 MG/DL (ref 8.6–10.5)
CALCIUM SPEC-SCNC: 8.1 MG/DL (ref 8.6–10.5)
CALCIUM SPEC-SCNC: 8.3 MG/DL (ref 8.6–10.5)
CALCIUM SPEC-SCNC: 8.6 MG/DL (ref 8.6–10.5)
CALCIUM SPEC-SCNC: 8.6 MG/DL (ref 8.6–10.5)
CALCIUM SPEC-SCNC: 8.7 MG/DL (ref 8.6–10.5)
CALCIUM SPEC-SCNC: 8.9 MG/DL (ref 8.6–10.5)
CALCIUM SPEC-SCNC: 9.1 MG/DL (ref 8.6–10.5)
CALCIUM SPEC-SCNC: 9.3 MG/DL (ref 8.6–10.5)
CHLORIDE SERPL-SCNC: 101 MMOL/L (ref 98–107)
CHLORIDE SERPL-SCNC: 103 MMOL/L (ref 98–107)
CHLORIDE SERPL-SCNC: 103 MMOL/L (ref 98–107)
CHLORIDE SERPL-SCNC: 105 MMOL/L (ref 98–107)
CHLORIDE SERPL-SCNC: 109 MMOL/L (ref 98–107)
CHLORIDE SERPL-SCNC: 110 MMOL/L (ref 98–107)
CHLORIDE SERPL-SCNC: 111 MMOL/L (ref 98–107)
CHLORIDE SERPL-SCNC: 111 MMOL/L (ref 98–107)
CHLORIDE SERPL-SCNC: 113 MMOL/L (ref 98–107)
CHOLEST SERPL-MCNC: 93 MG/DL (ref 0–200)
CK SERPL-CCNC: 50 U/L (ref 20–180)
CLARITY UR: ABNORMAL
CO2 BLDA-SCNC: 20.3 MMOL/L (ref 22–29)
CO2 BLDA-SCNC: 22 MMOL/L (ref 22–29)
CO2 BLDA-SCNC: 26.5 MMOL/L (ref 22–29)
CO2 BLDA-SCNC: 27.9 MMOL/L (ref 22–29)
CO2 SERPL-SCNC: 13.3 MMOL/L (ref 22–29)
CO2 SERPL-SCNC: 15.1 MMOL/L (ref 22–29)
CO2 SERPL-SCNC: 18.7 MMOL/L (ref 22–29)
CO2 SERPL-SCNC: 19.4 MMOL/L (ref 22–29)
CO2 SERPL-SCNC: 20 MMOL/L (ref 22–29)
CO2 SERPL-SCNC: 20.5 MMOL/L (ref 22–29)
CO2 SERPL-SCNC: 20.7 MMOL/L (ref 22–29)
CO2 SERPL-SCNC: 21.1 MMOL/L (ref 22–29)
CO2 SERPL-SCNC: 21.3 MMOL/L (ref 22–29)
CO2 SERPL-SCNC: 21.4 MMOL/L (ref 22–29)
CO2 SERPL-SCNC: 24.8 MMOL/L (ref 22–29)
COLOR UR: YELLOW
CREAT BLDA-MCNC: 2.47 MG/DL (ref 0.6–1.3)
CREAT BLDA-MCNC: 3.52 MG/DL (ref 0.6–1.3)
CREAT BLDA-MCNC: 4.31 MG/DL (ref 0.6–1.3)
CREAT SERPL-MCNC: 2.59 MG/DL (ref 0.57–1)
CREAT SERPL-MCNC: 3.14 MG/DL (ref 0.57–1)
CREAT SERPL-MCNC: 3.43 MG/DL (ref 0.57–1)
CREAT SERPL-MCNC: 3.63 MG/DL (ref 0.57–1)
CREAT SERPL-MCNC: 3.66 MG/DL (ref 0.57–1)
CREAT SERPL-MCNC: 3.91 MG/DL (ref 0.57–1)
CREAT SERPL-MCNC: 3.92 MG/DL (ref 0.57–1)
CREAT SERPL-MCNC: 4.08 MG/DL (ref 0.57–1)
CREAT SERPL-MCNC: 4.86 MG/DL (ref 0.57–1)
CREAT SERPL-MCNC: 4.95 MG/DL (ref 0.57–1)
CREAT SERPL-MCNC: 5.1 MG/DL (ref 0.57–1)
D-LACTATE SERPL-SCNC: 0.5 MMOL/L (ref 0.2–2)
D-LACTATE SERPL-SCNC: 0.6 MMOL/L (ref 0.2–2)
D-LACTATE SERPL-SCNC: 0.6 MMOL/L (ref 0.2–2)
D-LACTATE SERPL-SCNC: 0.7 MMOL/L (ref 0.3–2)
D-LACTATE SERPL-SCNC: 1 MMOL/L (ref 0.2–2)
DEPRECATED RDW RBC AUTO: 45.8 FL (ref 37–54)
DEPRECATED RDW RBC AUTO: 46.3 FL (ref 37–54)
DEPRECATED RDW RBC AUTO: 46.6 FL (ref 37–54)
DEPRECATED RDW RBC AUTO: 46.7 FL (ref 37–54)
DEPRECATED RDW RBC AUTO: 49.2 FL (ref 37–54)
DEPRECATED RDW RBC AUTO: 50 FL (ref 37–54)
DEPRECATED RDW RBC AUTO: 51.7 FL (ref 37–54)
EGFRCR SERPLBLD CKD-EPI 2021: 10.4 ML/MIN/1.73
EGFRCR SERPLBLD CKD-EPI 2021: 11.1 ML/MIN/1.73
EGFRCR SERPLBLD CKD-EPI 2021: 11.6 ML/MIN/1.73
EGFRCR SERPLBLD CKD-EPI 2021: 11.7 ML/MIN/1.73
EGFRCR SERPLBLD CKD-EPI 2021: 12.6 ML/MIN/1.73
EGFRCR SERPLBLD CKD-EPI 2021: 12.8 ML/MIN/1.73
EGFRCR SERPLBLD CKD-EPI 2021: 13.2 ML/MIN/1.73
EGFRCR SERPLBLD CKD-EPI 2021: 13.7 ML/MIN/1.73
EGFRCR SERPLBLD CKD-EPI 2021: 15.2 ML/MIN/1.73
EGFRCR SERPLBLD CKD-EPI 2021: 19.1 ML/MIN/1.73
EGFRCR SERPLBLD CKD-EPI 2021: 20.3 ML/MIN/1.73
EGFRCR SERPLBLD CKD-EPI 2021: 8.5 ML/MIN/1.73
EGFRCR SERPLBLD CKD-EPI 2021: 8.8 ML/MIN/1.73
EGFRCR SERPLBLD CKD-EPI 2021: 9 ML/MIN/1.73
EOSINOPHIL # BLD AUTO: 0 10*3/MM3 (ref 0–0.4)
EOSINOPHIL # BLD AUTO: 0 10*3/MM3 (ref 0–0.4)
EOSINOPHIL # BLD AUTO: 0.04 10*3/MM3 (ref 0–0.4)
EOSINOPHIL NFR BLD AUTO: 0 % (ref 0.3–6.2)
EOSINOPHIL NFR BLD AUTO: 0 % (ref 0.3–6.2)
EOSINOPHIL NFR BLD AUTO: 0.1 % (ref 0.3–6.2)
ERYTHROCYTE [DISTWIDTH] IN BLOOD BY AUTOMATED COUNT: 13.9 % (ref 12.3–15.4)
ERYTHROCYTE [DISTWIDTH] IN BLOOD BY AUTOMATED COUNT: 14 % (ref 12.3–15.4)
ERYTHROCYTE [DISTWIDTH] IN BLOOD BY AUTOMATED COUNT: 14 % (ref 12.3–15.4)
ERYTHROCYTE [DISTWIDTH] IN BLOOD BY AUTOMATED COUNT: 14.1 % (ref 12.3–15.4)
ERYTHROCYTE [DISTWIDTH] IN BLOOD BY AUTOMATED COUNT: 14.4 % (ref 12.3–15.4)
ERYTHROCYTE [DISTWIDTH] IN BLOOD BY AUTOMATED COUNT: 14.4 % (ref 12.3–15.4)
ERYTHROCYTE [DISTWIDTH] IN BLOOD BY AUTOMATED COUNT: 14.6 % (ref 12.3–15.4)
FLUAV SUBTYP SPEC NAA+PROBE: NOT DETECTED
FLUBV RNA ISLT QL NAA+PROBE: NOT DETECTED
GLOBULIN UR ELPH-MCNC: 3.4 GM/DL
GLOBULIN UR ELPH-MCNC: 3.6 GM/DL
GLOBULIN UR ELPH-MCNC: 3.7 GM/DL
GLOBULIN UR ELPH-MCNC: 3.8 GM/DL
GLOBULIN UR ELPH-MCNC: 3.9 GM/DL
GLOBULIN UR ELPH-MCNC: 4.1 GM/DL
GLOBULIN UR ELPH-MCNC: 4.2 GM/DL
GLUCOSE BLDC GLUCOMTR-MCNC: 102 MG/DL (ref 70–105)
GLUCOSE BLDC GLUCOMTR-MCNC: 105 MG/DL (ref 70–105)
GLUCOSE BLDC GLUCOMTR-MCNC: 108 MG/DL (ref 70–105)
GLUCOSE BLDC GLUCOMTR-MCNC: 111 MG/DL (ref 70–105)
GLUCOSE BLDC GLUCOMTR-MCNC: 114 MG/DL (ref 70–105)
GLUCOSE BLDC GLUCOMTR-MCNC: 119 MG/DL (ref 70–105)
GLUCOSE BLDC GLUCOMTR-MCNC: 124 MG/DL (ref 70–105)
GLUCOSE BLDC GLUCOMTR-MCNC: 127 MG/DL (ref 74–100)
GLUCOSE BLDC GLUCOMTR-MCNC: 127 MG/DL (ref 74–100)
GLUCOSE BLDC GLUCOMTR-MCNC: 131 MG/DL (ref 70–105)
GLUCOSE BLDC GLUCOMTR-MCNC: 136 MG/DL (ref 70–105)
GLUCOSE BLDC GLUCOMTR-MCNC: 141 MG/DL (ref 70–105)
GLUCOSE BLDC GLUCOMTR-MCNC: 171 MG/DL (ref 70–105)
GLUCOSE BLDC GLUCOMTR-MCNC: 215 MG/DL (ref 70–105)
GLUCOSE BLDC GLUCOMTR-MCNC: 513 MG/DL (ref 74–100)
GLUCOSE BLDC GLUCOMTR-MCNC: 513 MG/DL (ref 74–100)
GLUCOSE BLDC GLUCOMTR-MCNC: 63 MG/DL (ref 70–105)
GLUCOSE BLDC GLUCOMTR-MCNC: 83 MG/DL (ref 74–100)
GLUCOSE BLDC GLUCOMTR-MCNC: 90 MG/DL (ref 74–100)
GLUCOSE BLDC GLUCOMTR-MCNC: 90 MG/DL (ref 74–100)
GLUCOSE BLDC GLUCOMTR-MCNC: 92 MG/DL (ref 70–105)
GLUCOSE SERPL-MCNC: 108 MG/DL (ref 65–99)
GLUCOSE SERPL-MCNC: 108 MG/DL (ref 65–99)
GLUCOSE SERPL-MCNC: 109 MG/DL (ref 65–99)
GLUCOSE SERPL-MCNC: 119 MG/DL (ref 65–99)
GLUCOSE SERPL-MCNC: 128 MG/DL (ref 65–99)
GLUCOSE SERPL-MCNC: 162 MG/DL (ref 65–99)
GLUCOSE SERPL-MCNC: 194 MG/DL (ref 65–99)
GLUCOSE SERPL-MCNC: 204 MG/DL (ref 65–99)
GLUCOSE SERPL-MCNC: 218 MG/DL (ref 65–99)
GLUCOSE SERPL-MCNC: 82 MG/DL (ref 65–99)
GLUCOSE SERPL-MCNC: 96 MG/DL (ref 65–99)
GLUCOSE UR STRIP-MCNC: NEGATIVE MG/DL
GRAM STN SPEC: NORMAL
HADV DNA SPEC NAA+PROBE: NOT DETECTED
HCO3 BLDA-SCNC: 14.5 MMOL/L (ref 21–28)
HCO3 BLDA-SCNC: 19 MMOL/L (ref 21–28)
HCO3 BLDA-SCNC: 19.7 MMOL/L (ref 21–28)
HCO3 BLDA-SCNC: 20.8 MMOL/L (ref 21–28)
HCO3 BLDA-SCNC: 25.5 MMOL/L (ref 21–28)
HCO3 BLDA-SCNC: 26.7 MMOL/L (ref 21–28)
HCO3 BLDA-SCNC: 26.8 MMOL/L (ref 21–28)
HCOV 229E RNA SPEC QL NAA+PROBE: NOT DETECTED
HCOV HKU1 RNA SPEC QL NAA+PROBE: NOT DETECTED
HCOV NL63 RNA SPEC QL NAA+PROBE: NOT DETECTED
HCOV OC43 RNA SPEC QL NAA+PROBE: NOT DETECTED
HCT VFR BLD AUTO: 29.3 % (ref 34–46.6)
HCT VFR BLD AUTO: 29.5 % (ref 34–46.6)
HCT VFR BLD AUTO: 30.1 % (ref 34–46.6)
HCT VFR BLD AUTO: 32.3 % (ref 34–46.6)
HCT VFR BLD AUTO: 33 % (ref 34–46.6)
HCT VFR BLD AUTO: 33.2 % (ref 34–46.6)
HCT VFR BLD AUTO: 37 % (ref 34–46.6)
HCT VFR BLDA CALC: 34 % (ref 38–51)
HCT VFR BLDA CALC: 36 % (ref 38–51)
HCT VFR BLDA CALC: 39 % (ref 38–51)
HDLC SERPL-MCNC: 17 MG/DL (ref 40–60)
HEMODILUTION: NO
HGB BLD-MCNC: 10.5 G/DL (ref 12–15.9)
HGB BLD-MCNC: 10.6 G/DL (ref 12–15.9)
HGB BLD-MCNC: 10.9 G/DL (ref 12–15.9)
HGB BLD-MCNC: 11.4 G/DL (ref 12–15.9)
HGB BLD-MCNC: 11.5 G/DL (ref 12–15.9)
HGB BLD-MCNC: 12 G/DL (ref 12–15.9)
HGB BLD-MCNC: 12.5 G/DL (ref 12–15.9)
HGB BLDA-MCNC: 11.5 G/DL (ref 12–17)
HGB BLDA-MCNC: 12.2 G/DL (ref 12–17)
HGB BLDA-MCNC: 13.1 G/DL (ref 12–17)
HGB UR QL STRIP.AUTO: ABNORMAL
HMPV RNA NPH QL NAA+NON-PROBE: NOT DETECTED
HPIV1 RNA ISLT QL NAA+PROBE: NOT DETECTED
HPIV2 RNA SPEC QL NAA+PROBE: NOT DETECTED
HPIV3 RNA NPH QL NAA+PROBE: NOT DETECTED
HPIV4 P GENE NPH QL NAA+PROBE: NOT DETECTED
HYALINE CASTS UR QL AUTO: ABNORMAL /LPF
IMM GRANULOCYTES # BLD AUTO: 0.37 10*3/MM3 (ref 0–0.05)
IMM GRANULOCYTES # BLD AUTO: 0.4 10*3/MM3 (ref 0–0.05)
IMM GRANULOCYTES # BLD AUTO: 0.76 10*3/MM3 (ref 0–0.05)
IMM GRANULOCYTES NFR BLD AUTO: 1 % (ref 0–0.5)
IMM GRANULOCYTES NFR BLD AUTO: 1.5 % (ref 0–0.5)
IMM GRANULOCYTES NFR BLD AUTO: 2.3 % (ref 0–0.5)
INHALED O2 CONCENTRATION: 100 %
INHALED O2 CONCENTRATION: 100 %
INHALED O2 CONCENTRATION: 40 %
INHALED O2 CONCENTRATION: 40 %
INHALED O2 CONCENTRATION: 60 %
INHALED O2 CONCENTRATION: 78 %
INHALED O2 CONCENTRATION: 80 %
INR PPP: 1.48 (ref 0.9–1.1)
KETONES UR QL STRIP: ABNORMAL
L PNEUMO1 AG UR QL IA: NEGATIVE
LDLC SERPL CALC-MCNC: 48 MG/DL (ref 0–100)
LDLC/HDLC SERPL: 2.54 {RATIO}
LEUKOCYTE ESTERASE UR QL STRIP.AUTO: ABNORMAL
LIPASE SERPL-CCNC: 210 U/L (ref 13–60)
LIPASE SERPL-CCNC: 691 U/L (ref 13–60)
LV EF BIPLANE MOD: 57 %
LYMPHOCYTES # BLD AUTO: 0.45 10*3/MM3 (ref 0.7–3.1)
LYMPHOCYTES # BLD AUTO: 0.55 10*3/MM3 (ref 0.7–3.1)
LYMPHOCYTES # BLD AUTO: 0.55 10*3/MM3 (ref 0.7–3.1)
LYMPHOCYTES # BLD MANUAL: 0.67 10*3/MM3 (ref 0.7–3.1)
LYMPHOCYTES # BLD MANUAL: 1.01 10*3/MM3 (ref 0.7–3.1)
LYMPHOCYTES # BLD MANUAL: 1.32 10*3/MM3 (ref 0.7–3.1)
LYMPHOCYTES # BLD MANUAL: 2 10*3/MM3 (ref 0.7–3.1)
LYMPHOCYTES NFR BLD AUTO: 1.5 % (ref 19.6–45.3)
LYMPHOCYTES NFR BLD AUTO: 1.7 % (ref 19.6–45.3)
LYMPHOCYTES NFR BLD AUTO: 1.7 % (ref 19.6–45.3)
LYMPHOCYTES NFR BLD MANUAL: 1 % (ref 5–12)
LYMPHOCYTES NFR BLD MANUAL: 2 % (ref 5–12)
LYMPHOCYTES NFR BLD MANUAL: 3 % (ref 5–12)
LYMPHOCYTES NFR BLD MANUAL: 4 % (ref 5–12)
Lab: ABNORMAL
M PNEUMO IGG SER IA-ACNC: NOT DETECTED
MAGNESIUM SERPL-MCNC: 2.1 MG/DL (ref 1.6–2.4)
MAGNESIUM SERPL-MCNC: 2.4 MG/DL (ref 1.6–2.4)
MAGNESIUM SERPL-MCNC: 2.5 MG/DL (ref 1.6–2.4)
MAGNESIUM SERPL-MCNC: 2.8 MG/DL (ref 1.6–2.4)
MAGNESIUM SERPL-MCNC: 2.8 MG/DL (ref 1.6–2.4)
MAGNESIUM SERPL-MCNC: 3.1 MG/DL (ref 1.6–2.4)
MAGNESIUM SERPL-MCNC: 3.2 MG/DL (ref 1.6–2.4)
MCH RBC QN AUTO: 32.3 PG (ref 26.6–33)
MCH RBC QN AUTO: 32.7 PG (ref 26.6–33)
MCH RBC QN AUTO: 32.8 PG (ref 26.6–33)
MCH RBC QN AUTO: 32.9 PG (ref 26.6–33)
MCH RBC QN AUTO: 32.9 PG (ref 26.6–33)
MCHC RBC AUTO-ENTMCNC: 33.8 G/DL (ref 31.5–35.7)
MCHC RBC AUTO-ENTMCNC: 34.6 G/DL (ref 31.5–35.7)
MCHC RBC AUTO-ENTMCNC: 35.3 G/DL (ref 31.5–35.7)
MCHC RBC AUTO-ENTMCNC: 35.6 G/DL (ref 31.5–35.7)
MCHC RBC AUTO-ENTMCNC: 36.2 G/DL (ref 31.5–35.7)
MCHC RBC AUTO-ENTMCNC: 36.2 G/DL (ref 31.5–35.7)
MCHC RBC AUTO-ENTMCNC: 36.4 G/DL (ref 31.5–35.7)
MCV RBC AUTO: 90.2 FL (ref 79–97)
MCV RBC AUTO: 90.4 FL (ref 79–97)
MCV RBC AUTO: 90.8 FL (ref 79–97)
MCV RBC AUTO: 90.9 FL (ref 79–97)
MCV RBC AUTO: 92.8 FL (ref 79–97)
MCV RBC AUTO: 94.6 FL (ref 79–97)
MCV RBC AUTO: 97.4 FL (ref 79–97)
METAMYELOCYTES NFR BLD MANUAL: 1 % (ref 0–0)
METAMYELOCYTES NFR BLD MANUAL: 1 % (ref 0–0)
MODALITY: ABNORMAL
MONOCYTES # BLD AUTO: 0.67 10*3/MM3 (ref 0.1–0.9)
MONOCYTES # BLD AUTO: 0.72 10*3/MM3 (ref 0.1–0.9)
MONOCYTES # BLD AUTO: 0.79 10*3/MM3 (ref 0.1–0.9)
MONOCYTES # BLD: 0.34 10*3/MM3 (ref 0.1–0.9)
MONOCYTES # BLD: 0.66 10*3/MM3 (ref 0.1–0.9)
MONOCYTES # BLD: 1 10*3/MM3 (ref 0.1–0.9)
MONOCYTES # BLD: 1.35 10*3/MM3 (ref 0.1–0.9)
MONOCYTES NFR BLD AUTO: 2.1 % (ref 5–12)
MONOCYTES NFR BLD AUTO: 2.2 % (ref 5–12)
MONOCYTES NFR BLD AUTO: 2.5 % (ref 5–12)
MRSA DNA SPEC QL NAA+PROBE: NORMAL
MYELOCYTES NFR BLD MANUAL: 1 % (ref 0–0)
MYELOCYTES NFR BLD MANUAL: 1 % (ref 0–0)
NEUTROPHILS # BLD AUTO: 29.65 10*3/MM3 (ref 1.7–7)
NEUTROPHILS # BLD AUTO: 30.78 10*3/MM3 (ref 1.7–7)
NEUTROPHILS # BLD AUTO: 31.41 10*3/MM3 (ref 1.7–7)
NEUTROPHILS # BLD AUTO: 32.17 10*3/MM3 (ref 1.7–7)
NEUTROPHILS NFR BLD AUTO: 24.91 10*3/MM3 (ref 1.7–7)
NEUTROPHILS NFR BLD AUTO: 30.4 10*3/MM3 (ref 1.7–7)
NEUTROPHILS NFR BLD AUTO: 35.57 10*3/MM3 (ref 1.7–7)
NEUTROPHILS NFR BLD AUTO: 93.3 % (ref 42.7–76)
NEUTROPHILS NFR BLD AUTO: 94 % (ref 42.7–76)
NEUTROPHILS NFR BLD AUTO: 95.1 % (ref 42.7–76)
NEUTROPHILS NFR BLD MANUAL: 85 % (ref 42.7–76)
NEUTROPHILS NFR BLD MANUAL: 85 % (ref 42.7–76)
NEUTROPHILS NFR BLD MANUAL: 86 % (ref 42.7–76)
NEUTROPHILS NFR BLD MANUAL: 93 % (ref 42.7–76)
NEUTS BAND NFR BLD MANUAL: 11 % (ref 0–5)
NEUTS BAND NFR BLD MANUAL: 4 % (ref 0–5)
NEUTS BAND NFR BLD MANUAL: 7 % (ref 0–5)
NEUTS HYPERSEG # BLD: ABNORMAL 10*3/UL
NEUTS HYPERSEG # BLD: ABNORMAL 10*3/UL
NITRITE UR QL STRIP: NEGATIVE
NOTIFIED WHO: ABNORMAL
NRBC BLD AUTO-RTO: 0.1 /100 WBC (ref 0–0.2)
NRBC BLD AUTO-RTO: 0.1 /100 WBC (ref 0–0.2)
NRBC BLD AUTO-RTO: 0.2 /100 WBC (ref 0–0.2)
NT-PROBNP SERPL-MCNC: 8348 PG/ML (ref 0–900)
PCO2 BLDA: 32.3 MM HG (ref 35–48)
PCO2 BLDA: 35.6 MM HG (ref 35–48)
PCO2 BLDA: 37.6 MM HG (ref 35–48)
PCO2 BLDA: 39.3 MM HG (ref 35–48)
PCO2 BLDA: 40.1 MM HG (ref 35–48)
PCO2 BLDA: 42.2 MM HG (ref 35–48)
PCO2 BLDA: 43.5 MM HG (ref 35–48)
PEEP RESPIRATORY: 5 CM[H2O]
PEEP RESPIRATORY: 5 CM[H2O]
PH BLDA: 7.17 PH UNITS (ref 7.35–7.45)
PH BLDA: 7.26 PH UNITS (ref 7.35–7.45)
PH BLDA: 7.33 PH UNITS (ref 7.35–7.45)
PH BLDA: 7.39 PH UNITS (ref 7.35–7.45)
PH BLDA: 7.39 PH UNITS (ref 7.35–7.45)
PH BLDA: 7.46 PH UNITS (ref 7.35–7.45)
PH BLDA: 7.46 PH UNITS (ref 7.35–7.45)
PH UR STRIP.AUTO: <=5 [PH] (ref 5–8)
PHOSPHATE SERPL-MCNC: 4.4 MG/DL (ref 2.5–4.5)
PHOSPHATE SERPL-MCNC: 4.5 MG/DL (ref 2.5–4.5)
PHOSPHATE SERPL-MCNC: 4.8 MG/DL (ref 2.5–4.5)
PHOSPHATE SERPL-MCNC: 4.8 MG/DL (ref 2.5–4.5)
PHOSPHATE SERPL-MCNC: 4.9 MG/DL (ref 2.5–4.5)
PHOSPHATE SERPL-MCNC: 5.9 MG/DL (ref 2.5–4.5)
PLAT MORPH BLD: NORMAL
PLATELET # BLD AUTO: 219 10*3/MM3 (ref 140–450)
PLATELET # BLD AUTO: 231 10*3/MM3 (ref 140–450)
PLATELET # BLD AUTO: 237 10*3/MM3 (ref 140–450)
PLATELET # BLD AUTO: 237 10*3/MM3 (ref 140–450)
PLATELET # BLD AUTO: 243 10*3/MM3 (ref 140–450)
PLATELET # BLD AUTO: 243 10*3/MM3 (ref 140–450)
PLATELET # BLD AUTO: 256 10*3/MM3 (ref 140–450)
PMV BLD AUTO: 10 FL (ref 6–12)
PMV BLD AUTO: 10.3 FL (ref 6–12)
PMV BLD AUTO: 10.3 FL (ref 6–12)
PMV BLD AUTO: 10.4 FL (ref 6–12)
PMV BLD AUTO: 10.8 FL (ref 6–12)
PO2 BLD: 110 MM[HG] (ref 0–500)
PO2 BLD: 186 MM[HG] (ref 0–500)
PO2 BLD: 186 MM[HG] (ref 0–500)
PO2 BLD: 350 MM[HG] (ref 0–500)
PO2 BLD: 62 MM[HG] (ref 0–500)
PO2 BLD: 72 MM[HG] (ref 0–500)
PO2 BLD: 74 MM[HG] (ref 0–500)
PO2 BLDA: 350.2 MM HG (ref 83–108)
PO2 BLDA: 57.7 MM HG (ref 83–108)
PO2 BLDA: 57.8 MM HG (ref 83–108)
PO2 BLDA: 62.1 MM HG (ref 83–108)
PO2 BLDA: 65.9 MM HG (ref 83–108)
PO2 BLDA: 74.3 MM HG (ref 83–108)
PO2 BLDA: 74.4 MM HG (ref 83–108)
POTASSIUM BLDA-SCNC: 2.6 MMOL/L (ref 3.5–4.5)
POTASSIUM BLDA-SCNC: 3.2 MMOL/L (ref 3.5–4.5)
POTASSIUM BLDA-SCNC: 3.2 MMOL/L (ref 3.5–4.5)
POTASSIUM SERPL-SCNC: 2.4 MMOL/L (ref 3.5–5.2)
POTASSIUM SERPL-SCNC: 2.8 MMOL/L (ref 3.5–5.2)
POTASSIUM SERPL-SCNC: 2.9 MMOL/L (ref 3.5–5.2)
POTASSIUM SERPL-SCNC: 3 MMOL/L (ref 3.5–5.2)
POTASSIUM SERPL-SCNC: 3.1 MMOL/L (ref 3.5–5.2)
POTASSIUM SERPL-SCNC: 3.2 MMOL/L (ref 3.5–5.2)
POTASSIUM SERPL-SCNC: 3.4 MMOL/L (ref 3.5–5.2)
POTASSIUM SERPL-SCNC: 3.4 MMOL/L (ref 3.5–5.2)
POTASSIUM SERPL-SCNC: 3.5 MMOL/L (ref 3.5–5.2)
POTASSIUM SERPL-SCNC: 4 MMOL/L (ref 3.5–5.2)
PROT SERPL-MCNC: 5.7 G/DL (ref 6–8.5)
PROT SERPL-MCNC: 6.1 G/DL (ref 6–8.5)
PROT SERPL-MCNC: 6.2 G/DL (ref 6–8.5)
PROT SERPL-MCNC: 6.4 G/DL (ref 6–8.5)
PROT SERPL-MCNC: 6.5 G/DL (ref 6–8.5)
PROT SERPL-MCNC: 7.1 G/DL (ref 6–8.5)
PROT SERPL-MCNC: 7.1 G/DL (ref 6–8.5)
PROT UR QL STRIP: ABNORMAL
PROTHROMBIN TIME: 18 SECONDS (ref 11.7–14.2)
RBC # BLD AUTO: 3.24 10*6/MM3 (ref 3.77–5.28)
RBC # BLD AUTO: 3.25 10*6/MM3 (ref 3.77–5.28)
RBC # BLD AUTO: 3.31 10*6/MM3 (ref 3.77–5.28)
RBC # BLD AUTO: 3.48 10*6/MM3 (ref 3.77–5.28)
RBC # BLD AUTO: 3.51 10*6/MM3 (ref 3.77–5.28)
RBC # BLD AUTO: 3.66 10*6/MM3 (ref 3.77–5.28)
RBC # BLD AUTO: 3.8 10*6/MM3 (ref 3.77–5.28)
RBC # UR STRIP: ABNORMAL /HPF
RBC MORPH BLD: NORMAL
RBC MORPH BLD: NORMAL
READ BACK: ABNORMAL
REF LAB TEST METHOD: ABNORMAL
RESPIRATORY RATE: 18
RESPIRATORY RATE: 20
RESPIRATORY RATE: 20
RHINOVIRUS RNA SPEC NAA+PROBE: NOT DETECTED
RSV RNA NPH QL NAA+NON-PROBE: NOT DETECTED
S PNEUM AG SPEC QL LA: NEGATIVE
SAO2 % BLDCOA: 84.6 % (ref 94–98)
SAO2 % BLDCOA: 89.3 % (ref 94–98)
SAO2 % BLDCOA: 91.3 % (ref 94–98)
SAO2 % BLDCOA: 93.8 % (ref 94–98)
SAO2 % BLDCOA: 93.8 % (ref 94–98)
SAO2 % BLDCOA: 94.9 % (ref 94–98)
SAO2 % BLDCOA: 99.9 % (ref 94–98)
SARS-COV-2 RNA RESP QL NAA+PROBE: DETECTED
SCAN SLIDE: NORMAL
SINUS: 3 CM
SMALL PLATELETS BLD QL SMEAR: ADEQUATE
SODIUM BLD-SCNC: 135 MMOL/L (ref 138–146)
SODIUM BLD-SCNC: 146 MMOL/L (ref 138–146)
SODIUM BLD-SCNC: 157 MMOL/L (ref 138–146)
SODIUM SERPL-SCNC: 133 MMOL/L (ref 136–145)
SODIUM SERPL-SCNC: 134 MMOL/L (ref 136–145)
SODIUM SERPL-SCNC: 141 MMOL/L (ref 136–145)
SODIUM SERPL-SCNC: 141 MMOL/L (ref 136–145)
SODIUM SERPL-SCNC: 142 MMOL/L (ref 136–145)
SODIUM SERPL-SCNC: 143 MMOL/L (ref 136–145)
SODIUM SERPL-SCNC: 143 MMOL/L (ref 136–145)
SODIUM SERPL-SCNC: 144 MMOL/L (ref 136–145)
SODIUM SERPL-SCNC: 147 MMOL/L (ref 136–145)
SODIUM SERPL-SCNC: 147 MMOL/L (ref 136–145)
SODIUM SERPL-SCNC: 152 MMOL/L (ref 136–145)
SODIUM SERPL-SCNC: 155 MMOL/L (ref 136–145)
SODIUM UR-SCNC: 43 MMOL/L
SP GR UR STRIP: 1.01 (ref 1–1.03)
SQUAMOUS #/AREA URNS HPF: ABNORMAL /HPF
STJ: 2.5 CM
TARGETS BLD QL SMEAR: ABNORMAL
TOXIC GRANULATION: ABNORMAL
TOXIC GRANULATION: ABNORMAL
TRIGL SERPL-MCNC: 164 MG/DL (ref 0–150)
TRIGL SERPL-MCNC: 248 MG/DL (ref 0–150)
UROBILINOGEN UR QL STRIP: ABNORMAL
VARIANT LYMPHS NFR BLD MANUAL: 2 % (ref 19.6–45.3)
VARIANT LYMPHS NFR BLD MANUAL: 3 % (ref 19.6–45.3)
VARIANT LYMPHS NFR BLD MANUAL: 4 % (ref 19.6–45.3)
VARIANT LYMPHS NFR BLD MANUAL: 6 % (ref 19.6–45.3)
VENTILATOR MODE: ABNORMAL
VENTILATOR MODE: AC
VENTILATOR MODE: AC
VLDLC SERPL-MCNC: 28 MG/DL (ref 5–40)
VT ON VENT VENT: 480 ML
VT ON VENT VENT: 480 ML
WBC # UR STRIP: ABNORMAL /HPF
WBC NRBC COR # BLD AUTO: 26.51 10*3/MM3 (ref 3.4–10.8)
WBC NRBC COR # BLD AUTO: 32.6 10*3/MM3 (ref 3.4–10.8)
WBC NRBC COR # BLD AUTO: 33.1 10*3/MM3 (ref 3.4–10.8)
WBC NRBC COR # BLD AUTO: 33.31 10*3/MM3 (ref 3.4–10.8)
WBC NRBC COR # BLD AUTO: 33.51 10*3/MM3 (ref 3.4–10.8)
WBC NRBC COR # BLD AUTO: 33.77 10*3/MM3 (ref 3.4–10.8)
WBC NRBC COR # BLD AUTO: 37.38 10*3/MM3 (ref 3.4–10.8)

## 2025-01-01 PROCEDURE — 25010000002 MAGNESIUM SULFATE 4 GM/100ML SOLUTION: Performed by: NURSE PRACTITIONER

## 2025-01-01 PROCEDURE — 25010000002 HEPARIN (PORCINE) PER 1000 UNITS: Performed by: NURSE PRACTITIONER

## 2025-01-01 PROCEDURE — 74018 RADEX ABDOMEN 1 VIEW: CPT

## 2025-01-01 PROCEDURE — 31500 INSERT EMERGENCY AIRWAY: CPT | Performed by: NURSE PRACTITIONER

## 2025-01-01 PROCEDURE — 36600 WITHDRAWAL OF ARTERIAL BLOOD: CPT

## 2025-01-01 PROCEDURE — 84100 ASSAY OF PHOSPHORUS: CPT | Performed by: NURSE PRACTITIONER

## 2025-01-01 PROCEDURE — 25010000002 DEXAMETHASONE PER 1 MG: Performed by: NURSE PRACTITIONER

## 2025-01-01 PROCEDURE — 82948 REAGENT STRIP/BLOOD GLUCOSE: CPT

## 2025-01-01 PROCEDURE — 84100 ASSAY OF PHOSPHORUS: CPT

## 2025-01-01 PROCEDURE — 82550 ASSAY OF CK (CPK): CPT | Performed by: INTERNAL MEDICINE

## 2025-01-01 PROCEDURE — 87449 NOS EACH ORGANISM AG IA: CPT | Performed by: NURSE PRACTITIONER

## 2025-01-01 PROCEDURE — 93356 MYOCRD STRAIN IMG SPCKL TRCK: CPT | Performed by: INTERNAL MEDICINE

## 2025-01-01 PROCEDURE — 25010000002 FENTANYL CITRATE (PF) 50 MCG/ML SOLUTION: Performed by: EMERGENCY MEDICINE

## 2025-01-01 PROCEDURE — 85025 COMPLETE CBC W/AUTO DIFF WBC: CPT | Performed by: NURSE PRACTITIONER

## 2025-01-01 PROCEDURE — 94002 VENT MGMT INPAT INIT DAY: CPT

## 2025-01-01 PROCEDURE — 82803 BLOOD GASES ANY COMBINATION: CPT

## 2025-01-01 PROCEDURE — 82803 BLOOD GASES ANY COMBINATION: CPT | Performed by: EMERGENCY MEDICINE

## 2025-01-01 PROCEDURE — 84295 ASSAY OF SERUM SODIUM: CPT

## 2025-01-01 PROCEDURE — 25010000002 CEFTRIAXONE PER 250 MG: Performed by: NURSE PRACTITIONER

## 2025-01-01 PROCEDURE — 94669 MECHANICAL CHEST WALL OSCILL: CPT

## 2025-01-01 PROCEDURE — 25010000002 POTASSIUM CHLORIDE 10 MEQ/100ML SOLUTION: Performed by: NURSE PRACTITIONER

## 2025-01-01 PROCEDURE — 82948 REAGENT STRIP/BLOOD GLUCOSE: CPT | Performed by: NURSE PRACTITIONER

## 2025-01-01 PROCEDURE — 94664 DEMO&/EVAL PT USE INHALER: CPT

## 2025-01-01 PROCEDURE — 25010000002 BUMETANIDE PER 0.5 MG: Performed by: NURSE PRACTITIONER

## 2025-01-01 PROCEDURE — 25010000002 LORAZEPAM PER 2 MG

## 2025-01-01 PROCEDURE — 25010000002 THIAMINE PER 100 MG: Performed by: NURSE PRACTITIONER

## 2025-01-01 PROCEDURE — 71275 CT ANGIOGRAPHY CHEST: CPT

## 2025-01-01 PROCEDURE — 71045 X-RAY EXAM CHEST 1 VIEW: CPT

## 2025-01-01 PROCEDURE — 83605 ASSAY OF LACTIC ACID: CPT

## 2025-01-01 PROCEDURE — 94799 UNLISTED PULMONARY SVC/PX: CPT

## 2025-01-01 PROCEDURE — 25010000002 POTASSIUM CHLORIDE PER 2 MEQ: Performed by: EMERGENCY MEDICINE

## 2025-01-01 PROCEDURE — 93356 MYOCRD STRAIN IMG SPCKL TRCK: CPT

## 2025-01-01 PROCEDURE — 25010000002 LORAZEPAM PER 2 MG: Performed by: NURSE PRACTITIONER

## 2025-01-01 PROCEDURE — 80053 COMPREHEN METABOLIC PANEL: CPT | Performed by: NURSE PRACTITIONER

## 2025-01-01 PROCEDURE — 94660 CPAP INITIATION&MGMT: CPT

## 2025-01-01 PROCEDURE — 0BH17EZ INSERTION OF ENDOTRACHEAL AIRWAY INTO TRACHEA, VIA NATURAL OR ARTIFICIAL OPENING: ICD-10-PCS | Performed by: INTERNAL MEDICINE

## 2025-01-01 PROCEDURE — 25010000002 FUROSEMIDE PER 20 MG: Performed by: INTERNAL MEDICINE

## 2025-01-01 PROCEDURE — 25010000002 CHLOROTHIAZIDE PER 500 MG: Performed by: EMERGENCY MEDICINE

## 2025-01-01 PROCEDURE — 82803 BLOOD GASES ANY COMBINATION: CPT | Performed by: INTERNAL MEDICINE

## 2025-01-01 PROCEDURE — 74176 CT ABD & PELVIS W/O CONTRAST: CPT

## 2025-01-01 PROCEDURE — 82330 ASSAY OF CALCIUM: CPT

## 2025-01-01 PROCEDURE — 83690 ASSAY OF LIPASE: CPT | Performed by: EMERGENCY MEDICINE

## 2025-01-01 PROCEDURE — 25010000002 HYDROMORPHONE PER 4 MG: Performed by: INTERNAL MEDICINE

## 2025-01-01 PROCEDURE — 87086 URINE CULTURE/COLONY COUNT: CPT | Performed by: NURSE PRACTITIONER

## 2025-01-01 PROCEDURE — 80061 LIPID PANEL: CPT | Performed by: NURSE PRACTITIONER

## 2025-01-01 PROCEDURE — 87040 BLOOD CULTURE FOR BACTERIA: CPT | Performed by: EMERGENCY MEDICINE

## 2025-01-01 PROCEDURE — 85018 HEMOGLOBIN: CPT

## 2025-01-01 PROCEDURE — 0202U NFCT DS 22 TRGT SARS-COV-2: CPT | Performed by: NURSE PRACTITIONER

## 2025-01-01 PROCEDURE — 25510000001 IOPAMIDOL PER 1 ML: Performed by: EMERGENCY MEDICINE

## 2025-01-01 PROCEDURE — 94640 AIRWAY INHALATION TREATMENT: CPT

## 2025-01-01 PROCEDURE — 25010000002 ZIPRASIDONE MESYLATE PER 10 MG: Performed by: EMERGENCY MEDICINE

## 2025-01-01 PROCEDURE — 25810000003 SODIUM CHLORIDE 0.9 % SOLUTION

## 2025-01-01 PROCEDURE — 94667 MNPJ CHEST WALL 1ST: CPT

## 2025-01-01 PROCEDURE — 82565 ASSAY OF CREATININE: CPT

## 2025-01-01 PROCEDURE — 80051 ELECTROLYTE PANEL: CPT

## 2025-01-01 PROCEDURE — 25810000003 DEXTROSE 5% IN LACTATED RINGERS PER 1000 ML: Performed by: NURSE PRACTITIONER

## 2025-01-01 PROCEDURE — 87077 CULTURE AEROBIC IDENTIFY: CPT | Performed by: NURSE PRACTITIONER

## 2025-01-01 PROCEDURE — 25010000002 MIDAZOLAM PER 1 MG: Performed by: EMERGENCY MEDICINE

## 2025-01-01 PROCEDURE — 99285 EMERGENCY DEPT VISIT HI MDM: CPT

## 2025-01-01 PROCEDURE — 25810000003 LACTATED RINGERS SOLUTION: Performed by: NURSE PRACTITIONER

## 2025-01-01 PROCEDURE — 25010000002 POTASSIUM CHLORIDE 10 MEQ/100ML SOLUTION

## 2025-01-01 PROCEDURE — 82150 ASSAY OF AMYLASE: CPT | Performed by: NURSE PRACTITIONER

## 2025-01-01 PROCEDURE — 82330 ASSAY OF CALCIUM: CPT | Performed by: NURSE PRACTITIONER

## 2025-01-01 PROCEDURE — 25010000003 DEXTROSE 5 % SOLUTION: Performed by: INTERNAL MEDICINE

## 2025-01-01 PROCEDURE — 25010000002 FUROSEMIDE PER 20 MG

## 2025-01-01 PROCEDURE — 94761 N-INVAS EAR/PLS OXIMETRY MLT: CPT

## 2025-01-01 PROCEDURE — 87186 SC STD MICRODIL/AGAR DIL: CPT | Performed by: NURSE PRACTITIONER

## 2025-01-01 PROCEDURE — 25010000002 CALCIUM GLUCONATE 2-0.675 GM/100ML-% SOLUTION: Performed by: NURSE PRACTITIONER

## 2025-01-01 PROCEDURE — 85007 BL SMEAR W/DIFF WBC COUNT: CPT

## 2025-01-01 PROCEDURE — 25010000002 PIPERACILLIN SOD-TAZOBACTAM PER 1 G: Performed by: NURSE PRACTITIONER

## 2025-01-01 PROCEDURE — 25010000002 MORPHINE PER 10 MG: Performed by: NURSE PRACTITIONER

## 2025-01-01 PROCEDURE — P9612 CATHETERIZE FOR URINE SPEC: HCPCS

## 2025-01-01 PROCEDURE — 93306 TTE W/DOPPLER COMPLETE: CPT | Performed by: INTERNAL MEDICINE

## 2025-01-01 PROCEDURE — 25810000003 LACTATED RINGERS SOLUTION: Performed by: EMERGENCY MEDICINE

## 2025-01-01 PROCEDURE — B548ZZA ULTRASONOGRAPHY OF SUPERIOR VENA CAVA, GUIDANCE: ICD-10-PCS | Performed by: INTERNAL MEDICINE

## 2025-01-01 PROCEDURE — 25010000002 PHENOBARBITAL PER 120 MG: Performed by: NURSE PRACTITIONER

## 2025-01-01 PROCEDURE — 25010000002 HYDROMORPHONE PER 4 MG

## 2025-01-01 PROCEDURE — 25010000002 PIPERACILLIN SOD-TAZOBACTAM PER 1 G: Performed by: INTERNAL MEDICINE

## 2025-01-01 PROCEDURE — 93306 TTE W/DOPPLER COMPLETE: CPT

## 2025-01-01 PROCEDURE — 25010000002 THIAMINE HCL 200 MG/2ML SOLUTION: Performed by: NURSE PRACTITIONER

## 2025-01-01 PROCEDURE — 85007 BL SMEAR W/DIFF WBC COUNT: CPT | Performed by: NURSE PRACTITIONER

## 2025-01-01 PROCEDURE — 5A1945Z RESPIRATORY VENTILATION, 24-96 CONSECUTIVE HOURS: ICD-10-PCS | Performed by: INTERNAL MEDICINE

## 2025-01-01 PROCEDURE — 25010000002 FENTANYL CITRATE (PF) 50 MCG/ML SOLUTION

## 2025-01-01 PROCEDURE — 84478 ASSAY OF TRIGLYCERIDES: CPT | Performed by: EMERGENCY MEDICINE

## 2025-01-01 PROCEDURE — 83880 ASSAY OF NATRIURETIC PEPTIDE: CPT | Performed by: NURSE PRACTITIONER

## 2025-01-01 PROCEDURE — 25010000002 MAGNESIUM SULFATE 2 GM/50ML SOLUTION: Performed by: NURSE PRACTITIONER

## 2025-01-01 PROCEDURE — 80053 COMPREHEN METABOLIC PANEL: CPT

## 2025-01-01 PROCEDURE — 87899 AGENT NOS ASSAY W/OPTIC: CPT | Performed by: NURSE PRACTITIONER

## 2025-01-01 PROCEDURE — 36415 COLL VENOUS BLD VENIPUNCTURE: CPT

## 2025-01-01 PROCEDURE — 83735 ASSAY OF MAGNESIUM: CPT | Performed by: NURSE PRACTITIONER

## 2025-01-01 PROCEDURE — 81001 URINALYSIS AUTO W/SCOPE: CPT | Performed by: NURSE PRACTITIONER

## 2025-01-01 PROCEDURE — 85730 THROMBOPLASTIN TIME PARTIAL: CPT | Performed by: NURSE PRACTITIONER

## 2025-01-01 PROCEDURE — 25810000003 LACTATED RINGERS PER 1000 ML: Performed by: INTERNAL MEDICINE

## 2025-01-01 PROCEDURE — 25010000002 CEFTRIAXONE PER 250 MG: Performed by: EMERGENCY MEDICINE

## 2025-01-01 PROCEDURE — 70450 CT HEAD/BRAIN W/O DYE: CPT

## 2025-01-01 PROCEDURE — 25010000002 MIDAZOLAM PER 1 MG

## 2025-01-01 PROCEDURE — 02HV33Z INSERTION OF INFUSION DEVICE INTO SUPERIOR VENA CAVA, PERCUTANEOUS APPROACH: ICD-10-PCS | Performed by: INTERNAL MEDICINE

## 2025-01-01 PROCEDURE — 94003 VENT MGMT INPAT SUBQ DAY: CPT

## 2025-01-01 PROCEDURE — 63710000001 INSULIN LISPRO (HUMAN) PER 5 UNITS: Performed by: NURSE PRACTITIONER

## 2025-01-01 PROCEDURE — 87205 SMEAR GRAM STAIN: CPT | Performed by: NURSE PRACTITIONER

## 2025-01-01 PROCEDURE — 85025 COMPLETE CBC W/AUTO DIFF WBC: CPT

## 2025-01-01 PROCEDURE — 25010000002 METHYLPREDNISOLONE PER 40 MG

## 2025-01-01 PROCEDURE — 84132 ASSAY OF SERUM POTASSIUM: CPT

## 2025-01-01 PROCEDURE — 87070 CULTURE OTHR SPECIMN AEROBIC: CPT | Performed by: NURSE PRACTITIONER

## 2025-01-01 PROCEDURE — 72110 X-RAY EXAM L-2 SPINE 4/>VWS: CPT

## 2025-01-01 PROCEDURE — 80053 COMPREHEN METABOLIC PANEL: CPT | Performed by: INTERNAL MEDICINE

## 2025-01-01 PROCEDURE — 87641 MR-STAPH DNA AMP PROBE: CPT | Performed by: NURSE PRACTITIONER

## 2025-01-01 PROCEDURE — 36600 WITHDRAWAL OF ARTERIAL BLOOD: CPT | Performed by: INTERNAL MEDICINE

## 2025-01-01 PROCEDURE — 25010000002 FUROSEMIDE PER 20 MG: Performed by: EMERGENCY MEDICINE

## 2025-01-01 PROCEDURE — 84300 ASSAY OF URINE SODIUM: CPT | Performed by: INTERNAL MEDICINE

## 2025-01-01 PROCEDURE — 36600 WITHDRAWAL OF ARTERIAL BLOOD: CPT | Performed by: EMERGENCY MEDICINE

## 2025-01-01 PROCEDURE — 25010000002 POTASSIUM CHLORIDE PER 2 MEQ

## 2025-01-01 PROCEDURE — 25010000002 POTASSIUM CHLORIDE PER 2 MEQ: Performed by: NURSE PRACTITIONER

## 2025-01-01 PROCEDURE — 83735 ASSAY OF MAGNESIUM: CPT

## 2025-01-01 PROCEDURE — 85610 PROTHROMBIN TIME: CPT | Performed by: NURSE PRACTITIONER

## 2025-01-01 RX ORDER — BISACODYL 10 MG
10 SUPPOSITORY, RECTAL RECTAL DAILY PRN
Status: DISCONTINUED | OUTPATIENT
Start: 2025-01-01 | End: 2025-01-01

## 2025-01-01 RX ORDER — FUROSEMIDE 10 MG/ML
20 INJECTION INTRAMUSCULAR; INTRAVENOUS EVERY 12 HOURS
Status: DISCONTINUED | OUTPATIENT
Start: 2025-01-01 | End: 2025-01-01

## 2025-01-01 RX ORDER — LORAZEPAM 2 MG/ML
1 CONCENTRATE ORAL
Status: DISCONTINUED | OUTPATIENT
Start: 2025-01-01 | End: 2025-01-01 | Stop reason: HOSPADM

## 2025-01-01 RX ORDER — GABAPENTIN 100 MG/1
100 CAPSULE ORAL DAILY
Status: DISCONTINUED | OUTPATIENT
Start: 2025-01-01 | End: 2025-01-01

## 2025-01-01 RX ORDER — METHYLPREDNISOLONE SODIUM SUCCINATE 125 MG/2ML
80 INJECTION, POWDER, LYOPHILIZED, FOR SOLUTION INTRAMUSCULAR; INTRAVENOUS ONCE
Status: DISCONTINUED | OUTPATIENT
Start: 2025-01-01 | End: 2025-01-01

## 2025-01-01 RX ORDER — LORAZEPAM 2 MG/ML
0.5 INJECTION INTRAMUSCULAR
Status: DISCONTINUED | OUTPATIENT
Start: 2025-01-01 | End: 2025-01-01 | Stop reason: HOSPADM

## 2025-01-01 RX ORDER — SODIUM CHLORIDE, SODIUM LACTATE, POTASSIUM CHLORIDE, CALCIUM CHLORIDE 600; 310; 30; 20 MG/100ML; MG/100ML; MG/100ML; MG/100ML
50 INJECTION, SOLUTION INTRAVENOUS CONTINUOUS
Status: DISCONTINUED | OUTPATIENT
Start: 2025-01-01 | End: 2025-01-01

## 2025-01-01 RX ORDER — LORAZEPAM 2 MG/ML
1 INJECTION INTRAMUSCULAR
Status: DISCONTINUED | OUTPATIENT
Start: 2025-01-01 | End: 2025-01-01 | Stop reason: HOSPADM

## 2025-01-01 RX ORDER — DEXTROSE MONOHYDRATE 25 G/50ML
25 INJECTION, SOLUTION INTRAVENOUS
Status: DISCONTINUED | OUTPATIENT
Start: 2025-01-01 | End: 2025-01-01

## 2025-01-01 RX ORDER — LORAZEPAM 2 MG/ML
2 INJECTION INTRAMUSCULAR
Status: DISCONTINUED | OUTPATIENT
Start: 2025-01-01 | End: 2025-01-01 | Stop reason: HOSPADM

## 2025-01-01 RX ORDER — LORAZEPAM 2 MG/ML
0.5 CONCENTRATE ORAL
Status: DISCONTINUED | OUTPATIENT
Start: 2025-01-01 | End: 2025-01-01 | Stop reason: HOSPADM

## 2025-01-01 RX ORDER — CALCIUM GLUCONATE 20 MG/ML
2000 INJECTION, SOLUTION INTRAVENOUS ONCE
Status: COMPLETED | OUTPATIENT
Start: 2025-01-01 | End: 2025-01-01

## 2025-01-01 RX ORDER — MAGNESIUM SULFATE HEPTAHYDRATE 40 MG/ML
4 INJECTION, SOLUTION INTRAVENOUS ONCE
Status: COMPLETED | OUTPATIENT
Start: 2025-01-01 | End: 2025-01-01

## 2025-01-01 RX ORDER — CHOLECALCIFEROL (VITAMIN D3) 25 MCG
1000 TABLET ORAL DAILY
Status: DISCONTINUED | OUTPATIENT
Start: 2025-01-01 | End: 2025-01-01

## 2025-01-01 RX ORDER — INSULIN LISPRO 100 [IU]/ML
2-9 INJECTION, SOLUTION INTRAVENOUS; SUBCUTANEOUS EVERY 6 HOURS SCHEDULED
Status: DISCONTINUED | OUTPATIENT
Start: 2025-01-01 | End: 2025-01-01

## 2025-01-01 RX ORDER — PHENOBARBITAL 32.4 MG/1
32.4 TABLET ORAL ONCE
Status: DISCONTINUED | OUTPATIENT
Start: 2025-01-01 | End: 2025-01-01

## 2025-01-01 RX ORDER — ACETAMINOPHEN 325 MG/1
650 TABLET ORAL EVERY 4 HOURS PRN
Status: DISCONTINUED | OUTPATIENT
Start: 2025-01-01 | End: 2025-01-01

## 2025-01-01 RX ORDER — ALBUTEROL SULFATE 90 UG/1
2 INHALANT RESPIRATORY (INHALATION) EVERY 6 HOURS PRN
Status: DISCONTINUED | OUTPATIENT
Start: 2025-01-01 | End: 2025-01-01

## 2025-01-01 RX ORDER — MORPHINE SULFATE 20 MG/ML
10 SOLUTION ORAL
Status: DISCONTINUED | OUTPATIENT
Start: 2025-01-01 | End: 2025-01-01 | Stop reason: HOSPADM

## 2025-01-01 RX ORDER — NOREPINEPHRINE BITARTRATE 0.03 MG/ML
INJECTION, SOLUTION INTRAVENOUS
Status: COMPLETED
Start: 2025-01-01 | End: 2025-01-01

## 2025-01-01 RX ORDER — IOPAMIDOL 755 MG/ML
100 INJECTION, SOLUTION INTRAVASCULAR
Status: COMPLETED | OUTPATIENT
Start: 2025-01-01 | End: 2025-01-01

## 2025-01-01 RX ORDER — ONDANSETRON 4 MG/1
4 TABLET, ORALLY DISINTEGRATING ORAL EVERY 6 HOURS PRN
Status: DISCONTINUED | OUTPATIENT
Start: 2025-01-01 | End: 2025-01-01

## 2025-01-01 RX ORDER — LEVOTHYROXINE SODIUM 150 UG/1
150 TABLET ORAL
Status: DISCONTINUED | OUTPATIENT
Start: 2025-01-01 | End: 2025-01-01

## 2025-01-01 RX ORDER — NOREPINEPHRINE BITARTRATE 0.03 MG/ML
.02-.5 INJECTION, SOLUTION INTRAVENOUS
Status: DISCONTINUED | OUTPATIENT
Start: 2025-01-01 | End: 2025-01-01

## 2025-01-01 RX ORDER — MIDAZOLAM HYDROCHLORIDE 1 MG/ML
2 INJECTION, SOLUTION INTRAMUSCULAR; INTRAVENOUS ONCE
Status: COMPLETED | OUTPATIENT
Start: 2025-01-01 | End: 2025-01-01

## 2025-01-01 RX ORDER — LORAZEPAM 0.5 MG/1
0.5 TABLET ORAL
Status: DISCONTINUED | OUTPATIENT
Start: 2025-01-01 | End: 2025-01-01 | Stop reason: HOSPADM

## 2025-01-01 RX ORDER — PHENOBARBITAL SODIUM 65 MG/ML
65 INJECTION, SOLUTION INTRAMUSCULAR; INTRAVENOUS ONCE
Status: COMPLETED | OUTPATIENT
Start: 2025-01-01 | End: 2025-01-01

## 2025-01-01 RX ORDER — FENTANYL CITRATE 50 UG/ML
50 INJECTION, SOLUTION INTRAMUSCULAR; INTRAVENOUS ONCE
Status: COMPLETED | OUTPATIENT
Start: 2025-01-01 | End: 2025-01-01

## 2025-01-01 RX ORDER — POTASSIUM CHLORIDE 29.8 MG/ML
20 INJECTION INTRAVENOUS ONCE
Status: COMPLETED | OUTPATIENT
Start: 2025-01-01 | End: 2025-01-01

## 2025-01-01 RX ORDER — DEXTROSE MONOHYDRATE 25 G/50ML
INJECTION, SOLUTION INTRAVENOUS
Status: COMPLETED | OUTPATIENT
Start: 2025-01-01 | End: 2025-01-01

## 2025-01-01 RX ORDER — BISACODYL 5 MG/1
5 TABLET, DELAYED RELEASE ORAL DAILY PRN
Status: DISCONTINUED | OUTPATIENT
Start: 2025-01-01 | End: 2025-01-01

## 2025-01-01 RX ORDER — DEXAMETHASONE SODIUM PHOSPHATE 10 MG/ML
6 INJECTION, SOLUTION INTRAMUSCULAR; INTRAVENOUS DAILY
Status: DISCONTINUED | OUTPATIENT
Start: 2025-01-01 | End: 2025-01-01

## 2025-01-01 RX ORDER — IBUPROFEN 600 MG/1
1 TABLET ORAL
Status: DISCONTINUED | OUTPATIENT
Start: 2025-01-01 | End: 2025-01-01

## 2025-01-01 RX ORDER — MIDAZOLAM HYDROCHLORIDE 1 MG/ML
2 INJECTION, SOLUTION INTRAMUSCULAR; INTRAVENOUS ONCE
Status: DISCONTINUED | OUTPATIENT
Start: 2025-01-01 | End: 2025-01-01

## 2025-01-01 RX ORDER — POTASSIUM CHLORIDE 7.45 MG/ML
10 INJECTION INTRAVENOUS
Status: DISCONTINUED | OUTPATIENT
Start: 2025-01-01 | End: 2025-01-01

## 2025-01-01 RX ORDER — HYDROMORPHONE HYDROCHLORIDE 1 MG/ML
0.5 INJECTION, SOLUTION INTRAMUSCULAR; INTRAVENOUS; SUBCUTANEOUS
Status: DISCONTINUED | OUTPATIENT
Start: 2025-01-01 | End: 2025-01-01

## 2025-01-01 RX ORDER — ALBUTEROL SULFATE 90 UG/1
2 INHALANT RESPIRATORY (INHALATION)
Status: DISCONTINUED | OUTPATIENT
Start: 2025-01-01 | End: 2025-01-01

## 2025-01-01 RX ORDER — SODIUM CHLORIDE, SODIUM LACTATE, POTASSIUM CHLORIDE, CALCIUM CHLORIDE 600; 310; 30; 20 MG/100ML; MG/100ML; MG/100ML; MG/100ML
100 INJECTION, SOLUTION INTRAVENOUS CONTINUOUS
Status: DISCONTINUED | OUTPATIENT
Start: 2025-01-01 | End: 2025-01-01

## 2025-01-01 RX ORDER — AMOXICILLIN 250 MG
2 CAPSULE ORAL 2 TIMES DAILY PRN
Status: DISCONTINUED | OUTPATIENT
Start: 2025-01-01 | End: 2025-01-01

## 2025-01-01 RX ORDER — PROPOFOL 10 MG/ML
50 VIAL (ML) INTRAVENOUS ONCE
Status: DISCONTINUED | OUTPATIENT
Start: 2025-01-01 | End: 2025-01-01

## 2025-01-01 RX ORDER — POTASSIUM CHLORIDE 7.45 MG/ML
10 INJECTION INTRAVENOUS ONCE
Status: COMPLETED | OUTPATIENT
Start: 2025-01-01 | End: 2025-01-01

## 2025-01-01 RX ORDER — ACETAMINOPHEN 160 MG/5ML
650 SOLUTION ORAL EVERY 4 HOURS PRN
Status: DISCONTINUED | OUTPATIENT
Start: 2025-01-01 | End: 2025-01-01 | Stop reason: HOSPADM

## 2025-01-01 RX ORDER — ONDANSETRON 2 MG/ML
4 INJECTION INTRAMUSCULAR; INTRAVENOUS EVERY 6 HOURS PRN
Status: DISCONTINUED | OUTPATIENT
Start: 2025-01-01 | End: 2025-01-01

## 2025-01-01 RX ORDER — SERTRALINE HYDROCHLORIDE 100 MG/1
100 TABLET, FILM COATED ORAL DAILY
Status: DISCONTINUED | OUTPATIENT
Start: 2025-01-01 | End: 2025-01-01

## 2025-01-01 RX ORDER — LOSARTAN POTASSIUM 50 MG/1
100 TABLET ORAL DAILY
Status: DISCONTINUED | OUTPATIENT
Start: 2025-01-01 | End: 2025-01-01

## 2025-01-01 RX ORDER — LORAZEPAM 1 MG/1
2 TABLET ORAL
Status: DISCONTINUED | OUTPATIENT
Start: 2025-01-01 | End: 2025-01-01 | Stop reason: HOSPADM

## 2025-01-01 RX ORDER — IPRATROPIUM BROMIDE AND ALBUTEROL SULFATE 2.5; .5 MG/3ML; MG/3ML
3 SOLUTION RESPIRATORY (INHALATION) EVERY 6 HOURS PRN
Status: DISCONTINUED | OUTPATIENT
Start: 2025-01-01 | End: 2025-01-01

## 2025-01-01 RX ORDER — FENTANYL CITRATE-0.9 % NACL/PF 10 MCG/ML
50-300 PLASTIC BAG, INJECTION (ML) INTRAVENOUS
Status: DISCONTINUED | OUTPATIENT
Start: 2025-01-01 | End: 2025-01-01

## 2025-01-01 RX ORDER — DEXTROSE MONOHYDRATE 25 G/50ML
INJECTION, SOLUTION INTRAVENOUS
Status: ACTIVE
Start: 2025-01-01 | End: 2025-01-01

## 2025-01-01 RX ORDER — DEXTROSE, SODIUM CHLORIDE, SODIUM LACTATE, POTASSIUM CHLORIDE, AND CALCIUM CHLORIDE 5; .6; .31; .03; .02 G/100ML; G/100ML; G/100ML; G/100ML; G/100ML
100 INJECTION, SOLUTION INTRAVENOUS CONTINUOUS
Status: DISPENSED | OUTPATIENT
Start: 2025-01-01 | End: 2025-01-01

## 2025-01-01 RX ORDER — MIDAZOLAM HYDROCHLORIDE 1 MG/ML
1 INJECTION, SOLUTION INTRAMUSCULAR; INTRAVENOUS ONCE
Status: COMPLETED | OUTPATIENT
Start: 2025-01-01 | End: 2025-01-01

## 2025-01-01 RX ORDER — DEXMEDETOMIDINE HYDROCHLORIDE 4 UG/ML
.2-1.5 INJECTION, SOLUTION INTRAVENOUS
Status: DISCONTINUED | OUTPATIENT
Start: 2025-01-01 | End: 2025-01-01

## 2025-01-01 RX ORDER — DIPHENOXYLATE HYDROCHLORIDE AND ATROPINE SULFATE 2.5; .025 MG/1; MG/1
1 TABLET ORAL
Status: DISCONTINUED | OUTPATIENT
Start: 2025-01-01 | End: 2025-01-01 | Stop reason: HOSPADM

## 2025-01-01 RX ORDER — LORAZEPAM 2 MG/ML
2 CONCENTRATE ORAL
Status: DISCONTINUED | OUTPATIENT
Start: 2025-01-01 | End: 2025-01-01 | Stop reason: HOSPADM

## 2025-01-01 RX ORDER — NICOTINE POLACRILEX 4 MG
15 LOZENGE BUCCAL
Status: DISCONTINUED | OUTPATIENT
Start: 2025-01-01 | End: 2025-01-01

## 2025-01-01 RX ORDER — NITROGLYCERIN 0.4 MG/1
0.4 TABLET SUBLINGUAL
Status: DISCONTINUED | OUTPATIENT
Start: 2025-01-01 | End: 2025-01-01

## 2025-01-01 RX ORDER — BISACODYL 10 MG
10 SUPPOSITORY, RECTAL RECTAL DAILY PRN
Status: DISCONTINUED | OUTPATIENT
Start: 2025-01-01 | End: 2025-01-01 | Stop reason: SDUPTHER

## 2025-01-01 RX ORDER — GABAPENTIN 100 MG/1
100 CAPSULE ORAL EVERY 8 HOURS SCHEDULED
Status: DISCONTINUED | OUTPATIENT
Start: 2025-01-01 | End: 2025-01-01

## 2025-01-01 RX ORDER — DEXAMETHASONE SODIUM PHOSPHATE 4 MG/ML
6 INJECTION, SOLUTION INTRA-ARTICULAR; INTRALESIONAL; INTRAMUSCULAR; INTRAVENOUS; SOFT TISSUE DAILY
Status: DISCONTINUED | OUTPATIENT
Start: 2025-01-01 | End: 2025-01-01

## 2025-01-01 RX ORDER — NOREPINEPHRINE BITARTRATE 0.03 MG/ML
.02-.3 INJECTION, SOLUTION INTRAVENOUS
Status: DISCONTINUED | OUTPATIENT
Start: 2025-01-01 | End: 2025-01-01

## 2025-01-01 RX ORDER — AMOXICILLIN 250 MG
2 CAPSULE ORAL 2 TIMES DAILY
Status: DISCONTINUED | OUTPATIENT
Start: 2025-01-01 | End: 2025-01-01

## 2025-01-01 RX ORDER — ACETAMINOPHEN 650 MG/1
650 SUPPOSITORY RECTAL EVERY 4 HOURS PRN
Status: DISCONTINUED | OUTPATIENT
Start: 2025-01-01 | End: 2025-01-01 | Stop reason: HOSPADM

## 2025-01-01 RX ORDER — ALBUTEROL SULFATE 0.83 MG/ML
2.5 SOLUTION RESPIRATORY (INHALATION) 3 TIMES DAILY
Status: DISCONTINUED | OUTPATIENT
Start: 2025-01-01 | End: 2025-01-01

## 2025-01-01 RX ORDER — CHLORHEXIDINE GLUCONATE ORAL RINSE 1.2 MG/ML
15 SOLUTION DENTAL EVERY 12 HOURS SCHEDULED
Status: DISCONTINUED | OUTPATIENT
Start: 2025-01-01 | End: 2025-01-01

## 2025-01-01 RX ORDER — BUMETANIDE 0.25 MG/ML
2 INJECTION, SOLUTION INTRAMUSCULAR; INTRAVENOUS ONCE
Status: COMPLETED | OUTPATIENT
Start: 2025-01-01 | End: 2025-01-01

## 2025-01-01 RX ORDER — FUROSEMIDE 10 MG/ML
60 INJECTION INTRAMUSCULAR; INTRAVENOUS EVERY 12 HOURS
Status: DISCONTINUED | OUTPATIENT
Start: 2025-01-01 | End: 2025-01-01

## 2025-01-01 RX ORDER — ACETYLCYSTEINE 200 MG/ML
2 SOLUTION ORAL; RESPIRATORY (INHALATION)
Status: DISCONTINUED | OUTPATIENT
Start: 2025-01-01 | End: 2025-01-01

## 2025-01-01 RX ORDER — LORAZEPAM 2 MG/ML
1 INJECTION INTRAMUSCULAR EVERY 4 HOURS PRN
Status: DISCONTINUED | OUTPATIENT
Start: 2025-01-01 | End: 2025-01-01

## 2025-01-01 RX ORDER — LORAZEPAM 1 MG/1
1 TABLET ORAL
Status: DISCONTINUED | OUTPATIENT
Start: 2025-01-01 | End: 2025-01-01 | Stop reason: HOSPADM

## 2025-01-01 RX ORDER — ALUMINA, MAGNESIA, AND SIMETHICONE 2400; 2400; 240 MG/30ML; MG/30ML; MG/30ML
15 SUSPENSION ORAL EVERY 6 HOURS PRN
Status: DISCONTINUED | OUTPATIENT
Start: 2025-01-01 | End: 2025-01-01

## 2025-01-01 RX ORDER — PANTOPRAZOLE SODIUM 40 MG/10ML
40 INJECTION, POWDER, LYOPHILIZED, FOR SOLUTION INTRAVENOUS
Status: DISCONTINUED | OUTPATIENT
Start: 2025-01-01 | End: 2025-01-01

## 2025-01-01 RX ORDER — GABAPENTIN 300 MG/1
300 CAPSULE ORAL DAILY
Status: DISCONTINUED | OUTPATIENT
Start: 2025-01-01 | End: 2025-01-01

## 2025-01-01 RX ORDER — LORAZEPAM 2 MG/ML
1 INJECTION INTRAMUSCULAR ONCE
Status: COMPLETED | OUTPATIENT
Start: 2025-01-01 | End: 2025-01-01

## 2025-01-01 RX ORDER — METHYLPREDNISOLONE SODIUM SUCCINATE 40 MG/ML
40 INJECTION, POWDER, LYOPHILIZED, FOR SOLUTION INTRAMUSCULAR; INTRAVENOUS ONCE
Status: COMPLETED | OUTPATIENT
Start: 2025-01-01 | End: 2025-01-01

## 2025-01-01 RX ORDER — POLYETHYLENE GLYCOL 3350 17 G/17G
17 POWDER, FOR SOLUTION ORAL DAILY PRN
Status: DISCONTINUED | OUTPATIENT
Start: 2025-01-01 | End: 2025-01-01

## 2025-01-01 RX ORDER — PANTOPRAZOLE SODIUM 40 MG/1
40 TABLET, DELAYED RELEASE ORAL DAILY
Status: DISCONTINUED | OUTPATIENT
Start: 2025-01-01 | End: 2025-01-01

## 2025-01-01 RX ORDER — POTASSIUM CHLORIDE 29.8 MG/ML
20 INJECTION INTRAVENOUS
Status: COMPLETED | OUTPATIENT
Start: 2025-01-01 | End: 2025-01-01

## 2025-01-01 RX ORDER — THIAMINE HYDROCHLORIDE 100 MG/ML
200 INJECTION, SOLUTION INTRAMUSCULAR; INTRAVENOUS EVERY 8 HOURS SCHEDULED
Status: DISCONTINUED | OUTPATIENT
Start: 2025-01-01 | End: 2025-01-01

## 2025-01-01 RX ORDER — FUROSEMIDE 10 MG/ML
20 INJECTION INTRAMUSCULAR; INTRAVENOUS EVERY 8 HOURS
Status: DISCONTINUED | OUTPATIENT
Start: 2025-01-01 | End: 2025-01-01

## 2025-01-01 RX ORDER — DEXTROSE MONOHYDRATE 50 MG/ML
50 INJECTION, SOLUTION INTRAVENOUS CONTINUOUS
Status: DISCONTINUED | OUTPATIENT
Start: 2025-01-01 | End: 2025-01-01

## 2025-01-01 RX ORDER — FENTANYL CITRATE 50 UG/ML
INJECTION, SOLUTION INTRAMUSCULAR; INTRAVENOUS
Status: ACTIVE
Start: 2025-01-01 | End: 2025-01-01

## 2025-01-01 RX ORDER — POTASSIUM CHLORIDE 7.45 MG/ML
10 INJECTION INTRAVENOUS
Status: COMPLETED | OUTPATIENT
Start: 2025-01-01 | End: 2025-01-01

## 2025-01-01 RX ORDER — MIDAZOLAM HYDROCHLORIDE 1 MG/ML
INJECTION, SOLUTION INTRAMUSCULAR; INTRAVENOUS
Status: COMPLETED
Start: 2025-01-01 | End: 2025-01-01

## 2025-01-01 RX ORDER — FENTANYL CITRATE 50 UG/ML
12.5 INJECTION, SOLUTION INTRAMUSCULAR; INTRAVENOUS
Status: DISCONTINUED | OUTPATIENT
Start: 2025-01-01 | End: 2025-01-01

## 2025-01-01 RX ORDER — IPRATROPIUM BROMIDE AND ALBUTEROL SULFATE 2.5; .5 MG/3ML; MG/3ML
3 SOLUTION RESPIRATORY (INHALATION) EVERY 4 HOURS PRN
Status: DISCONTINUED | OUTPATIENT
Start: 2025-01-01 | End: 2025-01-01

## 2025-01-01 RX ORDER — ACETAMINOPHEN 650 MG/1
650 SUPPOSITORY RECTAL EVERY 4 HOURS PRN
Status: DISCONTINUED | OUTPATIENT
Start: 2025-01-01 | End: 2025-01-01

## 2025-01-01 RX ORDER — ETOMIDATE 2 MG/ML
20 INJECTION INTRAVENOUS ONCE
Status: COMPLETED | OUTPATIENT
Start: 2025-01-01 | End: 2025-01-01

## 2025-01-01 RX ORDER — IBUPROFEN 600 MG/1
1 TABLET ORAL ONCE
Status: DISCONTINUED | OUTPATIENT
Start: 2025-01-01 | End: 2025-01-01

## 2025-01-01 RX ORDER — ACETAMINOPHEN 325 MG/1
650 TABLET ORAL EVERY 4 HOURS PRN
Status: DISCONTINUED | OUTPATIENT
Start: 2025-01-01 | End: 2025-01-01 | Stop reason: HOSPADM

## 2025-01-01 RX ORDER — MAGNESIUM SULFATE HEPTAHYDRATE 40 MG/ML
2 INJECTION, SOLUTION INTRAVENOUS ONCE
Status: COMPLETED | OUTPATIENT
Start: 2025-01-01 | End: 2025-01-01

## 2025-01-01 RX ORDER — HEPARIN SODIUM 5000 [USP'U]/ML
5000 INJECTION, SOLUTION INTRAVENOUS; SUBCUTANEOUS EVERY 8 HOURS SCHEDULED
Status: DISCONTINUED | OUTPATIENT
Start: 2025-01-01 | End: 2025-01-01

## 2025-01-01 RX ORDER — FUROSEMIDE 10 MG/ML
40 INJECTION INTRAMUSCULAR; INTRAVENOUS ONCE
Status: COMPLETED | OUTPATIENT
Start: 2025-01-01 | End: 2025-01-01

## 2025-01-01 RX ORDER — IBUPROFEN 600 MG/1
TABLET ORAL
Status: ACTIVE
Start: 2025-01-01 | End: 2025-01-01

## 2025-01-01 RX ORDER — DEXTROSE MONOHYDRATE 25 G/50ML
50 INJECTION, SOLUTION INTRAVENOUS
Status: DISCONTINUED | OUTPATIENT
Start: 2025-01-01 | End: 2025-01-01

## 2025-01-01 RX ADMIN — FOLIC ACID 1 MG: 5 INJECTION, SOLUTION INTRAMUSCULAR; INTRAVENOUS; SUBCUTANEOUS at 13:10

## 2025-01-01 RX ADMIN — FOLIC ACID 1 MG: 5 INJECTION, SOLUTION INTRAMUSCULAR; INTRAVENOUS; SUBCUTANEOUS at 17:44

## 2025-01-01 RX ADMIN — POTASSIUM CHLORIDE 10 MEQ: 7.46 INJECTION, SOLUTION INTRAVENOUS at 15:10

## 2025-01-01 RX ADMIN — SODIUM CHLORIDE, POTASSIUM CHLORIDE, SODIUM LACTATE AND CALCIUM CHLORIDE 50 ML/HR: 600; 310; 30; 20 INJECTION, SOLUTION INTRAVENOUS at 08:42

## 2025-01-01 RX ADMIN — ZIPRASIDONE MESYLATE 20 MG: 20 INJECTION, POWDER, LYOPHILIZED, FOR SOLUTION INTRAMUSCULAR at 14:04

## 2025-01-01 RX ADMIN — IPRATROPIUM BROMIDE AND ALBUTEROL SULFATE 3 ML: .5; 3 SOLUTION RESPIRATORY (INHALATION) at 21:20

## 2025-01-01 RX ADMIN — METHYLPREDNISOLONE SODIUM SUCCINATE 40 MG: 40 INJECTION, POWDER, FOR SOLUTION INTRAMUSCULAR; INTRAVENOUS at 00:20

## 2025-01-01 RX ADMIN — PIPERACILLIN AND TAZOBACTAM 3.38 G: 3; .375 INJECTION, POWDER, FOR SOLUTION INTRAVENOUS at 11:04

## 2025-01-01 RX ADMIN — DEXMEDETOMIDINE HYDROCHLORIDE IN SODIUM CHLORIDE 0.5 MCG/KG/HR: 4 INJECTION INTRAVENOUS at 10:43

## 2025-01-01 RX ADMIN — FUROSEMIDE 20 MG: 10 INJECTION, SOLUTION INTRAMUSCULAR; INTRAVENOUS at 17:44

## 2025-01-01 RX ADMIN — DEXAMETHASONE SODIUM PHOSPHATE 6 MG: 4 INJECTION, SOLUTION INTRAMUSCULAR; INTRAVENOUS at 08:01

## 2025-01-01 RX ADMIN — ALBUTEROL SULFATE 2.5 MG: 2.5 SOLUTION RESPIRATORY (INHALATION) at 19:22

## 2025-01-01 RX ADMIN — HYDROMORPHONE HYDROCHLORIDE 0.5 MG: 1 INJECTION, SOLUTION INTRAMUSCULAR; INTRAVENOUS; SUBCUTANEOUS at 19:42

## 2025-01-01 RX ADMIN — FUROSEMIDE 60 MG: 10 INJECTION, SOLUTION INTRAMUSCULAR; INTRAVENOUS at 14:05

## 2025-01-01 RX ADMIN — POTASSIUM CHLORIDE 20 MEQ: 29.8 INJECTION, SOLUTION INTRAVENOUS at 01:08

## 2025-01-01 RX ADMIN — LORAZEPAM 1 MG: 2 INJECTION INTRAMUSCULAR; INTRAVENOUS at 18:17

## 2025-01-01 RX ADMIN — CALCIUM GLUCONATE 2000 MG: 20 INJECTION, SOLUTION INTRAVENOUS at 17:45

## 2025-01-01 RX ADMIN — SODIUM CHLORIDE, POTASSIUM CHLORIDE, SODIUM LACTATE AND CALCIUM CHLORIDE 2000 ML: 600; 310; 30; 20 INJECTION, SOLUTION INTRAVENOUS at 13:23

## 2025-01-01 RX ADMIN — THIAMINE HYDROCHLORIDE 200 MG: 100 INJECTION, SOLUTION INTRAMUSCULAR; INTRAVENOUS at 21:16

## 2025-01-01 RX ADMIN — HEPARIN SODIUM 5000 UNITS: 5000 INJECTION INTRAVENOUS; SUBCUTANEOUS at 21:30

## 2025-01-01 RX ADMIN — THIAMINE HYDROCHLORIDE 200 MG: 100 INJECTION, SOLUTION INTRAMUSCULAR; INTRAVENOUS at 13:11

## 2025-01-01 RX ADMIN — SODIUM CHLORIDE, SODIUM LACTATE, POTASSIUM CHLORIDE, AND CALCIUM CHLORIDE 1000 ML: .6; .31; .03; .02 INJECTION, SOLUTION INTRAVENOUS at 15:07

## 2025-01-01 RX ADMIN — ACETYLCYSTEINE 2 ML: 200 SOLUTION ORAL; RESPIRATORY (INHALATION) at 19:23

## 2025-01-01 RX ADMIN — CHLOROTHIAZIDE SODIUM 500 MG: 500 INJECTION, POWDER, LYOPHILIZED, FOR SOLUTION INTRAVENOUS at 09:07

## 2025-01-01 RX ADMIN — FENTANYL CITRATE 12.5 MCG: 50 INJECTION, SOLUTION INTRAMUSCULAR; INTRAVENOUS at 03:24

## 2025-01-01 RX ADMIN — POTASSIUM CHLORIDE 10 MEQ: 7.46 INJECTION, SOLUTION INTRAVENOUS at 19:27

## 2025-01-01 RX ADMIN — DEXMEDETOMIDINE HYDROCHLORIDE IN SODIUM CHLORIDE 0.2 MCG/KG/HR: 4 INJECTION INTRAVENOUS at 05:09

## 2025-01-01 RX ADMIN — DEXMEDETOMIDINE HYDROCHLORIDE IN SODIUM CHLORIDE 0.4 MCG/KG/HR: 4 INJECTION INTRAVENOUS at 05:13

## 2025-01-01 RX ADMIN — HEPARIN SODIUM 5000 UNITS: 5000 INJECTION INTRAVENOUS; SUBCUTANEOUS at 21:14

## 2025-01-01 RX ADMIN — THIAMINE HYDROCHLORIDE 200 MG: 100 INJECTION, SOLUTION INTRAMUSCULAR; INTRAVENOUS at 22:11

## 2025-01-01 RX ADMIN — CHLORHEXIDINE GLUCONATE 15 ML: 1.2 RINSE ORAL at 21:28

## 2025-01-01 RX ADMIN — HEPARIN SODIUM 5000 UNITS: 5000 INJECTION INTRAVENOUS; SUBCUTANEOUS at 14:58

## 2025-01-01 RX ADMIN — PHENOBARBITAL SODIUM 65 MG: 65 INJECTION INTRAMUSCULAR; INTRAVENOUS at 08:42

## 2025-01-01 RX ADMIN — BUMETANIDE 2 MG: 0.25 INJECTION INTRAMUSCULAR; INTRAVENOUS at 19:26

## 2025-01-01 RX ADMIN — HYDROMORPHONE HYDROCHLORIDE 0.5 MG: 1 INJECTION, SOLUTION INTRAMUSCULAR; INTRAVENOUS; SUBCUTANEOUS at 02:00

## 2025-01-01 RX ADMIN — HEPARIN SODIUM 5000 UNITS: 5000 INJECTION INTRAVENOUS; SUBCUTANEOUS at 14:05

## 2025-01-01 RX ADMIN — ACETAMINOPHEN 650 MG: 650 SUPPOSITORY RECTAL at 18:17

## 2025-01-01 RX ADMIN — NOREPINEPHRINE BITARTRATE 0.1 MCG/KG/MIN: 0.06 INJECTION, SOLUTION INTRAVENOUS at 13:30

## 2025-01-01 RX ADMIN — MAGNESIUM SULFATE IN WATER FOR 4 G: 40 INJECTION INTRAVENOUS at 13:40

## 2025-01-01 RX ADMIN — DEXTROSE MONOHYDRATE 50 ML/HR: 50 INJECTION, SOLUTION INTRAVENOUS at 15:53

## 2025-01-01 RX ADMIN — SODIUM CHLORIDE 500 ML: 0.9 INJECTION, SOLUTION INTRAVENOUS at 02:00

## 2025-01-01 RX ADMIN — FENTANYL CITRATE 50 MCG: 50 INJECTION, SOLUTION INTRAMUSCULAR; INTRAVENOUS at 10:36

## 2025-01-01 RX ADMIN — FENTANYL CITRATE 12.5 MCG: 50 INJECTION, SOLUTION INTRAMUSCULAR; INTRAVENOUS at 00:32

## 2025-01-01 RX ADMIN — CEFTRIAXONE 2000 MG: 2 INJECTION, POWDER, FOR SOLUTION INTRAMUSCULAR; INTRAVENOUS at 14:05

## 2025-01-01 RX ADMIN — HYDROMORPHONE HYDROCHLORIDE 0.5 MG: 1 INJECTION, SOLUTION INTRAMUSCULAR; INTRAVENOUS; SUBCUTANEOUS at 16:54

## 2025-01-01 RX ADMIN — PHENOBARBITAL SODIUM 130 MG: 130 INJECTION INTRAMUSCULAR; INTRAVENOUS at 21:15

## 2025-01-01 RX ADMIN — THIAMINE HYDROCHLORIDE 200 MG: 100 INJECTION, SOLUTION INTRAMUSCULAR; INTRAVENOUS at 14:05

## 2025-01-01 RX ADMIN — SODIUM CHLORIDE, SODIUM LACTATE, POTASSIUM CHLORIDE, CALCIUM CHLORIDE AND DEXTROSE MONOHYDRATE 100 ML/HR: 5; 600; 310; 30; 20 INJECTION, SOLUTION INTRAVENOUS at 13:39

## 2025-01-01 RX ADMIN — MIDAZOLAM HYDROCHLORIDE 1 MG: 1 INJECTION, SOLUTION INTRAMUSCULAR; INTRAVENOUS at 16:53

## 2025-01-01 RX ADMIN — PHENOBARBITAL SODIUM 65 MG: 65 INJECTION INTRAMUSCULAR; INTRAVENOUS at 22:11

## 2025-01-01 RX ADMIN — THIAMINE HYDROCHLORIDE 200 MG: 100 INJECTION, SOLUTION INTRAMUSCULAR; INTRAVENOUS at 05:13

## 2025-01-01 RX ADMIN — Medication 150 MEQ: at 10:31

## 2025-01-01 RX ADMIN — MUPIROCIN 1 APPLICATION: 20 OINTMENT TOPICAL at 22:11

## 2025-01-01 RX ADMIN — ACETAMINOPHEN 650 MG: 650 SUPPOSITORY RECTAL at 23:15

## 2025-01-01 RX ADMIN — DEXAMETHASONE SODIUM PHOSPHATE 6 MG: 4 INJECTION, SOLUTION INTRAMUSCULAR; INTRAVENOUS at 13:40

## 2025-01-01 RX ADMIN — POTASSIUM CHLORIDE 10 MEQ: 7.46 INJECTION, SOLUTION INTRAVENOUS at 09:55

## 2025-01-01 RX ADMIN — PHENOBARBITAL SODIUM 130 MG: 130 INJECTION, SOLUTION INTRAMUSCULAR; INTRAVENOUS at 23:28

## 2025-01-01 RX ADMIN — MIDAZOLAM 2 MG: 1 INJECTION INTRAMUSCULAR; INTRAVENOUS at 10:37

## 2025-01-01 RX ADMIN — MORPHINE SULFATE 4 MG: 4 INJECTION, SOLUTION INTRAMUSCULAR; INTRAVENOUS at 05:24

## 2025-01-01 RX ADMIN — HEPARIN SODIUM 5000 UNITS: 5000 INJECTION INTRAVENOUS; SUBCUTANEOUS at 22:11

## 2025-01-01 RX ADMIN — IPRATROPIUM BROMIDE AND ALBUTEROL SULFATE 3 ML: .5; 3 SOLUTION RESPIRATORY (INHALATION) at 15:37

## 2025-01-01 RX ADMIN — ALBUTEROL SULFATE 2.5 MG: 2.5 SOLUTION RESPIRATORY (INHALATION) at 07:46

## 2025-01-01 RX ADMIN — CHLORHEXIDINE GLUCONATE 15 ML: 1.2 RINSE ORAL at 09:07

## 2025-01-01 RX ADMIN — POTASSIUM CHLORIDE 10 MEQ: 7.46 INJECTION, SOLUTION INTRAVENOUS at 13:40

## 2025-01-01 RX ADMIN — POTASSIUM CHLORIDE 20 MEQ: 29.8 INJECTION, SOLUTION INTRAVENOUS at 02:30

## 2025-01-01 RX ADMIN — DEXAMETHASONE SODIUM PHOSPHATE 6 MG: 4 INJECTION, SOLUTION INTRAMUSCULAR; INTRAVENOUS at 08:41

## 2025-01-01 RX ADMIN — POTASSIUM CHLORIDE 20 MEQ: 29.8 INJECTION, SOLUTION INTRAVENOUS at 17:47

## 2025-01-01 RX ADMIN — MAGNESIUM SULFATE HEPTAHYDRATE 2 G: 40 INJECTION, SOLUTION INTRAVENOUS at 14:34

## 2025-01-01 RX ADMIN — HYDROMORPHONE HYDROCHLORIDE 0.5 MG: 1 INJECTION, SOLUTION INTRAMUSCULAR; INTRAVENOUS; SUBCUTANEOUS at 08:34

## 2025-01-01 RX ADMIN — POTASSIUM CHLORIDE 20 MEQ: 29.8 INJECTION INTRAVENOUS at 21:28

## 2025-01-01 RX ADMIN — FUROSEMIDE 20 MG: 10 INJECTION, SOLUTION INTRAMUSCULAR; INTRAVENOUS at 00:20

## 2025-01-01 RX ADMIN — HYDROMORPHONE HYDROCHLORIDE 0.5 MG: 1 INJECTION, SOLUTION INTRAMUSCULAR; INTRAVENOUS; SUBCUTANEOUS at 00:39

## 2025-01-01 RX ADMIN — PANTOPRAZOLE SODIUM 40 MG: 40 INJECTION, POWDER, FOR SOLUTION INTRAVENOUS at 05:18

## 2025-01-01 RX ADMIN — FOLIC ACID 1 MG: 5 INJECTION, SOLUTION INTRAMUSCULAR; INTRAVENOUS; SUBCUTANEOUS at 08:02

## 2025-01-01 RX ADMIN — CHLORHEXIDINE GLUCONATE 15 ML: 1.2 RINSE ORAL at 21:14

## 2025-01-01 RX ADMIN — LORAZEPAM 1 MG: 2 INJECTION INTRAMUSCULAR; INTRAVENOUS at 11:08

## 2025-01-01 RX ADMIN — ETOMIDATE 20 MG: 2 INJECTION INTRAVENOUS at 10:37

## 2025-01-01 RX ADMIN — POTASSIUM CHLORIDE 10 MEQ: 7.46 INJECTION, SOLUTION INTRAVENOUS at 02:14

## 2025-01-01 RX ADMIN — POTASSIUM CHLORIDE 10 MEQ: 7.46 INJECTION, SOLUTION INTRAVENOUS at 10:35

## 2025-01-01 RX ADMIN — DEXMEDETOMIDINE HYDROCHLORIDE IN SODIUM CHLORIDE 0.2 MCG/KG/HR: 4 INJECTION INTRAVENOUS at 19:42

## 2025-01-01 RX ADMIN — MIDAZOLAM 2 MG: 1 INJECTION INTRAMUSCULAR; INTRAVENOUS at 10:42

## 2025-01-01 RX ADMIN — NOREPINEPHRINE BITARTRATE 0.02 MCG/KG/MIN: 0.03 INJECTION, SOLUTION INTRAVENOUS at 21:17

## 2025-01-01 RX ADMIN — MUPIROCIN 1 APPLICATION: 20 OINTMENT TOPICAL at 12:09

## 2025-01-01 RX ADMIN — THIAMINE HYDROCHLORIDE 200 MG: 100 INJECTION, SOLUTION INTRAMUSCULAR; INTRAVENOUS at 14:58

## 2025-01-01 RX ADMIN — FUROSEMIDE 20 MG: 10 INJECTION, SOLUTION INTRAMUSCULAR; INTRAVENOUS at 12:07

## 2025-01-01 RX ADMIN — PIPERACILLIN AND TAZOBACTAM 3.38 G: 3; .375 INJECTION, POWDER, FOR SOLUTION INTRAVENOUS at 19:59

## 2025-01-01 RX ADMIN — MUPIROCIN 1 APPLICATION: 20 OINTMENT TOPICAL at 13:41

## 2025-01-01 RX ADMIN — DEXTROSE MONOHYDRATE 50 ML: 25 INJECTION, SOLUTION INTRAVENOUS at 10:12

## 2025-01-01 RX ADMIN — ACETYLCYSTEINE 2 ML: 200 SOLUTION ORAL; RESPIRATORY (INHALATION) at 07:53

## 2025-01-01 RX ADMIN — ACETAMINOPHEN 650 MG: 650 SUPPOSITORY RECTAL at 16:56

## 2025-01-01 RX ADMIN — MUPIROCIN 1 APPLICATION: 20 OINTMENT TOPICAL at 21:15

## 2025-01-01 RX ADMIN — LORAZEPAM 1 MG: 2 INJECTION INTRAMUSCULAR; INTRAVENOUS at 02:00

## 2025-01-01 RX ADMIN — LORAZEPAM 2 MG: 2 INJECTION INTRAMUSCULAR; INTRAVENOUS at 05:24

## 2025-01-01 RX ADMIN — CHLORHEXIDINE GLUCONATE 15 ML: 1.2 RINSE ORAL at 12:09

## 2025-01-01 RX ADMIN — SODIUM CHLORIDE, SODIUM LACTATE, POTASSIUM CHLORIDE, CALCIUM CHLORIDE AND DEXTROSE MONOHYDRATE 100 ML/HR: 5; 600; 310; 30; 20 INJECTION, SOLUTION INTRAVENOUS at 23:28

## 2025-01-01 RX ADMIN — POTASSIUM CHLORIDE 10 MEQ: 7.46 INJECTION, SOLUTION INTRAVENOUS at 07:57

## 2025-01-01 RX ADMIN — CEFTRIAXONE 2000 MG: 2 INJECTION, POWDER, FOR SOLUTION INTRAMUSCULAR; INTRAVENOUS at 14:37

## 2025-01-01 RX ADMIN — PHENOBARBITAL SODIUM 130 MG: 130 INJECTION INTRAMUSCULAR; INTRAVENOUS at 17:39

## 2025-01-01 RX ADMIN — THIAMINE HYDROCHLORIDE 200 MG: 100 INJECTION, SOLUTION INTRAMUSCULAR; INTRAVENOUS at 05:09

## 2025-01-01 RX ADMIN — CEFTRIAXONE 2000 MG: 2 INJECTION, POWDER, FOR SOLUTION INTRAMUSCULAR; INTRAVENOUS at 16:11

## 2025-01-01 RX ADMIN — NOREPINEPHRINE BITARTRATE 0.05 MG: 0.03 INJECTION, SOLUTION INTRAVENOUS at 10:31

## 2025-01-01 RX ADMIN — NOREPINEPHRINE BITARTRATE 0.1 MCG/KG/MIN: 0.03 INJECTION, SOLUTION INTRAVENOUS at 10:40

## 2025-01-01 RX ADMIN — HEPARIN SODIUM 5000 UNITS: 5000 INJECTION INTRAVENOUS; SUBCUTANEOUS at 13:10

## 2025-01-01 RX ADMIN — SODIUM BICARBONATE 150 MEQ: 84 INJECTION INTRAVENOUS at 10:31

## 2025-01-01 RX ADMIN — LORAZEPAM 1 MG: 2 INJECTION INTRAMUSCULAR; INTRAVENOUS at 19:42

## 2025-01-01 RX ADMIN — LORAZEPAM 1 MG: 2 INJECTION INTRAMUSCULAR; INTRAVENOUS at 05:18

## 2025-01-01 RX ADMIN — SODIUM BICARBONATE: 84 INJECTION INTRAVENOUS at 16:32

## 2025-01-01 RX ADMIN — Medication 50 MCG/HR: at 10:51

## 2025-01-01 RX ADMIN — POTASSIUM CHLORIDE 10 MEQ: 7.46 INJECTION, SOLUTION INTRAVENOUS at 11:47

## 2025-01-01 RX ADMIN — CHLORHEXIDINE GLUCONATE 15 ML: 1.2 RINSE ORAL at 21:07

## 2025-01-01 RX ADMIN — ALBUTEROL SULFATE 2 PUFF: 108 AEROSOL, METERED RESPIRATORY (INHALATION) at 20:02

## 2025-01-01 RX ADMIN — FUROSEMIDE 20 MG: 10 INJECTION, SOLUTION INTRAMUSCULAR; INTRAVENOUS at 02:30

## 2025-01-01 RX ADMIN — SODIUM BICARBONATE: 84 INJECTION INTRAVENOUS at 01:12

## 2025-01-01 RX ADMIN — THIAMINE HYDROCHLORIDE 200 MG: 100 INJECTION, SOLUTION INTRAMUSCULAR; INTRAVENOUS at 05:44

## 2025-01-01 RX ADMIN — PANTOPRAZOLE SODIUM 40 MG: 40 INJECTION, POWDER, FOR SOLUTION INTRAVENOUS at 05:44

## 2025-01-01 RX ADMIN — FENTANYL CITRATE 12.5 MCG: 50 INJECTION, SOLUTION INTRAMUSCULAR; INTRAVENOUS at 18:17

## 2025-01-01 RX ADMIN — DEXMEDETOMIDINE HYDROCHLORIDE IN SODIUM CHLORIDE 1.2 MCG/KG/HR: 4 INJECTION INTRAVENOUS at 11:58

## 2025-01-01 RX ADMIN — POTASSIUM CHLORIDE 10 MEQ: 7.46 INJECTION, SOLUTION INTRAVENOUS at 05:13

## 2025-01-01 RX ADMIN — IOPAMIDOL 100 ML: 755 INJECTION, SOLUTION INTRAVENOUS at 11:48

## 2025-01-01 RX ADMIN — BUMETANIDE 2 MG: 0.25 INJECTION INTRAMUSCULAR; INTRAVENOUS at 15:53

## 2025-01-01 RX ADMIN — CHLORHEXIDINE GLUCONATE 15 ML: 1.2 RINSE ORAL at 13:40

## 2025-01-01 RX ADMIN — PANTOPRAZOLE SODIUM 40 MG: 40 INJECTION, POWDER, FOR SOLUTION INTRAVENOUS at 05:13

## 2025-01-01 RX ADMIN — FOLIC ACID 1 MG: 5 INJECTION, SOLUTION INTRAMUSCULAR; INTRAVENOUS; SUBCUTANEOUS at 10:51

## 2025-01-01 RX ADMIN — POTASSIUM CHLORIDE 20 MEQ: 29.8 INJECTION, SOLUTION INTRAVENOUS at 00:09

## 2025-01-01 RX ADMIN — HEPARIN SODIUM 5000 UNITS: 5000 INJECTION INTRAVENOUS; SUBCUTANEOUS at 05:13

## 2025-01-01 RX ADMIN — THIAMINE HYDROCHLORIDE 200 MG: 100 INJECTION, SOLUTION INTRAMUSCULAR; INTRAVENOUS at 21:30

## 2025-01-01 RX ADMIN — MUPIROCIN 1 APPLICATION: 20 OINTMENT TOPICAL at 08:01

## 2025-01-01 RX ADMIN — FENTANYL CITRATE 12.5 MCG: 50 INJECTION, SOLUTION INTRAMUSCULAR; INTRAVENOUS at 09:33

## 2025-01-01 RX ADMIN — DEXMEDETOMIDINE HYDROCHLORIDE IN SODIUM CHLORIDE 1 MCG/KG/HR: 4 INJECTION INTRAVENOUS at 03:16

## 2025-01-01 RX ADMIN — MIDAZOLAM 1 MG: 1 INJECTION INTRAMUSCULAR; INTRAVENOUS at 16:53

## 2025-01-01 RX ADMIN — FENTANYL CITRATE 12.5 MCG: 50 INJECTION, SOLUTION INTRAMUSCULAR; INTRAVENOUS at 09:55

## 2025-01-01 RX ADMIN — FUROSEMIDE 60 MG: 10 INJECTION, SOLUTION INTRAMUSCULAR; INTRAVENOUS at 23:26

## 2025-01-01 RX ADMIN — DEXMEDETOMIDINE HYDROCHLORIDE IN SODIUM CHLORIDE 0.5 MCG/KG/HR: 4 INJECTION INTRAVENOUS at 14:58

## 2025-01-01 RX ADMIN — POTASSIUM CHLORIDE 10 MEQ: 7.46 INJECTION, SOLUTION INTRAVENOUS at 03:21

## 2025-01-01 RX ADMIN — NOREPINEPHRINE BITARTRATE 0.02 MCG/KG/MIN: 0.06 INJECTION, SOLUTION INTRAVENOUS at 01:34

## 2025-01-01 RX ADMIN — HEPARIN SODIUM 5000 UNITS: 5000 INJECTION INTRAVENOUS; SUBCUTANEOUS at 05:09

## 2025-01-01 RX ADMIN — DEXMEDETOMIDINE HYDROCHLORIDE IN SODIUM CHLORIDE 0.6 MCG/KG/HR: 4 INJECTION INTRAVENOUS at 21:16

## 2025-01-01 RX ADMIN — THIAMINE HYDROCHLORIDE 200 MG: 100 INJECTION, SOLUTION INTRAMUSCULAR; INTRAVENOUS at 21:14

## 2025-01-01 RX ADMIN — DEXMEDETOMIDINE HYDROCHLORIDE IN SODIUM CHLORIDE 0.5 MCG/KG/HR: 4 INJECTION INTRAVENOUS at 08:43

## 2025-01-01 RX ADMIN — THIAMINE HYDROCHLORIDE 200 MG: 100 INJECTION, SOLUTION INTRAMUSCULAR; INTRAVENOUS at 17:44

## 2025-01-01 RX ADMIN — INSULIN LISPRO 2 UNITS: 100 INJECTION, SOLUTION INTRAVENOUS; SUBCUTANEOUS at 13:11

## 2025-01-01 RX ADMIN — FUROSEMIDE 40 MG: 10 INJECTION, SOLUTION INTRAMUSCULAR; INTRAVENOUS at 08:34

## 2025-01-01 RX ADMIN — HEPARIN SODIUM 5000 UNITS: 5000 INJECTION INTRAVENOUS; SUBCUTANEOUS at 05:44

## 2025-01-01 RX ADMIN — HEPARIN SODIUM 5000 UNITS: 5000 INJECTION INTRAVENOUS; SUBCUTANEOUS at 13:41

## 2025-01-01 RX ADMIN — HEPARIN SODIUM 5000 UNITS: 5000 INJECTION INTRAVENOUS; SUBCUTANEOUS at 21:15

## 2025-01-01 RX ADMIN — CEFTRIAXONE SODIUM 2000 MG: 2 INJECTION, POWDER, FOR SOLUTION INTRAMUSCULAR; INTRAVENOUS at 15:33

## 2025-01-01 RX ADMIN — CHLORHEXIDINE GLUCONATE 15 ML: 1.2 RINSE ORAL at 22:11

## 2025-01-01 RX ADMIN — MUPIROCIN 1 APPLICATION: 20 OINTMENT TOPICAL at 21:16

## 2025-01-01 RX ADMIN — IPRATROPIUM BROMIDE AND ALBUTEROL SULFATE 3 ML: .5; 3 SOLUTION RESPIRATORY (INHALATION) at 04:37

## 2025-01-01 RX ADMIN — DEXAMETHASONE SODIUM PHOSPHATE 6 MG: 4 INJECTION, SOLUTION INTRAMUSCULAR; INTRAVENOUS at 09:06

## 2025-01-01 RX ADMIN — CHLORHEXIDINE GLUCONATE 15 ML: 1.2 RINSE ORAL at 08:01

## 2025-01-02 PROBLEM — N17.9 ACUTE RENAL FAILURE: Status: ACTIVE | Noted: 2025-01-01

## 2025-01-02 NOTE — H&P
[facility] Medicine Services  History & Physical    Patient Name: Connie A Schoen  : 1950  MRN: [id number]  Primary Care Physician:  Sandra Anthony APRN  Date of admission: 2025  Date and Time of Service: 2025 at 1516    Subjective      Chief Complaint: feeling ill    History of Present Illness: Connie A Schoen is a 74 y.o. female with a CMH of anxiety, depression, hypothyroidism, fibromyalgia, HTN, RA who presented to [facility] on 2025 with generalized weakness and feeling unwell. She is also complaining of right lower extremity radicular pain. This started about one week ago. She has been fatigued, coughing and has had a severely dry mouth.     On ED evaluation, potassium is 3.2, anion gap 16.9, BUN 77, Cr 5.10, alk phos 251, WBC 33.10, UA + for trace ketones, moderate blood, large leukocytes, protein, WBC, 4+ bacteria or squamous epithelial cells. Respiratory panel is positive for Covid 19. CXR shows no acute findings. XR lumbar spine shows no acute findings. CT head is negative for acute intracranial findings. Nephrology was consulted. ER provider spoke with Dr. Barkley. Hospitalist team to admit for further medical management.     Review of Systems   Constitutional:  Positive for fatigue. Negative for chills and fever.   HENT:  Positive for congestion.    Respiratory:  Positive for cough and shortness of breath.    Cardiovascular:  Negative for chest pain.   Neurological:  Positive for weakness.       Personal History     Past Medical History:   Diagnosis Date    Anxiety     Arthritis     Cataract     Depression     Disease of thyroid gland     Fibromyalgia, primary 10 years    HTN (hypertension)     Hypertension     Injury of back     back surgery    Low back pain had surgery    RA (rheumatoid arthritis)     Shortness of breath     Thyroid disease        Past Surgical History:   Procedure Laterality Date    APPENDECTOMY      BACK SURGERY      T10-S1 poster lumbar  fusion correction    BACK SURGERY      rods     CARDIAC CATHETERIZATION N/A 08/10/2021    Procedure: Left Heart Cath;  Surgeon: Richy Kevin MD;  Location: [facility] CATH INVASIVE LOCATION;  Service: Cardiology;  Laterality: N/A;    CARDIAC CATHETERIZATION N/A 08/10/2021    Procedure: Coronary angiography;  Surgeon: Richy Kevin MD;  Location: [facility] CATH INVASIVE LOCATION;  Service: Cardiology;  Laterality: N/A;    CARDIAC CATHETERIZATION N/A 08/10/2021    Procedure: Left ventriculography;  Surgeon: Richy Kevin MD;  Location: [facility] CATH INVASIVE LOCATION;  Service: Cardiology;  Laterality: N/A;    EYE SURGERY  2023    KNEE SURGERY      SPINE SURGERY  2019    TOTAL KNEE ARTHROPLASTY Left     TOTAL KNEE ARTHROPLASTY Right 01/31/2022    Procedure: TOTAL KNEE ARTHROPLASTY;  Surgeon: Zaid Walsh MD;  Location: [facility] MAIN OR;  Service: Orthopedics;  Laterality: Right;       Family History: family history includes Alcohol abuse in her father; Arthritis in her mother; Colon cancer in her half-brother and half-brother; Depression in her half-brother; Diabetes in her maternal grandmother; Heart disease in her paternal grandfather; Heart failure in her mother; Lung cancer in her father; Other in her half-sister; Suicidality in her half-brother. Otherwise pertinent FHx was reviewed and not pertinent to current issue.    Social History:  reports that she has been smoking cigarettes. She has a 40 pack-year smoking history. She has never used smokeless tobacco. She reports that she does not currently use alcohol after a past usage of about 30.0 standard drinks of alcohol per week. She reports that she does not currently use drugs.    Home Medications:  Prior to Admission Medications       Prescriptions Last Dose Informant Patient Reported? Taking?    B Complex Vitamins (B COMPLEX-B12 PO)   Yes No    Take  by mouth Daily.    cholecalciferol (VITAMIN D3) 25 MCG (1000 UT) tablet  Self  Yes No    Take 1 tablet by mouth Daily.    gabapentin (NEURONTIN) 600 MG tablet   No No    TAKE 1 TABLET BY MOUTH 3 TIMES  DAILY    Green Tea 150 MG capsule   Yes No    Take  by mouth.    levothyroxine (SYNTHROID, LEVOTHROID) 150 MCG tablet   No No    TAKE 1 TABLET BY MOUTH DAILY    losartan (COZAAR) 100 MG tablet   No No    TAKE 1 TABLET BY MOUTH DAILY    pantoprazole (PROTONIX) 40 MG EC tablet   Yes No    Take 1 tablet by mouth Daily.    sertraline (ZOLOFT) 100 MG tablet   No No    TAKE 1 TABLET BY MOUTH DAILY    Zinc 100 MG tablet   Yes No    Take  by mouth.              Allergies:  No Known Allergies    Objective      Vitals:   Temp:  [97.6 °F (36.4 °C)] 97.6 °F (36.4 °C)  Heart Rate:  [65-85] 80  Resp:  [20] 20  BP: (102)/(67) 102/67  Body mass index is 28.29 kg/m².  Physical Exam  Vitals and nursing note reviewed.   Constitutional:       Appearance: She is ill-appearing.   HENT:      Head: Normocephalic and atraumatic.      Nose: Nose normal.      Mouth/Throat:      Mouth: Mucous membranes are moist.   Eyes:      Extraocular Movements: Extraocular movements intact.      Pupils: Pupils are equal, round, and reactive to light.   Cardiovascular:      Rate and Rhythm: Normal rate and regular rhythm.      Pulses: Normal pulses.   Pulmonary:      Effort: Pulmonary effort is normal.      Breath sounds: Rhonchi and rales present.   Abdominal:      General: Bowel sounds are normal.      Palpations: Abdomen is soft.      Tenderness: There is no abdominal tenderness.   Musculoskeletal:         General: Normal range of motion.      Cervical back: Normal range of motion and neck supple.   Skin:     General: Skin is warm.      Capillary Refill: Capillary refill takes less than 2 seconds.      Coloration: Skin is pale.   Neurological:      Mental Status: She is alert and oriented to person, place, and time.         Diagnostic Data:  Lab Results (last 24 hours)       Procedure Component Value Units Date/Time    Blood Culture  - Blood, Arm, Right [[id number]] Collected: 01/02/25 1503    Specimen: Blood from Arm, Right Updated: 01/02/25 1505    Respiratory Panel PCR w/COVID-19(SARS-CoV-2) [facility] In-House, NP Swab in UTM/VTM, 2 HR TAT - Swab, Nasopharynx [[id number]]  (Abnormal) Collected: 01/02/25 1333    Specimen: Swab from Nasopharynx Updated: 01/02/25 1443     ADENOVIRUS, PCR Not Detected     Coronavirus 229E Not Detected     Coronavirus HKU1 Not Detected     Coronavirus NL63 Not Detected     Coronavirus OC43 Not Detected     COVID19 Detected     Human Metapneumovirus Not Detected     Human Rhinovirus/Enterovirus Not Detected     Influenza A PCR Not Detected     Influenza B PCR Not Detected     Parainfluenza Virus 1 Not Detected     Parainfluenza Virus 2 Not Detected     Parainfluenza Virus 3 Not Detected     Parainfluenza Virus 4 Not Detected     RSV, PCR Not Detected     Bordetella pertussis pcr Not Detected     Bordetella parapertussis PCR Not Detected     Chlamydophila pneumoniae PCR Not Detected     Mycoplasma pneumo by PCR Not Detected    Narrative:      In the setting of a positive respiratory panel with a viral infection PLUS a negative procalcitonin without other underlying concern for bacterial infection, consider observing off antibiotics or discontinuation of antibiotics and continue supportive care. If the respiratory panel is positive for atypical bacterial infection (Bordetella pertussis, Chlamydophila pneumoniae, or Mycoplasma pneumoniae), consider antibiotic de-escalation to target atypical bacterial infection.    Urinalysis, Microscopic Only - Urine, Catheter [[id number]]  (Abnormal) Collected: 01/02/25 1419    Specimen: Urine, Catheter Updated: 01/02/25 1442     RBC, UA 0-2 /HPF      WBC, UA Too Numerous to Count /HPF      Bacteria, UA 4+ /HPF      Squamous Epithelial Cells, UA 3-6 /HPF      Hyaline Casts, UA None Seen /LPF      Methodology Manual Light Microscopy    Urine Culture - Urine, Urine,  Catheter [[id number]] Collected: 01/02/25 1419    Specimen: Urine, Catheter Updated: 01/02/25 1442    Urinalysis With Culture If Indicated - Urine, Catheter [[id number]]  (Abnormal) Collected: 01/02/25 1419    Specimen: Urine, Catheter Updated: 01/02/25 1432     Color, UA Yellow     Appearance, UA Slightly Cloudy     pH, UA <=5.0     Specific Gravity, UA 1.015     Glucose, UA Negative     Ketones, UA Trace     Bilirubin, UA Negative     Blood, UA Moderate (2+)     Protein,  mg/dL (2+)     Leuk Esterase, UA Large (3+)     Nitrite, UA Negative     Urobilinogen, UA 1.0 E.U./dL    Narrative:      In absence of clinical symptoms, the presence of pyuria, bacteria, and/or nitrites on the urinalysis result does not correlate with infection.    Manual Differential [[id number]]  (Abnormal) Collected: 01/02/25 1333    Specimen: Blood Updated: 01/02/25 1407     Neutrophil % 86.0 %      Lymphocyte % 4.0 %      Monocyte % 2.0 %      Bands %  7.0 %      Myelocyte % 1.0 %      Neutrophils Absolute 30.78 10*3/mm3      Lymphocytes Absolute 1.32 10*3/mm3      Monocytes Absolute 0.66 10*3/mm3      Crenated RBC's Slight/1+     Hypersegmented Neutrophils Slight/1+     Platelet Estimate Adequate    CBC & Differential [[id number]]  (Abnormal) Collected: 01/02/25 1333    Specimen: Blood Updated: 01/02/25 1407    Narrative:      The following orders were created for panel order CBC & Differential.  Procedure                               Abnormality         Status                     ---------                               -----------         ------                     CBC Auto Differential[[id number]]        Abnormal            Final result               Scan Slide[[id number]]                                       Final result                 Please view results for these tests on the individual orders.    CBC Auto Differential [[id number]]  (Abnormal) Collected: 01/02/25 1333    Specimen: Blood Updated: 01/02/25 1407     WBC 33.10 10*3/mm3       RBC 3.80 10*6/mm3      Hemoglobin 12.5 g/dL      Hematocrit 37.0 %      MCV 97.4 fL      MCH 32.9 pg      MCHC 33.8 g/dL      RDW 14.6 %      RDW-SD 51.7 fl      MPV 10.4 fL      Platelets 256 10*3/mm3     Narrative:      The previously reported component NRBC is no longer being reported. Previous result was 0.1 /100 WBC (Reference Range: 0.0-0.2 /100 WBC) on 1/2/2025 at 1346 EST.    Scan Slide [[id number]] Collected: 01/02/25 1333    Specimen: Blood Updated: 01/02/25 1407     Scan Slide --     Comment: See Manual Differential Results       Comprehensive Metabolic Panel [[id number]]  (Abnormal) Collected: 01/02/25 1333    Specimen: Blood Updated: 01/02/25 1405     Glucose 96 mg/dL      BUN 77 mg/dL      Creatinine 5.10 mg/dL      Sodium 133 mmol/L      Potassium 3.2 mmol/L      Chloride 101 mmol/L      CO2 15.1 mmol/L      Calcium 9.1 mg/dL      Total Protein 7.1 g/dL      Albumin 3.0 g/dL      ALT (SGPT) 23 U/L      AST (SGOT) 24 U/L      Alkaline Phosphatase 251 U/L      Total Bilirubin 0.4 mg/dL      Globulin 4.1 gm/dL      A/G Ratio 0.7 g/dL      BUN/Creatinine Ratio 15.1     Anion Gap 16.9 mmol/L      eGFR 8.5 mL/min/1.73     Narrative:      GFR Categories in Chronic Kidney Disease (CKD)      GFR Category          GFR (mL/min/1.73)    Interpretation  G1                     90 or greater         Normal or high (1)  G2                      60-89                Mild decrease (1)  G3a                   45-59                Mild to moderate decrease  G3b                   30-44                Moderate to severe decrease  G4                    15-29                Severe decrease  G5                    14 or less           Kidney failure          (1)In the absence of evidence of kidney disease, neither GFR category G1 or G2 fulfill the criteria for CKD.    eGFR calculation 2021 CKD-EPI creatinine equation, which does not include race as a factor             Imaging Results (Last 24 Hours)       Procedure Component  Value Units Date/Time    CT Abdomen Pelvis Without Contrast [[id number]] Resulted: 01/02/25 1536     Updated: 01/02/25 1516    XR Chest 1 View [[id number]] Collected: 01/02/25 1448     Updated: 01/02/25 1451    Narrative:      XR CHEST 1 VW    Date of Exam: 1/2/2025 2:41 PM EST    Indication: cough ams    Comparison: CT chest 11/27/2023, PML chest 1/27/2022    Findings:  No acute airspace disease. Heart size is normal. No pleural effusion or pneumothorax or acute osseous abnormality. Bilateral vertical fusion rods in the lower thoracic spine. Advanced degenerative changes of both shoulders.      Impression:      Impression:  No acute cardiopulmonary findings.      Electronically Signed: Vee Wills MD    1/2/2025 2:49 PM EST    Workstation ID: [id number]    XR Spine Lumbar Complete 4+VW [[id number]] Collected: 01/02/25 1443     Updated: 01/02/25 1450    Narrative:      XR SPINE LUMBAR COMPLETE 4+VW    Date of Exam: 1/2/2025 2:40 PM EST    Indication: fall, rle radicular pain    Comparison: CT lumbar spine 3/26/2024.    Findings:  T10 through sacral bilateral pedicle screw and vertical fusion santos fixation with bilateral SI joint cannulated screw fixation. Lucency surrounds the left S1 pedicle screw on the oblique image, suggestive of hardware loosening, seen to better advantage on   prior CT lumbar spine imaging. The right L1 and right L2 pedicle screw tips extend across the superior endplates into the disc space, also seen to better advantage on prior CT imaging.    No lumbar spine fracture or subluxation is seen. Lumbar dextroscoliotic curvature is again noted there is advanced diminished disc height at L1-2 through L5-S1. Vacuum disc phenomenon and endplate sclerosis is present at L5-S1. Degenerative sclerotic   changes are present within the bilateral sacroiliac joints      Impression:      Impression:    1. No acute lumbar spine findings.  2. Degenerative postsurgical changes as described above.  3. Signs of  bilateral S1 pedicle screw loosening, seen to better advantage on prior CT imaging.      Electronically Signed: Vee Wills MD    1/2/2025 2:48 PM EST    Workstation ID: [id number]    CT Head Without Contrast [[id number]] Collected: 01/02/25 1403     Updated: 01/02/25 1407    Narrative:      CT HEAD WO CONTRAST    Date of Exam: 1/2/2025 1:53 PM EST    Indication: ams falls.    Comparison: CT angiography of the head and neck 11/27/2023, 1/19/2022. MRA head 9/15/2021. Noncontrast CT head 8/9/2021 and 2/13/2016.    Technique: Axial CT images were obtained of the head without contrast administration.  Coronal reconstructions were performed.  Automated exposure control and iterative reconstruction methods were used.      Findings:  No intracranial hemorrhage, mass lesion, mass effect, midline shift or evidence of acute evolving infarct. Mild age-appropriate atrophy. Normal ventricular configuration. Small air-fluid levels in the bilateral maxillary sinuses with mild generalized   paranasal sinus mucosal thickening. Mastoid air cells are clear. No acute calvarial abnormality. Mild intracranial carotid artery calcifications are present. Bilateral cataract surgery changes are suggested.      Impression:      Impression:  1.No acute intracranial finding.  2.Mild age-appropriate atrophy.  3.Mild paranasal sinus disease.        Electronically Signed: Vee Wills MD    1/2/2025 2:05 PM EST    Workstation ID: [id number]              Assessment & Plan        This is a 74 y.o. female with:    Active and Resolved Problems  Active Hospital Problems    Diagnosis  POA    **Acute renal failure [N17.9]  Yes      Resolved Hospital Problems   No resolved problems to display.       Acute renal failure  - Cr 5.10, only prior baseline is 0.9 from over a year ago  - BUN 77, GFR 8.5  - avoid nephrotoxic medications  - nephrology consulted     UTI  - UA + for ketones, blood, large leukocytes, protein, too numerous WBC, and 4+ bacteria, also  with squamous epithelial cells d/t contamination but will treat based on patient's overall presentation  - Continue Ceftriaxone  - Culture and sensitivity pending    Covid-19  - symptoms began one week ago  - symptomatic treatment   - Patient is 99% on RA    Hypokalemia   - likely 2/2 ARF/dehydration   - replacement per nephrology     Leukocytosis   - likely 2/2 covid and UTI  - will trend on daily labs     Hypothyroidism   - continue synthroid    Depression   - continue home medications once med rec completed     GERD  - continue PPI    Hypertension   - hold home losartan in light of poor renal function   - BP stable at 102/67    VTE Prophylaxis:  Mechanical VTE prophylaxis orders are present.        The patient desires to be as follows:    CODE STATUS:    Code Status (Patient has no pulse and is not breathing): CPR (Attempt to Resuscitate)  Medical Interventions (Patient has pulse or is breathing): Full Support      Roger Schoen, who can be contacted at [phone], is the designated person to make medical decisions on the patient's behalf if She is incapable of doing so. This was clarified with patient and/or next of kin on 1/2/2025 during the course of this H&P.    Admission Status:  I believe this patient meets inpatient status.    Expected Length of Stay: > 2 midnights     PDMP and Medication Dispenses via Sidebar reviewed and consistent with patient reported medications.    I discussed the patient's findings and my recommendations with patient.      Signature:     This document has been electronically signed by MISAEL Can on January 2, 2025 15:39 [facility] Hospitalist Team

## 2025-01-02 NOTE — CONSULTS
Nephrology Consult Note                                                [facility]      Patient Identification:  Name: Connie A Schoen  Age: 74 y.o.  Sex: female  :  1950  MRN: [id number]               Requesting Physician: Gerardo Sarabia MD  Reason for Consultation: management recommendations      History of Present Illness:    Patient is a 74-year-old female patient without previous kidney disease with history of depression anxiety disorder hypertension rheumatoid arthritis presented to the hospital because of generalized weakness and not feeling well further evaluation showed a creatinine 5.1 BUN 77 at low potassium and acidotic with a CO2 of 19 with the possible UTI prompting renal consultation  Problem List:    Acute renal failure     Past Medical History:  Past Medical History:   Diagnosis Date    Anxiety     Arthritis     Cataract     Depression     Disease of thyroid gland     Fibromyalgia, primary 10 years    HTN (hypertension)     Hypertension     Injury of back     back surgery    Low back pain had surgery    RA (rheumatoid arthritis)     Shortness of breath     Thyroid disease      Past Surgical History:  Past Surgical History:   Procedure Laterality Date    APPENDECTOMY      BACK SURGERY      T10-S1 poster lumbar fusion correction    BACK SURGERY      rods     CARDIAC CATHETERIZATION N/A 08/10/2021    Procedure: Left Heart Cath;  Surgeon: Richy Kevin MD;  Location: [facility] CATH INVASIVE LOCATION;  Service: Cardiology;  Laterality: N/A;    CARDIAC CATHETERIZATION N/A 08/10/2021    Procedure: Coronary angiography;  Surgeon: Richy Kevin MD;  Location:  [facility] CATH INVASIVE LOCATION;  Service: Cardiology;  Laterality: N/A;    CARDIAC CATHETERIZATION N/A 08/10/2021    Procedure: Left ventriculography;  Surgeon: Richy Kevin MD;  Location:  [facility] CATH INVASIVE LOCATION;  Service: Cardiology;  Laterality: N/A;    EYE SURGERY       KNEE SURGERY      SPINE SURGERY  2019    TOTAL KNEE ARTHROPLASTY Left     TOTAL KNEE ARTHROPLASTY Right 01/31/2022    Procedure: TOTAL KNEE ARTHROPLASTY;  Surgeon: Zaid Walsh MD;  Location: [facility] MAIN OR;  Service: Orthopedics;  Laterality: Right;      Home Meds:  (Not in a hospital admission)    Current Meds:     Current Facility-Administered Medications:     sennosides-docusate (PERICOLACE) 8.6-50 MG per tablet 2 tablet, 2 tablet, Oral, BID PRN **AND** polyethylene glycol (MIRALAX) packet 17 g, 17 g, Oral, Daily PRN **AND** bisacodyl (DULCOLAX) EC tablet 5 mg, 5 mg, Oral, Daily PRN **AND** bisacodyl (DULCOLAX) suppository 10 mg, 10 mg, Rectal, Daily PRN, Turner, Nathaly, APRN    cefTRIAXone (ROCEPHIN) 2,000 mg in sodium chloride 0.9 % 100 mL MBP, 2,000 mg, Intravenous, Once, Josh Grider MD    lactated ringers bolus 1,000 mL, 1,000 mL, Intravenous, Once, Josh Grider MD, Last Rate: 2,000 mL/hr at 01/02/25 1507, 1,000 mL at 01/02/25 1507    Current Outpatient Medications:     B Complex Vitamins (B COMPLEX-B12 PO), Take  by mouth Daily., Disp: , Rfl:     cholecalciferol (VITAMIN D3) 25 MCG (1000 UT) tablet, Take 1 tablet by mouth Daily., Disp: , Rfl:     gabapentin (NEURONTIN) 600 MG tablet, TAKE 1 TABLET BY MOUTH 3 TIMES  DAILY, Disp: 270 tablet, Rfl: 1    Green Tea 150 MG capsule, Take  by mouth., Disp: , Rfl:     levothyroxine (SYNTHROID, LEVOTHROID) 150 MCG tablet, TAKE 1 TABLET BY MOUTH DAILY, Disp: 90 tablet, Rfl: 1    losartan (COZAAR) 100 MG tablet, TAKE 1 TABLET BY MOUTH DAILY, Disp: 90 tablet, Rfl: 3    pantoprazole (PROTONIX) 40 MG EC tablet, Take 1 tablet by mouth Daily., Disp: , Rfl:     sertraline (ZOLOFT) 100 MG tablet, TAKE 1 TABLET BY MOUTH DAILY, Disp: 90 tablet, Rfl: 1    Zinc 100 MG tablet, Take  by mouth., Disp: , Rfl:     Allergies:  No Known Allergies  Social History:   Social History     Tobacco Use    Smoking status: Every Day     Current packs/day: 1.00     Average packs/day:  "1 pack/day for 40.0 years (40.0 ttl pk-yrs)     Types: Cigarettes    Smokeless tobacco: Never   Substance Use Topics    Alcohol use: Not Currently     Alcohol/week: 30.0 standard drinks of alcohol     Types: 20 Glasses of wine, 10 Cans of beer per week     Comment: sober 3-4 years      Family History:  Family History   Problem Relation Age of Onset    Arthritis Mother     Heart failure Mother     Lung cancer Father     Alcohol abuse Father     Other Half-Sister         complications from colonoscopy, ?sepsis    Colon cancer Half-Brother     Depression Half-Brother     Colon cancer Half-Brother     Suicidality Half-Brother     Diabetes Maternal Grandmother     Heart disease Paternal Grandfather         Review of Systems  Reviewed 12 systems were reviewed, all negative except for those mentioned in HPI    Objective:  Vitals:   /67 (BP Location: Left arm, Patient Position: Sitting)   Pulse 80   Temp 97.6 °F (36.4 °C) (Oral)   Resp 20   Ht 165.1 cm (65\")   Wt 77.1 kg (170 lb)   SpO2 99%   BMI 28.29 kg/m²   I/O:   No intake or output data in the 24 hours ending 01/02/25 1531    Exam:  General Appearance:  Alert  Head:  Normocephalic, without obvious abnormality, atraumatic  Eyes:  PERRL, conjunctiva/corneas clear     Neck:  Supple,  no adenopathy;      Lungs:  Decreased BS occasion ronchi  Heart:  Regular rate and rhythm, S1 and S2 normal  Abdomen:  Soft, non-tender, bowel sounds active   Extremities: trace edema  Pulses: 2+ and symmetric all extremities  Skin:  No rashes or lesions  Data Review:  All labs (24hrs):   Recent Results (from the past 24 hours)   Respiratory Panel PCR w/COVID-19(SARS-CoV-2) [facility] In-House, NP Swab in UTM/VTM, 2 HR TAT - Swab, Nasopharynx    Collection Time: 01/02/25  1:33 PM    Specimen: Nasopharynx; Swab   Result Value Ref Range    ADENOVIRUS, PCR Not Detected Not Detected    Coronavirus 229E Not Detected Not Detected    Coronavirus HKU1 Not Detected Not " Detected    Coronavirus NL63 Not Detected Not Detected    Coronavirus OC43 Not Detected Not Detected    COVID19 Detected (C) Not Detected - Ref. Range    Human Metapneumovirus Not Detected Not Detected    Human Rhinovirus/Enterovirus Not Detected Not Detected    Influenza A PCR Not Detected Not Detected    Influenza B PCR Not Detected Not Detected    Parainfluenza Virus 1 Not Detected Not Detected    Parainfluenza Virus 2 Not Detected Not Detected    Parainfluenza Virus 3 Not Detected Not Detected    Parainfluenza Virus 4 Not Detected Not Detected    RSV, PCR Not Detected Not Detected    Bordetella pertussis pcr Not Detected Not Detected    Bordetella parapertussis PCR Not Detected Not Detected    Chlamydophila pneumoniae PCR Not Detected Not Detected    Mycoplasma pneumo by PCR Not Detected Not Detected   Comprehensive Metabolic Panel    Collection Time: 01/02/25  1:33 PM    Specimen: Blood   Result Value Ref Range    Glucose 96 65 - 99 mg/dL    BUN 77 (H) 8 - 23 mg/dL    Creatinine 5.10 (H) 0.57 - 1.00 mg/dL    Sodium 133 (L) 136 - 145 mmol/L    Potassium 3.2 (L) 3.5 - 5.2 mmol/L    Chloride 101 98 - 107 mmol/L    CO2 15.1 (L) 22.0 - 29.0 mmol/L    Calcium 9.1 8.6 - 10.5 mg/dL    Total Protein 7.1 6.0 - 8.5 g/dL    Albumin 3.0 (L) 3.5 - 5.2 g/dL    ALT (SGPT) 23 1 - 33 U/L    AST (SGOT) 24 1 - 32 U/L    Alkaline Phosphatase 251 (H) 39 - 117 U/L    Total Bilirubin 0.4 0.0 - 1.2 mg/dL    Globulin 4.1 gm/dL    A/G Ratio 0.7 g/dL    BUN/Creatinine Ratio 15.1 7.0 - 25.0    Anion Gap 16.9 (H) 5.0 - 15.0 mmol/L    eGFR 8.5 (L) >60.0 mL/min/1.73   CBC Auto Differential    Collection Time: 01/02/25  1:33 PM    Specimen: Blood   Result Value Ref Range    WBC 33.10 (C) 3.40 - 10.80 10*3/mm3    RBC 3.80 3.77 - 5.28 10*6/mm3    Hemoglobin 12.5 12.0 - 15.9 g/dL    Hematocrit 37.0 34.0 - 46.6 %    MCV 97.4 (H) 79.0 - 97.0 fL    MCH 32.9 26.6 - 33.0 pg    MCHC 33.8 31.5 - 35.7 g/dL    RDW 14.6 12.3 - 15.4 %    RDW-SD 51.7 37.0 -  54.0 fl    MPV 10.4 6.0 - 12.0 fL    Platelets 256 140 - 450 10*3/mm3   Scan Slide    Collection Time: 01/02/25  1:33 PM    Specimen: Blood   Result Value Ref Range    Scan Slide     Manual Differential    Collection Time: 01/02/25  1:33 PM    Specimen: Blood   Result Value Ref Range    Neutrophil % 86.0 (H) 42.7 - 76.0 %    Lymphocyte % 4.0 (L) 19.6 - 45.3 %    Monocyte % 2.0 (L) 5.0 - 12.0 %    Bands %  7.0 (H) 0.0 - 5.0 %    Myelocyte % 1.0 (H) 0.0 - 0.0 %    Neutrophils Absolute 30.78 (H) 1.70 - 7.00 10*3/mm3    Lymphocytes Absolute 1.32 0.70 - 3.10 10*3/mm3    Monocytes Absolute 0.66 0.10 - 0.90 10*3/mm3    Crenated RBC's Slight/1+ None Seen    Hypersegmented Neutrophils Slight/1+ None Seen    Platelet Estimate Adequate Normal   Urinalysis With Culture If Indicated - Urine, Catheter    Collection Time: 01/02/25  2:19 PM    Specimen: Urine, Catheter   Result Value Ref Range    Color, UA Yellow Yellow, Straw    Appearance, UA Slightly Cloudy (A) Clear    pH, UA <=5.0 5.0 - 8.0    Specific Gravity, UA 1.015 1.005 - 1.030    Glucose, UA Negative Negative    Ketones, UA Trace (A) Negative    Bilirubin, UA Negative Negative    Blood, UA Moderate (2+) (A) Negative    Protein,  mg/dL (2+) (A) Negative    Leuk Esterase, UA Large (3+) (A) Negative    Nitrite, UA Negative Negative    Urobilinogen, UA 1.0 E.U./dL 0.2 - 1.0 E.U./dL   Urinalysis, Microscopic Only - Urine, Catheter    Collection Time: 01/02/25  2:19 PM    Specimen: Urine, Catheter   Result Value Ref Range    RBC, UA 0-2 None Seen, 0-2 /HPF    WBC, UA Too Numerous to Count (A) None Seen, 0-2 /HPF    Bacteria, UA 4+ (A) None Seen /HPF    Squamous Epithelial Cells, UA 3-6 (A) None Seen, 0-2 /HPF    Hyaline Casts, UA None Seen None Seen /LPF    Methodology Manual Light Microscopy        Current Facility-Administered Medications:     sennosides-docusate (PERICOLACE) 8.6-50 MG per tablet 2 tablet, 2 tablet, Oral, BID PRN **AND** polyethylene glycol (MIRALAX)  packet 17 g, 17 g, Oral, Daily PRN **AND** bisacodyl (DULCOLAX) EC tablet 5 mg, 5 mg, Oral, Daily PRN **AND** bisacodyl (DULCOLAX) suppository 10 mg, 10 mg, Rectal, Daily PRN, Nathaly Turner APRN    cefTRIAXone (ROCEPHIN) 2,000 mg in sodium chloride 0.9 % 100 mL MBP, 2,000 mg, Intravenous, Once, Josh Grider MD    lactated ringers bolus 1,000 mL, 1,000 mL, Intravenous, Once, Josh Grider MD, Last Rate: 2,000 mL/hr at 01/02/25 1507, 1,000 mL at 01/02/25 1507    Current Outpatient Medications:     B Complex Vitamins (B COMPLEX-B12 PO), Take  by mouth Daily., Disp: , Rfl:     cholecalciferol (VITAMIN D3) 25 MCG (1000 UT) tablet, Take 1 tablet by mouth Daily., Disp: , Rfl:     gabapentin (NEURONTIN) 600 MG tablet, TAKE 1 TABLET BY MOUTH 3 TIMES  DAILY, Disp: 270 tablet, Rfl: 1    Green Tea 150 MG capsule, Take  by mouth., Disp: , Rfl:     levothyroxine (SYNTHROID, LEVOTHROID) 150 MCG tablet, TAKE 1 TABLET BY MOUTH DAILY, Disp: 90 tablet, Rfl: 1    losartan (COZAAR) 100 MG tablet, TAKE 1 TABLET BY MOUTH DAILY, Disp: 90 tablet, Rfl: 3    pantoprazole (PROTONIX) 40 MG EC tablet, Take 1 tablet by mouth Daily., Disp: , Rfl:     sertraline (ZOLOFT) 100 MG tablet, TAKE 1 TABLET BY MOUTH DAILY, Disp: 90 tablet, Rfl: 1    Zinc 100 MG tablet, Take  by mouth., Disp: , Rfl:     Assessment:   Acute kidney injury   Metabolic acidosis  Hypokalemia  UTI   Positive COVID   leukocytosis  Recommendations:   IV fluids with bicarb drip  Replace potassium cautiously   Rule out reversible causes kidney failure  Check CPK   Urine studies  May need renal ultrasound   Follow repeat labs      Nile Barkley MD  1/2/2025  15:31 EST

## 2025-01-02 NOTE — ED PROVIDER NOTES
"Subjective   Provider in Triage Note  74-year-old male presents to triage with her  with a 1 week history of \" feeling off.\"   states last 3 days she has had some difficulty walking seems like she has had some periods of confusion and slurred speech.  She has had some right lower extremity radicular pain.  Intermittent nonproductive cough without fever congestion vomiting diarrhea or urinary complaints    Due to significant overcrowding in the emergency department patient was initially seen and evaluated in triage.  Provider in triage recommended patient placement in the treatment area to initiate therapy and movement to an ER bed as soon as possible.   Orders placed; medications will be deferred to main provider per protocol.        History of Present Illness  Agree with above unless otherwise noted.  Review of Systems  See above.  Past Medical History:   Diagnosis Date    Anxiety     Arthritis     Cataract 2023    Depression     Disease of thyroid gland     Fibromyalgia, primary 10 years    HTN (hypertension)     Hypertension     Injury of back     back surgery    Low back pain had surgery    RA (rheumatoid arthritis)     Shortness of breath     Thyroid disease        No Known Allergies    Past Surgical History:   Procedure Laterality Date    APPENDECTOMY      BACK SURGERY      T10-S1 poster lumbar fusion correction    BACK SURGERY      rods     CARDIAC CATHETERIZATION N/A 08/10/2021    Procedure: Left Heart Cath;  Surgeon: Richy Kevin MD;  Location: [facility] CATH INVASIVE LOCATION;  Service: Cardiology;  Laterality: N/A;    CARDIAC CATHETERIZATION N/A 08/10/2021    Procedure: Coronary angiography;  Surgeon: Richy Kevin MD;  Location: [facility] CATH INVASIVE LOCATION;  Service: Cardiology;  Laterality: N/A;    CARDIAC CATHETERIZATION N/A 08/10/2021    Procedure: Left ventriculography;  Surgeon: Richy Kevin MD;  Location: [facility] CATH INVASIVE LOCATION;  Service: " "Cardiology;  Laterality: N/A;    EYE SURGERY  2023    KNEE SURGERY      SPINE SURGERY  2019    TOTAL KNEE ARTHROPLASTY Left     TOTAL KNEE ARTHROPLASTY Right 01/31/2022    Procedure: TOTAL KNEE ARTHROPLASTY;  Surgeon: Zaid Walsh MD;  Location: [facility] MAIN OR;  Service: Orthopedics;  Laterality: Right;       Family History   Problem Relation Age of Onset    Arthritis Mother     Heart failure Mother     Lung cancer Father     Alcohol abuse Father     Other Half-Sister         complications from colonoscopy, ?sepsis    Colon cancer Half-Brother     Depression Half-Brother     Colon cancer Half-Brother     Suicidality Half-Brother     Diabetes Maternal Grandmother     Heart disease Paternal Grandfather        Social History     Socioeconomic History    Marital status:     Number of children: 3   Tobacco Use    Smoking status: Every Day     Current packs/day: 1.00     Average packs/day: 1 pack/day for 40.0 years (40.0 ttl pk-yrs)     Types: Cigarettes    Smokeless tobacco: Never   Vaping Use    Vaping status: Never Used   Substance and Sexual Activity    Alcohol use: Not Currently     Alcohol/week: 30.0 standard drinks of alcohol     Types: 20 Glasses of wine, 10 Cans of beer per week     Comment: sober 3-4 years    Drug use: Not Currently    Sexual activity: Yes     Partners: Male           Objective   Physical Exam  Uncomfortable appearing, normal speech, no murmur appreciated, regular rate and rhythm, abdomen soft and nontender without rebound or guarding, dry oral mucosa, poor skin turgor, alert, coarse breath sounds bilaterally, moving all extremities  Procedures           ED Course      /68   Pulse 86   Temp 97.6 °F (36.4 °C) (Oral)   Resp 13   Ht 165.1 cm (65\")   Wt 77.1 kg (170 lb)   SpO2 94%   BMI 28.29 kg/m²   Labs Reviewed   RESPIRATORY PANEL PCR W/ COVID-19 (SARS-COV-2), NP SWAB IN UTM/VTP, 2 HR TAT - Abnormal; Notable for the following components:       Result Value    COVID19 " Detected (*)     All other components within normal limits    Narrative:     In the setting of a positive respiratory panel with a viral infection PLUS a negative procalcitonin without other underlying concern for bacterial infection, consider observing off antibiotics or discontinuation of antibiotics and continue supportive care. If the respiratory panel is positive for atypical bacterial infection (Bordetella pertussis, Chlamydophila pneumoniae, or Mycoplasma pneumoniae), consider antibiotic de-escalation to target atypical bacterial infection.   COMPREHENSIVE METABOLIC PANEL - Abnormal; Notable for the following components:    BUN 77 (*)     Creatinine 5.10 (*)     Sodium 133 (*)     Potassium 3.2 (*)     CO2 15.1 (*)     Albumin 3.0 (*)     Alkaline Phosphatase 251 (*)     Anion Gap 16.9 (*)     eGFR 8.5 (*)     All other components within normal limits    Narrative:     GFR Categories in Chronic Kidney Disease (CKD)      GFR Category          GFR (mL/min/1.73)    Interpretation  G1                     90 or greater         Normal or high (1)  G2                      60-89                Mild decrease (1)  G3a                   45-59                Mild to moderate decrease  G3b                   30-44                Moderate to severe decrease  G4                    15-29                Severe decrease  G5                    14 or less           Kidney failure          (1)In the absence of evidence of kidney disease, neither GFR category G1 or G2 fulfill the criteria for CKD.    eGFR calculation 2021 CKD-EPI creatinine equation, which does not include race as a factor   CBC WITH AUTO DIFFERENTIAL - Abnormal; Notable for the following components:    WBC 33.10 (*)     MCV 97.4 (*)     All other components within normal limits    Narrative:     The previously reported component NRBC is no longer being reported. Previous result was 0.1 /100 WBC (Reference Range: 0.0-0.2 /100 WBC) on 1/2/2025 at 1346 EST.    URINALYSIS W/ CULTURE IF INDICATED - Abnormal; Notable for the following components:    Appearance, UA Slightly Cloudy (*)     Ketones, UA Trace (*)     Blood, UA Moderate (2+) (*)     Protein,  mg/dL (2+) (*)     Leuk Esterase, UA Large (3+) (*)     All other components within normal limits    Narrative:     In absence of clinical symptoms, the presence of pyuria, bacteria, and/or nitrites on the urinalysis result does not correlate with infection.   MANUAL DIFFERENTIAL - Abnormal; Notable for the following components:    Neutrophil % 86.0 (*)     Lymphocyte % 4.0 (*)     Monocyte % 2.0 (*)     Bands %  7.0 (*)     Myelocyte % 1.0 (*)     Neutrophils Absolute 30.78 (*)     All other components within normal limits   URINALYSIS, MICROSCOPIC ONLY - Abnormal; Notable for the following components:    WBC, UA Too Numerous to Count (*)     Bacteria, UA 4+ (*)     Squamous Epithelial Cells, UA 3-6 (*)     All other components within normal limits   BASIC METABOLIC PANEL - Abnormal; Notable for the following components:    BUN 78 (*)     Creatinine 4.95 (*)     Sodium 134 (*)     Potassium 2.8 (*)     CO2 13.3 (*)     Calcium 8.1 (*)     Anion Gap 17.7 (*)     eGFR 8.8 (*)     All other components within normal limits    Narrative:     GFR Categories in Chronic Kidney Disease (CKD)      GFR Category          GFR (mL/min/1.73)    Interpretation  G1                     90 or greater         Normal or high (1)  G2                      60-89                Mild decrease (1)  G3a                   45-59                Mild to moderate decrease  G3b                   30-44                Moderate to severe decrease  G4                    15-29                Severe decrease  G5                    14 or less           Kidney failure          (1)In the absence of evidence of kidney disease, neither GFR category G1 or G2 fulfill the criteria for CKD.    eGFR calculation 2021 CKD-EPI creatinine equation, which does not  include race as a factor   CK - Normal   POC LACTATE - Normal   URINE CULTURE   BLOOD CULTURE   BLOOD CULTURE   SCAN SLIDE   SODIUM, URINE, RANDOM    Narrative:     Reference intervals for random urine have not been established.  Clinical usage is dependent upon physician's interpretation in combination with other laboratory tests.      CBC WITH AUTO DIFFERENTIAL   POC LACTATE   CBC AND DIFFERENTIAL    Narrative:     The following orders were created for panel order CBC & Differential.  Procedure                               Abnormality         Status                     ---------                               -----------         ------                     CBC Auto Differential[[id number]]        Abnormal            Final result               Scan Slide[[id number]]                                       Final result                 Please view results for these tests on the individual orders.   CBC AND DIFFERENTIAL    Narrative:     The following orders were created for panel order CBC & Differential.  Procedure                               Abnormality         Status                     ---------                               -----------         ------                     CBC Auto Differential[[id number]]                                                         Please view results for these tests on the individual orders.     CT Abdomen Pelvis Without Contrast   Final Result   Impression:   1.No evidence of obstructive uropathy.   2.Cirrhosis.   3.Mural thickening involving the sigmoid colon which may be on a chronic basis as there are no surrounding inflammatory changes. Correlate for signs of sigmoid colitis.   4.Other incidental nonemergent findings as detailed above.                  Electronically Signed: Berny Collins MD     1/2/2025 3:41 PM EST     Workstation ID: [id number]      XR Chest 1 View   Final Result   Impression:   No acute cardiopulmonary findings.         Electronically Signed: Vee Wills MD      1/2/2025 2:49 PM EST     Workstation ID: [id number]      XR Spine Lumbar Complete 4+VW   Final Result   Impression:      1. No acute lumbar spine findings.   2. Degenerative postsurgical changes as described above.   3. Signs of bilateral S1 pedicle screw loosening, seen to better advantage on prior CT imaging.         Electronically Signed: Vee Wills MD     1/2/2025 2:48 PM EST     Workstation ID: [id number]      CT Head Without Contrast   Final Result   Impression:   1.No acute intracranial finding.   2.Mild age-appropriate atrophy.   3.Mild paranasal sinus disease.            Electronically Signed: Vee Wills MD     1/2/2025 2:05 PM EST     Workstation ID: [id number]                                                         Medical Decision Making  Interpretation of CT abdomen pelvis negative for obstructive uropathy.  See system for radiology interpretation    Patient still making some urine.  Given LR here.  Discussed with Dr. Broussard who will see states likely need bicarb drip.  Urine concerning for infection.  Started on Rocephin.  CT abdomen pelvis largely unremarkable.  Patient COVID sign positive.  Will admit to PCU    Final diagnoses:   Acute renal failure, unspecified acute renal failure type   Acute UTI       ED Disposition  ED Disposition       ED Disposition   Decision to Admit    Condition   --    Comment   Level of Care: Progressive Care [20]   Diagnosis: Acute renal failure [472912]   Admitting Physician: FARHAT APONTE [[id number]]   Certification: I Certify That Inpatient Hospital Services Are Medically Necessary For Greater Than 2 Midnights                 No follow-up provider specified.       Medication List      No changes were made to your prescriptions during this visit.            Josh Grider MD  01/03/25 0022

## 2025-01-02 NOTE — ED NOTES
..Patient evaluated by provider and will return to waiting room with Intravenous line in place.  Patient has been instructed not to inject anything into IV, or leave premises with line in place. Patient pending further evaluation, treatment, testing / monitoring.       Family

## 2025-01-03 NOTE — CONSULTS
Initial spiritual care visit. Pt is a spiritual consult and then a rapid code. Staff around pt and spouse in [place] in ED during code.  engaged spouse after NP finished updating him. Spouse was nervous because pt would be intubated in the ICU. Spouse stated that he was waiting for his daughter, who is a nurse, so he will better understand the plan for his wife's care. Pt being moved to ICU.  will follow up later when pt settled in to further assess spiritual need.  prayed in hallway for pt and spouse. No other needs at this time.

## 2025-01-03 NOTE — PROCEDURES
"Intubation    Date/Time: 1/3/2025 10:42 AM    Performed by: Cuco Del Toro APRN  Authorized by: Cuco Del Toro APRN  Consent: The procedure was performed in an emergent situation. Verbal consent obtained.  Risks and benefits: risks, benefits and alternatives were discussed  Consent given by: spouse  Patient understanding: patient states understanding of the procedure being performed  Required items: required blood products, implants, devices, and special equipment available  Patient identity confirmed: arm band, hospital-assigned identification number and anonymous protocol, patient vented/unresponsive  Time out: Immediately prior to procedure a \"time out\" was called to verify the correct patient, procedure, equipment, support staff and site/side marked as required.  Indications: respiratory failure, airway protection and hypoxemia  Intubation method: GlideScope.  Preoxygenation: BVM and nonrebreather mask  Pretreatment medications: none  Sedatives: etomidate, midazolam, fentanyl and see MAR for details  Paralytic: none  Laryngoscope size: Mac 3  Tube size: 7.5 mm  Tube type: cuffed  Number of attempts: 1  Cricoid pressure: no  Cords visualized: yes  Post-procedure assessment: CO2 detector  Breath sounds: equal and absent over the epigastrium  Cuff inflated: yes  ETT to lip: 23 cm  Tube secured with: ETT stewart  Chest x-ray interpreted by me.  Chest x-ray findings: endotracheal tube too low  Tube repositioned: tube repositioned successfully (21, repositioned after review of the CT chest.)        Electronically signed by MISAEL Santoyo, 01/03/25, 3:16 PM EST.    "

## 2025-01-03 NOTE — CODE DOCUMENTATION
Rapid response called related to respiratory distress. Upon arrival of rapid response team, patient was audibly tachypneaic, short of air, and struggling to expel secretions. MISAEL Becerra called to the bedside to evaluate patient. Spo2 84%. After discussing patients current respiratory failure with patient's spouse, decision was made to intubate. Versed, fentanyl, etomidate given for intubation - see MAR. Successful intubation with 8.0 ETT. Precedex, fentanyl, and levophed drips started. Patient was taken to CT for CT chest, then transported to ICU 2304. Family aware of event and at bedside.

## 2025-01-03 NOTE — PROGRESS NOTES
[facility] MEDICINE SERVICE  DAILY PROGRESS NOTE    NAME: Connie A Schoen  : 1950  MRN: [id number]      LOS: 1 day     PROVIDER OF SERVICE: Bryce Oleary MD    Chief Complaint: Acute renal failure    Subjective:     Interval History:  History taken from: Patient seen and examined at bedside this morning with  present.  States that she has been breathing like this for about 2 weeks now.  She has been taking ibuprofen daily for her sciatic pain patient             Review of Systems: Negative except as described above  Review of Systems    Objective:     Vital Signs  Temp:  [96.5 °F (35.8 °C)-97.6 °F (36.4 °C)] 96.5 °F (35.8 °C)  Heart Rate:  [65-98] 92  Resp:  [13-20] 15  BP: ()/(43-91) 119/81  Flow (L/min) (Oxygen Therapy):  [2-100] 100  FiO2 (%):  [100 %] 100 %   Body mass index is 28.29 kg/m².    Physical Exam  Physical Exam  Constitutional:       Comments: NAD    Cardiovascular:      Comments:  RRR, S1 & S2   Pulmonary:      Comments:  Lungs CTA   Abdominal:      Comments:  ABD soft, NT            Diagnostic Data    Results from last 7 days   Lab Units 25  1055 25  1018 25  0541 25  2209 25  1333   WBC 10*3/mm3 32.60*  --    < >  --  33.10*   HEMOGLOBIN g/dL 11.4*  --    < >  --  12.5   HEMATOCRIT % 32.3*  --    < >  --  37.0   PLATELETS 10*3/mm3 237  --    < >  --  256   GLUCOSE mg/dL  --   --   --  82 96   CREATININE mg/dL  --  4.31*  --  4.95* 5.10*   BUN mg/dL  --   --   --  78* 77*   SODIUM mmol/L  --   --   --  134* 133*   POTASSIUM mmol/L  --   --   --  2.8* 3.2*   AST (SGOT) U/L  --   --   --   --  24   ALT (SGPT) U/L  --   --   --   --  23   ALK PHOS U/L  --   --   --   --  251*   BILIRUBIN mg/dL  --   --   --   --  0.4   ANION GAP mmol/L  --   --   --  17.7* 16.9*    < > = values in this interval not displayed.       CT Angiogram Chest Pulmonary Embolism    Result Date: 1/3/2025  Impression: 1.Dense left lower lobe consolidation with  volume loss. Correlate for symptoms of pneumonia. 2.Scattered peripheral groundglass densities within the bilateral upper lobes and right middle lobe. Findings consistent with pneumonia, as can be seen with COVID-pneumonia. Correlate clinically. 3.Questionable mild peripancreatic inflammatory stranding, raising the possibility of pancreatitis in the appropriate clinical context.. Correlate with serum amylase and lipase levels. 4.Cirrhotic liver morphology. 5.Additional findings as described above. 6.Enteric tube extends to the level of the lower thoracic esophagus, above the esophogastric junction. Consider advancement. I called and spoke with one of the nurses in the ICU (this patient's nurse was unable to come to the phone), and she stated she would pass this pertinent information to the patient's nurse. Electronically Signed: Vee Wills MD  1/3/2025 12:03 PM EST  Workstation ID: [id number]    XR Chest 1 View    Result Date: 1/3/2025  Impression: ET tube placement to the level of the mid thoracic trachea. Medial bibasilar densities favored to represent subsegmental atelectasis, without significant change. Electronically Signed: Vee Wills MD  1/3/2025 11:11 AM EST  Workstation ID: [id number]    XR Chest 1 View    Result Date: 1/3/2025  Impression: Mild linear subsegmental atelectasis in the right lower lobe. No focal consolidation. Electronically Signed: Vee Wills MD  1/3/2025 10:23 AM EST  Workstation ID: [id number]    XR Chest 1 View    Result Date: 1/3/2025  Impression: No acute cardiopulmonary process. Chronic appearing interstitial changes are present. Electronically Signed: Norma Ott MD  1/3/2025 2:22 AM EST  Workstation ID: [id number]    CT Abdomen Pelvis Without Contrast    Result Date: 1/2/2025  Impression: 1.No evidence of obstructive uropathy. 2.Cirrhosis. 3.Mural thickening involving the sigmoid colon which may be on a chronic basis as there are no surrounding inflammatory changes. Correlate  for signs of sigmoid colitis. 4.Other incidental nonemergent findings as detailed above. Electronically Signed: Berny Collins MD  1/2/2025 3:41 PM EST  Workstation ID: [id number]    XR Chest 1 View    Result Date: 1/2/2025  Impression: No acute cardiopulmonary findings. Electronically Signed: Vee Wills MD  1/2/2025 2:49 PM EST  Workstation ID: [id number]    XR Spine Lumbar Complete 4+VW    Result Date: 1/2/2025  Impression: 1. No acute lumbar spine findings. 2. Degenerative postsurgical changes as described above. 3. Signs of bilateral S1 pedicle screw loosening, seen to better advantage on prior CT imaging. Electronically Signed: Vee Wills MD  1/2/2025 2:48 PM EST  Workstation ID: [id number]    CT Head Without Contrast    Result Date: 1/2/2025  Impression: 1.No acute intracranial finding. 2.Mild age-appropriate atrophy. 3.Mild paranasal sinus disease. Electronically Signed: Vee Wills MD  1/2/2025 2:05 PM EST  Workstation ID: [id number]       I reviewed the patient's new clinical results.    Assessment/Plan:     Active and Resolved Problems  Active Hospital Problems    Diagnosis  POA    **Acute renal failure [N17.9]  Yes      Resolved Hospital Problems   No resolved problems to display.       Acute renal failure, possibly secondary to NSAIDs  -Aguirre catheter placed, no significant urine output seen.  Heading towards dialysis  -She had received Dilaudid prior to me seeing her and during my interview she was pretty lethargic and disoriented and seemed like was having trouble protecting her airway as she was foaming as well and likely aspirating so rapid response was called and decision was made to intubate  - avoid nephrotoxic medications  - nephrology consulted      UTI  - UA + for ketones, blood, large leukocytes, protein, too numerous WBC, and 4+ bacteria, also with squamous epithelial cells d/t contamination but will treat based on patient's overall presentation  - Continue Ceftriaxone antibiotics as per  critical care team  - Culture positive for gram-negative rods     Covid-19  - symptoms began one week ago  - symptomatic treatment        Hypokalemia   - likely 2/2 ARF  - replacement per nephrology      Leukocytosis   - likely 2/2 covid and UTI and possible aspiration  - will trend on daily labs      Hypothyroidism   - continue synthroid     Depression   - continue home medications once med rec completed      GERD  - continue PPI     Hypertension   - hold home losartan in light of poor renal function   - BP stable at 102/67       VTE Prophylaxis:  Pharmacologic & mechanical VTE prophylaxis orders are present.                    Time: 35 minutes     Code Status and Medical Interventions: CPR (Attempt to Resuscitate); Full Support   Ordered at: 01/02/25 1539     Code Status (Patient has no pulse and is not breathing):    CPR (Attempt to Resuscitate)     Medical Interventions (Patient has pulse or is breathing):    Full Support       Signature: Electronically signed by Bryce Oleary MD, 01/03/25, 12:45 EST.  [facility] Hospitalist Team

## 2025-01-03 NOTE — NURSING NOTE
Talked with nurse they are going to draw labs at 1700 and determine if dialysis to be done.  Per Blanca ARROYO they are going to now draw labs at 2100 and re-evaulate

## 2025-01-03 NOTE — NURSING NOTE
"Nurse took over care for pt. Upon first initial assessment pts lungs sounded full of fluid and she had audible gurgling with a frequent non productive junky cough.PT was able to let me know she had pain in her back and answer questions such as name and .  at bedside Dr. Mcmullen and Dr. Barkley made aware. IVF stopped, IV lasix administered,  not I' & O;s documented. Nurses inserted larios catheter and got an output of 400 ml. Pt still alert and stated that  nurse was \"good at putting those in withing the same hour Speech therapist came to let nurse know pt was not responding well and seemed lethargic and could not stay awake, and had secretions coming out the side of mouth. Tech was asked to set up suction while doing so DR came in and told family pt needed dialysis. RN then went into room, got float pull charge and RR was called. Pt intubated and moved to ICU  "

## 2025-01-03 NOTE — CONSULTS
Critical Care Consult Note   Connie A Schoen : 1950 MRN:[id number] LOS:1 ROOM: 2304/1     Reason for admission: Acute renal failure     Assessment / Plan     Acute respiratory failure with hypoxia  Likely due to pneumonia, complicated by mental status changes  CTA Chest: LLL consolidation.  Mucomyst and CPT ordered.  Continue duonebs.  Rapid response team called, ABG with pH 7.166, pO2 62.1 on nonrebreather.  Patient not appropriate for BiPAP, discussed with family, proceeded with intubation and transfer to ICU.  Ventilator settings noted and adjusted as needed.   Close monitoring of ABG as ordered.   Minimize sedation/analgesia to keep RASS of 0 to -1; using Precedex and fentanyl.  Check echocardiogram.    Septic Shock due to pneumonia / COVID-19 infection  Likely due to suspected UTI, multifocal pneumonia and COVID-19.   CT of the abdomen/pelvis: Mural thickening involving the sigmoid colon without surrounding inflammatory changes, could possibly be chronic in nature.  CTA of the chest: Left lower lobe consolidation, peripheral groundglass densities in bilateral upper lobes and right middle lobe.  Blood cultures-pending with no growth.  Urine culture-pending with gram-negative bacilli.  Sputum culture-pending  MRSA screen-negative.  RVP-+COVID-19  Continues on Rocephin.  Decadron ordered for 6 mg x 10 days.    Hypotensive episode overnight that was fluid responsive.  Did not become hypotensive till after sedation medications with intubation.  C/w additional fluids as ordered. Avoid fluid overload.   Titrate vasopressors for a target MAP of 65. Started on Levophed gtt.      Acute Kidney Injury / Metabolic acidosis, increased anion gap  Remains nonoliguric. Baseline creatinine 0.9,   Likely prerenal. Possible intrinsic component due to ATN from infection, dehydration, and hypotension.  Nephrology consulted.  Initially plans were to proceed with hemodialysis.  Patient has been very fluid responsive, despite  elevated BNP, patient does seem to be intravascularly dry.  Ordered an additional 2 L of lactated Ringer's, and will start IV fluids with D5 LR at 100.  3 A of sodium HCO3 ordered following intubation.  Monitor further labs to determine further sodium bicarb and needs.  Rediscussed with nephrology, dialysis was discontinued, and will reevaluate in the morning.  He agreed with further IV fluids.  Monitor Input/Output very closely.   Avoids NSAIDs, nephrotoxic medications, and hypotension.  Net IO Since Admission: 3,962.83 mL [01/03/25 1521]     Hypokalemia  Potassium 2.4, replacement ordered.  Electrolyte replacement to be ordered on following labs.  Follow serial BMPs.  Closely monitor magnesium level and replace accordingly.    Possible pancreatitis  Likely alcohol induced.  Lipase 691, Triglycerides 248.  CT of abdomen and pelvis: Pancreas was unremarkable.  CTA Chest: Questionable mild peripancreatic inflammatory stranding.  Continue IVF as above.    Transaminitis  Alk phos elevated at 251 on admission.  Monitor and trend labs.  Likely secondary to alcohol use, as CT showing suggestion of cirrhosis.  Recommend follow up with GI as outpatient.    Acute encephalopathy/fall/generalized weakness  Likely infection related.  CT of the head negative for acute intracranial findings.  X-ray of lumbar spine with no acute lumbar spine findings, degenerative postsurgical changes described, signs of bilateral S1 pedicle screw loosening, but was a finding on previous CT imaging.  Further workup as clinically indicated.    Essential hypertension:   Due to hypotension and vasopressor needs, hold losartan.  Titrate medications as needed.    Alcohol abuse  Family unable to quantify amount of alcohol patient drinks, but they state that she drinks quite a bit.  Prefers fireball but will drink any type of alcohol.  CIWA protocol in place.  Safety precautions and seizure precautions in place  Phenobarb taper ordered.  Thiamine, folic  acid ordered.    Hypothyroidism: Continue levothyroxine when patient able to take oral medications.  GERD: Continue Protonix.  Anxiety/depression: Continue Zoloft when patient able to take oral medications.  Neuropathy / DDD: Hold gabapentin due to acute kidney injury.    Code Status (Patient has no pulse and is not breathing): CPR (Attempt to Resuscitate)  Medical Interventions (Patient has pulse or is breathing): Full Support       Nutrition: NPO Diet NPO Type: Strict NPO Tube Feeding: Formula: Peptamen AF (Vital AF 1.2); Feeding Type: Continuous; Start at: 20 mL/hr; Then Advance By: Do Not Advance; Water Flush: 10 mL; Every: 1 hour; Water Bolus: None     VTE Prophylaxis:  Pharmacologic & mechanical VTE prophylaxis orders are present.       History of Present illness     A 74 y.o. old female patient with PMH of degenerative disc disease, status post back surgery, anxiety/depression, hypothyroidism, hypertension, RA, and alcohol abuse, and presents to the hospital with complaints of generalized weakness, right lower extremity radicular pain, and generally feeling unwell.  Patient presented on 1/2/2025, and stated that at baseline patient can be off balance, uses a walker for ambulation and tends to hold onto people and walls.  Approximately 3-4 days ago she began to develop muscle weakness, and furthermore then developed pain in her chest that radiated to her back with associated coughing.  She has had a decreased appetite and oral intake, though it is noted that patient does routinely drink alcohol.  When asked how much alcohol she drinks, family was unable to quantify but states that it is a lot.  She will drink any type of alcohol that she can find, but tends to prefer fireball.  In the ED, patient was noted to have an elevated creatinine of 5.10 which is a new finding for her.  She was also found to have a suspected urinary tract infection, which a urine culture is pending with gram negative bacilli.   Respiratory viral panel did show COVID-19 infection.  Patient had a CT of the head that was negative for acute intracranial findings, and an x-ray of her lumbar spine but did also not have any acute findings.  Nephrology was consulted in the ED.  Patient was admitted to the hospitalist service.  She did experience a brief hypotensive episode overnight that responded to a 500 mL liter IV fluid bolus.  Patient's mentation continued to decline overnight.    On 1/3/2025, patient had a sharp decline in mental status with accompanying hypoxia and respiratory distress, and a rapid response team was called.  Upon arrival by rapid response team, it was requested for patient to undergo intensivist consultation.  Patient was noted to be hypoxic with a metabolic acidosis, and was too weak to lift her arms up and felt an appropriate for BiPAP.  She did have a audible secretions in the back of her mouth, but was lethargic but responsive to questions.  After discussion with patient's family, decision was made to intubate patient and transferred to the ICU.  Initially, nephrology was planning to initiate hemodialysis, but after transfer to the ICU, and a improvement in her renal function with IV fluids, decision was made to postpone dialysis.  Patient did require vasopressors after intubation.  Intensivist service have taken over management of this patient.  Patient did trigger sepsis, and sepsis workup is in progress, following blood cultures, and patient was started on empiric antimicrobial therapy.    ACP: No advance care planning documentation on file, patient's spouse would be next of kin and decision maker at this time.    Patient was seen and examined on 01/03/25 at 15:17 EST .    Past Medical/Surgical/Social/Family History & Allergies     Past Medical History:   Diagnosis Date    Anxiety     Arthritis     Cataract 2023    Depression     Disease of thyroid gland     Fibromyalgia, primary 10 years    HTN (hypertension)      Hypertension     Injury of back     back surgery    Low back pain had surgery    RA (rheumatoid arthritis)     Shortness of breath     Thyroid disease       Past Surgical History:   Procedure Laterality Date    APPENDECTOMY      BACK SURGERY      T10-S1 poster lumbar fusion correction    BACK SURGERY      rods     CARDIAC CATHETERIZATION N/A 08/10/2021    Procedure: Left Heart Cath;  Surgeon: Richy Kevin MD;  Location: [facility] CATH INVASIVE LOCATION;  Service: Cardiology;  Laterality: N/A;    CARDIAC CATHETERIZATION N/A 08/10/2021    Procedure: Coronary angiography;  Surgeon: Richy Kevin MD;  Location: [facility] CATH INVASIVE LOCATION;  Service: Cardiology;  Laterality: N/A;    CARDIAC CATHETERIZATION N/A 08/10/2021    Procedure: Left ventriculography;  Surgeon: Richy Kevin MD;  Location: [facility] CATH INVASIVE LOCATION;  Service: Cardiology;  Laterality: N/A;    EYE SURGERY  2023    KNEE SURGERY      SPINE SURGERY  2019    TOTAL KNEE ARTHROPLASTY Left     TOTAL KNEE ARTHROPLASTY Right 01/31/2022    Procedure: TOTAL KNEE ARTHROPLASTY;  Surgeon: Zaid Walsh MD;  Location: [facility] MAIN OR;  Service: Orthopedics;  Laterality: Right;      Social History     Socioeconomic History    Marital status:     Number of children: 3   Tobacco Use    Smoking status: Every Day     Current packs/day: 1.00     Average packs/day: 1 pack/day for 40.0 years (40.0 ttl pk-yrs)     Types: Cigarettes    Smokeless tobacco: Never   Vaping Use    Vaping status: Never Used   Substance and Sexual Activity    Alcohol use: Not Currently     Alcohol/week: 30.0 standard drinks of alcohol     Types: 20 Glasses of wine, 10 Cans of beer per week     Comment: sober 3-4 years    Drug use: Not Currently    Sexual activity: Yes     Partners: Male      Family History   Problem Relation Age of Onset    Arthritis Mother     Heart failure Mother     Lung cancer Father     Alcohol abuse Father     Other  Half-Sister         complications from colonoscopy, ?sepsis    Colon cancer Half-Brother     Depression Half-Brother     Colon cancer Half-Brother     Suicidality Half-Brother     Diabetes Maternal Grandmother     Heart disease Paternal Grandfather       No Known Allergies     Home Medications     Prior to Admission medications    Medication Sig Start Date End Date Taking? Authorizing Provider   B Complex Vitamins (B COMPLEX-B12 PO) Take  by mouth Daily.    Mago Venegas MD   cholecalciferol (VITAMIN D3) 25 MCG (1000 UT) tablet Take 1 tablet by mouth Daily.    Mago Venegas MD   gabapentin (NEURONTIN) 600 MG tablet TAKE 1 TABLET BY MOUTH 3 TIMES  DAILY 10/24/24   Sandra Anthony APRN   Green Tea 150 MG capsule Take  by mouth.    Mago Venegas MD   levothyroxine (SYNTHROID, LEVOTHROID) 150 MCG tablet TAKE 1 TABLET BY MOUTH DAILY 10/24/24   Sandra Anthony APRN   losartan (COZAAR) 100 MG tablet TAKE 1 TABLET BY MOUTH DAILY 11/19/24   Sandra Anthony APRN   pantoprazole (PROTONIX) 40 MG EC tablet Take 1 tablet by mouth Daily. 9/8/23   Mago Venegas MD   sertraline (ZOLOFT) 100 MG tablet TAKE 1 TABLET BY MOUTH DAILY 12/11/24   Sandra Anthony APRN   Zinc 100 MG tablet Take  by mouth.    Mago Venegas MD        Objective / Physical Exam     Vital signs:  Temp: 96.5 °F (35.8 °C)  BP: 119/81  Heart Rate: 92  Resp: 15  SpO2: 98 %  Weight: 77.1 kg (170 lb)    Admission Weight: Weight: 77.1 kg (170 lb)    Physical Exam  Vitals and nursing note reviewed.   Constitutional:       General: She is not in acute distress.     Appearance: She is ill-appearing and toxic-appearing. She is not diaphoretic.      Interventions: She is sedated and intubated.   HENT:      Head: Normocephalic and atraumatic.      Nose: Nose normal.      Mouth/Throat:      Mouth: Mucous membranes are moist.      Pharynx: Oropharynx is clear. No oropharyngeal exudate.      Comments: ET tube in place  Eyes:       General: No scleral icterus.     Conjunctiva/sclera: Conjunctivae normal.   Cardiovascular:      Rate and Rhythm: Normal rate and regular rhythm.      Pulses: Normal pulses.      Heart sounds: Normal heart sounds.   Pulmonary:      Effort: She is intubated.      Comments: Coarse breath sounds throughout, diminished bibasilar breath sounds.  Abdominal:      General: There is no distension.      Palpations: Abdomen is soft.      Tenderness: There is no abdominal tenderness.   Musculoskeletal:      Right lower leg: No edema.      Left lower leg: No edema.   Skin:     General: Skin is warm and dry.   Neurological:      Comments: Withdraws to stimuli, intubated, sedated, no obvious focal deficits noted.   Psychiatric:      Comments: Intubated, sedated.          Labs     Results from last 7 days   Lab Units 01/03/25  1055 01/03/25  1018 01/03/25  0541 01/02/25  1333   WBC 10*3/mm3 32.60*  --  33.31* 33.10*   HEMATOCRIT % 32.3*  --  33.2* 37.0   HEMATOCRIT POC %  --  36*  --   --    PLATELETS 10*3/mm3 237  --  219 256      Results from last 7 days   Lab Units 01/03/25  1332 01/03/25  1018 01/02/25  2209 01/02/25  1333   SODIUM mmol/L 141  --  134* 133*   POTASSIUM mmol/L 2.4*  --  2.8* 3.2*   CHLORIDE mmol/L 103  --  103 101   CO2 mmol/L 18.7*  --  13.3* 15.1*   BUN mg/dL 77*  --  78* 77*   CREATININE mg/dL 4.86* 4.31* 4.95* 5.10*        Imaging     XR Chest 1 View    Result Date: 1/3/2025  Impression: Tip of the NG tube projects to the expected location of the lower thoracic esophagus, over the left lower mediastinum. It appears unchanged in position compared to today's earlier CT chest in today's earlier KUB study. As previously recommended, it should be advanced until is below the diaphragm, after confirmation with repeat KUB imaging. Electronically Signed: Vee Wills MD  1/3/2025 3:13 PM EST  Workstation ID: [id number]    XR Abdomen KUB    Result Date: 1/3/2025  Impression: Enteric tube remains malpositioned, similar  in position compared to today's earlier KUB, located above the level of the diaphragm. This should not be used until it is advanced below the diaphragm, followed by confirmation with KUB. Electronically Signed: Whitney Simmons  1/3/2025 2:18 PM EST  Workstation ID: [id number]    XR Abdomen KUB    Result Date: 1/3/2025  Impression: NG tube tip projects left of midline over the mediastinum, near the level of the lower third of the thoracic esophagus. These findings have already been reported on today's earlier CT chest 1/3/2025 at 11:46 a.m. Electronically Signed: Vee Wills MD  1/3/2025 12:47 PM EST  Workstation ID: [id number]    CT Angiogram Chest Pulmonary Embolism    Result Date: 1/3/2025  Impression: 1.Dense left lower lobe consolidation with volume loss. Correlate for symptoms of pneumonia. 2.Scattered peripheral groundglass densities within the bilateral upper lobes and right middle lobe. Findings consistent with pneumonia, as can be seen with COVID-pneumonia. Correlate clinically. 3.Questionable mild peripancreatic inflammatory stranding, raising the possibility of pancreatitis in the appropriate clinical context.. Correlate with serum amylase and lipase levels. 4.Cirrhotic liver morphology. 5.Additional findings as described above. 6.Enteric tube extends to the level of the lower thoracic esophagus, above the esophogastric junction. Consider advancement. I called and spoke with one of the nurses in the ICU (this patient's nurse was unable to come to the phone), and she stated she would pass this pertinent information to the patient's nurse. Electronically Signed: Vee Wills MD  1/3/2025 12:03 PM EST  Workstation ID: [id number]    XR Chest 1 View    Result Date: 1/3/2025  Impression: ET tube placement to the level of the mid thoracic trachea. Medial bibasilar densities favored to represent subsegmental atelectasis, without significant change. Electronically Signed: Vee Wills MD  1/3/2025 11:11 AM EST   Workstation ID: [id number]    XR Chest 1 View    Result Date: 1/3/2025  Impression: Mild linear subsegmental atelectasis in the right lower lobe. No focal consolidation. Electronically Signed: Vee Wills MD  1/3/2025 10:23 AM EST  Workstation ID: [id number]    XR Chest 1 View    Result Date: 1/3/2025  Impression: No acute cardiopulmonary process. Chronic appearing interstitial changes are present. Electronically Signed: Norma Ott MD  1/3/2025 2:22 AM EST  Workstation ID: [id number]    CT Abdomen Pelvis Without Contrast    Result Date: 1/2/2025  Impression: 1.No evidence of obstructive uropathy. 2.Cirrhosis. 3.Mural thickening involving the sigmoid colon which may be on a chronic basis as there are no surrounding inflammatory changes. Correlate for signs of sigmoid colitis. 4.Other incidental nonemergent findings as detailed above. Electronically Signed: Berny Collins MD  1/2/2025 3:41 PM EST  Workstation ID: [id number]    XR Chest 1 View    Result Date: 1/2/2025  Impression: No acute cardiopulmonary findings. Electronically Signed: Vee Wills MD  1/2/2025 2:49 PM EST  Workstation ID: [id number]    XR Spine Lumbar Complete 4+VW    Result Date: 1/2/2025  Impression: 1. No acute lumbar spine findings. 2. Degenerative postsurgical changes as described above. 3. Signs of bilateral S1 pedicle screw loosening, seen to better advantage on prior CT imaging. Electronically Signed: Vee Wills MD  1/2/2025 2:48 PM EST  Workstation ID: [id number]    CT Head Without Contrast    Result Date: 1/2/2025  Impression: 1.No acute intracranial finding. 2.Mild age-appropriate atrophy. 3.Mild paranasal sinus disease. Electronically Signed: Vee Wills MD  1/2/2025 2:05 PM EST  Workstation ID: [id number]      Current Medications     Scheduled Meds:  acetylcysteine, 2 mL, Nebulization, BID - RT  albuterol, 2.5 mg, Nebulization, TID  calcium gluconate, 2,000 mg, Intravenous, Once  cefTRIAXone, 2,000 mg, Intravenous, Q24H  chlorhexidine,  15 mL, Mouth/Throat, Q12H  cholecalciferol, 1,000 Units, Oral, Daily  dexAMETHasone, 6 mg, Intravenous, Daily  furosemide, 20 mg, Intravenous, Q8H  [START ON 1/4/2025] gabapentin, 100 mg, Nasogastric, Daily  heparin (porcine), 5,000 Units, Subcutaneous, Q8H  [START ON 1/4/2025] levothyroxine, 150 mcg, Nasogastric, Q AM  [Held by provider] losartan, 100 mg, Oral, Daily  mupirocin, 1 Application, Each Nare, BID  [START ON 1/4/2025] pantoprazole, 40 mg, Intravenous, Q AM  potassium chloride, 10 mEq, Intravenous, Q1H  senna-docusate sodium, 2 tablet, Nasogastric, BID  [START ON 1/4/2025] sertraline, 100 mg, Nasogastric, Daily         Continuous Infusions:  dexmedetomidine, 0.2-1.5 mcg/kg/hr, Last Rate: 0.5 mcg/kg/hr (01/03/25 1458)  dextrose 5 % and lactated Ringer's, 100 mL/hr, Last Rate: 100 mL/hr (01/03/25 1339)  fentanyl 10 mcg/mL,  mcg/hr, Last Rate: 50 mcg/hr (01/03/25 1051)  norepinephrine, 0.02-0.5 mcg/kg/min, Last Rate: 0.07 mcg/kg/min (01/03/25 1335)       Patient continues to be critically ill, remains at risk of clinical deterioration or death and needed high complexity decision making. I have spent a total of 37 minutes providing critical care services to this patient including but not limited to: review of labs/ microbiology/imaging/medications, serial monitoring of vital signs, adjusting ventilator settings as needed, review of other consultant's notes, review of events in the last 24 hrs, monitoring input/output, review of treatment plan with bedside nurse, RT and other treatment team, management of life support and nutrition needs. I also spoke with patient's spouse and daughter about the plan of care and answered all questions.    Time spent in performing separately billable procedures and updating family is not included in the critical care time.       MISAEL Santoyo   Critical Care  01/03/25   15:17 EST

## 2025-01-03 NOTE — CASE MANAGEMENT/SOCIAL WORK
Discharge Planning Assessment  [facility]     Patient Name: Connie A Schoen  MRN: [id number]  Today's Date: 1/3/2025    Admit Date: 1/2/2025    Plan: Attempted to complete CM assessment.  Will need full assessment.    Catina Chen RN    [address] [address]  Phone: [phone]  Fax: [phone]

## 2025-01-03 NOTE — PROGRESS NOTES
"                                                                                                                                      Nephrology  Progress Note                                        [facility]    Patient Identification    Name: Connie A Schoen  Age: 74 y.o.  Sex: female  :  1950  MRN: [id number]      DATE OF SERVICE:  1/3/2025        Subective    Events noted  Respiratory Distress  Not Much Urine Output  And Currently Got Intubated on the Vent     Objective   Scheduled Meds:cefTRIAXone, 2,000 mg, Intravenous, Q24H  cholecalciferol, 1,000 Units, Oral, Daily  gabapentin, 300 mg, Oral, Daily  levothyroxine, 150 mcg, Oral, Q AM  [Held by provider] losartan, 100 mg, Oral, Daily  pantoprazole, 40 mg, Oral, Daily  sertraline, 100 mg, Oral, Daily          Continuous Infusions:sodium chloride 0.45 % 925 mL with sodium bicarbonate 8.4 % 75 mEq infusion, , Last Rate: 125 mL/hr at 25 0112        PRN Meds:  senna-docusate sodium **AND** polyethylene glycol **AND** bisacodyl **AND** bisacodyl    HYDROmorphone     Exam:  /84 (BP Location: Left arm, Patient Position: Lying)   Pulse 98   Temp 97.5 °F (36.4 °C) (Oral)   Resp 14   Ht 165.1 cm (65\")   Wt 77.1 kg (170 lb)   SpO2 94%   BMI 28.29 kg/m²     Intake/Output last 3 shifts:  I/O last 3 completed shifts:  In: 1100 [IV Piggyback:1100]  Out: -     Intake/Output this shift:  No intake/output data recorded.    Physical exam:  General Appearance: On the vent  Head:  Normocephalic, without obvious abnormality, atraumatic  Eyes:  PERRL, conjunctiva/corneas clear     Neck:  Supple,  no adenopathy;      Lungs:  Decreased BS occasion ronchi  Heart:  Regular rate and rhythm, S1 and S2 normal  Abdomen:  Soft, non-tender, bowel sounds active   Extremities: trace edema  Pulses: 2+ and symmetric all extremities  Skin:  No rashes or lesions       Data Review:  All labs (24hrs):   Recent Results (from the past 24 hours)   Respiratory " Panel PCR w/COVID-19(SARS-CoV-2) KATE/TY/KARTHIK/[facility] In-House, NP Swab in UTM/VTM, 2 HR TAT - Swab, Nasopharynx    Collection Time: 01/02/25  1:33 PM    Specimen: Nasopharynx; Swab   Result Value Ref Range    ADENOVIRUS, PCR Not Detected Not Detected    Coronavirus 229E Not Detected Not Detected    Coronavirus HKU1 Not Detected Not Detected    Coronavirus NL63 Not Detected Not Detected    Coronavirus OC43 Not Detected Not Detected    COVID19 Detected (C) Not Detected - Ref. Range    Human Metapneumovirus Not Detected Not Detected    Human Rhinovirus/Enterovirus Not Detected Not Detected    Influenza A PCR Not Detected Not Detected    Influenza B PCR Not Detected Not Detected    Parainfluenza Virus 1 Not Detected Not Detected    Parainfluenza Virus 2 Not Detected Not Detected    Parainfluenza Virus 3 Not Detected Not Detected    Parainfluenza Virus 4 Not Detected Not Detected    RSV, PCR Not Detected Not Detected    Bordetella pertussis pcr Not Detected Not Detected    Bordetella parapertussis PCR Not Detected Not Detected    Chlamydophila pneumoniae PCR Not Detected Not Detected    Mycoplasma pneumo by PCR Not Detected Not Detected   Comprehensive Metabolic Panel    Collection Time: 01/02/25  1:33 PM    Specimen: Blood   Result Value Ref Range    Glucose 96 65 - 99 mg/dL    BUN 77 (H) 8 - 23 mg/dL    Creatinine 5.10 (H) 0.57 - 1.00 mg/dL    Sodium 133 (L) 136 - 145 mmol/L    Potassium 3.2 (L) 3.5 - 5.2 mmol/L    Chloride 101 98 - 107 mmol/L    CO2 15.1 (L) 22.0 - 29.0 mmol/L    Calcium 9.1 8.6 - 10.5 mg/dL    Total Protein 7.1 6.0 - 8.5 g/dL    Albumin 3.0 (L) 3.5 - 5.2 g/dL    ALT (SGPT) 23 1 - 33 U/L    AST (SGOT) 24 1 - 32 U/L    Alkaline Phosphatase 251 (H) 39 - 117 U/L    Total Bilirubin 0.4 0.0 - 1.2 mg/dL    Globulin 4.1 gm/dL    A/G Ratio 0.7 g/dL    BUN/Creatinine Ratio 15.1 7.0 - 25.0    Anion Gap 16.9 (H) 5.0 - 15.0 mmol/L    eGFR 8.5 (L) >60.0 mL/min/1.73   CBC Auto Differential    Collection Time:  01/02/25  1:33 PM    Specimen: Blood   Result Value Ref Range    WBC 33.10 (C) 3.40 - 10.80 10*3/mm3    RBC 3.80 3.77 - 5.28 10*6/mm3    Hemoglobin 12.5 12.0 - 15.9 g/dL    Hematocrit 37.0 34.0 - 46.6 %    MCV 97.4 (H) 79.0 - 97.0 fL    MCH 32.9 26.6 - 33.0 pg    MCHC 33.8 31.5 - 35.7 g/dL    RDW 14.6 12.3 - 15.4 %    RDW-SD 51.7 37.0 - 54.0 fl    MPV 10.4 6.0 - 12.0 fL    Platelets 256 140 - 450 10*3/mm3   Scan Slide    Collection Time: 01/02/25  1:33 PM    Specimen: Blood   Result Value Ref Range    Scan Slide     Manual Differential    Collection Time: 01/02/25  1:33 PM    Specimen: Blood   Result Value Ref Range    Neutrophil % 86.0 (H) 42.7 - 76.0 %    Lymphocyte % 4.0 (L) 19.6 - 45.3 %    Monocyte % 2.0 (L) 5.0 - 12.0 %    Bands %  7.0 (H) 0.0 - 5.0 %    Myelocyte % 1.0 (H) 0.0 - 0.0 %    Neutrophils Absolute 30.78 (H) 1.70 - 7.00 10*3/mm3    Lymphocytes Absolute 1.32 0.70 - 3.10 10*3/mm3    Monocytes Absolute 0.66 0.10 - 0.90 10*3/mm3    Crenated RBC's Slight/1+ None Seen    Hypersegmented Neutrophils Slight/1+ None Seen    Platelet Estimate Adequate Normal   CK    Collection Time: 01/02/25  1:33 PM    Specimen: Blood   Result Value Ref Range    Creatine Kinase 50 20 - 180 U/L   Urinalysis With Culture If Indicated - Urine, Catheter    Collection Time: 01/02/25  2:19 PM    Specimen: Urine, Catheter   Result Value Ref Range    Color, UA Yellow Yellow, Straw    Appearance, UA Slightly Cloudy (A) Clear    pH, UA <=5.0 5.0 - 8.0    Specific Gravity, UA 1.015 1.005 - 1.030    Glucose, UA Negative Negative    Ketones, UA Trace (A) Negative    Bilirubin, UA Negative Negative    Blood, UA Moderate (2+) (A) Negative    Protein,  mg/dL (2+) (A) Negative    Leuk Esterase, UA Large (3+) (A) Negative    Nitrite, UA Negative Negative    Urobilinogen, UA 1.0 E.U./dL 0.2 - 1.0 E.U./dL   Urinalysis, Microscopic Only - Urine, Catheter    Collection Time: 01/02/25  2:19 PM    Specimen: Urine, Catheter   Result Value Ref  Range    RBC, UA 0-2 None Seen, 0-2 /HPF    WBC, UA Too Numerous to Count (A) None Seen, 0-2 /HPF    Bacteria, UA 4+ (A) None Seen /HPF    Squamous Epithelial Cells, UA 3-6 (A) None Seen, 0-2 /HPF    Hyaline Casts, UA None Seen None Seen /LPF    Methodology Manual Light Microscopy    Sodium, Urine, Random - Urine, Catheter    Collection Time: 01/02/25  2:19 PM    Specimen: Urine, Catheter   Result Value Ref Range    Sodium, Urine 43 mmol/L   POC Lactate    Collection Time: 01/02/25  3:42 PM    Specimen: Blood   Result Value Ref Range    Lactate 0.7 0.3 - 2.0 mmol/L   Basic Metabolic Panel    Collection Time: 01/02/25 10:09 PM    Specimen: Hand, Right; Blood   Result Value Ref Range    Glucose 82 65 - 99 mg/dL    BUN 78 (H) 8 - 23 mg/dL    Creatinine 4.95 (H) 0.57 - 1.00 mg/dL    Sodium 134 (L) 136 - 145 mmol/L    Potassium 2.8 (L) 3.5 - 5.2 mmol/L    Chloride 103 98 - 107 mmol/L    CO2 13.3 (L) 22.0 - 29.0 mmol/L    Calcium 8.1 (L) 8.6 - 10.5 mg/dL    BUN/Creatinine Ratio 15.8 7.0 - 25.0    Anion Gap 17.7 (H) 5.0 - 15.0 mmol/L    eGFR 8.8 (L) >60.0 mL/min/1.73   CBC Auto Differential    Collection Time: 01/03/25  5:41 AM    Specimen: Hand, Left; Blood   Result Value Ref Range    WBC 33.31 (C) 3.40 - 10.80 10*3/mm3    RBC 3.51 (L) 3.77 - 5.28 10*6/mm3    Hemoglobin 11.5 (L) 12.0 - 15.9 g/dL    Hematocrit 33.2 (L) 34.0 - 46.6 %    MCV 94.6 79.0 - 97.0 fL    MCH 32.8 26.6 - 33.0 pg    MCHC 34.6 31.5 - 35.7 g/dL    RDW 14.4 12.3 - 15.4 %    RDW-SD 50.0 37.0 - 54.0 fl    MPV 10.0 6.0 - 12.0 fL    Platelets 219 140 - 450 10*3/mm3   Scan Slide    Collection Time: 01/03/25  5:41 AM    Specimen: Hand, Left; Blood   Result Value Ref Range    Scan Slide     Manual Differential    Collection Time: 01/03/25  5:41 AM    Specimen: Hand, Left; Blood   Result Value Ref Range    Neutrophil % 85.0 (H) 42.7 - 76.0 %    Lymphocyte % 6.0 (L) 19.6 - 45.3 %    Monocyte % 3.0 (L) 5.0 - 12.0 %    Bands %  4.0 0.0 - 5.0 %    Metamyelocyte %  1.0 (H) 0.0 - 0.0 %    Myelocyte % 1.0 (H) 0.0 - 0.0 %    Neutrophils Absolute 29.65 (H) 1.70 - 7.00 10*3/mm3    Lymphocytes Absolute 2.00 0.70 - 3.10 10*3/mm3    Monocytes Absolute 1.00 (H) 0.10 - 0.90 10*3/mm3    RBC Morphology Normal Normal    Toxic Granulation Slight/1+ None Seen    Platelet Morphology Normal Normal          Imaging:  XR Chest 1 View    Result Date: 1/3/2025  Impression: No acute cardiopulmonary process. Chronic appearing interstitial changes are present. Electronically Signed: Norma Ott MD  1/3/2025 2:22 AM EST  Workstation ID: [id number]    CT Abdomen Pelvis Without Contrast    Result Date: 1/2/2025  Impression: 1.No evidence of obstructive uropathy. 2.Cirrhosis. 3.Mural thickening involving the sigmoid colon which may be on a chronic basis as there are no surrounding inflammatory changes. Correlate for signs of sigmoid colitis. 4.Other incidental nonemergent findings as detailed above. Electronically Signed: Berny Collins MD  1/2/2025 3:41 PM EST  Workstation ID: [id number]    XR Chest 1 View    Result Date: 1/2/2025  Impression: No acute cardiopulmonary findings. Electronically Signed: Vee Wills MD  1/2/2025 2:49 PM EST  Workstation ID: [id number]    XR Spine Lumbar Complete 4+VW    Result Date: 1/2/2025  Impression: 1. No acute lumbar spine findings. 2. Degenerative postsurgical changes as described above. 3. Signs of bilateral S1 pedicle screw loosening, seen to better advantage on prior CT imaging. Electronically Signed: eVe Wills MD  1/2/2025 2:48 PM EST  Workstation ID: [id number]    CT Head Without Contrast    Result Date: 1/2/2025  Impression: 1.No acute intracranial finding. 2.Mild age-appropriate atrophy. 3.Mild paranasal sinus disease. Electronically Signed: Vee Wills MD  1/2/2025 2:05 PM EST  Workstation ID: [id number]     Assessment/Plan:     Acute renal failure          Acute kidney injury   respiratory failure  Metabolic acidosis  Hypokalemia  UTI   Positive COVID    leukocytosis  Recommendations:  Patient got intubated  IV fluid was stopped  Was given a dose of Lasix  Will proceed with dialysis due to worsening kidney function and worsening acidosis  Discussed with family at bedside  Discussed with intensivist team  She is on Levophed  Will attempt to regular dialysis  Will add gentle diuresis also  CCT 32

## 2025-01-03 NOTE — CONSULTS
Nutrition Services    Patient Name: Connie A Schoen  YOB: 1950  MRN: [id number]  Admission date: 1/2/2025    Comment:  -- Begin Peptamen AF at 20 mL/hr + 10 mL/hr water flush       CLINICAL NUTRITION ASSESSMENT      Reason for Assessment 1/3: Consult for tube feeding      H&P      Past Medical History:   Diagnosis Date    Anxiety     Arthritis     Cataract 2023    Depression     Disease of thyroid gland     Fibromyalgia, primary 10 years    HTN (hypertension)     Hypertension     Injury of back     back surgery    Low back pain had surgery    RA (rheumatoid arthritis)     Shortness of breath     Thyroid disease        Past Surgical History:   Procedure Laterality Date    APPENDECTOMY      BACK SURGERY      T10-S1 poster lumbar fusion correction    BACK SURGERY      rods     CARDIAC CATHETERIZATION N/A 08/10/2021    Procedure: Left Heart Cath;  Surgeon: Richy Kevin MD;  Location: [facility] CATH INVASIVE LOCATION;  Service: Cardiology;  Laterality: N/A;    CARDIAC CATHETERIZATION N/A 08/10/2021    Procedure: Coronary angiography;  Surgeon: Richy Kevin MD;  Location: [facility] CATH INVASIVE LOCATION;  Service: Cardiology;  Laterality: N/A;    CARDIAC CATHETERIZATION N/A 08/10/2021    Procedure: Left ventriculography;  Surgeon: Richy Kevin MD;  Location: [facility] CATH INVASIVE LOCATION;  Service: Cardiology;  Laterality: N/A;    EYE SURGERY  2023    KNEE SURGERY      SPINE SURGERY  2019    TOTAL KNEE ARTHROPLASTY Left     TOTAL KNEE ARTHROPLASTY Right 01/31/2022    Procedure: TOTAL KNEE ARTHROPLASTY;  Surgeon: Zaid Walsh MD;  Location: [facility] MAIN OR;  Service: Orthopedics;  Laterality: Right;        Current Problems   Acute renal failure  Hypokalemia   - nephrology consulted   - intubated 1/3    UTI     Covid-19  - symptoms began one week PTA     Leukocytosis      Hypothyroidism      Depression      GERD     Hypertension        Encounter Information       "  Trending Narrative     1/3: Admitted 1/2 for generalized weakness and feeling unwell.  Rapid response event 1/3 resulting in intubation.  On precedex, fentanyl, levo.  RD consulted for TF.  RD unable to see patient in person on this date.         Anthropometrics        Current Height, Weight Height: 165.1 cm (65\")  Weight: 77.1 kg (170 lb) (01/02/25 1312)       Usual Body Weight (UBW) Unable to obtain from patient        Trending Weight Hx     This admission: 1/3: 170#             PTA: 12.8% weight loss x 9 months     Wt Readings from Last 30 Encounters:   01/02/25 1312 77.1 kg (170 lb)   10/23/23 1438 80.7 kg (178 lb)   04/12/23 1405 88.5 kg (195 lb)   09/12/22 1413 86.6 kg (191 lb)   08/19/22 1052 83.9 kg (185 lb)   08/10/22 1655 82.6 kg (182 lb)   01/31/22 1015 82.6 kg (182 lb 3.2 oz)   01/26/22 1404 81.6 kg (180 lb)   11/01/21 1223 88 kg (194 lb)   10/13/21 1316 87.7 kg (193 lb 6.4 oz)   10/12/21 1316 88.5 kg (195 lb)   09/23/21 1102 88.5 kg (195 lb)   09/01/21 1544 88.7 kg (195 lb 9.6 oz)   08/26/21 0955 88.4 kg (194 lb 12.8 oz)   08/23/21 0906 88 kg (194 lb)   08/11/21 0408 89.7 kg (197 lb 12 oz)   08/10/21 1012 81.6 kg (180 lb)   08/09/21 1122 81.6 kg (180 lb)   09/17/20 1121 81.6 kg (180 lb)   08/25/20 1507 81.6 kg (180 lb)   01/06/20 0944 85.3 kg (188 lb)   12/09/19 1237 84.4 kg (186 lb)   11/21/19 1455 84.6 kg (186 lb 9.6 oz)   09/05/19 1253 83 kg (183 lb)   05/06/19 0943 87.1 kg (192 lb)   04/15/19 1501 85.7 kg (189 lb)   02/13/19 1052 84.8 kg (187 lb)   02/01/19 1128 83.5 kg (184 lb)   01/10/19 0919 79.4 kg (175 lb)   12/06/18 0916 79.4 kg (175 lb)   11/28/18 1504 83.5 kg (184 lb)   08/13/18 1347 81.6 kg (180 lb)   08/09/18 1304 81.6 kg (180 lb)      BMI kg/m2 Body mass index is 28.29 kg/m².       Labs        Pertinent Labs    Results from last 7 days   Lab Units 01/03/25  1018 01/02/25  2209 01/02/25  1333   SODIUM mmol/L  --  134* 133*   POTASSIUM mmol/L  --  2.8* 3.2*   CHLORIDE mmol/L  --  103 101 "   CO2 mmol/L  --  13.3* 15.1*   BUN mg/dL  --  78* 77*   CREATININE mg/dL 4.31* 4.95* 5.10*   CALCIUM mg/dL  --  8.1* 9.1   BILIRUBIN mg/dL  --   --  0.4   ALK PHOS U/L  --   --  251*   ALT (SGPT) U/L  --   --  23   AST (SGOT) U/L  --   --  24   GLUCOSE mg/dL  --  82 96     Results from last 7 days   Lab Units 01/03/25  1055   HEMOGLOBIN g/dL 11.4*   HEMATOCRIT % 32.3*     Lab Results   Component Value Date    HGBA1C 5.80 (H) 04/12/2023        Medications    Scheduled Medications acetylcysteine, 2 mL, Nebulization, TID - RT  albuterol, 2.5 mg, Nebulization, TID  cefTRIAXone, 2,000 mg, Intravenous, Q24H  chlorhexidine, 15 mL, Mouth/Throat, Q12H  cholecalciferol, 1,000 Units, Oral, Daily  dexAMETHasone, 6 mg, Intravenous, Daily  furosemide, 20 mg, Intravenous, Q8H  [START ON 1/4/2025] gabapentin, 100 mg, Nasogastric, Daily  heparin (porcine), 5,000 Units, Subcutaneous, Q8H  [START ON 1/4/2025] levothyroxine, 150 mcg, Nasogastric, Q AM  [Held by provider] losartan, 100 mg, Oral, Daily  mupirocin, 1 Application, Each Nare, BID  [START ON 1/4/2025] pantoprazole, 40 mg, Intravenous, Q AM  senna-docusate sodium, 2 tablet, Nasogastric, BID  [START ON 1/4/2025] sertraline, 100 mg, Nasogastric, Daily        Infusions dexmedetomidine, 0.2-1.5 mcg/kg/hr, Last Rate: 0.5 mcg/kg/hr (01/03/25 1043)  fentanyl 10 mcg/mL,  mcg/hr, Last Rate: 50 mcg/hr (01/03/25 1051)  norepinephrine, 0.02-0.5 mcg/kg/min        PRN Medications   acetaminophen **OR** acetaminophen    aluminum-magnesium hydroxide-simethicone    senna-docusate sodium **AND** polyethylene glycol **AND** bisacodyl **AND** bisacodyl    dextrose    fentaNYL    ipratropium-albuterol    nitroglycerin    ondansetron ODT **OR** ondansetron     Physical Findings        Trending Physical   Appearance, NFPE 1/3: MARGARET   --  Edema  No edema documented      Bowel Function No BM since admission (yesterday)     Tubes No tube yet     Chewing/Swallowing Intubated      Skin Intact         Estimated/Assessed Needs    Estimated 1/3/25   Energy Requirements    EST Needs, Method, Wt used 1397 kcal/day ([place] [employer] 2003b with CBW 77.1 kg)       Protein Requirements    EST Needs, Method, Wt used  (1.2-2.0 g/kg of IBW 56.8 kg)       Fluid Requirements     Estimated Needs (mL/day) 1 mL/kcal, monitor hydration status      Fluid Deficit    Current Na Level (mEq/L)    Desired Na Level (mEq/L)    Estimated Fluid Deficit (L)       Current Nutrition Orders & Evaluation of Intake       Oral Nutrition     Food Allergies NKFA   Current PO Diet NPO Diet NPO Type: Strict NPO   Supplement None ordered    PO Evaluation     Trending % PO Intake 1/3: NPO     Enteral Nutrition    Enteral Route    Order, Modulars, Flushes    Tolerance    TF Observation         Parenteral Nutrition     TPN Route    Total # Days on TPN    TPN Order, Lipid Details    MVI & Trace Element Freq    TPN Observation       Nutritional Risk Screening        NRS-2002 Score          Nutrition Diagnosis         Nutrition Dx Problem 1 Inadequate energy intake related to vent as evidenced by NPO.        Nutrition Dx Problem 2        Intervention Goal         Intervention Goal(s) Begin and tolerate EN     Nutrition Intervention        RD Action Order EN     Nutrition Prescription          Diet Prescription NPO   Supplement Prescription      Enteral Prescription Initial Goal:  *initial goal conservative d/t risk of RFS     Peptamen AF at 20 mL/hr + 10 mL/hr water flush      End Goal:    Peptamen AF at 50 mL/hr + water flush per clinical picture      Calories  1320 kcals (94%)    Protein  84 g (in range)    Free water  891 mL   Flushes  Will monitor hydration status      The above end goal rate is for 22 hrs/day to assume interruptions for ADLs. Water flushes adjusted based on clinical picture + Rx flushes/IV fluids     Specialized formula chosen r/t high protein needs in the critical care setting.           TPN Prescription      Monitor/Evaluation         Monitor Per protocol, I&O, Pertinent labs, EN delivery/tolerance, Weight, Hemodynamic stability       Electronically signed by:  Raven Tapia RD  01/03/25 12:44 EST

## 2025-01-04 NOTE — PROGRESS NOTES
Nutrition Services  Patient Name: Connie A Schoen  YOB: 1950  MRN: [id number]  Admission date: 1/2/2025    PROGRESS NOTE      Nutrition Intervention Updates: Will continue to monitor for ability to feed.         Encounter Information: Checking in to monitor EN. NGT unable to be placed yesterday. Pt remains intubated. On precedex and fent. Plan to wean sedation and SBT per intensivist. If unable to extubate, will retry to obtain EN access.        PO Diet: NPO Diet NPO Type: Strict NPO   PO Supplements: None    PO Intake:  NPO       Current nutrition support: Peptamen AF at 20 mL/hr + 10 mL/hr water flush    Nutrition support review: No EN access at this time       Labs (reviewed below): Hypokalemia - management per attending.   Hyperphosphatemia/Hypermagnesemia - management per nephrology        GI Function:  Last documented BM 1/2       Brief weight review   Wt Readings from Last 10 Encounters:   01/02/25 1312 77.1 kg (170 lb)   10/23/23 1438 80.7 kg (178 lb)   04/12/23 1405 88.5 kg (195 lb)   09/12/22 1413 86.6 kg (191 lb)   08/19/22 1052 83.9 kg (185 lb)   08/10/22 1655 82.6 kg (182 lb)   01/31/22 1015 82.6 kg (182 lb 3.2 oz)   01/26/22 1404 81.6 kg (180 lb)   11/01/21 1223 88 kg (194 lb)   10/13/21 1316 87.7 kg (193 lb 6.4 oz)   10/12/21 1316 88.5 kg (195 lb)        Results from last 7 days   Lab Units 01/04/25  0910 01/04/25  0556 01/03/25  2125 01/03/25  1332 01/02/25  2209 01/02/25  1333   SODIUM mmol/L 143 142 141 141   < > 133*   POTASSIUM mmol/L 3.4* 4.0 2.9* 2.4*   < > 3.2*   CHLORIDE mmol/L 109* 109* 105 103   < > 101   CO2 mmol/L 20.0* 20.5* 19.4* 18.7*   < > 15.1*   BUN mg/dL 67* 67* 69* 77*   < > 77*   CREATININE mg/dL 3.91* 3.92* 4.08* 4.86*   < > 5.10*   CALCIUM mg/dL 8.1* 8.1* 8.3* 7.7*   < > 9.1   BILIRUBIN mg/dL  --  0.2  --  0.3  --  0.4   ALK PHOS U/L  --  293*  --  226*  --  251*   ALT (SGPT) U/L  --  17  --  16  --  23   AST (SGOT) U/L  --  24  --  26  --  24   GLUCOSE mg/dL  204* 218* 194* 108*   < > 96    < > = values in this interval not displayed.     Results from last 7 days   Lab Units 01/04/25  0556 01/03/25  2125 01/03/25  1332   MAGNESIUM mg/dL 3.1* 3.2* 2.1   PHOSPHORUS mg/dL 4.9* 4.8* 5.9*   HEMOGLOBIN g/dL 10.9*  --   --    HEMATOCRIT % 30.1*  --   --    TRIGLYCERIDES mg/dL 164*  --  248*         RD to follow up per protocol.    Electronically signed by:  Katie Boggs RD  01/04/25 11:12 EST

## 2025-01-04 NOTE — PLAN OF CARE
Goal Outcome Evaluation:            Pt extubated, is tolerating being out of restraints.

## 2025-01-04 NOTE — PROGRESS NOTES
Critical Care Progress Note     Connie A Schoen : 1950 MRN:[id number] LOS:2  Rm: 2304/1     Principal Problem: Acute renal failure     Reason for follow up: All the medical problems listed below    Summary     Connie A Schoen is a 74 y.o. female with PMH of  degenerative disc disease, status post back surgery, anxiety/depression, hypothyroidism, hypertension, RA, and alcohol abuse, and presents to the hospital with complaints of generalized weakness, right lower extremity radicular pain, and generally feeling unwell.  Patient presented on 2025, and stated that at baseline patient can be off balance, uses a walker for ambulation and tends to hold onto people and walls.  Approximately 3-4 days ago she began to develop muscle weakness, and furthermore then developed pain in her chest that radiated to her back with associated coughing.  She has had a decreased appetite and oral intake, though it is noted that patient does routinely drink alcohol.  When asked how much alcohol she drinks, family was unable to quantify but states that it is a lot.  She will drink any type of alcohol that she can find, but tends to prefer fireball.  In the ED, patient was noted to have an elevated creatinine of 5.10 which is a new finding for her.  She was also found to have a suspected urinary tract infection, which a urine culture is pending with gram negative bacilli.  Respiratory viral panel did show COVID-19 infection.  Patient had a CT of the head that was negative for acute intracranial findings, and an x-ray of her lumbar spine but did also not have any acute findings.  Nephrology was consulted in the ED.  Patient was admitted to the hospitalist service.  She did experience a brief hypotensive episode overnight that responded to a 500 mL liter IV fluid bolus.  Patient's mentation continued to decline overnight.     On 1/3/2025, patient had a sharp decline in mental status with accompanying hypoxia and respiratory  distress, and a rapid response team was called.  Upon arrival by rapid response team, it was requested for patient to undergo intensivist consultation.  Patient was noted to be hypoxic with a metabolic acidosis, and was too weak to lift her arms up and felt an appropriate for BiPAP.  She did have a audible secretions in the back of her mouth, but was lethargic but responsive to questions.  After discussion with patient's family, decision was made to intubate patient and transferred to the ICU.  Initially, nephrology was planning to initiate hemodialysis, but after transfer to the ICU, and a improvement in her renal function with IV fluids, decision was made to postpone dialysis.  Patient did require vasopressors after intubation.  Intensivist service have taken over management of this patient.  Patient did trigger sepsis, and sepsis workup is in progress, following blood cultures, and patient was started on empiric antimicrobial therapy.    Significant Events / Subjective     01/04/25 : Patient remains nonoliguric overnight and has decreasing creatinine.  She is on low-dose norepinephrine and sedated with Precedex and fentanyl and is on minimal oxygen settings.  Plan today is to wean sedation and potentially perform a spontaneous breathing trial.  Will continue to treat patient's infection however I would like to get her extubated today if possible. If unable to extubate we will retry to obtain enteral access    Assessment / Plan   Acute respiratory failure with hypoxia  Likely due to pneumonia, complicated by mental status changes  CTA Chest: LLL consolidation.  Mucomyst and CPT ordered.  Continue duonebs.  Ventilator settings noted and adjusted as needed.   Close monitoring of ABG as ordered.   CXR with worsening bilateral haziness concerning for pulmonary edema as well as her LLL atelectasis which has improved  Minimize sedation/analgesia, hold precedex given sedation and phenobarb load  Echo with EF 56-60% from  1/3     Septic Shock due to UTI / COVID-19 infection  Likely due to suspected UTI, multifocal pneumonia and COVID-19.   CT of the abdomen/pelvis: Mural thickening involving the sigmoid colon without surrounding inflammatory changes, could possibly be chronic in nature.  CTA of the chest: Left lower lobe consolidation, peripheral groundglass densities in bilateral upper lobes and right middle lobe.  Blood cultures-pending with no growth.  Urine culture-Growing Ecoli.  Sputum culture-pending  MRSA screen-negative.  RVP-+COVID-19  Continues on Rocephin.  Decadron ordered for 6 mg x 10 days.    C/w additional fluids as ordered. Avoid fluid overload.   Titrate vasopressors for a target MAP of 65. Low dose levo gtt     Acute Kidney Injury / Metabolic acidosis, increased anion gap  Remains nonoliguric. Baseline creatinine 0.9,   Likely prerenal. Possible intrinsic component due to ATN from infection, dehydration, and hypotension.  Nephrology consulted.  Initially plans were to proceed with hemodialysis however patients CR is improving so we held off  Patient appears hypervolemic today, likely will need diuretic given the resuscitation yesterday  Monitor Input/Output very closely.   Avoids NSAIDs, nephrotoxic medications, and hypotension.      Intake/Output Summary (Last 24 hours) at 1/4/2025 0858  Last data filed at 1/4/2025 0500  Gross per 24 hour   Intake 5476 ml   Output 1725 ml   Net 3751 ml       Pancreatitis  Likely alcohol induced.  Lipase 691, Triglycerides 248.  CT of abdomen and pelvis: Pancreas was unremarkable.  CTA Chest: Questionable mild peripancreatic inflammatory stranding.  Supportive care     Acute encephalopathy/fall/generalized weakness  Likely infection related.  CT of the head negative for acute intracranial findings.  X-ray of lumbar spine with no acute lumbar spine findings, degenerative postsurgical changes described, signs of bilateral S1 pedicle screw loosening, but was a finding on previous CT  imaging.  Further workup as clinically indicated.     Essential hypertension:   Due to hypotension and vasopressor needs, hold losartan.  Titrate medications as needed.     Alcohol abuse  Family unable to quantify amount of alcohol patient drinks, but they state that she drinks quite a bit.  Prefers fireball but will drink any type of alcohol.  CIWA protocol in place.  Safety precautions and seizure precautions in place  Phenobarb taper ordered.  Thiamine, folic acid ordered.     Hypothyroidism: Continue levothyroxine when patient able to take oral medications.  GERD: Continue Protonix.  Anxiety/depression: Continue Zoloft when patient able to take oral medications.  Neuropathy / DDD: Hold gabapentin due to acute kidney injury.    Disposition:  remain in ICU    Code status:   Code Status (Patient has no pulse and is not breathing): CPR (Attempt to Resuscitate)  Medical Interventions (Patient has pulse or is breathing): Full Support       Nutrition:   NPO Diet NPO Type: Strict NPO   Tube Feeding: Formula: Peptamen AF (Vital AF 1.2); Feeding Type: Continuous; Start at: 20 mL/hr; Then Advance By: Do Not Advance; Water Flush: 10 mL; Every: 1 hour; Water Bolus: None     VTE Prophylaxis:  Pharmacologic & mechanical VTE prophylaxis orders are present.           Objective / Physical Exam     Vital signs:  Temp: 99.9 °F (37.7 °C)  BP: 102/68  Heart Rate: 52  Resp: 20  SpO2: 98 %  Weight: 77.1 kg (170 lb)    Admission Weight: Weight: 77.1 kg (170 lb)  Current Weight: Weight: 77.1 kg (170 lb)    Input/Output in last 24 hours:    Intake/Output Summary (Last 24 hours) at 1/4/2025 0851  Last data filed at 1/4/2025 0500  Gross per 24 hour   Intake 5476 ml   Output 1725 ml   Net 3751 ml      Net IO Since Admission: 6,446.83 mL [01/04/25 0851]     Physical Exam  Vitals and nursing note reviewed.   Constitutional:       General: She is not in acute distress.     Comments: Intubated and sedated in NAD   HENT:      Head: Normocephalic.       Mouth/Throat:      Mouth: Mucous membranes are moist.      Comments: ETT in place  Eyes:      Comments: Normal eyelids   Cardiovascular:      Rate and Rhythm: Regular rhythm. Bradycardia present.   Pulmonary:      Breath sounds: No wheezing.      Comments: Coarse breath sounds bilaterally  Abdominal:      General: There is no distension.   Musculoskeletal:         General: Normal range of motion.      Right lower leg: No edema.      Left lower leg: No edema.   Skin:     General: Skin is warm.      Capillary Refill: Capillary refill takes less than 2 seconds.   Neurological:      Comments: Sedated, unresponsive           Radiology and Labs     Results from last 7 days   Lab Units 01/04/25  0556 01/03/25  1055 01/03/25  1018 01/03/25  0541 01/02/25  1333   WBC 10*3/mm3 26.51* 32.60*  --  33.31* 33.10*   HEMOGLOBIN g/dL 10.9* 11.4*  --  11.5* 12.5   HEMOGLOBIN, POC g/dL  --   --  12.2  --   --    HEMATOCRIT % 30.1* 32.3*  --  33.2* 37.0   HEMATOCRIT POC %  --   --  36*  --   --    PLATELETS 10*3/mm3 231 237  --  219 256      Results from last 7 days   Lab Units 01/03/25  1205   PROTIME Seconds 18.0*   INR  1.48*   APTT seconds 35.4      Results from last 7 days   Lab Units 01/04/25  0556 01/03/25  2125 01/03/25  1332 01/03/25  1018 01/02/25  2209 01/02/25  1333   SODIUM mmol/L 142 141 141  --  134* 133*   POTASSIUM mmol/L 4.0 2.9* 2.4*  --  2.8* 3.2*   CHLORIDE mmol/L 109* 105 103  --  103 101   CO2 mmol/L 20.5* 19.4* 18.7*  --  13.3* 15.1*   BUN mg/dL 67* 69* 77*  --  78* 77*   CREATININE mg/dL 3.92* 4.08* 4.86* 4.31* 4.95* 5.10*   GLUCOSE mg/dL 218* 194* 108*  --  82 96   MAGNESIUM mg/dL 3.1* 3.2* 2.1  --   --   --    PHOSPHORUS mg/dL 4.9* 4.8* 5.9*  --   --   --       Results from last 7 days   Lab Units 01/04/25  0556 01/03/25  1332 01/02/25  1333   ALK PHOS U/L 293* 226* 251*   AST (SGOT) U/L 24 26 24   ALT (SGPT) U/L 17 16 23     Results from last 7 days   Lab Units 01/04/25  0350 01/03/25  1242  01/03/25  1018   PH, ARTERIAL pH units 7.332* 7.261* 7.166*   PCO2, ARTERIAL mm Hg 39.3 42.2 40.1   PO2 ART mm Hg 74.4* 350.2* 62.1*   O2 SATURATION ART % 93.8* 99.9* 84.6*   FIO2 % 40 100 100   HCO3 ART mmol/L 20.8* 19.0* 14.5*   BASE EXCESS ART mmol/L -4.7* -7.7* -13.5*       XR Chest 1 View    Result Date: 1/3/2025  Impression: 1.Status post placement of a right-sided subclavian venous catheter with the tip terminating at the right atrial/SVC junction. There is no pneumothorax. 2.Interval development of pulmonary interstitial edema. 3.Patchy basilar densities, left greater than right side representing atelectasis versus developing airspace disease. There is probably a small left pleural effusion. Electronically Signed: Govind Aguilar MD  1/3/2025 4:56 PM EST  Workstation ID: [id number]    XR Chest 1 View    Result Date: 1/3/2025  Impression: Tip of the NG tube projects to the expected location of the lower thoracic esophagus, over the left lower mediastinum. It appears unchanged in position compared to today's earlier CT chest in today's earlier KUB study. As previously recommended, it should be advanced until is below the diaphragm, after confirmation with repeat KUB imaging. Electronically Signed: Vee Wills MD  1/3/2025 3:13 PM EST  Workstation ID: [id number]    XR Abdomen KUB    Result Date: 1/3/2025  Impression: Enteric tube remains malpositioned, similar in position compared to today's earlier KUB, located above the level of the diaphragm. This should not be used until it is advanced below the diaphragm, followed by confirmation with KUB. Electronically Signed: Whitney Simmons  1/3/2025 2:18 PM EST  Workstation ID: [id number]    XR Abdomen KUB    Result Date: 1/3/2025  Impression: NG tube tip projects left of midline over the mediastinum, near the level of the lower third of the thoracic esophagus. These findings have already been reported on today's earlier CT chest 1/3/2025 at 11:46 a.m. Electronically Signed:  Vee Wills MD  1/3/2025 12:47 PM EST  Workstation ID: [id number]    CT Angiogram Chest Pulmonary Embolism    Result Date: 1/3/2025  Impression: 1.Dense left lower lobe consolidation with volume loss. Correlate for symptoms of pneumonia. 2.Scattered peripheral groundglass densities within the bilateral upper lobes and right middle lobe. Findings consistent with pneumonia, as can be seen with COVID-pneumonia. Correlate clinically. 3.Questionable mild peripancreatic inflammatory stranding, raising the possibility of pancreatitis in the appropriate clinical context.. Correlate with serum amylase and lipase levels. 4.Cirrhotic liver morphology. 5.Additional findings as described above. 6.Enteric tube extends to the level of the lower thoracic esophagus, above the esophogastric junction. Consider advancement. I called and spoke with one of the nurses in the ICU (this patient's nurse was unable to come to the phone), and she stated she would pass this pertinent information to the patient's nurse. Electronically Signed: Vee Wills MD  1/3/2025 12:03 PM EST  Workstation ID: [id number]    XR Chest 1 View    Result Date: 1/3/2025  Impression: ET tube placement to the level of the mid thoracic trachea. Medial bibasilar densities favored to represent subsegmental atelectasis, without significant change. Electronically Signed: Vee Wills MD  1/3/2025 11:11 AM EST  Workstation ID: [id number]    XR Chest 1 View    Result Date: 1/3/2025  Impression: Mild linear subsegmental atelectasis in the right lower lobe. No focal consolidation. Electronically Signed: Vee Wills MD  1/3/2025 10:23 AM EST  Workstation ID: [id number]    XR Chest 1 View    Result Date: 1/3/2025  Impression: No acute cardiopulmonary process. Chronic appearing interstitial changes are present. Electronically Signed: Norma Ott MD  1/3/2025 2:22 AM EST  Workstation ID: [id number]    CT Abdomen Pelvis Without Contrast    Result Date: 1/2/2025  Impression: 1.No  evidence of obstructive uropathy. 2.Cirrhosis. 3.Mural thickening involving the sigmoid colon which may be on a chronic basis as there are no surrounding inflammatory changes. Correlate for signs of sigmoid colitis. 4.Other incidental nonemergent findings as detailed above. Electronically Signed: Berny Collins MD  1/2/2025 3:41 PM EST  Workstation ID: [id number]    XR Chest 1 View    Result Date: 1/2/2025  Impression: No acute cardiopulmonary findings. Electronically Signed: Vee Wills MD  1/2/2025 2:49 PM EST  Workstation ID: [id number]    XR Spine Lumbar Complete 4+VW    Result Date: 1/2/2025  Impression: 1. No acute lumbar spine findings. 2. Degenerative postsurgical changes as described above. 3. Signs of bilateral S1 pedicle screw loosening, seen to better advantage on prior CT imaging. Electronically Signed: Vee Wills MD  1/2/2025 2:48 PM EST  Workstation ID: [id number]    CT Head Without Contrast    Result Date: 1/2/2025  Impression: 1.No acute intracranial finding. 2.Mild age-appropriate atrophy. 3.Mild paranasal sinus disease. Electronically Signed: Vee Wills MD  1/2/2025 2:05 PM EST  Workstation ID: [id number]     Current medications     Scheduled Meds:   acetylcysteine, 2 mL, Nebulization, BID - RT  albuterol, 2.5 mg, Nebulization, TID  cefTRIAXone, 2,000 mg, Intravenous, Q24H  chlorhexidine, 15 mL, Mouth/Throat, Q12H  cholecalciferol, 1,000 Units, Oral, Daily  dexAMETHasone, 6 mg, Intravenous, Daily  folic acid 1 mg in sodium chloride 0.9 % 50 mL IVPB, 1 mg, Intravenous, Daily  furosemide, 20 mg, Intravenous, Q12H  gabapentin, 100 mg, Nasogastric, Daily  heparin (porcine), 5,000 Units, Subcutaneous, Q8H  levothyroxine, 150 mcg, Nasogastric, Q AM  [Held by provider] losartan, 100 mg, Oral, Daily  mupirocin, 1 Application, Each Nare, BID  pantoprazole, 40 mg, Intravenous, Q AM  PHENobarbital, 65 mg, Intravenous, Once   Followed by  [START ON 1/5/2025] PHENobarbital, 32.4 mg, Oral, Once   Followed  by  [START ON 1/5/2025] PHENobarbital, 32.4 mg, Oral, Once   Followed by  [START ON 1/6/2025] PHENobarbital, 32.4 mg, Oral, Once  senna-docusate sodium, 2 tablet, Nasogastric, BID  sertraline, 100 mg, Nasogastric, Daily  thiamine (B-1) IV, 200 mg, Intravenous, Q8H   Followed by  [START ON 1/8/2025] thiamine, 100 mg, Oral, Daily        Continuous Infusions:   dexmedetomidine, 0.2-1.5 mcg/kg/hr, Last Rate: 0.4 mcg/kg/hr (01/04/25 0844)  fentanyl 10 mcg/mL,  mcg/hr, Last Rate: 75 mcg/hr (01/04/25 0620)  lactated ringers, 50 mL/hr, Last Rate: 50 mL/hr (01/04/25 0842)  norepinephrine, 0.02-0.5 mcg/kg/min, Last Rate: 0.02 mcg/kg/min (01/04/25 0134)      Total critical care time: Approximately 35 minutes    Due to a high probability of clinically significant, life threatening deterioration, the patient required my highest level of preparedness to intervene emergently and I personally spent this critical care time directly and personally managing the patient. This critical care time included obtaining a history; examining the patient; pulse oximetry; ordering and review of studies; arranging urgent treatment with development of a management plan; evaluation of patient's response to treatment; frequent reassessment; and, discussions with other providers.    This critical care time was performed to assess and manage the high probability of imminent, life-threatening deterioration that could result in multi-organ failure. It was exclusive of separately billable procedures and treating other patients and teaching time.      Plan discussed with RN. Reviewed all other data in the last 24 hours, including but not limited to vitals, labs, microbiology, imaging and pertinent notes from other providers.  Plan also discussed with the patients family at the bedside.      Maikel Alexis MD   Critical Care  01/04/25   08:51 EST

## 2025-01-04 NOTE — PROCEDURES
"Non-Tunneled Catheter    Date/Time: 1/3/2025 4:40 PM    Performed by: Cuco Del Toro APRN  Authorized by: Cuco Del Toro APRN  Consent: Written consent obtained.  Risks and benefits: risks, benefits and alternatives were discussed  Consent given by: spouse  Patient understanding: patient states understanding of the procedure being performed  Patient consent: the patient's understanding of the procedure matches consent given  Procedure consent: procedure consent matches procedure scheduled  Relevant documents: relevant documents present and verified  Test results: test results available and properly labeled  Imaging studies: imaging studies available  Required items: required blood products, implants, devices, and special equipment available  Patient identity confirmed: arm band, hospital-assigned identification number and anonymous protocol, patient vented/unresponsive  Time out: Immediately prior to procedure a \"time out\" was called to verify the correct patient, procedure, equipment, support staff and site/side marked as required.  Indications: vascular access  Anesthesia: local infiltration    Anesthesia:  Local Anesthetic: lidocaine 1% without epinephrine  Anesthetic total: 5 mL    Sedation:  Patient sedated: no    Preparation: skin prepped with ChloraPrep  Skin prep agent dried: skin prep agent completely dried prior to procedure  Sterile barriers: all five maximum sterile barriers used - cap, mask, sterile gown, sterile gloves, and large sterile sheet  Hand hygiene: hand hygiene performed prior to central venous catheter insertion  Location details: right subclavian  Patient position: flat  Catheter type: triple lumen  Catheter size: 12 Fr (20 cm)  Ultrasound guidance: yes (Sterile probe cover utilized.)  Number of attempts: 1  Successful placement: yes  Post-procedure: line sutured and dressing applied  Assessment: free fluid flow, no pneumothorax on x-ray, blood return through all ports and placement " verified by x-ray  Patient tolerance: patient tolerated the procedure well with no immediate complications        Electronically signed by MISAEL Santoyo, 01/03/25, 7:56 PM EST.

## 2025-01-04 NOTE — PLAN OF CARE
Goal Outcome Evaluation:      Pt rested well throughout the night. VSS. Levo 0.02, fent 75, dex 0.5. 900mL UOP this shift. Bilateral soft wrist restraints maintained for ETT management.

## 2025-01-04 NOTE — NURSING NOTE
Pt arrived to ICU on vent. Fent/dex/levo gtts running. Pt would grimace when trying NG placement but no other response to pain. Pt not following commands. Replaced K/calcium/mag. Aguirre in place. Phenobarb given for withdrawal- pts family states she drinks fireball daily. 2L bolus of LR given. At end of shift pt remains on fent/dex/LR with D5. Levo stopped.

## 2025-01-04 NOTE — PROGRESS NOTES
Nephrology  Progress Note                                        [facility]    Patient Identification    Name: Connie A Schoen  Age: 74 y.o.  Sex: female  :  1950  MRN: [id number]      DATE OF SERVICE:  2025        Subective    Patient is on the vent and pressors  Making urine     Objective   Scheduled Meds:acetylcysteine, 2 mL, Nebulization, BID - RT  albuterol, 2.5 mg, Nebulization, TID  cefTRIAXone, 2,000 mg, Intravenous, Q24H  chlorhexidine, 15 mL, Mouth/Throat, Q12H  cholecalciferol, 1,000 Units, Oral, Daily  dexAMETHasone, 6 mg, Intravenous, Daily  folic acid 1 mg in sodium chloride 0.9 % 50 mL IVPB, 1 mg, Intravenous, Daily  furosemide, 20 mg, Intravenous, Q8H  gabapentin, 100 mg, Nasogastric, Daily  heparin (porcine), 5,000 Units, Subcutaneous, Q8H  levothyroxine, 150 mcg, Nasogastric, Q AM  [Held by provider] losartan, 100 mg, Oral, Daily  mupirocin, 1 Application, Each Nare, BID  pantoprazole, 40 mg, Intravenous, Q AM  PHENobarbital, 65 mg, Intravenous, Once   Followed by  PHENobarbital, 65 mg, Intravenous, Once   Followed by  [START ON 2025] PHENobarbital, 32.4 mg, Oral, Once   Followed by  [START ON 2025] PHENobarbital, 32.4 mg, Oral, Once   Followed by  [START ON 2025] PHENobarbital, 32.4 mg, Oral, Once  senna-docusate sodium, 2 tablet, Nasogastric, BID  sertraline, 100 mg, Nasogastric, Daily  thiamine (B-1) IV, 200 mg, Intravenous, Q8H   Followed by  [START ON 2025] thiamine, 100 mg, Oral, Daily          Continuous Infusions:dexmedetomidine, 0.2-1.5 mcg/kg/hr, Last Rate: 0.5 mcg/kg/hr (25)  fentanyl 10 mcg/mL,  mcg/hr, Last Rate: 75 mcg/hr (25)  norepinephrine, 0.02-0.5 mcg/kg/min, Last Rate: 0.02 mcg/kg/min (254)        PRN Meds:  acetaminophen **OR** acetaminophen    aluminum-magnesium  "hydroxide-simethicone    senna-docusate sodium **AND** polyethylene glycol **AND** bisacodyl **AND** bisacodyl    dextrose    fentaNYL    ipratropium-albuterol    Magnesium Standard Dose Replacement - Follow Nurse / BPA Driven Protocol    nitroglycerin    ondansetron ODT **OR** ondansetron     Exam:  /68   Pulse 52   Temp 99.9 °F (37.7 °C)   Resp 20   Ht 165.1 cm (65\")   Wt 77.1 kg (170 lb)   SpO2 99%   BMI 28.29 kg/m²     Intake/Output last 3 shifts:  I/O last 3 completed shifts:  In: 7471.8 [I.V.:7471.8]  Out: 2125 [Urine:2125]    Intake/Output this shift:  No intake/output data recorded.    Physical exam:  General Appearance: On the vent  Head:  Normocephalic, without obvious abnormality, atraumatic  Eyes:  PERRL, conjunctiva/corneas clear     Neck:  Supple,  no adenopathy;      Lungs:  Decreased BS occasion ronchi  Heart:  Regular rate and rhythm, S1 and S2 normal  Abdomen:  Soft, non-tender, bowel sounds active   Extremities: trace edema  Pulses: 2+ and symmetric all extremities  Skin:  No rashes or lesions       Data Review:  All labs (24hrs):   Recent Results (from the past 24 hours)   POC Glucose Once    Collection Time: 01/03/25 10:07 AM    Specimen: Blood   Result Value Ref Range    Glucose 63 (L) 70 - 105 mg/dL   Blood Gas, Arterial -    Collection Time: 01/03/25 10:18 AM    Specimen: Arterial Blood   Result Value Ref Range    Site Right Radial     Chris's Test Positive     pH, Arterial 7.166 (C) 7.350 - 7.450 pH units    pCO2, Arterial 40.1 35.0 - 48.0 mm Hg    pO2, Arterial 62.1 (L) 83.0 - 108.0 mm Hg    HCO3, Arterial 14.5 (L) 21.0 - 28.0 mmol/L    Base Excess, Arterial -13.5 (L) 0.0 - 3.0 mmol/L    O2 Saturation, Arterial 84.6 (L) 94.0 - 98.0 %    Barometric Pressure for Blood Gas      Modality NRB     FIO2 100 %    Notified Who      Read Back      Notified Time      Hemodilution No     PO2/FIO2 62 0 - 500   POC Creatinine    Collection Time: 01/03/25 10:18 AM    Specimen: Arterial Blood "   Result Value Ref Range    Creatinine 4.31 (H) 0.60 - 1.30 mg/dL    eGFR 10.4 (L) >60.0 mL/min/1.73   POCT Electrolytes +HGB +HCT    Collection Time: 01/03/25 10:18 AM    Specimen: Arterial Blood   Result Value Ref Range    Sodium 135 (L) 138 - 146 mmol/L    POC Potassium 2.6 (C) 3.5 - 4.5 mmol/L    Ionized Calcium 1.13 (L) 1.15 - 1.33 mmol/L    Glucose 513 (C) 74 - 100 mg/dL    Hematocrit 36 (L) 38 - 51 %    Hemoglobin 12.2 12.0 - 17.0 g/dL    Notified Time      Notified Who      Read Back     POC Lactate    Collection Time: 01/03/25 10:18 AM    Specimen: Arterial Blood   Result Value Ref Range    Lactate 0.6 0.2 - 2.0 mmol/L   POC Glucose Once    Collection Time: 01/03/25 10:18 AM    Specimen: Arterial Blood   Result Value Ref Range    Glucose 513 (C) 74 - 100 mg/dL    Notified Who      Notified Time      Read Back     CBC Auto Differential    Collection Time: 01/03/25 10:55 AM    Specimen: Blood   Result Value Ref Range    WBC 32.60 (C) 3.40 - 10.80 10*3/mm3    RBC 3.48 (L) 3.77 - 5.28 10*6/mm3    Hemoglobin 11.4 (L) 12.0 - 15.9 g/dL    Hematocrit 32.3 (L) 34.0 - 46.6 %    MCV 92.8 79.0 - 97.0 fL    MCH 32.8 26.6 - 33.0 pg    MCHC 35.3 31.5 - 35.7 g/dL    RDW 14.4 12.3 - 15.4 %    RDW-SD 49.2 37.0 - 54.0 fl    MPV 10.4 6.0 - 12.0 fL    Platelets 237 140 - 450 10*3/mm3    Neutrophil % 93.3 (H) 42.7 - 76.0 %    Lymphocyte % 1.7 (L) 19.6 - 45.3 %    Monocyte % 2.2 (L) 5.0 - 12.0 %    Eosinophil % 0.1 (L) 0.3 - 6.2 %    Basophil % 0.4 0.0 - 1.5 %    Immature Grans % 2.3 (H) 0.0 - 0.5 %    Neutrophils, Absolute 30.40 (H) 1.70 - 7.00 10*3/mm3    Lymphocytes, Absolute 0.55 (L) 0.70 - 3.10 10*3/mm3    Monocytes, Absolute 0.72 0.10 - 0.90 10*3/mm3    Eosinophils, Absolute 0.04 0.00 - 0.40 10*3/mm3    Basophils, Absolute 0.13 0.00 - 0.20 10*3/mm3    Immature Grans, Absolute 0.76 (H) 0.00 - 0.05 10*3/mm3    nRBC 0.1 0.0 - 0.2 /100 WBC   Protime-INR    Collection Time: 01/03/25 12:05 PM    Specimen: Blood   Result Value Ref  Range    Protime 18.0 (H) 11.7 - 14.2 Seconds    INR 1.48 (H) 0.90 - 1.10   aPTT    Collection Time: 01/03/25 12:05 PM    Specimen: Blood   Result Value Ref Range    PTT 35.4 22.7 - 35.4 seconds   MRSA Screen, PCR (Inpatient) - Swab, Nares    Collection Time: 01/03/25 12:31 PM    Specimen: Nares; Swab   Result Value Ref Range    MRSA PCR No MRSA Detected No MRSA Detected   Respiratory Culture - Sputum, ET Suction    Collection Time: 01/03/25 12:34 PM    Specimen: ET Suction; Sputum   Result Value Ref Range    Gram Stain Many (4+) WBCs per low power field     Gram Stain Few (2+) Epithelial cells per low power field     Gram Stain Few (2+) Yeast with pseudohyphae     Gram Stain       Many (4+) Mixed bacterial morphotypes seen on Gram Stain   Blood Gas, Arterial -    Collection Time: 01/03/25 12:42 PM    Specimen: Arterial Blood   Result Value Ref Range    Site Left Radial     Chris's Test Positive     pH, Arterial 7.261 (L) 7.350 - 7.450 pH units    pCO2, Arterial 42.2 35.0 - 48.0 mm Hg    pO2, Arterial 350.2 (H) 83.0 - 108.0 mm Hg    HCO3, Arterial 19.0 (L) 21.0 - 28.0 mmol/L    Base Excess, Arterial -7.7 (L) 0.0 - 3.0 mmol/L    O2 Saturation, Arterial 99.9 (H) 94.0 - 98.0 %    CO2 Content 20.3 (L) 22 - 29 mmol/L    Barometric Pressure for Blood Gas      Modality Adult Vent     FIO2 100 %    Ventilator Mode AC     Set Tidal Volume 480     PEEP 5     Hemodilution No     Respiratory Rate 20     PO2/FIO2 350 0 - 500   POC Glucose Once    Collection Time: 01/03/25 12:55 PM    Specimen: Blood   Result Value Ref Range    Glucose 102 70 - 105 mg/dL   BNP    Collection Time: 01/03/25  1:32 PM    Specimen: Blood   Result Value Ref Range    proBNP 8,348.0 (H) 0.0 - 900.0 pg/mL   Magnesium    Collection Time: 01/03/25  1:32 PM    Specimen: Blood   Result Value Ref Range    Magnesium 2.1 1.6 - 2.4 mg/dL   Comprehensive Metabolic Panel    Collection Time: 01/03/25  1:32 PM    Specimen: Blood   Result Value Ref Range    Glucose  108 (H) 65 - 99 mg/dL    BUN 77 (H) 8 - 23 mg/dL    Creatinine 4.86 (H) 0.57 - 1.00 mg/dL    Sodium 141 136 - 145 mmol/L    Potassium 2.4 (C) 3.5 - 5.2 mmol/L    Chloride 103 98 - 107 mmol/L    CO2 18.7 (L) 22.0 - 29.0 mmol/L    Calcium 7.7 (L) 8.6 - 10.5 mg/dL    Total Protein 6.1 6.0 - 8.5 g/dL    Albumin 2.5 (L) 3.5 - 5.2 g/dL    ALT (SGPT) 16 1 - 33 U/L    AST (SGOT) 26 1 - 32 U/L    Alkaline Phosphatase 226 (H) 39 - 117 U/L    Total Bilirubin 0.3 0.0 - 1.2 mg/dL    Globulin 3.6 gm/dL    A/G Ratio 0.7 g/dL    BUN/Creatinine Ratio 15.8 7.0 - 25.0    Anion Gap 19.3 (H) 5.0 - 15.0 mmol/L    eGFR 9.0 (L) >60.0 mL/min/1.73   Phosphorus    Collection Time: 01/03/25  1:32 PM    Specimen: Blood   Result Value Ref Range    Phosphorus 5.9 (H) 2.5 - 4.5 mg/dL   Lipase    Collection Time: 01/03/25  1:32 PM    Specimen: Blood   Result Value Ref Range    Lipase 691 (H) 13 - 60 U/L   Amylase    Collection Time: 01/03/25  1:32 PM    Specimen: Blood   Result Value Ref Range    Amylase 313 (H) 28 - 100 U/L   Calcium, Ionized    Collection Time: 01/03/25  1:32 PM    Specimen: Blood   Result Value Ref Range    Ionized Calcium 0.98 (L) 1.15 - 1.30 mmol/L   Triglycerides    Collection Time: 01/03/25  1:32 PM    Specimen: Blood   Result Value Ref Range    Triglycerides 248 (H) 0 - 150 mg/dL   Adult Transthoracic Echo Complete W/ Cont if Necessary Per Protocol    Collection Time: 01/03/25  5:31 PM   Result Value Ref Range    LV GLOBAL STRAIN  -14.2 %    LVIDd 4.7 cm    LVIDs 3.2 cm    IVSd 1.10 cm    LVPWd 1.00 cm    FS 31.9 %    IVS/LVPW 1.10 cm    ESV(cubed) 32.8 ml    LV Sys Vol (BSA corrected) 21.2 cm2    EDV(cubed) 103.8 ml    LV Quijano Vol (BSA corrected) 49.5 cm2    LV mass(C)d 175.8 grams    LVOT area 3.8 cm2    LVOT diam 2.20 cm    EDV(MOD-sp4) 91.4 ml    ESV(MOD-sp4) 39.2 ml    SV(MOD-sp4) 52.2 ml    SVi(MOD-SP4) 28.3 ml/m2    SVi (LVOT) 38.7 ml/m2    EF(MOD-sp4) 57.1 %    MV E max radha 65.0 cm/sec    MV A max radha 69.2 cm/sec     MV dec time 0.24 sec    MV E/A 0.94     Pulm A Revs Dur 0.10 sec    Med Peak E' Sohail 6.1 cm/sec    Lat Peak E' Sohail 8.4 cm/sec    TR max sohail 264.0 cm/sec    Avg E/e' ratio 8.97     SV(LVOT) 71.5 ml    RVIDd 3.9 cm    TAPSE (>1.6) 1.94 cm    RV S' 8.4 cm/sec    LA dimension (2D)  2.5 cm    Pulm Sys Sohail 32.0 cm/sec    Pulm Quijano Sohail 29.0 cm/sec    Pulm S/D 1.10     Pulm A Revs Sohail 22.5 cm/sec    LV V1 max 68.7 cm/sec    LV V1 max PG 1.89 mmHg    LV V1 mean PG 1.00 mmHg    LV V1 VTI 18.8 cm    Ao pk sohail 125.0 cm/sec    Ao max PG 6.3 mmHg    Ao mean PG 3.0 mmHg    Ao V2 VTI 30.5 cm    JACINTO(I,D) 2.34 cm2    MV max PG 1.84 mmHg    MV mean PG 1.00 mmHg    MV V2 VTI 33.3 cm    MV P1/2t 195.3 msec    MVA(P1/2t) 1.13 cm2    MVA(VTI) 2.15 cm2    MV dec slope 98.4 cm/sec2    TR max PG 27.9 mmHg    RV V1 max PG 0.92 mmHg    RV V1 max 47.9 cm/sec    RV V1 VTI 14.6 cm    PA V2 max 65.6 cm/sec    PA acc time 0.21 sec    ACS 1.50 cm    Sinus 3.0 cm    STJ 2.5 cm    EF(MOD-bp) 57.0 %    Dimensionless Index 0.55 (DI)   POC Glucose Finger Q6H    Collection Time: 01/03/25  6:31 PM    Specimen: Finger; Blood   Result Value Ref Range    Glucose 136 (H) 70 - 105 mg/dL   Magnesium    Collection Time: 01/03/25  9:25 PM    Specimen: Blood   Result Value Ref Range    Magnesium 3.2 (H) 1.6 - 2.4 mg/dL   Phosphorus    Collection Time: 01/03/25  9:25 PM    Specimen: Blood   Result Value Ref Range    Phosphorus 4.8 (H) 2.5 - 4.5 mg/dL   Basic Metabolic Panel    Collection Time: 01/03/25  9:25 PM    Specimen: Blood   Result Value Ref Range    Glucose 194 (H) 65 - 99 mg/dL    BUN 69 (H) 8 - 23 mg/dL    Creatinine 4.08 (H) 0.57 - 1.00 mg/dL    Sodium 141 136 - 145 mmol/L    Potassium 2.9 (L) 3.5 - 5.2 mmol/L    Chloride 105 98 - 107 mmol/L    CO2 19.4 (L) 22.0 - 29.0 mmol/L    Calcium 8.3 (L) 8.6 - 10.5 mg/dL    BUN/Creatinine Ratio 16.9 7.0 - 25.0    Anion Gap 16.6 (H) 5.0 - 15.0 mmol/L    eGFR 11.1 (L) >60.0 mL/min/1.73   S. Pneumo Ag Urine or CSF -  Urine, Urine, Catheter    Collection Time: 01/03/25  9:25 PM    Specimen: Urine, Catheter   Result Value Ref Range    Strep Pneumo Ag Negative Negative   Legionella Antigen, Urine - Urine, Urine, Catheter    Collection Time: 01/03/25  9:25 PM    Specimen: Urine, Catheter   Result Value Ref Range    LEGIONELLA ANTIGEN, URINE Negative Negative   POC Glucose Once    Collection Time: 01/04/25  1:07 AM    Specimen: Blood   Result Value Ref Range    Glucose 215 (H) 70 - 105 mg/dL   Blood Gas, Arterial -    Collection Time: 01/04/25  3:50 AM    Specimen: Arterial Blood   Result Value Ref Range    Site Right Radial     Chris's Test Positive     pH, Arterial 7.332 (L) 7.350 - 7.450 pH units    pCO2, Arterial 39.3 35.0 - 48.0 mm Hg    pO2, Arterial 74.4 (L) 83.0 - 108.0 mm Hg    HCO3, Arterial 20.8 (L) 21.0 - 28.0 mmol/L    Base Excess, Arterial -4.7 (L) 0.0 - 3.0 mmol/L    O2 Saturation, Arterial 93.8 (L) 94.0 - 98.0 %    CO2 Content 22.0 22 - 29 mmol/L    Barometric Pressure for Blood Gas      Modality Adult Vent     FIO2 40 %    Ventilator Mode AC     Set Tidal Volume 480     PEEP 5     Hemodilution No     Respiratory Rate 20     PO2/FIO2 186 0 - 500   Lipid Panel    Collection Time: 01/04/25  5:56 AM    Specimen: Blood   Result Value Ref Range    Total Cholesterol 93 0 - 200 mg/dL    Triglycerides 164 (H) 0 - 150 mg/dL    HDL Cholesterol 17 (L) 40 - 60 mg/dL    LDL Cholesterol  48 0 - 100 mg/dL    VLDL Cholesterol 28 5 - 40 mg/dL    LDL/HDL Ratio 2.54    Magnesium    Collection Time: 01/04/25  5:56 AM    Specimen: Blood   Result Value Ref Range    Magnesium 3.1 (H) 1.6 - 2.4 mg/dL   Phosphorus    Collection Time: 01/04/25  5:56 AM    Specimen: Blood   Result Value Ref Range    Phosphorus 4.9 (H) 2.5 - 4.5 mg/dL   Comprehensive Metabolic Panel    Collection Time: 01/04/25  5:56 AM    Specimen: Blood   Result Value Ref Range    Glucose 218 (H) 65 - 99 mg/dL    BUN 67 (H) 8 - 23 mg/dL    Creatinine 3.92 (H) 0.57 - 1.00 mg/dL     Sodium 142 136 - 145 mmol/L    Potassium 4.0 3.5 - 5.2 mmol/L    Chloride 109 (H) 98 - 107 mmol/L    CO2 20.5 (L) 22.0 - 29.0 mmol/L    Calcium 8.1 (L) 8.6 - 10.5 mg/dL    Total Protein 5.7 (L) 6.0 - 8.5 g/dL    Albumin 2.3 (L) 3.5 - 5.2 g/dL    ALT (SGPT) 17 1 - 33 U/L    AST (SGOT) 24 1 - 32 U/L    Alkaline Phosphatase 293 (H) 39 - 117 U/L    Total Bilirubin 0.2 0.0 - 1.2 mg/dL    Globulin 3.4 gm/dL    A/G Ratio 0.7 g/dL    BUN/Creatinine Ratio 17.1 7.0 - 25.0    Anion Gap 12.5 5.0 - 15.0 mmol/L    eGFR 11.6 (L) >60.0 mL/min/1.73   CBC Auto Differential    Collection Time: 01/04/25  5:56 AM    Specimen: Blood   Result Value Ref Range    WBC 26.51 (H) 3.40 - 10.80 10*3/mm3    RBC 3.31 (L) 3.77 - 5.28 10*6/mm3    Hemoglobin 10.9 (L) 12.0 - 15.9 g/dL    Hematocrit 30.1 (L) 34.0 - 46.6 %    MCV 90.9 79.0 - 97.0 fL    MCH 32.9 26.6 - 33.0 pg    MCHC 36.2 (H) 31.5 - 35.7 g/dL    RDW 13.9 12.3 - 15.4 %    RDW-SD 46.3 37.0 - 54.0 fl    MPV 10.3 6.0 - 12.0 fL    Platelets 231 140 - 450 10*3/mm3    Neutrophil % 94.0 (H) 42.7 - 76.0 %    Lymphocyte % 1.7 (L) 19.6 - 45.3 %    Monocyte % 2.5 (L) 5.0 - 12.0 %    Eosinophil % 0.0 (L) 0.3 - 6.2 %    Basophil % 0.3 0.0 - 1.5 %    Immature Grans % 1.5 (H) 0.0 - 0.5 %    Neutrophils, Absolute 24.91 (H) 1.70 - 7.00 10*3/mm3    Lymphocytes, Absolute 0.45 (L) 0.70 - 3.10 10*3/mm3    Monocytes, Absolute 0.67 0.10 - 0.90 10*3/mm3    Eosinophils, Absolute 0.00 0.00 - 0.40 10*3/mm3    Basophils, Absolute 0.08 0.00 - 0.20 10*3/mm3    Immature Grans, Absolute 0.40 (H) 0.00 - 0.05 10*3/mm3    nRBC 0.2 0.0 - 0.2 /100 WBC          Imaging:  XR Chest 1 View    Result Date: 1/3/2025  Impression: 1.Status post placement of a right-sided subclavian venous catheter with the tip terminating at the right atrial/SVC junction. There is no pneumothorax. 2.Interval development of pulmonary interstitial edema. 3.Patchy basilar densities, left greater than right side representing atelectasis versus  developing airspace disease. There is probably a small left pleural effusion. Electronically Signed: Govind Aguilar MD  1/3/2025 4:56 PM EST  Workstation ID: [id number]    XR Chest 1 View    Result Date: 1/3/2025  Impression: Tip of the NG tube projects to the expected location of the lower thoracic esophagus, over the left lower mediastinum. It appears unchanged in position compared to today's earlier CT chest in today's earlier KUB study. As previously recommended, it should be advanced until is below the diaphragm, after confirmation with repeat KUB imaging. Electronically Signed: Vee Wills MD  1/3/2025 3:13 PM EST  Workstation ID: [id number]    XR Abdomen KUB    Result Date: 1/3/2025  Impression: Enteric tube remains malpositioned, similar in position compared to today's earlier KUB, located above the level of the diaphragm. This should not be used until it is advanced below the diaphragm, followed by confirmation with KUB. Electronically Signed: Whitney Simmons  1/3/2025 2:18 PM EST  Workstation ID: [id number]    XR Abdomen KUB    Result Date: 1/3/2025  Impression: NG tube tip projects left of midline over the mediastinum, near the level of the lower third of the thoracic esophagus. These findings have already been reported on today's earlier CT chest 1/3/2025 at 11:46 a.m. Electronically Signed: Vee Wills MD  1/3/2025 12:47 PM EST  Workstation ID: [id number]    CT Angiogram Chest Pulmonary Embolism    Result Date: 1/3/2025  Impression: 1.Dense left lower lobe consolidation with volume loss. Correlate for symptoms of pneumonia. 2.Scattered peripheral groundglass densities within the bilateral upper lobes and right middle lobe. Findings consistent with pneumonia, as can be seen with COVID-pneumonia. Correlate clinically. 3.Questionable mild peripancreatic inflammatory stranding, raising the possibility of pancreatitis in the appropriate clinical context.. Correlate with serum amylase and lipase levels. 4.Cirrhotic  liver morphology. 5.Additional findings as described above. 6.Enteric tube extends to the level of the lower thoracic esophagus, above the esophogastric junction. Consider advancement. I called and spoke with one of the nurses in the ICU (this patient's nurse was unable to come to the phone), and she stated she would pass this pertinent information to the patient's nurse. Electronically Signed: Vee Wills MD  1/3/2025 12:03 PM EST  Workstation ID: [id number]    XR Chest 1 View    Result Date: 1/3/2025  Impression: ET tube placement to the level of the mid thoracic trachea. Medial bibasilar densities favored to represent subsegmental atelectasis, without significant change. Electronically Signed: Vee Wills MD  1/3/2025 11:11 AM EST  Workstation ID: [id number]    XR Chest 1 View    Result Date: 1/3/2025  Impression: Mild linear subsegmental atelectasis in the right lower lobe. No focal consolidation. Electronically Signed: Vee Wills MD  1/3/2025 10:23 AM EST  Workstation ID: [id number]    XR Chest 1 View    Result Date: 1/3/2025  Impression: No acute cardiopulmonary process. Chronic appearing interstitial changes are present. Electronically Signed: Norma Ott MD  1/3/2025 2:22 AM EST  Workstation ID: [id number]    CT Abdomen Pelvis Without Contrast    Result Date: 1/2/2025  Impression: 1.No evidence of obstructive uropathy. 2.Cirrhosis. 3.Mural thickening involving the sigmoid colon which may be on a chronic basis as there are no surrounding inflammatory changes. Correlate for signs of sigmoid colitis. 4.Other incidental nonemergent findings as detailed above. Electronically Signed: Berny Collins MD  1/2/2025 3:41 PM EST  Workstation ID: [id number]    XR Chest 1 View    Result Date: 1/2/2025  Impression: No acute cardiopulmonary findings. Electronically Signed: Vee Wills MD  1/2/2025 2:49 PM EST  Workstation ID: [id number]    XR Spine Lumbar Complete 4+VW    Result Date: 1/2/2025  Impression: 1. No acute  lumbar spine findings. 2. Degenerative postsurgical changes as described above. 3. Signs of bilateral S1 pedicle screw loosening, seen to better advantage on prior CT imaging. Electronically Signed: Vee Wills MD  1/2/2025 2:48 PM EST  Workstation ID: [id number]    CT Head Without Contrast    Result Date: 1/2/2025  Impression: 1.No acute intracranial finding. 2.Mild age-appropriate atrophy. 3.Mild paranasal sinus disease. Electronically Signed: Vee Wills MD  1/2/2025 2:05 PM EST  Workstation ID: [id number]     Assessment/Plan:     Acute renal failure          Acute kidney injury   respiratory failure  Metabolic acidosis  Hypokalemia  UTI   Positive COVID   leukocytosis  Recommendations:  Creatinine trending down to 3.9  Reduce Lasix dose  Adjust fluids  No need for dialysis  Patient is critically ill  32 minutes critical care time

## 2025-01-05 NOTE — PLAN OF CARE
Goal Outcome Evaluation:      Pt became more restless agitated as night went on. Pt on BiPAP and started on dex 0500, resting well. Once pt is able to be still, will try for head CT. VSS. No c/o pain/discomfort at this time.

## 2025-01-05 NOTE — PLAN OF CARE
Goal Outcome Evaluation:               Pt has no gtts running. Pt was extubated at 1325. Pt was tolerating extubation till 1530. Pt breathing started to become labored Respiratory was notified mutiple times due to labored breathing and wheezing. 1700 albuterol inhaler. By 1800 pt was no longer following commands pre last assessment. Labs, cest x ray and ct of the head were ordered.   Pts VSS.

## 2025-01-05 NOTE — PROGRESS NOTES
Nephrology  Progress Note                                        [facility]    Patient Identification    Name: Connie A Schoen  Age: 74 y.o.  Sex: female  :  1950  MRN: [id number]      DATE OF SERVICE:  2025        Subective    Patient is on the vent and pressors  Making urine     Objective   Scheduled Meds:albuterol sulfate HFA, 2 puff, Inhalation, 4x Daily - RT  cefTRIAXone, 2,000 mg, Intravenous, Q24H  chlorhexidine, 15 mL, Mouth/Throat, Q12H  cholecalciferol, 1,000 Units, Oral, Daily  dexAMETHasone, 6 mg, Intravenous, Daily  folic acid 1 mg in sodium chloride 0.9 % 50 mL IVPB, 1 mg, Intravenous, Daily  furosemide, 20 mg, Intravenous, Q12H  gabapentin, 100 mg, Nasogastric, Daily  heparin (porcine), 5,000 Units, Subcutaneous, Q8H  insulin lispro, 2-9 Units, Subcutaneous, Q6H  levothyroxine, 150 mcg, Nasogastric, Q AM  [Held by provider] losartan, 100 mg, Oral, Daily  mupirocin, 1 Application, Each Nare, BID  pantoprazole, 40 mg, Intravenous, Q AM  PHENobarbital, 32.4 mg, Oral, Once   Followed by  PHENobarbital, 32.4 mg, Oral, Once   Followed by  [START ON 2025] PHENobarbital, 32.4 mg, Oral, Once  senna-docusate sodium, 2 tablet, Nasogastric, BID  sertraline, 100 mg, Nasogastric, Daily  thiamine (B-1) IV, 200 mg, Intravenous, Q8H   Followed by  [START ON 2025] thiamine, 100 mg, Oral, Daily          Continuous Infusions:dexmedetomidine, 0.2-1.5 mcg/kg/hr, Last Rate: 0.4 mcg/kg/hr (25)  fentanyl 10 mcg/mL,  mcg/hr, Last Rate: Stopped (25)  norepinephrine, 0.02-0.5 mcg/kg/min, Last Rate: 0.02 mcg/kg/min (25)        PRN Meds:  acetaminophen **OR** acetaminophen    albuterol sulfate HFA    aluminum-magnesium hydroxide-simethicone    senna-docusate sodium **AND** polyethylene glycol **AND** bisacodyl **AND**  "bisacodyl    dextrose    dextrose    dextrose    fentaNYL    fentaNYL citrate (PF)    glucagon (human recombinant)    ipratropium-albuterol    Magnesium Standard Dose Replacement - Follow Nurse / BPA Driven Protocol    nitroglycerin    ondansetron ODT **OR** ondansetron     Exam:  /85   Pulse 95   Temp 99.7 °F (37.6 °C)   Resp 19   Ht 165.1 cm (65\")   Wt 77.1 kg (170 lb)   SpO2 92%   BMI 28.29 kg/m²     Intake/Output last 3 shifts:  I/O last 3 completed shifts:  In: 2589 [I.V.:2589]  Out: 4625 [Urine:4625]    Intake/Output this shift:  No intake/output data recorded.    Physical exam:  General Appearance: On the vent  Head:  Normocephalic, without obvious abnormality, atraumatic  Eyes:  PERRL, conjunctiva/corneas clear     Neck:  Supple,  no adenopathy;      Lungs:  Decreased BS occasion ronchi  Heart:  Regular rate and rhythm, S1 and S2 normal  Abdomen:  Soft, non-tender, bowel sounds active   Extremities: trace edema  Pulses: 2+ and symmetric all extremities  Skin:  No rashes or lesions       Data Review:  All labs (24hrs):   Recent Results (from the past 24 hours)   Basic Metabolic Panel    Collection Time: 01/04/25  9:10 AM    Specimen: Blood   Result Value Ref Range    Glucose 204 (H) 65 - 99 mg/dL    BUN 67 (H) 8 - 23 mg/dL    Creatinine 3.91 (H) 0.57 - 1.00 mg/dL    Sodium 143 136 - 145 mmol/L    Potassium 3.4 (L) 3.5 - 5.2 mmol/L    Chloride 109 (H) 98 - 107 mmol/L    CO2 20.0 (L) 22.0 - 29.0 mmol/L    Calcium 8.1 (L) 8.6 - 10.5 mg/dL    BUN/Creatinine Ratio 17.1 7.0 - 25.0    Anion Gap 14.0 5.0 - 15.0 mmol/L    eGFR 11.7 (L) >60.0 mL/min/1.73   POC Glucose Finger Q6H    Collection Time: 01/04/25 12:28 PM    Specimen: Finger; Blood   Result Value Ref Range    Glucose 171 (H) 70 - 105 mg/dL   Basic Metabolic Panel    Collection Time: 01/04/25  4:15 PM    Specimen: Blood   Result Value Ref Range    Glucose 162 (H) 65 - 99 mg/dL    BUN 66 (H) 8 - 23 mg/dL    Creatinine 3.66 (H) 0.57 - 1.00 mg/dL    " Sodium 143 136 - 145 mmol/L    Potassium 3.4 (L) 3.5 - 5.2 mmol/L    Chloride 109 (H) 98 - 107 mmol/L    CO2 20.7 (L) 22.0 - 29.0 mmol/L    Calcium 8.6 8.6 - 10.5 mg/dL    BUN/Creatinine Ratio 18.0 7.0 - 25.0    Anion Gap 13.3 5.0 - 15.0 mmol/L    eGFR 12.6 (L) >60.0 mL/min/1.73   Magnesium    Collection Time: 01/04/25  4:15 PM    Specimen: Blood   Result Value Ref Range    Magnesium 2.8 (H) 1.6 - 2.4 mg/dL   Blood Gas, Arterial -    Collection Time: 01/04/25  5:51 PM    Specimen: Arterial Blood   Result Value Ref Range    Site Left Radial     Chris's Test Positive     pH, Arterial 7.393 7.350 - 7.450 pH units    pCO2, Arterial 32.3 (L) 35.0 - 48.0 mm Hg    pO2, Arterial 74.3 (L) 83.0 - 108.0 mm Hg    HCO3, Arterial 19.7 (L) 21.0 - 28.0 mmol/L    Base Excess, Arterial -4.4 (L) 0.0 - 3.0 mmol/L    O2 Saturation, Arterial 94.9 94.0 - 98.0 %    Barometric Pressure for Blood Gas      Modality Venti Mask     FIO2 40 %    Hemodilution No     PO2/FIO2 186 0 - 500   POC Creatinine    Collection Time: 01/04/25  5:51 PM    Specimen: Arterial Blood   Result Value Ref Range    Creatinine 3.52 (H) 0.60 - 1.30 mg/dL    eGFR 13.2 (L) >60.0 mL/min/1.73   POCT Electrolytes +HGB +HCT    Collection Time: 01/04/25  5:51 PM    Specimen: Arterial Blood   Result Value Ref Range    Sodium 146 138 - 146 mmol/L    POC Potassium 3.2 (L) 3.5 - 4.5 mmol/L    Ionized Calcium 1.25 1.15 - 1.33 mmol/L    Glucose 127 (H) 74 - 100 mg/dL    Hematocrit 34 (L) 38 - 51 %    Hemoglobin 11.5 (L) 12.0 - 17.0 g/dL   POC Lactate    Collection Time: 01/04/25  5:51 PM    Specimen: Arterial Blood   Result Value Ref Range    Lactate 0.6 0.2 - 2.0 mmol/L   POC Glucose Once    Collection Time: 01/04/25  5:51 PM    Specimen: Arterial Blood   Result Value Ref Range    Glucose 127 (H) 74 - 100 mg/dL   POC Glucose Q6H    Collection Time: 01/04/25  6:04 PM    Specimen: Blood   Result Value Ref Range    Glucose 141 (H) 70 - 105 mg/dL   Comprehensive Metabolic Panel     Collection Time: 01/04/25  7:00 PM    Specimen: Blood   Result Value Ref Range    Glucose 128 (H) 65 - 99 mg/dL    BUN 66 (H) 8 - 23 mg/dL    Creatinine 3.63 (H) 0.57 - 1.00 mg/dL    Sodium 144 136 - 145 mmol/L    Potassium 3.2 (L) 3.5 - 5.2 mmol/L    Chloride 110 (H) 98 - 107 mmol/L    CO2 21.1 (L) 22.0 - 29.0 mmol/L    Calcium 8.6 8.6 - 10.5 mg/dL    Total Protein 6.2 6.0 - 8.5 g/dL    Albumin 2.4 (L) 3.5 - 5.2 g/dL    ALT (SGPT) 18 1 - 33 U/L    AST (SGOT) 25 1 - 32 U/L    Alkaline Phosphatase 244 (H) 39 - 117 U/L    Total Bilirubin 0.2 0.0 - 1.2 mg/dL    Globulin 3.8 gm/dL    A/G Ratio 0.6 g/dL    BUN/Creatinine Ratio 18.2 7.0 - 25.0    Anion Gap 12.9 5.0 - 15.0 mmol/L    eGFR 12.8 (L) >60.0 mL/min/1.73   Calcium, Ionized    Collection Time: 01/04/25  7:00 PM    Specimen: Blood   Result Value Ref Range    Ionized Calcium 1.14 (L) 1.15 - 1.30 mmol/L   CBC Auto Differential    Collection Time: 01/04/25  7:00 PM    Specimen: Blood   Result Value Ref Range    WBC 33.77 (C) 3.40 - 10.80 10*3/mm3    RBC 3.24 (L) 3.77 - 5.28 10*6/mm3    Hemoglobin 10.6 (L) 12.0 - 15.9 g/dL    Hematocrit 29.3 (L) 34.0 - 46.6 %    MCV 90.4 79.0 - 97.0 fL    MCH 32.7 26.6 - 33.0 pg    MCHC 36.2 (H) 31.5 - 35.7 g/dL    RDW 14.0 12.3 - 15.4 %    RDW-SD 46.7 37.0 - 54.0 fl    MPV 10.3 6.0 - 12.0 fL    Platelets 237 140 - 450 10*3/mm3   Scan Slide    Collection Time: 01/04/25  7:00 PM    Specimen: Blood   Result Value Ref Range    Scan Slide     Manual Differential    Collection Time: 01/04/25  7:00 PM    Specimen: Blood   Result Value Ref Range    Neutrophil % 93.0 (H) 42.7 - 76.0 %    Lymphocyte % 3.0 (L) 19.6 - 45.3 %    Monocyte % 4.0 (L) 5.0 - 12.0 %    Neutrophils Absolute 31.41 (H) 1.70 - 7.00 10*3/mm3    Lymphocytes Absolute 1.01 0.70 - 3.10 10*3/mm3    Monocytes Absolute 1.35 (H) 0.10 - 0.90 10*3/mm3    RBC Morphology Normal Normal    Toxic Granulation Slight/1+ None Seen    Platelet Morphology Normal Normal   POC Glucose Once     Collection Time: 01/04/25 11:35 PM    Specimen: Blood   Result Value Ref Range    Glucose 105 70 - 105 mg/dL   Comprehensive Metabolic Panel    Collection Time: 01/05/25 12:22 AM    Specimen: Blood   Result Value Ref Range    Glucose 108 (H) 65 - 99 mg/dL    BUN 64 (H) 8 - 23 mg/dL    Creatinine 3.43 (H) 0.57 - 1.00 mg/dL    Sodium 147 (H) 136 - 145 mmol/L    Potassium 3.1 (L) 3.5 - 5.2 mmol/L    Chloride 113 (H) 98 - 107 mmol/L    CO2 21.3 (L) 22.0 - 29.0 mmol/L    Calcium 8.7 8.6 - 10.5 mg/dL    Total Protein 6.4 6.0 - 8.5 g/dL    Albumin 2.5 (L) 3.5 - 5.2 g/dL    ALT (SGPT) 18 1 - 33 U/L    AST (SGOT) 29 1 - 32 U/L    Alkaline Phosphatase 265 (H) 39 - 117 U/L    Total Bilirubin 0.2 0.0 - 1.2 mg/dL    Globulin 3.9 gm/dL    A/G Ratio 0.6 g/dL    BUN/Creatinine Ratio 18.7 7.0 - 25.0    Anion Gap 12.7 5.0 - 15.0 mmol/L    eGFR 13.7 (L) >60.0 mL/min/1.73   Magnesium    Collection Time: 01/05/25 12:22 AM    Specimen: Blood   Result Value Ref Range    Magnesium 2.8 (H) 1.6 - 2.4 mg/dL   Phosphorus    Collection Time: 01/05/25 12:22 AM    Specimen: Blood   Result Value Ref Range    Phosphorus 4.4 2.5 - 4.5 mg/dL   POC Glucose Once    Collection Time: 01/05/25  5:19 AM    Specimen: Blood   Result Value Ref Range    Glucose 131 (H) 70 - 105 mg/dL   Magnesium    Collection Time: 01/05/25  5:27 AM    Specimen: Blood   Result Value Ref Range    Magnesium 2.5 (H) 1.6 - 2.4 mg/dL   Phosphorus    Collection Time: 01/05/25  5:27 AM    Specimen: Blood   Result Value Ref Range    Phosphorus 4.5 2.5 - 4.5 mg/dL   Comprehensive Metabolic Panel    Collection Time: 01/05/25  5:27 AM    Specimen: Blood   Result Value Ref Range    Glucose 119 (H) 65 - 99 mg/dL    BUN 64 (H) 8 - 23 mg/dL    Creatinine 3.14 (H) 0.57 - 1.00 mg/dL    Sodium 147 (H) 136 - 145 mmol/L    Potassium 3.5 3.5 - 5.2 mmol/L    Chloride 111 (H) 98 - 107 mmol/L    CO2 21.4 (L) 22.0 - 29.0 mmol/L    Calcium 8.9 8.6 - 10.5 mg/dL    Total Protein 6.5 6.0 - 8.5 g/dL     Albumin 2.8 (L) 3.5 - 5.2 g/dL    ALT (SGPT) 16 1 - 33 U/L    AST (SGOT) 29 1 - 32 U/L    Alkaline Phosphatase 248 (H) 39 - 117 U/L    Total Bilirubin 0.2 0.0 - 1.2 mg/dL    Globulin 3.7 gm/dL    A/G Ratio 0.8 g/dL    BUN/Creatinine Ratio 20.4 7.0 - 25.0    Anion Gap 14.6 5.0 - 15.0 mmol/L    eGFR 15.2 (L) >60.0 mL/min/1.73   CBC Auto Differential    Collection Time: 01/05/25  5:27 AM    Specimen: Blood   Result Value Ref Range    WBC 33.51 (C) 3.40 - 10.80 10*3/mm3    RBC 3.25 (L) 3.77 - 5.28 10*6/mm3    Hemoglobin 10.5 (L) 12.0 - 15.9 g/dL    Hematocrit 29.5 (L) 34.0 - 46.6 %    MCV 90.8 79.0 - 97.0 fL    MCH 32.3 26.6 - 33.0 pg    MCHC 35.6 31.5 - 35.7 g/dL    RDW 14.0 12.3 - 15.4 %    RDW-SD 46.6 37.0 - 54.0 fl    MPV 10.4 6.0 - 12.0 fL    Platelets 243 140 - 450 10*3/mm3   Scan Slide    Collection Time: 01/05/25  5:27 AM    Specimen: Blood   Result Value Ref Range    Scan Slide     Manual Differential    Collection Time: 01/05/25  5:27 AM    Specimen: Blood   Result Value Ref Range    Neutrophil % 85.0 (H) 42.7 - 76.0 %    Lymphocyte % 2.0 (L) 19.6 - 45.3 %    Monocyte % 1.0 (L) 5.0 - 12.0 %    Bands %  11.0 (H) 0.0 - 5.0 %    Metamyelocyte % 1.0 (H) 0.0 - 0.0 %    Neutrophils Absolute 32.17 (H) 1.70 - 7.00 10*3/mm3    Lymphocytes Absolute 0.67 (L) 0.70 - 3.10 10*3/mm3    Monocytes Absolute 0.34 0.10 - 0.90 10*3/mm3    RBC Fragments Slight/1+ None Seen    Target Cells Slight/1+ None Seen    Hypersegmented Neutrophils Slight/1+ None Seen    Platelet Morphology Normal Normal          Imaging:  XR Chest 1 View    Result Date: 1/4/2025  1.Interval extubation. 2.Slight improvement in aeration at the left base. 3.Interstitial changes in both lungs, probably not significantly changed. Electronically Signed: Troy Nance MD  1/4/2025 7:52 PM EST  Workstation ID: [id number]    XR Chest 1 View    Result Date: 1/4/2025  Impression: Persistent left lower lobe airspace disease and underlying pleural effusion with mild  right basilar atelectasis. Electronically Signed: Lynne Collins MD  1/4/2025 12:00 PM EST  Workstation ID: [id number]    XR Chest 1 View    Result Date: 1/3/2025  Impression: 1.Status post placement of a right-sided subclavian venous catheter with the tip terminating at the right atrial/SVC junction. There is no pneumothorax. 2.Interval development of pulmonary interstitial edema. 3.Patchy basilar densities, left greater than right side representing atelectasis versus developing airspace disease. There is probably a small left pleural effusion. Electronically Signed: Govind Aguilar MD  1/3/2025 4:56 PM EST  Workstation ID: [id number]    XR Chest 1 View    Result Date: 1/3/2025  Impression: Tip of the NG tube projects to the expected location of the lower thoracic esophagus, over the left lower mediastinum. It appears unchanged in position compared to today's earlier CT chest in today's earlier KUB study. As previously recommended, it should be advanced until is below the diaphragm, after confirmation with repeat KUB imaging. Electronically Signed: Vee Wills MD  1/3/2025 3:13 PM EST  Workstation ID: [id number]    XR Abdomen KUB    Result Date: 1/3/2025  Impression: Enteric tube remains malpositioned, similar in position compared to today's earlier KUB, located above the level of the diaphragm. This should not be used until it is advanced below the diaphragm, followed by confirmation with KUB. Electronically Signed: Whitney Simmons  1/3/2025 2:18 PM EST  Workstation ID: [id number]    XR Abdomen KUB    Result Date: 1/3/2025  Impression: NG tube tip projects left of midline over the mediastinum, near the level of the lower third of the thoracic esophagus. These findings have already been reported on today's earlier CT chest 1/3/2025 at 11:46 a.m. Electronically Signed: Vee Wills MD  1/3/2025 12:47 PM EST  Workstation ID: [id number]    CT Angiogram Chest Pulmonary Embolism    Result Date: 1/3/2025  Impression: 1.Dense left  lower lobe consolidation with volume loss. Correlate for symptoms of pneumonia. 2.Scattered peripheral groundglass densities within the bilateral upper lobes and right middle lobe. Findings consistent with pneumonia, as can be seen with COVID-pneumonia. Correlate clinically. 3.Questionable mild peripancreatic inflammatory stranding, raising the possibility of pancreatitis in the appropriate clinical context.. Correlate with serum amylase and lipase levels. 4.Cirrhotic liver morphology. 5.Additional findings as described above. 6.Enteric tube extends to the level of the lower thoracic esophagus, above the esophogastric junction. Consider advancement. I called and spoke with one of the nurses in the ICU (this patient's nurse was unable to come to the phone), and she stated she would pass this pertinent information to the patient's nurse. Electronically Signed: Vee Wills MD  1/3/2025 12:03 PM EST  Workstation ID: [id number]    XR Chest 1 View    Result Date: 1/3/2025  Impression: ET tube placement to the level of the mid thoracic trachea. Medial bibasilar densities favored to represent subsegmental atelectasis, without significant change. Electronically Signed: Vee Wills MD  1/3/2025 11:11 AM EST  Workstation ID: [id number]    XR Chest 1 View    Result Date: 1/3/2025  Impression: Mild linear subsegmental atelectasis in the right lower lobe. No focal consolidation. Electronically Signed: Vee Wills MD  1/3/2025 10:23 AM EST  Workstation ID: [id number]    XR Chest 1 View    Result Date: 1/3/2025  Impression: No acute cardiopulmonary process. Chronic appearing interstitial changes are present. Electronically Signed: Norma Ott MD  1/3/2025 2:22 AM EST  Workstation ID: [id number]    CT Abdomen Pelvis Without Contrast    Result Date: 1/2/2025  Impression: 1.No evidence of obstructive uropathy. 2.Cirrhosis. 3.Mural thickening involving the sigmoid colon which may be on a chronic basis as there are no surrounding  inflammatory changes. Correlate for signs of sigmoid colitis. 4.Other incidental nonemergent findings as detailed above. Electronically Signed: Berny Collins MD  1/2/2025 3:41 PM EST  Workstation ID: [id number]    XR Chest 1 View    Result Date: 1/2/2025  Impression: No acute cardiopulmonary findings. Electronically Signed: Vee Wills MD  1/2/2025 2:49 PM EST  Workstation ID: [id number]    XR Spine Lumbar Complete 4+VW    Result Date: 1/2/2025  Impression: 1. No acute lumbar spine findings. 2. Degenerative postsurgical changes as described above. 3. Signs of bilateral S1 pedicle screw loosening, seen to better advantage on prior CT imaging. Electronically Signed: Vee Wills MD  1/2/2025 2:48 PM EST  Workstation ID: [id number]    CT Head Without Contrast    Result Date: 1/2/2025  Impression: 1.No acute intracranial finding. 2.Mild age-appropriate atrophy. 3.Mild paranasal sinus disease. Electronically Signed: Vee Wills MD  1/2/2025 2:05 PM EST  Workstation ID: [id number]     Assessment/Plan:     Acute renal failure          Acute kidney injury   respiratory failure  Metabolic acidosis  Hypokalemia  UTI   Positive COVID   leukocytosis  Recommendations:  Creatinine trending down to 3.1 from 3.9  Gentle diuresis  Add free water to tube feed  Adjust fluids  No need for dialysis  Discussed with family at bedside discussed with nurse

## 2025-01-05 NOTE — PROGRESS NOTES
Critical Care Progress Note     Connie A Schoen : 1950 MRN:[id number] LOS:3  Rm: 2304/1     Principal Problem: Acute renal failure     Reason for follow up: All the medical problems listed below    Summary     Connie A Schoen is a 74 y.o. female with PMH of  degenerative disc disease, status post back surgery, anxiety/depression, hypothyroidism, hypertension, RA, and alcohol abuse, and presents to the hospital with complaints of generalized weakness, right lower extremity radicular pain, and generally feeling unwell.  Patient presented on 2025, and stated that at baseline patient can be off balance, uses a walker for ambulation and tends to hold onto people and walls.  Approximately 3-4 days ago she began to develop muscle weakness, and furthermore then developed pain in her chest that radiated to her back with associated coughing.  She has had a decreased appetite and oral intake, though it is noted that patient does routinely drink alcohol.  When asked how much alcohol she drinks, family was unable to quantify but states that it is a lot.  She will drink any type of alcohol that she can find, but tends to prefer fireball.  In the ED, patient was noted to have an elevated creatinine of 5.10 which is a new finding for her.  She was also found to have a suspected urinary tract infection, which a urine culture is pending with gram negative bacilli.  Respiratory viral panel did show COVID-19 infection.  Patient had a CT of the head that was negative for acute intracranial findings, and an x-ray of her lumbar spine but did also not have any acute findings.  Nephrology was consulted in the ED.  Patient was admitted to the hospitalist service.  She did experience a brief hypotensive episode overnight that responded to a 500 mL liter IV fluid bolus.  Patient's mentation continued to decline overnight.     On 1/3/2025, patient had a sharp decline in mental status with accompanying hypoxia and respiratory  distress, and a rapid response team was called.  Upon arrival by rapid response team, it was requested for patient to undergo intensivist consultation.  Patient was noted to be hypoxic with a metabolic acidosis, and was too weak to lift her arms up and felt an appropriate for BiPAP.  She did have a audible secretions in the back of her mouth, but was lethargic but responsive to questions.  After discussion with patient's family, decision was made to intubate patient and transferred to the ICU.  Initially, nephrology was planning to initiate hemodialysis, but after transfer to the ICU, and a improvement in her renal function with IV fluids, decision was made to postpone dialysis.  Patient did require vasopressors after intubation.  Intensivist service have taken over management of this patient.  Patient did trigger sepsis, and sepsis workup is in progress, following blood cultures, and patient was started on empiric antimicrobial therapy.    Significant Events / Subjective     01/05/25 :Patient was extubated to NC yesterday mid day.  Overnight the patient had worsening agitation and required increased oxygen requirements. Placed on BIPAP.  Wakes up but is not alert or following commands, moving all extremities. Precedex for agitation. Now off norepinephrine    Assessment / Plan   Acute respiratory failure with hypoxia  Pulmonary Edema  Likely due to pulmonary edema  CTA Chest: LLL consolidation.  Mucomyst and CPT ordered.  Continue duonebs.  On BIPAP, will wean off to HFNC this AM  CXR with worsening pulmonary edema/multifocal pneumonia  Patient on low dose precedex gtt  Will diurese with lasix/diuril today, would like to get net negative  Echo with EF 56-60% from 1/3       Acute encephalopathy/fall/generalized weakness  Likely toxic/metabolic in the setting of UTI, covid, hospital delirium, and possible alcohol withdrawl  CT of the head negative for acute intracranial findings.  Will repeat CT head today  X-ray of  lumbar spine with no acute lumbar spine findings, degenerative postsurgical changes described, signs of bilateral S1 pedicle screw loosening, but was a finding on previous CT imaging.  Further workup as clinically indicated    Sepsis due to UTI / COVID-19 infection  Likely due to suspected UTI, multifocal pneumonia and COVID-19.   CT of the abdomen/pelvis: Mural thickening involving the sigmoid colon without surrounding inflammatory changes, could possibly be chronic in nature.  CTA of the chest: Left lower lobe consolidation, peripheral groundglass densities in bilateral upper lobes and right middle lobe.  Blood cultures-pending with no growth.  Urine culture-Growing Ecoli.  Sputum culture-normal rebekah  MRSA screen-negative.  RVP-+COVID-19  Continues on Rocephin.  Decadron ordered for 6 mg x 10 days.     Off Norepinephrine     Acute Kidney Injury / Metabolic acidosis, increased anion gap  Remains nonoliguric. Baseline creatinine 0.9,   Likely prerenal. Possible intrinsic component due to ATN from infection, dehydration, and hypotension.  Nephrology consulted.   Still with significant crackles  Monitor Input/Output very closely.   Avoids NSAIDs, nephrotoxic medications, and hypotension.      Intake/Output Summary (Last 24 hours) at 1/5/2025 0751  Last data filed at 1/5/2025 0400  Gross per 24 hour   Intake 630 ml   Output 3725 ml   Net -3095 ml       Pancreatitis  Likely alcohol induced.  Lipase 691, Triglycerides 248.  CT of abdomen and pelvis: Pancreas was unremarkable.  CTA Chest: Questionable mild peripancreatic inflammatory stranding.  Supportive care.     Essential hypertension:   Due to hypotension and vasopressor needs, hold losartan.  Titrate medications as needed.     Alcohol abuse  Family unable to quantify amount of alcohol patient drinks, but they state that she drinks quite a bit.  Prefers fireball but will drink any type of alcohol.  WA protocol in place.  Safety precautions and seizure precautions  in place  Phenobarb taper ordered.  Thiamine, folic acid ordered.     Hypothyroidism: Continue levothyroxine when patient able to take oral medications.  GERD: Continue Protonix.  Anxiety/depression: Continue Zoloft when patient able to take oral medications.  Neuropathy / DDD: Hold gabapentin due to acute kidney injury.    Disposition:  remain in ICU    Code status:   Code Status (Patient has no pulse and is not breathing): CPR (Attempt to Resuscitate)  Medical Interventions (Patient has pulse or is breathing): Full Support       Nutrition:   NPO Diet NPO Type: Strict NPO   Tube Feeding: Formula: Peptamen AF (Vital AF 1.2); Feeding Type: Continuous; Start at: 20 mL/hr; Then Advance By: Do Not Advance; Water Flush: 10 mL; Every: 1 hour; Water Bolus: None     VTE Prophylaxis:  Pharmacologic & mechanical VTE prophylaxis orders are present.           Objective / Physical Exam     Vital signs:  Temp: 99.7 °F (37.6 °C)  BP: 143/85  Heart Rate: 95  Resp: 19  SpO2: 92 %  Weight: 77.1 kg (170 lb)    Admission Weight: Weight: 77.1 kg (170 lb)  Current Weight: Weight: 77.1 kg (170 lb)    Input/Output in last 24 hours:    Intake/Output Summary (Last 24 hours) at 1/5/2025 0751  Last data filed at 1/5/2025 0400  Gross per 24 hour   Intake 630 ml   Output 3725 ml   Net -3095 ml      Net IO Since Admission: 3,351.83 mL [01/05/25 0751]     Physical Exam  Vitals and nursing note reviewed.   Constitutional:       General: She is not in acute distress.     Comments: Laying on right side with bipap on in no distress   HENT:      Head: Normocephalic.      Mouth/Throat:      Mouth: Mucous membranes are moist.   Eyes:      Comments: Normal eyelids   Cardiovascular:      Rate and Rhythm: Regular rhythm. Bradycardia present.   Pulmonary:      Breath sounds: No wheezing.      Comments: Coarse breath sounds/crackles bilaterally  Abdominal:      General: There is no distension.   Musculoskeletal:         General: Normal range of motion.       Right lower leg: No edema.      Left lower leg: No edema.   Skin:     General: Skin is warm.      Capillary Refill: Capillary refill takes less than 2 seconds.   Neurological:      Comments: Wakes up and groans. Moves all extremities, Does not follow commands.          Radiology and Labs     Results from last 7 days   Lab Units 01/05/25  0527 01/04/25  1900 01/04/25  1751 01/04/25  0556 01/03/25  1055 01/03/25  1018 01/03/25  0541   WBC 10*3/mm3 33.51* 33.77*  --  26.51* 32.60*  --  33.31*   HEMOGLOBIN g/dL 10.5* 10.6*  --  10.9* 11.4*  --  11.5*   HEMOGLOBIN, POC g/dL  --   --  11.5*  --   --    < >  --    HEMATOCRIT % 29.5* 29.3*  --  30.1* 32.3*  --  33.2*   HEMATOCRIT POC %  --   --  34*  --   --    < >  --    PLATELETS 10*3/mm3 243 237  --  231 237  --  219    < > = values in this interval not displayed.      Results from last 7 days   Lab Units 01/03/25  1205   PROTIME Seconds 18.0*   INR  1.48*   APTT seconds 35.4      Results from last 7 days   Lab Units 01/05/25  0527 01/05/25  0022 01/04/25  1900 01/04/25  1751 01/04/25  1615 01/04/25  0910 01/04/25  0556 01/03/25  2125 01/03/25  1332   SODIUM mmol/L 147* 147* 144  --  143 143 142 141 141   POTASSIUM mmol/L 3.5 3.1* 3.2*  --  3.4* 3.4* 4.0 2.9* 2.4*   CHLORIDE mmol/L 111* 113* 110*  --  109* 109* 109* 105 103   CO2 mmol/L 21.4* 21.3* 21.1*  --  20.7* 20.0* 20.5* 19.4* 18.7*   BUN mg/dL 64* 64* 66*  --  66* 67* 67* 69* 77*   CREATININE mg/dL 3.14* 3.43* 3.63* 3.52* 3.66* 3.91* 3.92* 4.08* 4.86*   GLUCOSE mg/dL 119* 108* 128*  --  162* 204* 218* 194* 108*   MAGNESIUM mg/dL 2.5* 2.8*  --   --  2.8*  --  3.1* 3.2* 2.1   PHOSPHORUS mg/dL 4.5 4.4  --   --   --   --  4.9* 4.8* 5.9*      Results from last 7 days   Lab Units 01/05/25  0527 01/05/25  0022 01/04/25  1900 01/04/25  0556 01/03/25  1332   ALK PHOS U/L 248* 265* 244* 293* 226*   AST (SGOT) U/L 29 29 25 24 26   ALT (SGPT) U/L 16 18 18 17 16     Results from last 7 days   Lab Units 01/04/25  175  01/04/25  0350 01/03/25  1242 01/03/25  1018   PH, ARTERIAL pH units 7.393 7.332* 7.261* 7.166*   PCO2, ARTERIAL mm Hg 32.3* 39.3 42.2 40.1   PO2 ART mm Hg 74.3* 74.4* 350.2* 62.1*   O2 SATURATION ART % 94.9 93.8* 99.9* 84.6*   FIO2 % 40 40 100 100   HCO3 ART mmol/L 19.7* 20.8* 19.0* 14.5*   BASE EXCESS ART mmol/L -4.4* -4.7* -7.7* -13.5*       XR Chest 1 View    Result Date: 1/4/2025  1.Interval extubation. 2.Slight improvement in aeration at the left base. 3.Interstitial changes in both lungs, probably not significantly changed. Electronically Signed: Troy Nance MD  1/4/2025 7:52 PM EST  Workstation ID: [id number]    XR Chest 1 View    Result Date: 1/4/2025  Impression: Persistent left lower lobe airspace disease and underlying pleural effusion with mild right basilar atelectasis. Electronically Signed: Lynne Collins MD  1/4/2025 12:00 PM EST  Workstation ID: [id number]    XR Chest 1 View    Result Date: 1/3/2025  Impression: 1.Status post placement of a right-sided subclavian venous catheter with the tip terminating at the right atrial/SVC junction. There is no pneumothorax. 2.Interval development of pulmonary interstitial edema. 3.Patchy basilar densities, left greater than right side representing atelectasis versus developing airspace disease. There is probably a small left pleural effusion. Electronically Signed: Govind Aguilar MD  1/3/2025 4:56 PM EST  Workstation ID: [id number]    XR Chest 1 View    Result Date: 1/3/2025  Impression: Tip of the NG tube projects to the expected location of the lower thoracic esophagus, over the left lower mediastinum. It appears unchanged in position compared to today's earlier CT chest in today's earlier KUB study. As previously recommended, it should be advanced until is below the diaphragm, after confirmation with repeat KUB imaging. Electronically Signed: Vee Wills MD  1/3/2025 3:13 PM EST  Workstation ID: [id number]    XR Abdomen KUB    Result Date:  1/3/2025  Impression: Enteric tube remains malpositioned, similar in position compared to today's earlier KUB, located above the level of the diaphragm. This should not be used until it is advanced below the diaphragm, followed by confirmation with KUB. Electronically Signed: Whitney Simmons  1/3/2025 2:18 PM EST  Workstation ID: [id number]    XR Abdomen KUB    Result Date: 1/3/2025  Impression: NG tube tip projects left of midline over the mediastinum, near the level of the lower third of the thoracic esophagus. These findings have already been reported on today's earlier CT chest 1/3/2025 at 11:46 a.m. Electronically Signed: Vee Wills MD  1/3/2025 12:47 PM EST  Workstation ID: [id number]    CT Angiogram Chest Pulmonary Embolism    Result Date: 1/3/2025  Impression: 1.Dense left lower lobe consolidation with volume loss. Correlate for symptoms of pneumonia. 2.Scattered peripheral groundglass densities within the bilateral upper lobes and right middle lobe. Findings consistent with pneumonia, as can be seen with COVID-pneumonia. Correlate clinically. 3.Questionable mild peripancreatic inflammatory stranding, raising the possibility of pancreatitis in the appropriate clinical context.. Correlate with serum amylase and lipase levels. 4.Cirrhotic liver morphology. 5.Additional findings as described above. 6.Enteric tube extends to the level of the lower thoracic esophagus, above the esophogastric junction. Consider advancement. I called and spoke with one of the nurses in the ICU (this patient's nurse was unable to come to the phone), and she stated she would pass this pertinent information to the patient's nurse. Electronically Signed: Vee Wills MD  1/3/2025 12:03 PM EST  Workstation ID: [id number]    XR Chest 1 View    Result Date: 1/3/2025  Impression: ET tube placement to the level of the mid thoracic trachea. Medial bibasilar densities favored to represent subsegmental atelectasis, without significant change.  Electronically Signed: Vee Wills MD  1/3/2025 11:11 AM EST  Workstation ID: [id number]    XR Chest 1 View    Result Date: 1/3/2025  Impression: Mild linear subsegmental atelectasis in the right lower lobe. No focal consolidation. Electronically Signed: Vee Wills MD  1/3/2025 10:23 AM EST  Workstation ID: [id number]     Current medications     Scheduled Meds:   cefTRIAXone, 2,000 mg, Intravenous, Q24H  chlorhexidine, 15 mL, Mouth/Throat, Q12H  chlorothiazide, 500 mg, Intravenous, Once  cholecalciferol, 1,000 Units, Oral, Daily  dexAMETHasone, 6 mg, Intravenous, Daily  folic acid 1 mg in sodium chloride 0.9 % 50 mL IVPB, 1 mg, Intravenous, Daily  furosemide, 60 mg, Intravenous, Q12H  gabapentin, 100 mg, Nasogastric, Daily  heparin (porcine), 5,000 Units, Subcutaneous, Q8H  insulin lispro, 2-9 Units, Subcutaneous, Q6H  levothyroxine, 150 mcg, Nasogastric, Q AM  [Held by provider] losartan, 100 mg, Oral, Daily  mupirocin, 1 Application, Each Nare, BID  pantoprazole, 40 mg, Intravenous, Q AM  PHENobarbital, 32.4 mg, Oral, Once   Followed by  PHENobarbital, 32.4 mg, Oral, Once   Followed by  [START ON 1/6/2025] PHENobarbital, 32.4 mg, Oral, Once  senna-docusate sodium, 2 tablet, Nasogastric, BID  sertraline, 100 mg, Nasogastric, Daily  thiamine (B-1) IV, 200 mg, Intravenous, Q8H   Followed by  [START ON 1/8/2025] thiamine, 100 mg, Oral, Daily        Continuous Infusions:   dexmedetomidine, 0.2-1.5 mcg/kg/hr, Last Rate: 0.4 mcg/kg/hr (01/05/25 0513)  norepinephrine, 0.02-0.5 mcg/kg/min, Last Rate: 0.02 mcg/kg/min (01/04/25 0936)      Total critical care time: Approximately 32 minutes    Due to a high probability of clinically significant, life threatening deterioration, the patient required my highest level of preparedness to intervene emergently and I personally spent this critical care time directly and personally managing the patient. This critical care time included obtaining a history; examining the patient;  pulse oximetry; ordering and review of studies; arranging urgent treatment with development of a management plan; evaluation of patient's response to treatment; frequent reassessment; and, discussions with other providers.    This critical care time was performed to assess and manage the high probability of imminent, life-threatening deterioration that could result in multi-organ failure. It was exclusive of separately billable procedures and treating other patients and teaching time.      Plan discussed with RN. Reviewed all other data in the last 24 hours, including but not limited to vitals, labs, microbiology, imaging and pertinent notes from other providers.  Plan also discussed with the patients family at the bedside.      Maikel Alexis MD   Critical Care  01/05/25   07:51 EST

## 2025-01-06 NOTE — PROGRESS NOTES
Nutrition Services  Patient Name: Connie A Schoen  YOB: 1950  MRN: [id number]  Admission date: 1/2/2025    PROGRESS NOTE      Nutrition Intervention Updates: Will continue to monitor for ability to feed.         Encounter Information: Checking in to monitor nutrition plan of care.  Patient extubated 1/4.  Remains without TF access and unable to take PO at this time.  GI consulted for difficult DHT placement.  TF order discontinued 1/5 per MD.  RD to monitor for nutrition plan of car.e         PO Diet: NPO Diet NPO Type: Strict NPO   PO Supplements: None ordered    PO Intake:  NPO       Current nutrition support:    Nutrition support review:        Labs (reviewed below): Hypokalemia - replaced today       GI Function:  Last documented BM 1/2 (x 4 days ago) - minimal nutrition noted       Brief weight review   Wt Readings from Last 10 Encounters:   01/02/25 1312 77.1 kg (170 lb)   10/23/23 1438 80.7 kg (178 lb)   04/12/23 1405 88.5 kg (195 lb)   09/12/22 1413 86.6 kg (191 lb)   08/19/22 1052 83.9 kg (185 lb)   08/10/22 1655 82.6 kg (182 lb)   01/31/22 1015 82.6 kg (182 lb 3.2 oz)   01/26/22 1404 81.6 kg (180 lb)   11/01/21 1223 88 kg (194 lb)   10/13/21 1316 87.7 kg (193 lb 6.4 oz)   10/12/21 1316 88.5 kg (195 lb)        Results from last 7 days   Lab Units 01/06/25  0804 01/06/25  0436 01/05/25  0527 01/05/25  0022   SODIUM mmol/L 155* 152* 147* 147*   POTASSIUM mmol/L  --  3.0* 3.5 3.1*   CHLORIDE mmol/L  --  111* 111* 113*   CO2 mmol/L  --  24.8 21.4* 21.3*   BUN mg/dL  --  62* 64* 64*   CREATININE mg/dL  --  2.59* 3.14* 3.43*   CALCIUM mg/dL  --  9.3 8.9 8.7   BILIRUBIN mg/dL  --  0.5 0.2 0.2   ALK PHOS U/L  --  245* 248* 265*   ALT (SGPT) U/L  --  13 16 18   AST (SGOT) U/L  --  34* 29 29   GLUCOSE mg/dL  --  109* 119* 108*     Results from last 7 days   Lab Units 01/06/25  0436 01/05/25  0527 01/05/25  0022 01/04/25  1615 01/04/25  0556   MAGNESIUM mg/dL 2.4 2.5* 2.8*   < > 3.1*   PHOSPHORUS mg/dL  4.8* 4.5 4.4  --  4.9*   HEMOGLOBIN g/dL 12.0 10.5*  --    < > 10.9*   HEMOGLOBIN, POC   --   --   --    < >  --    HEMATOCRIT % 33.0* 29.5*  --    < > 30.1*   HEMATOCRIT POC   --   --   --    < >  --    TRIGLYCERIDES mg/dL  --   --   --   --  164*    < > = values in this interval not displayed.         RD to follow up per protocol.    Electronically signed by:  Raven Tapia RD  01/06/25 12:33 EST

## 2025-01-06 NOTE — PROGRESS NOTES
Nephrology  Progress Note                                        [facility]    Patient Identification    Name: Connie A Schoen  Age: 74 y.o.  Sex: female  :  1950  MRN: [id number]      DATE OF SERVICE:  2025        Subective     Patient was extubated yesterday   Currently on intermittent Lasix   On BiPAP      Meds:cefTRIAXone, 2,000 mg, Intravenous, Q24H  chlorhexidine, 15 mL, Mouth/Throat, Q12H  cholecalciferol, 1,000 Units, Oral, Daily  dexAMETHasone, 6 mg, Intravenous, Daily  folic acid 1 mg in sodium chloride 0.9 % 50 mL IVPB, 1 mg, Intravenous, Daily  furosemide, 60 mg, Intravenous, Q12H  gabapentin, 100 mg, Nasogastric, Daily  heparin (porcine), 5,000 Units, Subcutaneous, Q8H  insulin lispro, 2-9 Units, Subcutaneous, Q6H  levothyroxine, 150 mcg, Nasogastric, Q AM  [Held by provider] losartan, 100 mg, Oral, Daily  mupirocin, 1 Application, Each Nare, BID  pantoprazole, 40 mg, Intravenous, Q AM  potassium chloride, 10 mEq, Intravenous, Q1H  senna-docusate sodium, 2 tablet, Nasogastric, BID  sertraline, 100 mg, Nasogastric, Daily  thiamine (B-1) IV, 200 mg, Intravenous, Q8H   Followed by  [START ON 2025] thiamine, 100 mg, Oral, Daily          Continuous Infusions:dexmedetomidine, 0.2-1.5 mcg/kg/hr, Last Rate: 0.2 mcg/kg/hr (25 9714)        PRN Meds:  acetaminophen **OR** acetaminophen    aluminum-magnesium hydroxide-simethicone    senna-docusate sodium **AND** polyethylene glycol **AND** bisacodyl **AND** bisacodyl    Calcium Replacement - Follow Nurse / BPA Driven Protocol    dextrose    dextrose    dextrose    fentaNYL citrate (PF)    glucagon (human recombinant)    ipratropium-albuterol    LORazepam    Magnesium Standard Dose Replacement - Follow Nurse / BPA Driven Protocol    nitroglycerin    ondansetron ODT **OR** ondansetron     "Phosphorus Replacement - Follow Nurse / BPA Driven Protocol    Potassium Replacement - Follow Nurse / BPA Driven Protocol    ziprasidone (GEODON) 10 mg in sterile water (preservative free) 0.5 mL injection     Exam:  /97   Pulse 96   Temp 99.9 °F (37.7 °C)   Resp 20   Ht 165.1 cm (65\")   Wt 77.1 kg (170 lb)   SpO2 94%   BMI 28.29 kg/m²     Intake/Output last 3 shifts:  I/O last 3 completed shifts:  In: 1150 [I.V.:1150]  Out: 8000 [Urine:8000]    Intake/Output this shift:  No intake/output data recorded.    Physical exam:  General Appearance: Off the vent  Head:  Normocephalic, without obvious abnormality, atraumatic  Eyes:  PERRL, conjunctiva/corneas clear     Neck:  Supple,  no adenopathy;      Lungs:  Decreased BS occasion ronchi  Heart:  Regular rate and rhythm, S1 and S2 normal  Abdomen:  Soft, non-tender, bowel sounds active   Extremities: trace edema  Pulses: 2+ and symmetric all extremities  Skin:  No rashes or lesions       Data Review:  All labs (24hrs):   Recent Results (from the past 24 hours)   POC Glucose Once    Collection Time: 01/05/25  1:01 PM    Specimen: Blood   Result Value Ref Range    Glucose 119 (H) 70 - 105 mg/dL   POC Glucose Once    Collection Time: 01/05/25  3:00 PM    Specimen: Blood   Result Value Ref Range    Glucose 114 (H) 70 - 105 mg/dL   POC Glucose Once    Collection Time: 01/05/25  5:39 PM    Specimen: Blood   Result Value Ref Range    Glucose 124 (H) 70 - 105 mg/dL   POC Glucose Once    Collection Time: 01/05/25 11:22 PM    Specimen: Blood   Result Value Ref Range    Glucose 92 70 - 105 mg/dL   Magnesium    Collection Time: 01/06/25  4:36 AM    Specimen: Blood   Result Value Ref Range    Magnesium 2.4 1.6 - 2.4 mg/dL   Phosphorus    Collection Time: 01/06/25  4:36 AM    Specimen: Blood   Result Value Ref Range    Phosphorus 4.8 (H) 2.5 - 4.5 mg/dL   Comprehensive Metabolic Panel    Collection Time: 01/06/25  4:36 AM    Specimen: Blood   Result Value Ref Range    " Glucose 109 (H) 65 - 99 mg/dL    BUN 62 (H) 8 - 23 mg/dL    Creatinine 2.59 (H) 0.57 - 1.00 mg/dL    Sodium 152 (H) 136 - 145 mmol/L    Potassium 3.0 (L) 3.5 - 5.2 mmol/L    Chloride 111 (H) 98 - 107 mmol/L    CO2 24.8 22.0 - 29.0 mmol/L    Calcium 9.3 8.6 - 10.5 mg/dL    Total Protein 7.1 6.0 - 8.5 g/dL    Albumin 2.9 (L) 3.5 - 5.2 g/dL    ALT (SGPT) 13 1 - 33 U/L    AST (SGOT) 34 (H) 1 - 32 U/L    Alkaline Phosphatase 245 (H) 39 - 117 U/L    Total Bilirubin 0.5 0.0 - 1.2 mg/dL    Globulin 4.2 gm/dL    A/G Ratio 0.7 g/dL    BUN/Creatinine Ratio 23.9 7.0 - 25.0    Anion Gap 16.2 (H) 5.0 - 15.0 mmol/L    eGFR 19.1 (L) >60.0 mL/min/1.73   CBC Auto Differential    Collection Time: 01/06/25  4:36 AM    Specimen: Blood   Result Value Ref Range    WBC 37.38 (C) 3.40 - 10.80 10*3/mm3    RBC 3.66 (L) 3.77 - 5.28 10*6/mm3    Hemoglobin 12.0 12.0 - 15.9 g/dL    Hematocrit 33.0 (L) 34.0 - 46.6 %    MCV 90.2 79.0 - 97.0 fL    MCH 32.8 26.6 - 33.0 pg    MCHC 36.4 (H) 31.5 - 35.7 g/dL    RDW 14.1 12.3 - 15.4 %    RDW-SD 45.8 37.0 - 54.0 fl    MPV 10.8 6.0 - 12.0 fL    Platelets 243 140 - 450 10*3/mm3    Neutrophil % 95.1 (H) 42.7 - 76.0 %    Lymphocyte % 1.5 (L) 19.6 - 45.3 %    Monocyte % 2.1 (L) 5.0 - 12.0 %    Eosinophil % 0.0 (L) 0.3 - 6.2 %    Basophil % 0.3 0.0 - 1.5 %    Immature Grans % 1.0 (H) 0.0 - 0.5 %    Neutrophils, Absolute 35.57 (H) 1.70 - 7.00 10*3/mm3    Lymphocytes, Absolute 0.55 (L) 0.70 - 3.10 10*3/mm3    Monocytes, Absolute 0.79 0.10 - 0.90 10*3/mm3    Eosinophils, Absolute 0.00 0.00 - 0.40 10*3/mm3    Basophils, Absolute 0.10 0.00 - 0.20 10*3/mm3    Immature Grans, Absolute 0.37 (H) 0.00 - 0.05 10*3/mm3    nRBC 0.1 0.0 - 0.2 /100 WBC   POC Glucose Once    Collection Time: 01/06/25  6:18 AM    Specimen: Blood   Result Value Ref Range    Glucose 111 (H) 70 - 105 mg/dL          Imaging:  CT Head Without Contrast    Result Date: 1/5/2025  Impression: No acute intracranial abnormality. Air-fluid levels in the  bilateral maxillary sinuses. Correlate clinically for acute sinusitis. Electronically Signed: Khadar Walden MD  1/5/2025 4:36 PM EST  Workstation ID: [id number]    XR Chest 1 View    Result Date: 1/5/2025  Impression: 1. Stable right subclavian central venous catheter. 2. No pneumothorax. 3. Persistent bibasilar airspace disease left greater than right which may relate to atelectasis and/or pneumonia. Electronically Signed: Butch Rick MD  1/5/2025 9:21 AM EST  Workstation ID: [id number]    XR Chest 1 View    Result Date: 1/4/2025  1.Interval extubation. 2.Slight improvement in aeration at the left base. 3.Interstitial changes in both lungs, probably not significantly changed. Electronically Signed: Troy Nance MD  1/4/2025 7:52 PM EST  Workstation ID: [id number]    XR Chest 1 View    Result Date: 1/4/2025  Impression: Persistent left lower lobe airspace disease and underlying pleural effusion with mild right basilar atelectasis. Electronically Signed: Lynne Collins MD  1/4/2025 12:00 PM EST  Workstation ID: [id number]    XR Chest 1 View    Result Date: 1/3/2025  Impression: 1.Status post placement of a right-sided subclavian venous catheter with the tip terminating at the right atrial/SVC junction. There is no pneumothorax. 2.Interval development of pulmonary interstitial edema. 3.Patchy basilar densities, left greater than right side representing atelectasis versus developing airspace disease. There is probably a small left pleural effusion. Electronically Signed: Govind Aguilar MD  1/3/2025 4:56 PM EST  Workstation ID: [id number]    XR Chest 1 View    Result Date: 1/3/2025  Impression: Tip of the NG tube projects to the expected location of the lower thoracic esophagus, over the left lower mediastinum. It appears unchanged in position compared to today's earlier CT chest in today's earlier KUB study. As previously recommended, it should be advanced until is below the diaphragm, after confirmation with repeat KUB  imaging. Electronically Signed: Vee Wills MD  1/3/2025 3:13 PM EST  Workstation ID: [id number]    XR Abdomen KUB    Result Date: 1/3/2025  Impression: Enteric tube remains malpositioned, similar in position compared to today's earlier KUB, located above the level of the diaphragm. This should not be used until it is advanced below the diaphragm, followed by confirmation with KUB. Electronically Signed: Whitney Troy  1/3/2025 2:18 PM EST  Workstation ID: [id number]    XR Abdomen KUB    Result Date: 1/3/2025  Impression: NG tube tip projects left of midline over the mediastinum, near the level of the lower third of the thoracic esophagus. These findings have already been reported on today's earlier CT chest 1/3/2025 at 11:46 a.m. Electronically Signed: Vee Wills MD  1/3/2025 12:47 PM EST  Workstation ID: [id number]    CT Angiogram Chest Pulmonary Embolism    Result Date: 1/3/2025  Impression: 1.Dense left lower lobe consolidation with volume loss. Correlate for symptoms of pneumonia. 2.Scattered peripheral groundglass densities within the bilateral upper lobes and right middle lobe. Findings consistent with pneumonia, as can be seen with COVID-pneumonia. Correlate clinically. 3.Questionable mild peripancreatic inflammatory stranding, raising the possibility of pancreatitis in the appropriate clinical context.. Correlate with serum amylase and lipase levels. 4.Cirrhotic liver morphology. 5.Additional findings as described above. 6.Enteric tube extends to the level of the lower thoracic esophagus, above the esophogastric junction. Consider advancement. I called and spoke with one of the nurses in the ICU (this patient's nurse was unable to come to the phone), and she stated she would pass this pertinent information to the patient's nurse. Electronically Signed: Vee Wills MD  1/3/2025 12:03 PM EST  Workstation ID: [id number]    XR Chest 1 View    Result Date: 1/3/2025  Impression: ET tube placement to the level  of the mid thoracic trachea. Medial bibasilar densities favored to represent subsegmental atelectasis, without significant change. Electronically Signed: Vee Wills MD  1/3/2025 11:11 AM EST  Workstation ID: [id number]    XR Chest 1 View    Result Date: 1/3/2025  Impression: Mild linear subsegmental atelectasis in the right lower lobe. No focal consolidation. Electronically Signed: Vee Wills MD  1/3/2025 10:23 AM EST  Workstation ID: [id number]    XR Chest 1 View    Result Date: 1/3/2025  Impression: No acute cardiopulmonary process. Chronic appearing interstitial changes are present. Electronically Signed: Norma Ott MD  1/3/2025 2:22 AM EST  Workstation ID: [id number]    CT Abdomen Pelvis Without Contrast    Result Date: 1/2/2025  Impression: 1.No evidence of obstructive uropathy. 2.Cirrhosis. 3.Mural thickening involving the sigmoid colon which may be on a chronic basis as there are no surrounding inflammatory changes. Correlate for signs of sigmoid colitis. 4.Other incidental nonemergent findings as detailed above. Electronically Signed: Berny Collins MD  1/2/2025 3:41 PM EST  Workstation ID: [id number]    XR Chest 1 View    Result Date: 1/2/2025  Impression: No acute cardiopulmonary findings. Electronically Signed: Vee Wills MD  1/2/2025 2:49 PM EST  Workstation ID: [id number]    XR Spine Lumbar Complete 4+VW    Result Date: 1/2/2025  Impression: 1. No acute lumbar spine findings. 2. Degenerative postsurgical changes as described above. 3. Signs of bilateral S1 pedicle screw loosening, seen to better advantage on prior CT imaging. Electronically Signed: Vee Wills MD  1/2/2025 2:48 PM EST  Workstation ID: [id number]    CT Head Without Contrast    Result Date: 1/2/2025  Impression: 1.No acute intracranial finding. 2.Mild age-appropriate atrophy. 3.Mild paranasal sinus disease. Electronically Signed: Vee Wills MD  1/2/2025 2:05 PM EST  Workstation ID: [id number]     Assessment/Plan:     Acute renal  failure          Acute kidney injury   respiratory failure  Metabolic acidosis  Hypokalemia  UTI   Positive COVID   leukocytosis  Recommendations:  Excellent diuresis   Creatinine trending down 2.5   Low potassium and high sodium   Adjust diuretics   May need Dobbhoff tube with free water flushes  Discussed with family

## 2025-01-06 NOTE — PROGRESS NOTES
Critical Care Progress Note     Connie A Schoen : 1950 MRN:[id number] LOS:4  Rm: 2304/1     Principal Problem: Acute renal failure     Reason for follow up: All the medical problems listed below    Summary     Connie A Schoen is a 74 y.o. female with PMH of  degenerative disc disease, status post back surgery, anxiety/depression, hypothyroidism, hypertension, RA, and alcohol abuse, and presents to the hospital with complaints of generalized weakness, right lower extremity radicular pain, and generally feeling unwell.  Patient presented on 2025, and stated that at baseline patient can be off balance, uses a walker for ambulation and tends to hold onto people and walls.  Approximately 3-4 days ago she began to develop muscle weakness, and furthermore then developed pain in her chest that radiated to her back with associated coughing.  She has had a decreased appetite and oral intake, though it is noted that patient does routinely drink alcohol.  When asked how much alcohol she drinks, family was unable to quantify but states that it is a lot.  She will drink any type of alcohol that she can find, but tends to prefer fireball.  In the ED, patient was noted to have an elevated creatinine of 5.10 which is a new finding for her.  She was also found to have a suspected urinary tract infection, which a urine culture is pending with gram negative bacilli.  Respiratory viral panel did show COVID-19 infection.  Patient had a CT of the head that was negative for acute intracranial findings, and an x-ray of her lumbar spine but did also not have any acute findings.  Nephrology was consulted in the ED.  Patient was admitted to the hospitalist service.  She did experience a brief hypotensive episode overnight that responded to a 500 mL liter IV fluid bolus.  Patient's mentation continued to decline overnight.     On 1/3/2025, patient had a sharp decline in mental status with accompanying hypoxia and respiratory  distress, and a rapid response team was called.  Upon arrival by rapid response team, it was requested for patient to undergo intensivist consultation.  Patient was noted to be hypoxic with a metabolic acidosis, and was too weak to lift her arms up and felt an appropriate for BiPAP.  She did have a audible secretions in the back of her mouth, but was lethargic but responsive to questions.  After discussion with patient's family, decision was made to intubate patient and transferred to the ICU.  Initially, nephrology was planning to initiate hemodialysis, but after transfer to the ICU, and a improvement in her renal function with IV fluids, decision was made to postpone dialysis.  Patient did require vasopressors after intubation.  Intensivist service have taken over management of this patient.  Patient did trigger sepsis, and sepsis workup is in progress, following blood cultures, and patient was started on empiric antimicrobial therapy.    Significant Events / Subjective     01/06/25 : The patient has become increasingly reliant on BiPAP for oxygenation, we will try alternating with Airvo.  She frequently becomes agitated and anxious and overnight the Precedex was restarted to help facilitate ease of breathing and and oxygenation.  She continues to fever with a Tmax of 101.3 °F.  Broadened antibiotic coverage to Zosyn to complete antibiotic regimen.  Sodium is up to 155, add D5W.  Would like to add free water enterally however multiple people have been unsuccessful placing an NG tube or Dobbhoff, GI consulted to assist with placement.    Assessment / Plan   Acute respiratory failure with hypoxia  Pulmonary Edema and COVID-19 infection  Likely due to pulmonary edema  CTA Chest: LLL consolidation.  Mucomyst and CPT ordered.  Continue duonebs.  On BIPAP, will wean off to HFNC this AM  CXR with worsening pulmonary edema/multifocal pneumonia  Patient on low dose precedex gtt  Hold further diuresis for now  Echo with EF  56-60% from 1/3       Acute encephalopathy/fall/generalized weakness  Multifactorial etiologies: Toxic/metabolic in the setting of UTI, covid, hospital delirium, and possible alcohol withdrawl  CT of the head negative for acute intracranial findings.  Repeat CT head without acute intracranial finding  X-ray of lumbar spine with no acute lumbar spine findings, degenerative postsurgical changes described, signs of bilateral S1 pedicle screw loosening, but was a finding on previous CT imaging.  Further workup as clinically indicated    Sepsis due to UTI / COVID-19 infection  E. coli UTI, multifocal pneumonia and COVID-19.   CT of the abdomen/pelvis: Mural thickening involving the sigmoid colon without surrounding inflammatory changes, could possibly be chronic in nature.  CTA of the chest: Left lower lobe consolidation, peripheral groundglass densities in bilateral upper lobes and right middle lobe.  Blood cultures-pending with no growth.  Urine culture-Growing Ecoli.  Sputum culture-normal rebekah  MRSA screen-negative.  RVP-+COVID-19  Received Rocephin, switched to Zosyn 1/6 to complete full regimen  Decadron ordered for 6 mg x 10 days.     Off Norepinephrine     Acute Kidney Injury / Metabolic acidosis, increased anion gap  Remains nonoliguric. Baseline creatinine 0.9,   Likely prerenal. Possible intrinsic component due to ATN from infection, dehydration, and hypotension.  Nephrology consulted.   Monitor Input/Output very closely.   Avoid NSAIDs, nephrotoxic medications, and hypotension.      Intake/Output Summary (Last 24 hours) at 1/6/2025 1056  Last data filed at 1/6/2025 0430  Gross per 24 hour   Intake 1150 ml   Output 5350 ml   Net -4200 ml       Pancreatitis  Likely alcohol induced.  Lipase 691, Triglycerides 248.  CT of abdomen and pelvis: Pancreas was unremarkable.  CTA Chest: Questionable mild peripancreatic inflammatory stranding.  Supportive care.     Essential hypertension:   Hold home losartan due to  intermittent hypotension  Titrate medications as needed.     Alcohol abuse disorder  Family unable to quantify amount of alcohol patient drinks, but they state that she drinks quite a bit.  Prefers fireball but will drink any type of alcohol.  [facility] protocol in place.  Safety precautions and seizure precautions in place  Phenobarb taper ordered.  Thiamine, folic acid ordered.     Hypothyroidism: Continue levothyroxine when patient able to take oral medications or enteral access available.  GERD: Continue Protonix.  Anxiety/depression: Continue Zoloft when patient able to take oral medications or enteral access available.  Neuropathy / DDD: Hold gabapentin due to acute kidney injury.    Disposition:  remain in ICU    Code status:   Code Status (Patient has no pulse and is not breathing): CPR (Attempt to Resuscitate)  Medical Interventions (Patient has pulse or is breathing): Full Support       Nutrition:   NPO Diet NPO Type: Strict NPO   Patient isn't on Tube Feeding     VTE Prophylaxis:  Pharmacologic & mechanical VTE prophylaxis orders are present.           Objective / Physical Exam     Vital signs:  Temp: 99.9 °F (37.7 °C)  BP: 128/97  Heart Rate: 96  Resp: 20  SpO2: 94 %  Weight: 77.1 kg (170 lb)    Admission Weight: Weight: 77.1 kg (170 lb)  Current Weight: Weight: 77.1 kg (170 lb)    Input/Output in last 24 hours:    Intake/Output Summary (Last 24 hours) at 1/6/2025 1056  Last data filed at 1/6/2025 0430  Gross per 24 hour   Intake 1150 ml   Output 5350 ml   Net -4200 ml      Net IO Since Admission: -848.17 mL [01/06/25 1056]     Physical Exam  Vitals and nursing note reviewed.   Constitutional:       General: She is not in acute distress.     Appearance: She is ill-appearing.   HENT:      Head: Normocephalic.      Nose: Nose normal.   Eyes:      General: No scleral icterus.        Right eye: No discharge.         Left eye: No discharge.   Cardiovascular:      Heart sounds: Murmur heard.      Comments: Sinus  rhythm  Pulmonary:      Comments: Coarse breath sounds with rhonchi and crackles bilaterally  Abdominal:      General: There is no distension.      Palpations: Abdomen is soft.      Tenderness: There is no guarding.   Musculoskeletal:      Right lower leg: No edema.      Left lower leg: No edema.   Skin:     General: Skin is warm.   Neurological:      Comments: Wakes up and groans. Moves all extremities, Does not follow commands.          Radiology and Labs     Results from last 7 days   Lab Units 01/06/25  0436 01/05/25  0527 01/04/25  1900 01/04/25  1751 01/04/25  0556 01/03/25  1055   WBC 10*3/mm3 37.38* 33.51* 33.77*  --  26.51* 32.60*   HEMOGLOBIN g/dL 12.0 10.5* 10.6*  --  10.9* 11.4*   HEMOGLOBIN, POC g/dL  --   --   --  11.5*  --   --    HEMATOCRIT % 33.0* 29.5* 29.3*  --  30.1* 32.3*   HEMATOCRIT POC %  --   --   --  34*  --   --    PLATELETS 10*3/mm3 243 243 237  --  231 237      Results from last 7 days   Lab Units 01/03/25  1205   PROTIME Seconds 18.0*   INR  1.48*   APTT seconds 35.4      Results from last 7 days   Lab Units 01/06/25  0804 01/06/25  0436 01/05/25  0527 01/05/25  0022 01/04/25  1900 01/04/25  1751 01/04/25  1615 01/04/25  0910 01/04/25  0556 01/03/25  2125   SODIUM mmol/L 155* 152* 147* 147* 144  --  143   < > 142 141   POTASSIUM mmol/L  --  3.0* 3.5 3.1* 3.2*  --  3.4*   < > 4.0 2.9*   CHLORIDE mmol/L  --  111* 111* 113* 110*  --  109*   < > 109* 105   CO2 mmol/L  --  24.8 21.4* 21.3* 21.1*  --  20.7*   < > 20.5* 19.4*   BUN mg/dL  --  62* 64* 64* 66*  --  66*   < > 67* 69*   CREATININE mg/dL  --  2.59* 3.14* 3.43* 3.63* 3.52* 3.66*   < > 3.92* 4.08*   GLUCOSE mg/dL  --  109* 119* 108* 128*  --  162*   < > 218* 194*   MAGNESIUM mg/dL  --  2.4 2.5* 2.8*  --   --  2.8*  --  3.1* 3.2*   PHOSPHORUS mg/dL  --  4.8* 4.5 4.4  --   --   --   --  4.9* 4.8*    < > = values in this interval not displayed.      Results from last 7 days   Lab Units 01/06/25  0436 01/05/25  0527 01/05/25  0022  01/04/25  1900 01/04/25  0556   ALK PHOS U/L 245* 248* 265* 244* 293*   AST (SGOT) U/L 34* 29 29 25 24   ALT (SGPT) U/L 13 16 18 18 17     Results from last 7 days   Lab Units 01/04/25  1751 01/04/25  0350 01/03/25  1242 01/03/25  1018   PH, ARTERIAL pH units 7.393 7.332* 7.261* 7.166*   PCO2, ARTERIAL mm Hg 32.3* 39.3 42.2 40.1   PO2 ART mm Hg 74.3* 74.4* 350.2* 62.1*   O2 SATURATION ART % 94.9 93.8* 99.9* 84.6*   FIO2 % 40 40 100 100   HCO3 ART mmol/L 19.7* 20.8* 19.0* 14.5*   BASE EXCESS ART mmol/L -4.4* -4.7* -7.7* -13.5*       CT Head Without Contrast    Result Date: 1/5/2025  Impression: No acute intracranial abnormality. Air-fluid levels in the bilateral maxillary sinuses. Correlate clinically for acute sinusitis. Electronically Signed: Khadar Walden MD  1/5/2025 4:36 PM EST  Workstation ID: [id number]    XR Chest 1 View    Result Date: 1/5/2025  Impression: 1. Stable right subclavian central venous catheter. 2. No pneumothorax. 3. Persistent bibasilar airspace disease left greater than right which may relate to atelectasis and/or pneumonia. Electronically Signed: Butch Rick MD  1/5/2025 9:21 AM EST  Workstation ID: [id number]    XR Chest 1 View    Result Date: 1/4/2025  1.Interval extubation. 2.Slight improvement in aeration at the left base. 3.Interstitial changes in both lungs, probably not significantly changed. Electronically Signed: Troy Nance MD  1/4/2025 7:52 PM EST  Workstation ID: [id number]     Current medications     Scheduled Meds:   chlorhexidine, 15 mL, Mouth/Throat, Q12H  cholecalciferol, 1,000 Units, Oral, Daily  dexAMETHasone, 6 mg, Intravenous, Daily  folic acid 1 mg in sodium chloride 0.9 % 50 mL IVPB, 1 mg, Intravenous, Daily  gabapentin, 100 mg, Nasogastric, Daily  heparin (porcine), 5,000 Units, Subcutaneous, Q8H  insulin lispro, 2-9 Units, Subcutaneous, Q6H  levothyroxine, 150 mcg, Nasogastric, Q AM  [Held by provider] losartan, 100 mg, Oral, Daily  mupirocin, 1 Application, Each  Nare, BID  pantoprazole, 40 mg, Intravenous, Q AM  piperacillin-tazobactam, 3.375 g, Intravenous, Once  potassium chloride, 10 mEq, Intravenous, Q1H  senna-docusate sodium, 2 tablet, Nasogastric, BID  sertraline, 100 mg, Nasogastric, Daily  thiamine (B-1) IV, 200 mg, Intravenous, Q8H   Followed by  [START ON 1/8/2025] thiamine, 100 mg, Oral, Daily        Continuous Infusions:   dexmedetomidine, 0.2-1.5 mcg/kg/hr, Last Rate: 0.2 mcg/kg/hr (01/06/25 0841)  Pharmacy to Dose Zosyn,         Due to a high probability of clinically significant, life threatening deterioration, the patient required my highest level of preparedness to intervene emergently and I personally spent this critical care time directly and personally managing the patient. This critical care time included obtaining a history; examining the patient; pulse oximetry; ordering and review of studies; arranging urgent treatment with development of a management plan; evaluation of patient's response to treatment; frequent reassessment; and, discussions with other providers.    This critical care time was performed to assess and manage the high probability of imminent, life-threatening deterioration that could result in multi-organ failure. It was exclusive of separately billable procedures and treating other patients and teaching time.      Plan discussed with RN. Reviewed all other data in the last 24 hours, including but not limited to vitals, labs, microbiology, imaging and pertinent notes from other providers.  Plan also discussed with the patients family at the bedside.      MISAEL Mehta   Critical Care  01/06/25   10:56 EST

## 2025-01-06 NOTE — PLAN OF CARE
Goal Outcome Evaluation:            Pt remains in ICU    No enteral access at this time. GI consult placed for difficult placement    Was on Dex on arrival, Dex D/C'd for short period of time with geodon utilized instead for suspicions of hospital delerium versus true withdrawls. Pt tolerated geodon administration poorly with significant sedation afterwards with increased o2 demands on bipap. Geodon dosing/schedule altered. See MAR for more details.     At around 5p, pt noted with worsening hypertension and tachycardia. Highest noted was 202/118. Pt very restless at this time, moving in bed. Moaning loudly. Treated with PRN fentanyl and 1x dose of ativan. No improvement observed in either pts physical state nor her VS. Another mg of ativan was administered and pt placed back to dex with concerns for withdrawal.     Pt afebrile most of shift but spiked at fever at approximately 5p. Not responsive to tylenol and eventually placed to cooling blanket. T max 102    Ct head performed. Negative for acute intracranial finding. Some sinusitis observed.

## 2025-01-07 NOTE — NURSING NOTE
"Myself and Elroy, primary RN, met at the bedside with patient's  (POA) and daughter for a goals of care discussion. At this time,  has elected to make her a DNI, he understands that her condition is critical but he states he knows the patient \"does not want to live on machines.\" The daughter expresses hesitation in not intubating and that she \"doesn't want to give up on her.\"     After discussion, patient's  is adamant that the patient would choose to be done and he wants to make her DNR/DNI and transition to comfort care. Daughter supports this decision.     MISAEL Brady notified and comfort orders to be placed once additional family arrives.   "

## 2025-01-07 NOTE — PLAN OF CARE
Goal Outcome Evaluation:      Patient resting. Family at bedside. Patient appears uncomfortable despite pain meds and ativan given. NG unable to be placed. GI called for consult but did not return call. Family discussed with doctors the next steps and goals of care. Patient switched between bipap and airvo and seems to tolerate. Mentation improved after switching to airvo slightly. Pupils uneven and this morning during rounds. Patient on bipap now. D5W infusing and precedex at minimum setting. No further complaints from family at this time.

## 2025-01-07 NOTE — PLAN OF CARE
Goal Outcome Evaluation:                                            Problem: Adult Inpatient Plan of Care  Goal: Plan of Care Review  Outcome: Unable to Meet  Goal: Patient-Specific Goal (Individualized)  Outcome: Unable to Meet  Goal: Absence of Hospital-Acquired Illness or Injury  Outcome: Unable to Meet  Goal: Optimal Comfort and Wellbeing  Outcome: Unable to Meet  Goal: Readiness for Transition of Care  Outcome: Unable to Meet     Problem: Infection  Goal: Absence of Infection Signs and Symptoms  Outcome: Unable to Meet     Problem: Fall Injury Risk  Goal: Absence of Fall and Fall-Related Injury  Outcome: Unable to Meet     Problem: Mechanical Ventilation Invasive  Goal: Effective Communication  Outcome: Unable to Meet  Goal: Optimal Device Function  Outcome: Unable to Meet  Goal: Mechanical Ventilation Liberation  Outcome: Unable to Meet  Goal: Optimal Nutrition Delivery  Outcome: Unable to Meet  Goal: Absence of Device-Related Skin and Tissue Injury  Outcome: Unable to Meet  Goal: Absence of Ventilator-Induced Lung Injury  Outcome: Unable to Meet     Problem: Skin Injury Risk Increased  Goal: Skin Health and Integrity  Outcome: Unable to Meet     Problem: Sepsis/Septic Shock  Goal: Optimal Coping  Outcome: Unable to Meet  Goal: Absence of Bleeding  Outcome: Unable to Meet  Goal: Blood Glucose Level Within Target Range  Outcome: Unable to Meet  Goal: Absence of Infection Signs and Symptoms  Outcome: Unable to Meet  Goal: Optimal Nutrition Delivery  Outcome: Unable to Meet     Problem: Noninvasive Ventilation Acute  Goal: Effective Unassisted Ventilation and Oxygenation  Outcome: Unable to Meet

## 2025-01-07 NOTE — NURSING NOTE
Pts family expressed wishes to take full comfort measures at this time, all parties are at bedside. MISAEL Bishop notified.

## 2025-01-07 NOTE — DISCHARGE SUMMARY
CRITICAL CARE DEATH SUMMARY    PATIENT NAME:     Connie A Schoen  :     1950  MRN:     [id number]    ADMISSION DATE:  2025      DATE/TIME OF DEATH:    2025 at 6:37 AM       CAUSE OF DEATH  Acute respiratory failure with hypoxia, pulmonary edema secondary to COVID-19, compounded on metabolic acidosis 2/2 UTI with acute kidney injury    FINAL DIAGNOSES  Acute respiratory failure and hypoxia  Pulmonary edema and COVID-19 infection  Acute encephalopathy/fall/generalized weakness  Sepsis due to UTI/COVID-19 infection  Acute kidney injury/metabolic acidosis, increased anion gap  Pancreatitis  Hypertension  Alcohol abuse disorder  Hypothyroidism  GERD  Anxiety/depression  Neuropathy/DDD    HOSPITAL COURSE  Connie A Schoen was a 74 y.o. female with PMH of  degenerative disc disease, status post back surgery, anxiety/depression, hypothyroidism, hypertension, RA, and alcohol abuse, and presented to the hospital on 2025 with complaints of generalized weakness, right lower extremity radicular pain, and generally feeling unwell.  Patient stated that at baseline she could be off balance, used a walker for ambulation and tended to hold onto people and walls.  Approximately 3-4 days prior to admission she began to develop muscle weakness, and furthermore then developed pain in her chest that radiated to her back with associated coughing.  She has had a decreased appetite and oral intake, though it is noted that patient did routinely drink alcohol.  When asked how much alcohol she drank, family was unable to quantify but states that it was a lot.  In the ED, patient was noted to have an elevated creatinine of 5.10 which was a new finding for her.  She was also found to have a suspected urinary tract infection, which a urine culture is showed gram negative bacilli.  Respiratory viral panel did show COVID-19 infection.  Patient had a CT of the head that was negative for acute intracranial findings, and an x-ray of  her lumbar spine but did also not have any acute findings.  Nephrology was consulted in the ED.  Patient was admitted to the hospitalist service.  She had a brief hypotensive episode that night that responded to a 500 mL liter IV fluid bolus.  Patient's mentation continued to decline.  On 1/3/2025, patient had a sharp decline in mental status with accompanying hypoxia and respiratory distress, and a rapid response team was called.  Upon arrival by rapid response team, it was requested for patient to undergo intensivist consultation.  Patient was noted to be hypoxic with a metabolic acidosis, and was too weak to lift her arms up and was inappropriate for BiPAP.  She did have audible secretions in the back of her mouth.  She was lethargic but responsive to questions.  After discussion with patient's family, decision was made to intubate patient and transferred to the ICU.  Initially, nephrology was planning to initiate hemodialysis, but after transfer to the ICU, and improvement in her renal function with IV fluids, decision was made to postpone dialysis.  Patient did require vasopressors after intubation.  Intensivist service have taken over management of this patient.  On 01/04/25, patient remained nonoliguric overnight and noted to have decreasing creatinine.  She was on low-dose norepinephrine and sedated with Precedex and fentanyl and was on minimal oxygen settings.  Sedation was weaned and patient was extubated to nasal cannula that afternoon.  On 1/5/2025, patient had worsening agitation and required increased oxygen requirements. She was placed on BIPAP.   Noted to wake up but was not alert or following commands, moving all extremities. On 01/06/25, patient had become increasingly reliant on BiPAP for oxygenation.  Attempted alternating with Airvo.  She frequently became agitated and anxious, and overnight the Precedex was restarted to help facilitate ease of breathing and oxygenation.  She continued to fever  with a Tmax of 101.3 °F.  Broadened antibiotic coverage to Zosyn to complete antibiotic regimen.  Sodium was up to 155, and D5W was added.  Plan was to give free water enterally however multiple people were unsuccessful in placing an NG tube or Dobbhoff, GI consulted to assist with placement.  At that time discussion was had with patient's  who elected to make patient DNR/DNI.  Later that evening intensivist spoke with patient's daughter who was concerned about her CODE STATUS.  She wanted the patient to be intubated, however patient's power of  was her .  Her  was called by nursing staff and came into the facility to speak with patient daughter at bedside.  At that time he decided that he wanted to transition patient to comfort care as she would not want to live on a ventilator or have machines to keep her alive.  Patient was compassionately extubated shortly after 5:30 AM.    Time of death noted to be on 1/7/2025 at 6:37 AM .  Emotional support provided to family.       PROCEDURES PERFORMED         CONSULTING PHYSICIANS  Consults       No orders found from 12/4/2024 to 1/3/2025.            RESULTS REVIEW  LABS  Lab Results (last 24 hours)       Procedure Component Value Units Date/Time    Sodium [[id number]]  (Abnormal) Collected: 01/06/25 0804    Specimen: Blood Updated: 01/06/25 0837     Sodium 155 mmol/L     POC Glucose Once [[id number]]  (Abnormal) Collected: 01/06/25 1123    Specimen: Blood Updated: 01/06/25 1126     Glucose 108 mg/dL      Comment: Serial Number: [id number]:  [id number]       Blood Gas, Arterial - [[id number]]  (Abnormal) Collected: 01/06/25 1218    Specimen: Arterial Blood Updated: 01/06/25 1222     Site Left Radial     Chris's Test Positive     pH, Arterial 7.461 pH units      pCO2, Arterial 37.6 mm Hg      pO2, Arterial 65.9 mm Hg      HCO3, Arterial 26.8 mmol/L      Base Excess, Arterial 2.9 mmol/L      Comment: Serial Number: [id number]:  [id number]         O2 Saturation, Arterial 93.8 %      CO2 Content 27.9 mmol/L      Barometric Pressure for Blood Gas --     Comment: N/A        Modality BiPap     FIO2 60 %      Ventilator Mode BiLevel     Hemodilution No     Respiratory Rate 18     PO2/FIO2 110    POC Lactate [[id number]]  (Normal) Collected: 01/06/25 1218    Specimen: Arterial Blood Updated: 01/06/25 1222     Lactate 0.5 mmol/L      Comment: Serial Number: [id number]:  [id number]       Blood Gas, Arterial - [[id number]]  (Abnormal) Collected: 01/06/25 1733    Specimen: Arterial Blood Updated: 01/06/25 1741     Site Left Brachial     Chris's Test N/A     pH, Arterial 7.462 pH units      pCO2, Arterial 35.6 mm Hg      pO2, Arterial 57.8 mm Hg      HCO3, Arterial 25.5 mmol/L      Base Excess, Arterial 1.8 mmol/L      Comment: Serial Number: [id number]:  [id number]        O2 Saturation, Arterial 91.3 %      CO2 Content 26.5 mmol/L      Barometric Pressure for Blood Gas --     Comment: N/A        Modality Vapotherm     FIO2 78 %      Hemodilution No     PO2/FIO2 74    POC Glucose Once [[id number]]  (Normal) Collected: 01/06/25 1733    Specimen: Arterial Blood Updated: 01/06/25 1741     Glucose 83 mg/dL      Comment: Serial Number: [id number]:  [id number]       Potassium [[id number]]  (Abnormal) Collected: 01/06/25 1828    Specimen: Blood Updated: 01/06/25 1907     Potassium 3.2 mmol/L     POC Glucose Q6H [[id number]]  (Normal) Collected: 01/07/25 0010    Specimen: Blood Updated: 01/07/25 0014     Glucose 90 mg/dL      Comment: Serial Number: [id number]:  [id number]       POC Creatinine [[id number]]  (Abnormal) Collected: 01/07/25 0010    Specimen: Arterial Blood Updated: 01/07/25 0014     Creatinine 2.47 mg/dL      Comment: Serial Number: [id number]:  [id number]        eGFR 20.3 mL/min/1.73     Blood Gas, Arterial - [[id number]]  (Abnormal) Collected: 01/07/25 0010    Specimen: Arterial Blood Updated: 01/07/25 0014     Site Left Radial     Chris's Test Positive     pH, Arterial  7.395 pH units      pCO2, Arterial 43.5 mm Hg      pO2, Arterial 57.7 mm Hg      HCO3, Arterial 26.7 mmol/L      Base Excess, Arterial 1.4 mmol/L      Comment: Serial Number: [id number]:  [id number]        O2 Saturation, Arterial 89.3 %      Barometric Pressure for Blood Gas --     Comment: N/A        Modality Vapotherm     FIO2 80 %      Hemodilution No     PO2/FIO2 72    POCT Electrolytes +HGB +HCT [[id number]]  (Abnormal) Collected: 01/07/25 0010    Specimen: Arterial Blood Updated: 01/07/25 0014     Sodium 157 mmol/L      POC Potassium 3.2 mmol/L      Ionized Calcium 1.25 mmol/L      Comment: Serial Number: [id number]:  [id number]        Glucose 90 mg/dL      Hematocrit 39 %      Hemoglobin 13.1 g/dL     POC Lactate [[id number]]  (Normal) Collected: 01/07/25 0010    Specimen: Arterial Blood Updated: 01/07/25 0014     Lactate 1.0 mmol/L      Comment: Serial Number: [id number]:  [id number]               MICRO:  All cultures reviewed and negative, or pending with no growth unless otherwise designated in chart below.  Microbiology Results Abnormal       Procedure Component Value - Date/Time    Urine Culture - Urine, Urine, Catheter [[id number]]  (Abnormal)  (Susceptibility) Collected: 01/02/25 1419    Lab Status: Final result Specimen: Urine, Catheter Updated: 01/04/25 1146     Urine Culture >100,000 CFU/mL Escherichia coli    Narrative:      Colonization of the urinary tract without infection is common. Treatment is discouraged unless the patient is symptomatic, pregnant, or undergoing an invasive urologic procedure.      Susceptibility        Escherichia coli      LUKE      Amoxicillin + Clavulanate Susceptible      Ampicillin Resistant      Ampicillin + Sulbactam Susceptible      Cefazolin (Urine) Susceptible      Cefepime Susceptible      Ceftazidime Susceptible      Ceftriaxone Susceptible      Gentamicin Susceptible      Levofloxacin Intermediate      Nitrofurantoin Susceptible      Piperacillin + Tazobactam  Susceptible      Trimethoprim + Sulfamethoxazole Susceptible                           Respiratory Panel PCR w/COVID-19(SARS-CoV-2) [facility] In-House, NP Swab in UTM/VTM, 2 HR TAT - Swab, Nasopharynx [[id number]]  (Abnormal) Collected: 01/02/25 1333    Lab Status: Final result Specimen: Swab from Nasopharynx Updated: 01/02/25 1443     ADENOVIRUS, PCR Not Detected     Coronavirus 229E Not Detected     Coronavirus HKU1 Not Detected     Coronavirus NL63 Not Detected     Coronavirus OC43 Not Detected     COVID19 Detected     Human Metapneumovirus Not Detected     Human Rhinovirus/Enterovirus Not Detected     Influenza A PCR Not Detected     Influenza B PCR Not Detected     Parainfluenza Virus 1 Not Detected     Parainfluenza Virus 2 Not Detected     Parainfluenza Virus 3 Not Detected     Parainfluenza Virus 4 Not Detected     RSV, PCR Not Detected     Bordetella pertussis pcr Not Detected     Bordetella parapertussis PCR Not Detected     Chlamydophila pneumoniae PCR Not Detected     Mycoplasma pneumo by PCR Not Detected    Narrative:      In the setting of a positive respiratory panel with a viral infection PLUS a negative procalcitonin without other underlying concern for bacterial infection, consider observing off antibiotics or discontinuation of antibiotics and continue supportive care. If the respiratory panel is positive for atypical bacterial infection (Bordetella pertussis, Chlamydophila pneumoniae, or Mycoplasma pneumoniae), consider antibiotic de-escalation to target atypical bacterial infection.               RADIOLOGY:  XR Chest 1 View    Result Date: 1/7/2025  Diffuse bilateral interstitial infiltrates with some developing alveolar opacities in the bases. Findings may reflect a combination of pulmonary edema and pneumonia. Electronically Signed: Troy Nance MD  1/7/2025 12:05 AM EST  Workstation ID: [id number]    CT Head Without Contrast    Result Date: 1/5/2025  Impression: No acute  intracranial abnormality. Air-fluid levels in the bilateral maxillary sinuses. Correlate clinically for acute sinusitis. Electronically Signed: Khadar Walden MD  1/5/2025 4:36 PM EST  Workstation ID: [id number]    XR Chest 1 View    Result Date: 1/5/2025  Impression: 1. Stable right subclavian central venous catheter. 2. No pneumothorax. 3. Persistent bibasilar airspace disease left greater than right which may relate to atelectasis and/or pneumonia. Electronically Signed: Butch Rick MD  1/5/2025 9:21 AM EST  Workstation ID: [id number]    XR Chest 1 View    Result Date: 1/4/2025  1.Interval extubation. 2.Slight improvement in aeration at the left base. 3.Interstitial changes in both lungs, probably not significantly changed. Electronically Signed: Troy Nance MD  1/4/2025 7:52 PM EST  Workstation ID: [id number]    XR Chest 1 View    Result Date: 1/4/2025  Impression: Persistent left lower lobe airspace disease and underlying pleural effusion with mild right basilar atelectasis. Electronically Signed: Lynne Collins MD  1/4/2025 12:00 PM EST  Workstation ID: [id number]    XR Chest 1 View    Result Date: 1/3/2025  Impression: 1.Status post placement of a right-sided subclavian venous catheter with the tip terminating at the right atrial/SVC junction. There is no pneumothorax. 2.Interval development of pulmonary interstitial edema. 3.Patchy basilar densities, left greater than right side representing atelectasis versus developing airspace disease. There is probably a small left pleural effusion. Electronically Signed: Govind Aguilar MD  1/3/2025 4:56 PM EST  Workstation ID: [id number]    XR Chest 1 View    Result Date: 1/3/2025  Impression: Tip of the NG tube projects to the expected location of the lower thoracic esophagus, over the left lower mediastinum. It appears unchanged in position compared to today's earlier CT chest in today's earlier KUB study. As previously recommended, it should be advanced until is below the  diaphragm, after confirmation with repeat KUB imaging. Electronically Signed: Vee Wills MD  1/3/2025 3:13 PM EST  Workstation ID: [id number]    XR Abdomen KUB    Result Date: 1/3/2025  Impression: Enteric tube remains malpositioned, similar in position compared to today's earlier KUB, located above the level of the diaphragm. This should not be used until it is advanced below the diaphragm, followed by confirmation with KUB. Electronically Signed: Whitneyotoniel Simmons  1/3/2025 2:18 PM EST  Workstation ID: [id number]    XR Abdomen KUB    Result Date: 1/3/2025  Impression: NG tube tip projects left of midline over the mediastinum, near the level of the lower third of the thoracic esophagus. These findings have already been reported on today's earlier CT chest 1/3/2025 at 11:46 a.m. Electronically Signed: Vee Wills MD  1/3/2025 12:47 PM EST  Workstation ID: [id number]    CT Angiogram Chest Pulmonary Embolism    Result Date: 1/3/2025  Impression: 1.Dense left lower lobe consolidation with volume loss. Correlate for symptoms of pneumonia. 2.Scattered peripheral groundglass densities within the bilateral upper lobes and right middle lobe. Findings consistent with pneumonia, as can be seen with COVID-pneumonia. Correlate clinically. 3.Questionable mild peripancreatic inflammatory stranding, raising the possibility of pancreatitis in the appropriate clinical context.. Correlate with serum amylase and lipase levels. 4.Cirrhotic liver morphology. 5.Additional findings as described above. 6.Enteric tube extends to the level of the lower thoracic esophagus, above the esophogastric junction. Consider advancement. I called and spoke with one of the nurses in the ICU (this patient's nurse was unable to come to the phone), and she stated she would pass this pertinent information to the patient's nurse. Electronically Signed: Vee Wills MD  1/3/2025 12:03 PM EST  Workstation ID: [id number]    XR Chest 1 View    Result Date:  1/3/2025  Impression: ET tube placement to the level of the mid thoracic trachea. Medial bibasilar densities favored to represent subsegmental atelectasis, without significant change. Electronically Signed: Vee Wills MD  1/3/2025 11:11 AM EST  Workstation ID: [id number]    XR Chest 1 View    Result Date: 1/3/2025  Impression: Mild linear subsegmental atelectasis in the right lower lobe. No focal consolidation. Electronically Signed: Vee Wills MD  1/3/2025 10:23 AM EST  Workstation ID: [id number]    XR Chest 1 View    Result Date: 1/3/2025  Impression: No acute cardiopulmonary process. Chronic appearing interstitial changes are present. Electronically Signed: Norma Ott MD  1/3/2025 2:22 AM EST  Workstation ID: [id number]    CT Abdomen Pelvis Without Contrast    Result Date: 1/2/2025  Impression: 1.No evidence of obstructive uropathy. 2.Cirrhosis. 3.Mural thickening involving the sigmoid colon which may be on a chronic basis as there are no surrounding inflammatory changes. Correlate for signs of sigmoid colitis. 4.Other incidental nonemergent findings as detailed above. Electronically Signed: Berny Collins MD  1/2/2025 3:41 PM EST  Workstation ID: [id number]    XR Chest 1 View    Result Date: 1/2/2025  Impression: No acute cardiopulmonary findings. Electronically Signed: Vee Wills MD  1/2/2025 2:49 PM EST  Workstation ID: [id number]    XR Spine Lumbar Complete 4+VW    Result Date: 1/2/2025  Impression: 1. No acute lumbar spine findings. 2. Degenerative postsurgical changes as described above. 3. Signs of bilateral S1 pedicle screw loosening, seen to better advantage on prior CT imaging. Electronically Signed: Vee Wills MD  1/2/2025 2:48 PM EST  Workstation ID: [id number]    CT Head Without Contrast    Result Date: 1/2/2025  Impression: 1.No acute intracranial finding. 2.Mild age-appropriate atrophy. 3.Mild paranasal sinus disease. Electronically Signed: Vee Wills MD  1/2/2025 2:05 PM EST  Workstation  ID: [id number]     For more specific information regarding this patient’s hospital stay at [facility], please refer to patient’s full medical record.  This summary has been culminated by this nurse practitioner on behalf of the critical care team at the request of Dr. Coffey.    Time: Discharge 40 min    Electronically signed by MISAEL Claire, 01/07/25 at 07:52 EST.

## 2025-01-07 NOTE — NURSING NOTE
(POA), daughter, sister-in-law, Primary RN (Elroy), and myself all at the bedside to discuss goals of care. At this time, POA has decided patient is a DNI, daughter has advocated multiple times to staff that she does not understand this decision and does   Gastritis Alcoholic intoxication without complication

## 2025-01-07 NOTE — CASE MANAGEMENT/SOCIAL WORK
Discharge Planning Assessment  [facility]     Patient Name: Connie A Schoen  MRN: [id number]  Today's Date: 2025    Admit Date: 2025    Plan: Patient  home before CM assessment.  No CM needs.       Discharge Plan       Row Name 25 1025       Plan    Plan Patient  home before CM assessment.  No CM needs.    Plan Comments Patient  home before CM assessment. No CM needs.    Final Discharge Disposition Code 20 -     Final Note .               Expected Discharge Date and Time       Expected Discharge Date Expected Discharge Time    Vance 10, 2025         SUNITHA Sweeney RN  ICU/CVU   O: [phone]  C: [phone]  [email or web address]

## 2025-01-07 NOTE — CASE MANAGEMENT/SOCIAL WORK
Case Management Discharge Note      Final Note: .            Final Discharge Disposition Code: 20 -       SUNITHA Sweeney RN  ICU/CVU   O: [phone]  C: [phone]  [email or web address]

## 2025-01-07 NOTE — SIGNIFICANT NOTE
"Called into patient's room to talk with patient's daughter at bedside.  Daughter at bedside expresses concern about her mother's care stating \"you all are just giving up on her\".  This APRN explained to daughter that the patient's  is POA and has made the decision to make patient DNR/DNI.  We are currently doing everything we can for her considering her CODE STATUS.  Patient's daughter expresses concern for no longer receiving Bumex.  Explained that patient has been adequately diuresed, and reiterated with nephrology's note for today.  Patient daughter is concerned that patient is not waking up like she was, however she does realize patient receiving Dilaudid and Ativan for agitation and possible alcohol withdrawal.  Asked that we do not give pain medicine to allow her to wake up, understanding that patient may have more pain and distress if untreated.  Patient's family is requesting to speak with attending.  This APRN informed them that he is not in house, and will be back in the morning.  Patient  is currently on way to hospital after speaking with this APRN.  He is conflicted on what to do for his wife, stating that he knows for sure she would not want to go on the ventilator again.  Will await for him to arrive and speak to the family together with the nursing staff for plan of care.    Electronically signed by MISAEL Claire, 01/07/25, 1:29 AM EST.    "

## 2025-01-08 NOTE — PROGRESS NOTES
"Enter Query Response Below      Query Response: COVID-19 pneumonia     Electronically signed by Leon Coffey MD, 25, 1:17 PM EST.              If applicable, please update the problem list.     Patient: Schoen, Connie A        : 1950  Account: [id number]           Admit Date: 2025        How to Respond to this query:       a. Click New Note     b. Answer query within the yellow box.                c. Update the Problem List, if applicable.      If you have any questions about this query contact me at: [phone]     Dr. Coffey:     74 y.o. female with history of hypertension, RA, and alcohol abuse, admitted 2025 with COVID-19 and UTI.  CT chest showed \"Scattered peripheral groundglass densities within the bilateral upper lobes and right middle lobe. Findings consistent with pneumonia, as can be seen with COVID-pneumonia.\"  Progress notes include \"Septic Shock due to pneumonia / COVID-19 infection.\"  Patient given IV fluids, rocephin IV -, zosyn IV , levophed.  Discharge summary includes \"pulmonary edema secondary to COVID-19\" with no mention of pneumonia.    Please clarify the type of pneumonia the patient was treated/monitored for:    COVID-19 pneumonia  Bacterial pneumonia unspecified  Bacterial pneumonia superimposed on COVID-19 pneumonia  Pneumonia ruled out  Other- specify______    By submitting this query, we are merely seeking further clarification of documentation to accurately reflect all conditions that you are monitoring, evaluating, treating or that extend the hospitalization or utilize additional resources of care. Please utilize your independent clinical judgment when addressing the question(s) above.     This query and your response, once completed, will be entered into the legal medical record.    Sincerely,  Michelle Alvarado, MSN, APRN-BC, CCDS   Clinical Documentation Integrity Program   [email or web address]   "

## 2025-01-08 NOTE — PROGRESS NOTES
"Enter Query Response Below      Query Response: Septic encephalopathy     Electronically signed by Leon Coffey MD, 25, 1:18 PM EST.              If applicable, please update the problem list.     Patient: Schoen, Connie A        : 1950  Account: [id number]           Admit Date: 2025        How to Respond to this query:       a. Click New Note     b. Answer query within the yellow box.                c. Update the Problem List, if applicable.      If you have any questions about this query contact me at: [phone]     Dr. Coffey:    74 y.o. female with history of hypertension, RA, and alcohol abuse, presented 2025 with COVID-19 and UTI. Intensivist notes include \"Acute encephalopathy/fall/generalized weakness-Likely infection related.\" Progress notes include \"Acute encephalopathy/fall/generalized weakness-Likely toxic/metabolic in the setting of UTI, covid, hospital delirium, and possible alcohol withdrawal.\" Patient given IV fluids, rocephin IV -, zosyn IV , levophed, lorazepam IV, ziprasidone IV.  Discharge summary includes \"Acute encephalopathy.\"     Please clarify if patient treated/monitored for one or more of the following:    Metabolic encephalopathy    Septic encephalopathy  Other- specify__________     By submitting this query, we are merely seeking further clarification of documentation to accurately reflect all conditions that you are monitoring, evaluating, treating or that extend the hospitalization or utilize additional resources of care. Please utilize your independent clinical judgment when addressing the question(s) above.     This query and your response, once completed, will be entered into the legal medical record.    Sincerely,  Michelle Alvarado, MSN, APRN-BC, CCDS   Clinical Documentation Integrity Program   [email or web address]   "

## 2025-01-08 NOTE — PROGRESS NOTES
"Enter Query Response Below      Query Response: alcohol abuse with withdrawal     Electronically signed by Leon Coffey MD, 25, 1:19 PM EST.              If applicable, please update the problem list.     Patient: Schoen, Connie A        : 1950  Account: [id number]           Admit Date: 2025        How to Respond to this query:       a. Click New Note     b. Answer query within the yellow box.                c. Update the Problem List, if applicable.      If you have any questions about this query contact me at: [phone]     Dr. Coffey:     74 y.o. female with history of hypertension, RA, and alcohol abuse, presented 2025 with COVID-19 and UTI. On 1/3/2025, patient had a sharp decline in mental status with accompanying hypoxia and respiratory distress and was intubated. Progress notes - note patient with worsening agitation with patient requiring increased oxygen requirements and receiving dilaudid and ativan for possible withdrawal.  Intensivist notes include \"CIWA protocol in place-Phenobarb taper ordered.\" Patient given IV fluids, rocephin IV -, zosyn IV , levophed, phenobarbital IV, lorazepam IV, ziprasidone IV.  Discharge summary includes \"Alcohol abuse disorder.\"      Can this be further clarified as:    - alcohol abuse with withdrawal  - alcohol abuse without withdrawal  - other (specify) _____  - unable to determine     By submitting this query, we are merely seeking further clarification of documentation to accurately reflect all conditions that you are monitoring, evaluating, treating or that extend the hospitalization or utilize additional resources of care. Please utilize your independent clinical judgment when addressing the question(s) above.     This query and your response, once completed, will be entered into the legal medical record.    Sincerely,  Michelle Alvarado, MSN, APRN-BC, CCDS   Clinical Documentation Integrity Program "   [email or web address]

## (undated) DEVICE — SPNG GZ AVANT 6PLY 4X4IN STRL PK/2

## (undated) DEVICE — KT SURG TURNOVER 050

## (undated) DEVICE — GLIDESHEATH SLENDER STAINLESS STEEL KIT: Brand: GLIDESHEATH SLENDER

## (undated) DEVICE — RECIPROCATING BLADE HEAVY DUTY LONG, OFFSET  (77.6 X 0.77 X 11.2MM)

## (undated) DEVICE — SUT VIC 2/0 CP2 CR8 18IN J762D

## (undated) DEVICE — PK TRY HEART CATH 50

## (undated) DEVICE — CATH DIAG IMPULSE FR4 6F 100CM

## (undated) DEVICE — PROVE COVER: Brand: UNBRANDED

## (undated) DEVICE — GW EMR FIX EXCHG J STD .035 3MM 260CM

## (undated) DEVICE — GLV SURG BIOGEL LTX PF 8

## (undated) DEVICE — PK TOTL KN 50

## (undated) DEVICE — MARKR SKIN W/RULR

## (undated) DEVICE — DRAPE, RADIAL, STERILE: Brand: MEDLINE

## (undated) DEVICE — SOLUTION,WATER,IRRIGATION,1000ML,STERILE: Brand: MEDLINE

## (undated) DEVICE — WRAP KNEE COLD THERAPY

## (undated) DEVICE — BND COMPR ZEPHYR VASC LG

## (undated) DEVICE — DUAL CUT SAGITTAL BLADE

## (undated) DEVICE — ZIP 24 SURGICAL SKIN CLOSURE DEVICE, PSA: Brand: ZIP 24 SURGICAL SKIN CLOSURE DEVICE

## (undated) DEVICE — CEMENT MIXING SYSTEM WITH FEMORAL BREAKWAY NOZZLE: Brand: REVOLUTION

## (undated) DEVICE — DEV TOURNI/EXSNGNTN HEMACLEAR XL B/W DISP STRL

## (undated) DEVICE — GLV SURG SIGNATURE ESSENTIAL PF LTX SZ7

## (undated) DEVICE — PICO 7 10CM X 30CM: Brand: PICO™ 7

## (undated) DEVICE — PAD,ABDOMINAL,5"X9",STERILE,LF,1/PK: Brand: MEDLINE INDUSTRIES, INC.

## (undated) DEVICE — IRRIGATOR BULB ASEPTO 60CC STRL

## (undated) DEVICE — GLV SURG BIOGEL SENSR LTX PF SZ8

## (undated) DEVICE — ADHS SKIN PREMIERPRO EXOFIN TOPICAL HI/VISC .5ML

## (undated) DEVICE — CATH DIAG IMPULSE FL3 5F 100CM

## (undated) DEVICE — SOL IRR NACL 0.9PCT ARTHROMATIC 3000ML

## (undated) DEVICE — ELECTRD DEFIB M/FUNC PROPADZ RADIOL 2PK

## (undated) DEVICE — UNDERGLV SURG BIOGEL INDICAT PF 8 GRN